# Patient Record
Sex: FEMALE | Race: WHITE | NOT HISPANIC OR LATINO | Employment: OTHER | URBAN - METROPOLITAN AREA
[De-identification: names, ages, dates, MRNs, and addresses within clinical notes are randomized per-mention and may not be internally consistent; named-entity substitution may affect disease eponyms.]

---

## 2017-01-06 DIAGNOSIS — E78.00 ELEVATED CHOLESTEROL: ICD-10-CM

## 2017-01-06 DIAGNOSIS — Z00.00 HEALTHCARE MAINTENANCE: ICD-10-CM

## 2017-01-06 DIAGNOSIS — M81.0 OSTEOPOROSIS: ICD-10-CM

## 2017-01-10 LAB
25(OH)D3+25(OH)D2 SERPL-MCNC: 25.5 NG/ML (ref 30–100)
ALBUMIN SERPL-MCNC: 4.2 G/DL (ref 3.5–5.2)
ALBUMIN/GLOB SERPL: 1.8 G/DL
ALP SERPL-CCNC: 74 U/L (ref 39–117)
ALT SERPL-CCNC: 13 U/L (ref 1–33)
AST SERPL-CCNC: 20 U/L (ref 1–32)
BILIRUB SERPL-MCNC: 0.3 MG/DL (ref 0.1–1.2)
BUN SERPL-MCNC: 35 MG/DL (ref 8–23)
BUN/CREAT SERPL: 25.4 (ref 7–25)
CALCIUM SERPL-MCNC: 9.7 MG/DL (ref 8.6–10.5)
CHLORIDE SERPL-SCNC: 105 MMOL/L (ref 98–107)
CHOLEST SERPL-MCNC: 180 MG/DL (ref 0–200)
CO2 SERPL-SCNC: 26.6 MMOL/L (ref 22–29)
CREAT SERPL-MCNC: 1.38 MG/DL (ref 0.57–1)
GLOBULIN SER CALC-MCNC: 2.4 GM/DL
GLUCOSE SERPL-MCNC: 80 MG/DL (ref 65–99)
HCV AB S/CO SERPL IA: <0.1 S/CO RATIO (ref 0–0.9)
HDLC SERPL-MCNC: 91 MG/DL (ref 40–60)
LDLC SERPL CALC-MCNC: 77 MG/DL (ref 0–100)
LDLC/HDLC SERPL: 0.85 {RATIO}
POTASSIUM SERPL-SCNC: 3.8 MMOL/L (ref 3.5–5.2)
PROT SERPL-MCNC: 6.6 G/DL (ref 6–8.5)
SODIUM SERPL-SCNC: 144 MMOL/L (ref 136–145)
TRIGL SERPL-MCNC: 60 MG/DL (ref 0–150)
VLDLC SERPL CALC-MCNC: 12 MG/DL (ref 5–40)

## 2017-01-12 ENCOUNTER — OFFICE VISIT (OUTPATIENT)
Dept: INTERNAL MEDICINE | Facility: CLINIC | Age: 67
End: 2017-01-12

## 2017-01-12 VITALS
WEIGHT: 135.2 LBS | HEIGHT: 61 IN | SYSTOLIC BLOOD PRESSURE: 128 MMHG | DIASTOLIC BLOOD PRESSURE: 72 MMHG | OXYGEN SATURATION: 97 % | HEART RATE: 77 BPM | BODY MASS INDEX: 25.53 KG/M2

## 2017-01-12 DIAGNOSIS — E55.9 VITAMIN D DEFICIENCY: ICD-10-CM

## 2017-01-12 DIAGNOSIS — F41.9 ANXIETY: Primary | ICD-10-CM

## 2017-01-12 DIAGNOSIS — D69.6 THROMBOCYTOPENIA (HCC): ICD-10-CM

## 2017-01-12 DIAGNOSIS — M81.0 OSTEOPOROSIS: ICD-10-CM

## 2017-01-12 DIAGNOSIS — G47.00 INSOMNIA, UNSPECIFIED TYPE: ICD-10-CM

## 2017-01-12 DIAGNOSIS — N28.9 RENAL INSUFFICIENCY: ICD-10-CM

## 2017-01-12 PROCEDURE — 99214 OFFICE O/P EST MOD 30 MIN: CPT | Performed by: INTERNAL MEDICINE

## 2017-01-12 RX ORDER — ALPRAZOLAM 0.5 MG/1
0.5 TABLET ORAL 3 TIMES DAILY PRN
COMMUNITY
End: 2017-01-12 | Stop reason: SDUPTHER

## 2017-01-12 RX ORDER — ALPRAZOLAM 0.5 MG/1
0.5 TABLET ORAL 3 TIMES DAILY PRN
Qty: 20 TABLET | Refills: 0 | Status: SHIPPED | OUTPATIENT
Start: 2017-01-12 | End: 2018-02-15 | Stop reason: SDUPTHER

## 2017-01-12 NOTE — MR AVS SNAPSHOT
Carol Osgood   1/12/2017 1:30 PM   Office Visit    Dept Phone:  [phone]   Encounter #:  [id number]    Provider:  Ciera Newman MD   Department:  [facility] INTERNAL MEDICINE                Your Full Care Plan              Today's Medication Changes          These changes are accurate as of: 1/12/17  2:49 PM.  If you have any questions, ask your nurse or doctor.               Stop taking medication(s)listed here:     calcium carbonate 1250 (500 CA) MG tablet   Commonly known as:  OS-MOLLY   Stopped by:  Ciera Newman MD                Where to Get Your Medications      You can get these medications from any pharmacy     Bring a paper prescription for each of these medications     ALPRAZolam 0.5 MG tablet                  Your Updated Medication List          This list is accurate as of: 1/12/17  2:49 PM.  Always use your most recent med list.                ALPRAZolam 0.5 MG tablet   Commonly known as:  XANAX   Take 1 tablet by mouth 3 (Three) Times a Day As Needed for anxiety.               You Were Diagnosed With        Codes Comments    Anxiety    -  Primary ICD-10-CM: F41.9  ICD-9-CM: 300.00     Insomnia, unspecified type     ICD-10-CM: G47.00  ICD-9-CM: 780.52     Vitamin D deficiency     ICD-10-CM: E55.9  ICD-9-CM: 268.9     Renal insufficiency     ICD-10-CM: N28.9  ICD-9-CM: 593.9     Thrombocytopenia     ICD-10-CM: D69.6  ICD-9-CM: 287.5 in the past 2010  had a BM BX    Osteoporosis     ICD-10-CM: M81.0  ICD-9-CM: 733.00       Instructions     None    Patient Instructions History      Upcoming Appointments     Visit Type Date Time Department    OFFICE VISIT 1/12/2017  1:30 PM [facility]    [facility] 3/13/2017  9:50 AM [facility]    OFFICE VISIT 3/15/2017 10:30 AM [facility]      MyChart Signup     Our records indicate that you have declined [facility] MyChart signup. If you would like to sign up for [employer], please email  [email or web address] or call [phone] to obtain an activation code.             Other Info from Your Visit           Your Appointments     Mar 13, 2017  9:50 AM EDT   [facility] with [facility]   [facility] INTERNAL MEDICINE (--)    [address] [place] [address]   [phone]            Mar 15, 2017 10:30 AM EDT   Office Visit with Ciera Newman MD   [facility] INTERNAL MEDICINE (--)    [address] [place] [address]   [phone]           Arrive 15 minutes prior to appointment.              Allergies     Celecoxib        Reason for Visit     Anxiety     Osteoporosis     Hyperlipidemia     Insomnia           Vital Signs     Blood Pressure Pulse Height Weight Oxygen Saturation Body Mass Index    128/72 (BP Location: Left arm, Patient Position: Sitting, Cuff Size: Adult) 77 60.5\" (153.7 cm) 135 lb 3.2 oz (61.3 kg) 97% 25.97 kg/m2    Smoking Status                   Never Smoker           Problems and Diagnoses Noted     Anxiety problem    Osteoporosis    Poor kidney function    Decreased platelet count    Vitamin D deficiency    Difficulty falling or staying asleep            "

## 2017-01-12 NOTE — PROGRESS NOTES
Subjective   Carol Osgood is a 66 y.o. female.   Pt. Here to follow up on Anxiety, Hyperlipidemia, and Insomnia.     History of Present Illness   She has been stable with occas use of alprazolam  She has a hard time sleeping  SHe says she has a hard time sleeping.  She says she just worries about getting things done.  She has poor sleep hygeine and knows she needs to work on this  She does complain of a nodule on her left shin  She says it is painful and has been there for a few months  She does have some numbness in her right foot.   She does have a hx or mortons neuromas    The following portions of the patient's history were reviewed and updated as appropriate: allergies, current medications, past medical history, past social history and problem list.  She is not exercising but she does eat a healthy diet    Review of Systems   Musculoskeletal:        Foot pain/numbness   All other systems reviewed and are negative.      Objective   Physical Exam   Constitutional: She is oriented to person, place, and time. She appears well-developed and well-nourished.   HENT:   Head: Normocephalic and atraumatic.   Right Ear: External ear normal.   Left Ear: External ear normal.   Mouth/Throat: Oropharynx is clear and moist.   Eyes: Conjunctivae and EOM are normal. Pupils are equal, round, and reactive to light.   Neck: Normal range of motion. No tracheal deviation present. No thyromegaly present.   Cardiovascular: Normal rate, regular rhythm, normal heart sounds and intact distal pulses.    Pulmonary/Chest: Effort normal and breath sounds normal.   Abdominal: Soft. Bowel sounds are normal. She exhibits no distension. There is no tenderness.   Musculoskeletal: Normal range of motion. She exhibits no edema or deformity.   Neurological: She is alert and oriented to person, place, and time.   Skin: Skin is warm and dry.   Small cordlike nodule between 2 large varicosities.   Psychiatric: She has a normal mood and affect. Her  behavior is normal. Judgment and thought content normal.   Vitals reviewed.      Assessment/Plan   Mattie was seen today for anxiety, osteoporosis, hyperlipidemia and insomnia.    Diagnoses and all orders for this visit:    Anxiety    Insomnia, unspecified type    Vitamin D deficiency    Renal insufficiency    Thrombocytopenia  Comments:  in the past 2010  had a BM BX    1. Anxiety-and is doing well with a rare alprazolam.  I'm refilling this today  2. Insomnia-patient is not interested in any medication.  She really needs to work on good sleep hygiene and she knows what to do.  She is going to work harder on this and we will see her back in a couple of months  3. Vit d def-she needs 2000IU daily and we will recheck next visit  4. CRI- increase H2o and recheck  5. Osteoporosis- due BMX  We will check this   6.  Superficial thrombophlebitis: This appears very small.  She completed some heat on it a couple of times a day that should resolve completely

## 2017-01-17 ENCOUNTER — APPOINTMENT (OUTPATIENT)
Dept: BONE DENSITY | Facility: HOSPITAL | Age: 67
End: 2017-01-17

## 2017-01-19 ENCOUNTER — HOSPITAL ENCOUNTER (OUTPATIENT)
Dept: BONE DENSITY | Facility: HOSPITAL | Age: 67
Discharge: HOME | End: 2017-01-19
Admitting: INTERNAL MEDICINE

## 2017-01-19 DIAGNOSIS — M81.0 OSTEOPOROSIS: ICD-10-CM

## 2017-01-19 PROCEDURE — 77080 DXA BONE DENSITY AXIAL: CPT

## 2017-03-12 ENCOUNTER — RESULTS ENCOUNTER (OUTPATIENT)
Dept: INTERNAL MEDICINE | Facility: CLINIC | Age: 67
End: 2017-03-12

## 2017-03-12 DIAGNOSIS — N28.9 RENAL INSUFFICIENCY: ICD-10-CM

## 2017-03-12 DIAGNOSIS — E55.9 VITAMIN D DEFICIENCY: ICD-10-CM

## 2017-03-12 DIAGNOSIS — D69.6 THROMBOCYTOPENIA (HCC): ICD-10-CM

## 2017-03-14 LAB
25(OH)D3+25(OH)D2 SERPL-MCNC: 30.4 NG/ML (ref 30–100)
BASOPHILS # BLD AUTO: 0.05 10*3/MM3 (ref 0–0.2)
BASOPHILS NFR BLD AUTO: 1.3 % (ref 0–1.5)
BUN SERPL-MCNC: 31 MG/DL (ref 8–23)
BUN/CREAT SERPL: 23.1 (ref 7–25)
CALCIUM SERPL-MCNC: 9.9 MG/DL (ref 8.6–10.5)
CHLORIDE SERPL-SCNC: 100 MMOL/L (ref 98–107)
CO2 SERPL-SCNC: 27 MMOL/L (ref 22–29)
CREAT SERPL-MCNC: 1.34 MG/DL (ref 0.57–1)
EOSINOPHIL # BLD AUTO: 0.11 10*3/MM3 (ref 0–0.7)
EOSINOPHIL NFR BLD AUTO: 2.8 % (ref 0.3–6.2)
ERYTHROCYTE [DISTWIDTH] IN BLOOD BY AUTOMATED COUNT: 12.4 % (ref 11.7–13)
GLUCOSE SERPL-MCNC: 80 MG/DL (ref 65–99)
HCT VFR BLD AUTO: 36.1 % (ref 35.6–45.5)
HGB BLD-MCNC: 11.9 G/DL (ref 11.9–15.5)
IMM GRANULOCYTES # BLD: 0 10*3/MM3 (ref 0–0.03)
IMM GRANULOCYTES NFR BLD: 0 % (ref 0–0.5)
LYMPHOCYTES # BLD AUTO: 0.9 10*3/MM3 (ref 0.9–4.8)
LYMPHOCYTES NFR BLD AUTO: 22.9 % (ref 19.6–45.3)
MCH RBC QN AUTO: 31.8 PG (ref 26.9–32)
MCHC RBC AUTO-ENTMCNC: 33 G/DL (ref 32.4–36.3)
MCV RBC AUTO: 96.5 FL (ref 80.5–98.2)
MONOCYTES # BLD AUTO: 0.44 10*3/MM3 (ref 0.2–1.2)
MONOCYTES NFR BLD AUTO: 11.2 % (ref 5–12)
NEUTROPHILS # BLD AUTO: 2.43 10*3/MM3 (ref 1.9–8.1)
NEUTROPHILS NFR BLD AUTO: 61.8 % (ref 42.7–76)
PLATELET # BLD AUTO: 126 10*3/MM3 (ref 140–500)
POTASSIUM SERPL-SCNC: 4.2 MMOL/L (ref 3.5–5.2)
RBC # BLD AUTO: 3.74 10*6/MM3 (ref 3.9–5.2)
SODIUM SERPL-SCNC: 140 MMOL/L (ref 136–145)
WBC # BLD AUTO: 3.93 10*3/MM3 (ref 4.5–10.7)

## 2017-03-15 ENCOUNTER — OFFICE VISIT (OUTPATIENT)
Dept: INTERNAL MEDICINE | Facility: CLINIC | Age: 67
End: 2017-03-15

## 2017-03-15 VITALS
DIASTOLIC BLOOD PRESSURE: 74 MMHG | HEART RATE: 75 BPM | SYSTOLIC BLOOD PRESSURE: 118 MMHG | BODY MASS INDEX: 25.86 KG/M2 | HEIGHT: 61 IN | WEIGHT: 137 LBS | OXYGEN SATURATION: 98 %

## 2017-03-15 DIAGNOSIS — E55.9 VITAMIN D DEFICIENCY: Primary | ICD-10-CM

## 2017-03-15 DIAGNOSIS — N28.9 RENAL INSUFFICIENCY: ICD-10-CM

## 2017-03-15 DIAGNOSIS — L30.9 ECZEMA, UNSPECIFIED TYPE: ICD-10-CM

## 2017-03-15 PROCEDURE — 99213 OFFICE O/P EST LOW 20 MIN: CPT | Performed by: INTERNAL MEDICINE

## 2017-03-15 RX ORDER — TRIAMCINOLONE ACETONIDE 5 MG/G
OINTMENT TOPICAL 2 TIMES DAILY
Qty: 15 G | Refills: 1 | Status: SHIPPED | OUTPATIENT
Start: 2017-03-15 | End: 2018-07-25

## 2017-03-15 RX ORDER — CHOLECALCIFEROL (VITAMIN D3) 125 MCG
1 TABLET ORAL DAILY
COMMUNITY
End: 2017-09-19

## 2017-03-15 NOTE — PROGRESS NOTES
Subjective   Carol Osgood is a 66 y.o. female here today to f/u on vitamin d def, low hemoglobin and renal insufficiency.      History of Present Illness   She has been drinking more water and she does avoid NSAIDs  She is taking vit d daily  She still has some xanax but rarely needs to take this    The following portions of the patient's history were reviewed and updated as appropriate: allergies, current medications, past medical history, past social history and problem list.  She has been staying active but exercise as much    Review of Systems   All other systems reviewed and are negative.      Objective   Physical Exam   Constitutional: She is oriented to person, place, and time. She appears well-developed and well-nourished.   HENT:   Head: Normocephalic and atraumatic.   Right Ear: External ear normal.   Left Ear: External ear normal.   Mouth/Throat: Oropharynx is clear and moist.   Eyes: Conjunctivae and EOM are normal. Pupils are equal, round, and reactive to light.   Neck: Normal range of motion. No tracheal deviation present. No thyromegaly present.   Cardiovascular: Normal rate, regular rhythm, normal heart sounds and intact distal pulses.    Pulmonary/Chest: Effort normal and breath sounds normal.   Abdominal: Soft. Bowel sounds are normal. She exhibits no distension. There is no tenderness.   Musculoskeletal: Normal range of motion. She exhibits no edema or deformity.   Neurological: She is alert and oriented to person, place, and time.   Skin: Skin is warm and dry.   Small red papules on the back without any crusting   Psychiatric: She has a normal mood and affect. Her behavior is normal. Judgment and thought content normal.   Vitals reviewed.      Vitals:    03/15/17 1043   BP: 118/74   Pulse: 75   SpO2: 98%        Results Encounter on 03/12/2017   Component Date Value Ref Range Status   • WBC 03/13/2017 3.93* 4.50 - 10.70 10*3/mm3 Final   • RBC 03/13/2017 3.74* 3.90 - 5.20 10*6/mm3 Final   •  Hemoglobin 03/13/2017 11.9  11.9 - 15.5 g/dL Final   • Hematocrit 03/13/2017 36.1  35.6 - 45.5 % Final   • MCV 03/13/2017 96.5  80.5 - 98.2 fL Final   • MCH 03/13/2017 31.8  26.9 - 32.0 pg Final   • MCHC 03/13/2017 33.0  32.4 - 36.3 g/dL Final   • RDW 03/13/2017 12.4  11.7 - 13.0 % Final   • Platelets 03/13/2017 126* 140 - 500 10*3/mm3 Final   • Neutrophil Rel % 03/13/2017 61.8  42.7 - 76.0 % Final   • Lymphocyte Rel % 03/13/2017 22.9  19.6 - 45.3 % Final   • Monocyte Rel % 03/13/2017 11.2  5.0 - 12.0 % Final   • Eosinophil Rel % 03/13/2017 2.8  0.3 - 6.2 % Final   • Basophil Rel % 03/13/2017 1.3  0.0 - 1.5 % Final   • Neutrophils Absolute 03/13/2017 2.43  1.90 - 8.10 10*3/mm3 Final   • Lymphocytes Absolute 03/13/2017 0.90  0.90 - 4.80 10*3/mm3 Final   • Monocytes Absolute 03/13/2017 0.44  0.20 - 1.20 10*3/mm3 Final   • Eosinophils Absolute 03/13/2017 0.11  0.00 - 0.70 10*3/mm3 Final   • Basophils Absolute 03/13/2017 0.05  0.00 - 0.20 10*3/mm3 Final   • Immature Granulocyte Rel % 03/13/2017 0.0  0.0 - 0.5 % Final   • Immature Grans Absolute 03/13/2017 0.00  0.00 - 0.03 10*3/mm3 Final   • Glucose 03/13/2017 80  65 - 99 mg/dL Final   • BUN 03/13/2017 31* 8 - 23 mg/dL Final   • Creatinine 03/13/2017 1.34* 0.57 - 1.00 mg/dL Final   • eGFR Non  Am 03/13/2017 39* >60 mL/min/1.73 Final   • eGFR African Am 03/13/2017 48* >60 mL/min/1.73 Final   • BUN/Creatinine Ratio 03/13/2017 23.1  7.0 - 25.0 Final   • Sodium 03/13/2017 140  136 - 145 mmol/L Final   • Potassium 03/13/2017 4.2  3.5 - 5.2 mmol/L Final   • Chloride 03/13/2017 100  98 - 107 mmol/L Final   • Total CO2 03/13/2017 27.0  22.0 - 29.0 mmol/L Final   • Calcium 03/13/2017 9.9  8.6 - 10.5 mg/dL Final   • 25 Hydroxy, Vitamin D 03/13/2017 30.4  30.0 - 100.0 ng/mL Final    Comment: Reference Range for Total Vitamin D 25(OH)  Deficiency    <20.0 ng/mL  Insufficiency 21-29 ng/mL  Sufficiency    ng/mL  Toxicity      >100 ng/ml            Current Outpatient  Prescriptions:   •  ALPRAZolam (XANAX) 0.5 MG tablet, Take 1 tablet by mouth 3 (Three) Times a Day As Needed for anxiety., Disp: 20 tablet, Rfl: 0  •  Ergocalciferol (VITAMIN D2) 2000 UNITS tablet, Take 1 tablet by mouth Daily., Disp: , Rfl:       Assessment/Plan   Diagnoses and all orders for this visit:    Vitamin D deficiency    Renal insufficiency    Eczema, unspecified type      1.  Vitamin D deficiency: Patient is going to team continued to take the over-the-counter and try to get some sunlight exposure  2.  Chronic renal insufficiency stable.  She will continue to avoid NSAIDs  3.  Eczema: She has a little bit of area on her back.  She is going to try some triamcinolone cream for a week and see if that helps

## 2017-07-14 ENCOUNTER — OFFICE VISIT (OUTPATIENT)
Dept: INTERNAL MEDICINE | Facility: CLINIC | Age: 67
End: 2017-07-14

## 2017-07-14 VITALS
HEIGHT: 67 IN | BODY MASS INDEX: 21.57 KG/M2 | SYSTOLIC BLOOD PRESSURE: 144 MMHG | DIASTOLIC BLOOD PRESSURE: 82 MMHG | OXYGEN SATURATION: 98 % | HEART RATE: 60 BPM | WEIGHT: 137.44 LBS

## 2017-07-14 DIAGNOSIS — L98.9 SKIN LESION: ICD-10-CM

## 2017-07-14 DIAGNOSIS — R10.84 GENERALIZED ABDOMINAL PAIN: ICD-10-CM

## 2017-07-14 DIAGNOSIS — K59.00 CONSTIPATION, UNSPECIFIED CONSTIPATION TYPE: ICD-10-CM

## 2017-07-14 DIAGNOSIS — N28.9 RENAL INSUFFICIENCY: Primary | ICD-10-CM

## 2017-07-14 LAB
ALBUMIN SERPL-MCNC: 4.3 G/DL (ref 3.5–5.2)
ALBUMIN/GLOB SERPL: 1.9 G/DL
ALP SERPL-CCNC: 82 U/L (ref 39–117)
ALT SERPL-CCNC: 18 U/L (ref 1–33)
AST SERPL-CCNC: 23 U/L (ref 1–32)
BILIRUB SERPL-MCNC: 0.4 MG/DL (ref 0.1–1.2)
BUN SERPL-MCNC: 31 MG/DL (ref 8–23)
BUN/CREAT SERPL: 22.1 (ref 7–25)
CALCIUM SERPL-MCNC: 9.8 MG/DL (ref 8.6–10.5)
CHLORIDE SERPL-SCNC: 104 MMOL/L (ref 98–107)
CO2 SERPL-SCNC: 27.5 MMOL/L (ref 22–29)
CREAT SERPL-MCNC: 1.4 MG/DL (ref 0.57–1)
GLOBULIN SER CALC-MCNC: 2.3 GM/DL
GLUCOSE SERPL-MCNC: 82 MG/DL (ref 65–99)
POTASSIUM SERPL-SCNC: 4.4 MMOL/L (ref 3.5–5.2)
PROT SERPL-MCNC: 6.6 G/DL (ref 6–8.5)
SODIUM SERPL-SCNC: 143 MMOL/L (ref 136–145)

## 2017-07-14 PROCEDURE — 99214 OFFICE O/P EST MOD 30 MIN: CPT | Performed by: NURSE PRACTITIONER

## 2017-07-14 NOTE — PROGRESS NOTES
Subjective   Carol Osgood is a 66 y.o. female. Patient is here today for   Chief Complaint   Patient presents with   • Insect Bite     Pt complains of having insect bites all over her body. Pt states that these bites have been present x1 month.    .    History of Present Illness   C/o bug bites that have occurred over the past month.  Patient states that she does not feel anything when she gets bit and she does not have any itching.  The spot starts out red and then turns into a blister. States that this happened to her last summer and when the weather has changed, she improved.  She has been outside working in her yard.  The platelets seem to be mostly on her torso area.  She did go to the urgent care and was told that she possibly had an autoimmune disease.  She has used Neosporin and a bandaid until they heal. Happens each year at this time. She tried zyrtec which helped some with the redness    She has a few areas of bruising and states that she read somewhere that it could be due to a Vit C deficiency.   She has also had some issues with abdominal distention.  She has seen GI in the past and has had a colonoscopy.  She did not have good prep with a colonoscopy and was told that she would have to have it redone, but she wasn't sure if insurance would pay for it.  She has had some problems with constipation in the past along with some diarrhea. Denies abdominal pain. She is wanting to do an all natural liver detox and is wanting to know if that is okay to take.    The following portions of the patient's history were reviewed and updated as appropriate: allergies, current medications, past family history, past medical history, past social history, past surgical history and problem list.    Review of Systems   Constitutional: Negative.    Respiratory: Negative.    Cardiovascular: Negative.    Gastrointestinal: Positive for abdominal distention and constipation. Negative for abdominal pain and nausea.    Genitourinary: Negative.    Skin: Positive for rash.   Hematological: Bruises/bleeds easily.       Objective   Vitals:    07/14/17 0937   BP: 144/82   Pulse: 60   SpO2: 98%     Physical Exam   Constitutional: Vital signs are normal. She appears well-developed and well-nourished.   Cardiovascular: Normal rate, regular rhythm and normal heart sounds.    Pulmonary/Chest: Effort normal and breath sounds normal.   Abdominal: Soft. Normal appearance and bowel sounds are normal. She exhibits distension. There is no hepatomegaly. There is no tenderness. No hernia.   Skin: Skin is warm and dry. Lesion noted.        Psychiatric: She has a normal mood and affect. Her speech is normal and behavior is normal. Thought content normal.   Nursing note and vitals reviewed.    Reviewed colonoscopy done on 7/8/2015. Patient had poor prep and was recommended to repeat the colonoscopy.     Assessment/Plan   Mattie was seen today for insect bite.    Diagnoses and all orders for this visit:    Renal insufficiency  -     Comprehensive Metabolic Panel    Generalized abdominal pain  -     Ambulatory Referral to Gastroenterology    Constipation, unspecified constipation type  -     Ambulatory Referral to Gastroenterology    Skin lesion    skin lesion - Patient will follow up with her dermatologist.     Renal insufficiency - will check a CMP today before giving patient the okay to do a liver cleanse. It is an herbal cleanse. Patient has had some renal insufficiency in the past.     She has a follow up appointment with her primary care physician, Ciera Newman, next month.

## 2017-07-17 ENCOUNTER — TELEPHONE (OUTPATIENT)
Dept: INTERNAL MEDICINE | Facility: CLINIC | Age: 67
End: 2017-07-17

## 2017-07-17 PROBLEM — K59.00 CONSTIPATION: Status: ACTIVE | Noted: 2017-07-17

## 2017-07-17 PROBLEM — R10.84 GENERALIZED ABDOMINAL PAIN: Status: ACTIVE | Noted: 2017-07-17

## 2017-07-17 NOTE — TELEPHONE ENCOUNTER
"----- Message from NAOMI Bishop sent at 7/17/2017  8:52 AM EDT -----  Please let patient know that she still has renal insufficiency and it hasn't improved. Her liver enzymes are normal. I would not recommend that she take the \"liver cleanse\" that she asked me about. She needs to follow up with Dr. Newman next month.  "

## 2017-08-25 ENCOUNTER — OFFICE (OUTPATIENT)
Dept: URBAN - METROPOLITAN AREA CLINIC 1 | Facility: CLINIC | Age: 67
End: 2017-08-25

## 2017-08-25 VITALS
WEIGHT: 136 LBS | HEIGHT: 67 IN | DIASTOLIC BLOOD PRESSURE: 70 MMHG | SYSTOLIC BLOOD PRESSURE: 122 MMHG | HEART RATE: 76 BPM

## 2017-08-25 DIAGNOSIS — K59.09 OTHER CONSTIPATION: ICD-10-CM

## 2017-08-25 DIAGNOSIS — Z12.11 ENCOUNTER FOR SCREENING FOR MALIGNANT NEOPLASM OF COLON: ICD-10-CM

## 2017-08-25 PROCEDURE — 99204 OFFICE O/P NEW MOD 45 MIN: CPT | Performed by: INTERNAL MEDICINE

## 2017-08-25 NOTE — SERVICEHPINOTES
Thank you very much for allowing me to evaluate Ms. Osgood. As you know she is a very pleasant healthy 66-year-old white female with lifelong constipation. She says she remembers her mother giving her milk of magnesia as a little role. She tries to manage with diet. She says she eats prunes and that helps but she can go up to 3 days without a bowel movement. She has not taken any fiber supplements. She eats nuts and carrots R eventually more roughage and not fiber. She also says consultations worse if she is traveling in her weight from home. There is no blood in the bowels, she will have lower abdominal pain. She'll also have upper abdominal fullness if she has not had a bowel movement in several days. She had a cousin had colon cancer in their 20s, she had an aunt with colon cancer in their 90s. She has no other family members with polyps or colon cancer spelled but probably is not a risk factor for her. She had an attempted colonoscopy little over 2 years ago and had a poor prep so she's not had an adequate exam.She has no upper GI symptoms, she denies any reflux. There is no dysphagia, odynophagia or nausea or vomiting. There is no melena or hematemesis. She is in no distress, she does not look acutely ill. She does not smoke or drink.

## 2017-09-13 LAB
25(OH)D3+25(OH)D2 SERPL-MCNC: 39 NG/ML (ref 30–100)
ALBUMIN SERPL-MCNC: 4.4 G/DL (ref 3.5–5.2)
ALBUMIN/GLOB SERPL: 2.1 G/DL
ALP SERPL-CCNC: 70 U/L (ref 39–117)
ALT SERPL-CCNC: 17 U/L (ref 1–33)
AST SERPL-CCNC: 23 U/L (ref 1–32)
BASOPHILS # BLD AUTO: 0.03 10*3/MM3 (ref 0–0.2)
BASOPHILS NFR BLD AUTO: 0.7 % (ref 0–1.5)
BILIRUB SERPL-MCNC: 0.6 MG/DL (ref 0.1–1.2)
BUN SERPL-MCNC: 30 MG/DL (ref 8–23)
BUN/CREAT SERPL: 22.7 (ref 7–25)
CALCIUM SERPL-MCNC: 9.8 MG/DL (ref 8.6–10.5)
CHLORIDE SERPL-SCNC: 102 MMOL/L (ref 98–107)
CO2 SERPL-SCNC: 29.4 MMOL/L (ref 22–29)
CREAT SERPL-MCNC: 1.32 MG/DL (ref 0.57–1)
EOSINOPHIL # BLD AUTO: 0.07 10*3/MM3 (ref 0–0.7)
EOSINOPHIL NFR BLD AUTO: 1.5 % (ref 0.3–6.2)
ERYTHROCYTE [DISTWIDTH] IN BLOOD BY AUTOMATED COUNT: 13.1 % (ref 11.7–13)
GLOBULIN SER CALC-MCNC: 2.1 GM/DL
GLUCOSE SERPL-MCNC: 77 MG/DL (ref 65–99)
HCT VFR BLD AUTO: 37.5 % (ref 35.6–45.5)
HGB BLD-MCNC: 12.1 G/DL (ref 11.9–15.5)
IMM GRANULOCYTES # BLD: 0 10*3/MM3 (ref 0–0.03)
IMM GRANULOCYTES NFR BLD: 0 % (ref 0–0.5)
LYMPHOCYTES # BLD AUTO: 1.25 10*3/MM3 (ref 0.9–4.8)
LYMPHOCYTES NFR BLD AUTO: 27.2 % (ref 19.6–45.3)
MCH RBC QN AUTO: 31.4 PG (ref 26.9–32)
MCHC RBC AUTO-ENTMCNC: 32.3 G/DL (ref 32.4–36.3)
MCV RBC AUTO: 97.4 FL (ref 80.5–98.2)
MONOCYTES # BLD AUTO: 0.37 10*3/MM3 (ref 0.2–1.2)
MONOCYTES NFR BLD AUTO: 8 % (ref 5–12)
NEUTROPHILS # BLD AUTO: 2.88 10*3/MM3 (ref 1.9–8.1)
NEUTROPHILS NFR BLD AUTO: 62.6 % (ref 42.7–76)
PLATELET # BLD AUTO: 141 10*3/MM3 (ref 140–500)
POTASSIUM SERPL-SCNC: 4.1 MMOL/L (ref 3.5–5.2)
PROT SERPL-MCNC: 6.5 G/DL (ref 6–8.5)
RBC # BLD AUTO: 3.85 10*6/MM3 (ref 3.9–5.2)
SODIUM SERPL-SCNC: 143 MMOL/L (ref 136–145)
WBC # BLD AUTO: 4.6 10*3/MM3 (ref 4.5–10.7)

## 2017-09-15 ENCOUNTER — RESULTS ENCOUNTER (OUTPATIENT)
Dept: INTERNAL MEDICINE | Facility: CLINIC | Age: 67
End: 2017-09-15

## 2017-09-15 DIAGNOSIS — E55.9 VITAMIN D DEFICIENCY: ICD-10-CM

## 2017-09-15 DIAGNOSIS — N28.9 RENAL INSUFFICIENCY: ICD-10-CM

## 2017-09-19 ENCOUNTER — OFFICE VISIT (OUTPATIENT)
Dept: INTERNAL MEDICINE | Facility: CLINIC | Age: 67
End: 2017-09-19

## 2017-09-19 VITALS
OXYGEN SATURATION: 97 % | HEIGHT: 67 IN | SYSTOLIC BLOOD PRESSURE: 106 MMHG | BODY MASS INDEX: 21.28 KG/M2 | HEART RATE: 76 BPM | DIASTOLIC BLOOD PRESSURE: 58 MMHG | WEIGHT: 135.6 LBS

## 2017-09-19 DIAGNOSIS — R41.3 MEMORY LOSS: ICD-10-CM

## 2017-09-19 DIAGNOSIS — G89.29 CHRONIC BILATERAL LOW BACK PAIN WITHOUT SCIATICA: ICD-10-CM

## 2017-09-19 DIAGNOSIS — N28.9 RENAL INSUFFICIENCY: Primary | ICD-10-CM

## 2017-09-19 DIAGNOSIS — Z00.00 MEDICARE ANNUAL WELLNESS VISIT, INITIAL: ICD-10-CM

## 2017-09-19 DIAGNOSIS — M54.50 CHRONIC BILATERAL LOW BACK PAIN WITHOUT SCIATICA: ICD-10-CM

## 2017-09-19 DIAGNOSIS — E55.9 VITAMIN D DEFICIENCY: ICD-10-CM

## 2017-09-19 PROBLEM — R10.84 GENERALIZED ABDOMINAL PAIN: Status: RESOLVED | Noted: 2017-07-17 | Resolved: 2017-09-19

## 2017-09-19 PROCEDURE — 99213 OFFICE O/P EST LOW 20 MIN: CPT | Performed by: INTERNAL MEDICINE

## 2017-09-19 PROCEDURE — G0438 PPPS, INITIAL VISIT: HCPCS | Performed by: INTERNAL MEDICINE

## 2017-09-19 RX ORDER — CHOLECALCIFEROL (VITAMIN D3) 2400/ML
2000 LIQUID (ML) MISCELLANEOUS DAILY
COMMUNITY
End: 2018-07-27

## 2017-09-19 NOTE — PATIENT INSTRUCTIONS
Fall Prevention in the Home   Falls can cause injuries. They can happen to people of all ages. There are many things you can do to make your home safe and to help prevent falls.   WHAT CAN I DO ON THE OUTSIDE OF MY HOME?  · Regularly fix the edges of walkways and driveways and fix any cracks.  · Remove anything that might make you trip as you walk through a door, such as a raised step or threshold.  · Trim any bushes or trees on the path to your home.  · Use bright outdoor lighting.  · Clear any walking paths of anything that might make someone trip, such as rocks or tools.  · Regularly check to see if handrails are loose or broken. Make sure that both sides of any steps have handrails.  · Any raised decks and porches should have guardrails on the edges.  · Have any leaves, snow, or ice cleared regularly.  · Use sand or salt on walking paths during winter.  · Clean up any spills in your garage right away. This includes oil or grease spills.  WHAT CAN I DO IN THE BATHROOM?   · Use night lights.  · Install grab bars by the toilet and in the tub and shower. Do not use towel bars as grab bars.  · Use non-skid mats or decals in the tub or shower.  · If you need to sit down in the shower, use a plastic, non-slip stool.  · Keep the floor dry. Clean up any water that spills on the floor as soon as it happens.  · Remove soap buildup in the tub or shower regularly.  · Attach bath mats securely with double-sided non-slip rug tape.  · Do not have throw rugs and other things on the floor that can make you trip.  WHAT CAN I DO IN THE BEDROOM?  · Use night lights.  · Make sure that you have a light by your bed that is easy to reach.  · Do not use any sheets or blankets that are too big for your bed. They should not hang down onto the floor.  · Have a firm chair that has side arms. You can use this for support while you get dressed.  · Do not have throw rugs and other things on the floor that can make you trip.  WHAT CAN I DO IN  THE KITCHEN?  · Clean up any spills right away.  · Avoid walking on wet floors.  · Keep items that you use a lot in easy-to-reach places.  · If you need to reach something above you, use a strong step stool that has a grab bar.  · Keep electrical cords out of the way.  · Do not use floor polish or wax that makes floors slippery. If you must use wax, use non-skid floor wax.  · Do not have throw rugs and other things on the floor that can make you trip.  WHAT CAN I DO WITH MY STAIRS?  · Do not leave any items on the stairs.  · Make sure that there are handrails on both sides of the stairs and use them. Fix handrails that are broken or loose. Make sure that handrails are as long as the stairways.  · Check any carpeting to make sure that it is firmly attached to the stairs. Fix any carpet that is loose or worn.  · Avoid having throw rugs at the top or bottom of the stairs. If you do have throw rugs, attach them to the floor with carpet tape.  · Make sure that you have a light switch at the top of the stairs and the bottom of the stairs. If you do not have them, ask someone to add them for you.  WHAT ELSE CAN I DO TO HELP PREVENT FALLS?  · Wear shoes that:    Do not have high heels.    Have rubber bottoms.    Are comfortable and fit you well.    Are closed at the toe. Do not wear sandals.  · If you use a stepladder:    Make sure that it is fully opened. Do not climb a closed stepladder.    Make sure that both sides of the stepladder are locked into place.    Ask someone to hold it for you, if possible.  · Clearly lamine and make sure that you can see:    Any grab bars or handrails.    First and last steps.    Where the edge of each step is.  · Use tools that help you move around (mobility aids) if they are needed. These include:    Canes.    Walkers.    Scooters.    Crutches.  · Turn on the lights when you go into a dark area. Replace any light bulbs as soon as they burn out.  · Set up your furniture so you have a clear  path. Avoid moving your furniture around.  · If any of your floors are uneven, fix them.  · If there are any pets around you, be aware of where they are.  · Review your medicines with your doctor. Some medicines can make you feel dizzy. This can increase your chance of falling.  Ask your doctor what other things that you can do to help prevent falls.     This information is not intended to replace advice given to you by your health care provider. Make sure you discuss any questions you have with your health care provider.     Document Released: 10/14/2010 Document Revised: 2016 Document Reviewed: 2016  [employer] Interactive Patient Education © [employer]    Medicare Wellness  Personal Prevention Plan of Service     Date of Office Visit:  2017  Encounter Provider:  Ciera Newman MD  Place of Service:  [facility] INTERNAL MEDICINE  Patient Name: Carol Osgood  :  1950    As part of the Medicare Wellness portion of your visit today, we are providing you with this personalized preventive plan of services (PPPS). This plan is based upon recommendations of the [place] Preventive Services Task Force (USPSTF) and the Advisory Committee on Immunization Practices (ACIP).    This lists the preventive care services that should be considered, and provides dates of when you are due. Items listed as completed are up-to-date and do not require any further intervention.    Health Maintenance   Topic Date Due   • TDAP/TD VACCINES (1 - Tdap) 2009   • LIPID PANEL  2018   • PNEUMOCOCCAL VACCINES (65+ LOW/MEDIUM RISK) (2 of 2 - PPSV23) 03/15/2018   • MEDICARE ANNUAL WELLNESS  2018   • MAMMOGRAM  2018   • DXA SCAN  2019   • COLONOSCOPY  2025   • HEPATITIS C SCREENING  Completed   • INFLUENZA VACCINE  Addressed   • ZOSTER VACCINE  Completed       No orders of the defined types were placed in this encounter.      No Follow-up on file.

## 2017-09-19 NOTE — PROGRESS NOTES
QUICK REFERENCE INFORMATION:  The ABCs of the Annual Wellness Visit    Initial Medicare Wellness Visit    HEALTH RISK ASSESSMENT    1950    Recent Hospitalizations:  No hospitalization(s) within the last year..        Current Medical Providers:  Patient Care Team:  Ciera Newman MD as PCP - General  Ciera Newman MD as PCP - Family Medicine  Damon Perez MD as PCP - Claims Attributed        Smoking Status:  History   Smoking Status   • Never Smoker   Smokeless Tobacco   • Never Used       Alcohol Consumption:  History   Alcohol Use No       Depression Screen:   PHQ-2/PHQ-9 Depression Screening 9/19/2017   Little interest or pleasure in doing things 0   Feeling down, depressed, or hopeless 0   Trouble falling or staying asleep, or sleeping too much 0   Feeling tired or having little energy 2   Poor appetite or overeating 1   Feeling bad about yourself - or that you are a failure or have let yourself or your family down 0   Trouble concentrating on things, such as reading the newspaper or watching television 0   Moving or speaking so slowly that other people could have noticed. Or the opposite - being so fidgety or restless that you have been moving around a lot more than usual 0   Thoughts that you would be better off dead, or of hurting yourself in some way 0   Total Score 3       Health Habits and Functional and Cognitive Screening:  Functional & Cognitive Status 9/19/2017   Do you have difficulty preparing food and eating? No   Do you have difficulty bathing yourself? No   Do you have difficulty getting dressed? No   Do you have difficulty using the toilet? No   Do you have difficulty moving around from place to place? Yes   In the past year have you fallen or experienced a near fall? Yes   Do you need help using the phone?  No   Are you deaf or do you have serious difficulty hearing?  No   Do you need help with transportation? No   Do you need help shopping? No   Do you need help preparing meals?  No    Do you need help with housework?  Yes   Do you need help with laundry? No   Do you need help taking your medications? No   Do you need help managing money? No   Do you have difficulty concentrating, remembering or making decisions? Yes                  Does the patient have evidence of cognitive impairment? Yes occasionally forget names.  She does not get lost    Asiprin use counseling: Does not need ASA (and currently is not on it)      Recent Lab Results:    Visual Acuity:  No exam data present    Age-appropriate Screening Schedule:  Refer to the list below for future screening recommendations based on patient's age, sex and/or medical conditions. Orders for these recommended tests are listed in the plan section. The patient has been provided with a written plan.    Health Maintenance   Topic Date Due   • TDAP/TD VACCINES (1 - Tdap) 09/02/2009   • LIPID PANEL  01/09/2018   • PNEUMOCOCCAL VACCINES (65+ LOW/MEDIUM RISK) (2 of 2 - PPSV23) 03/15/2018   • MAMMOGRAM  11/29/2018   • DXA SCAN  01/19/2019   • COLONOSCOPY  07/08/2025   • INFLUENZA VACCINE  Addressed   • ZOSTER VACCINE  Completed        Subjective   History of Present Illness    Carol Osgood is a 66 y.o. female who presents for an Annual Wellness Visit.    The following portions of the patient's history were reviewed and updated as appropriate: allergies, current medications, past family history, past medical history, past social history, past surgical history and problem list.    Outpatient Medications Prior to Visit   Medication Sig Dispense Refill   • ALPRAZolam (XANAX) 0.5 MG tablet Take 1 tablet by mouth 3 (Three) Times a Day As Needed for anxiety. 20 tablet 0   • triamcinolone (KENALOG) 0.5 % ointment Apply  topically 2 (Two) Times a Day. 15 g 1   • Ergocalciferol (VITAMIN D2) 2000 UNITS tablet Take 1 tablet by mouth Daily.       No facility-administered medications prior to visit.        Patient Active Problem List   Diagnosis   • Low back pain   •  "Low hemoglobin   • Menopause present   • Osteoporosis   • Thrombocytopenia   • Renal insufficiency   • Pain and swelling of left knee   • Anxiety   • Vitamin D deficiency   • Constipation       Advance Care Planning:  has NO advance directive - information provided to the patient today    Identification of Risk Factors:  Risk factors include: cardiovascular risk and increased fall risk.    Review of Systems    Compared to one year ago, the patient feels her physical health is the same.  Compared to one year ago, the patient feels her mental health is the same.    Objective     Physical Exam    Vitals:    09/19/17 0913   BP: 106/58   BP Location: Right arm   Patient Position: Sitting   Pulse: 76   SpO2: 97%   Weight: 135 lb 9.6 oz (61.5 kg)   Height: 67\" (170.2 cm)   PainSc:   3       Body mass index is 21.24 kg/(m^2).  Discussed the patient's BMI with her. The BMI is in the acceptable range.    Assessment/Plan   Patient Self-Management and Personalized Health Advice  The patient has been provided with information about: diet, exercise, prevention of cardiac or vascular disease and fall prevention and preventive services including:   · Exercise counseling provided, Fall Risk plan of care done, Nutrition counseling provided.    Visit Diagnoses:    ICD-10-CM ICD-9-CM   1. Renal insufficiency N28.9 593.9   2. Vitamin D deficiency E55.9 268.9   3. Chronic bilateral low back pain without sciatica M54.5 724.2    G89.29 338.29   4. Medicare annual wellness visit, initial Z00.00 V70.0       No orders of the defined types were placed in this encounter.      Outpatient Encounter Prescriptions as of 9/19/2017   Medication Sig Dispense Refill   • ALPRAZolam (XANAX) 0.5 MG tablet Take 1 tablet by mouth 3 (Three) Times a Day As Needed for anxiety. 20 tablet 0   • Cholecalciferol (VITAMIN D3) 2400 UNIT/ML liquid 2,000 Units Daily.     • triamcinolone (KENALOG) 0.5 % ointment Apply  topically 2 (Two) Times a Day. 15 g 1   • " [DISCONTINUED] Ergocalciferol (VITAMIN D2) 2000 UNITS tablet Take 1 tablet by mouth Daily.       No facility-administered encounter medications on file as of 9/19/2017.        Reviewed use of high risk medication in the elderly: yes  Reviewed for potential of harmful drug interactions in the elderly: yes    Follow Up:  No Follow-up on file.     An After Visit Summary and PPPS with all of these plans were given to the patient.   SHe has been careful on step and uneven grouncd     Patient is very cautious on stairs.  She is very interested in Silver sneakers that she has noticed some problems with her balance  Patient is going to make sure she gets enough protein in her diet.  She did well on the Mini-Mental status exam.  She scored 30 out of 30.  But since she is noticing some problems with her memory I did advise some exercises to do with her puzzles to help with memory.  In addition I recommended a diet rich in antioxidants.

## 2017-09-19 NOTE — PROGRESS NOTES
Subjective   Carol Osgood is a 66 y.o. female here today to f/u on renal insufficiency and vitamin d def.    History of Present Illness   Chronic renal insufficiency has been stable for many years  She does avoid NSAIDs  She has been taking the vit d and doing well    The following portions of the patient's history were reviewed and updated as appropriate: allergies, current medications, past medical history, past social history and problem list.  She avoids NSAIDs    Review of Systems   All other systems reviewed and are negative.      Objective   Physical Exam   Constitutional: She is oriented to person, place, and time. She appears well-developed and well-nourished.   HENT:   Head: Normocephalic and atraumatic.   Right Ear: External ear normal.   Left Ear: External ear normal.   Mouth/Throat: Oropharynx is clear and moist.   Eyes: Conjunctivae and EOM are normal. Pupils are equal, round, and reactive to light.   Neck: Normal range of motion. No tracheal deviation present. No thyromegaly present.   Cardiovascular: Normal rate, regular rhythm, normal heart sounds and intact distal pulses.    Pulmonary/Chest: Effort normal and breath sounds normal.   Abdominal: Soft. Bowel sounds are normal. She exhibits no distension. There is no tenderness.   Musculoskeletal: Normal range of motion. She exhibits no edema or deformity.   Neurological: She is alert and oriented to person, place, and time.   Skin: Skin is warm and dry.   Psychiatric: She has a normal mood and affect. Her behavior is normal. Judgment and thought content normal.   Vitals reviewed.      Vitals:    09/19/17 0913   BP: 106/58   Pulse: 76   SpO2: 97%     Results Encounter on 09/15/2017   Component Date Value Ref Range Status   • Glucose 09/13/2017 77  65 - 99 mg/dL Final   • BUN 09/13/2017 30* 8 - 23 mg/dL Final   • Creatinine 09/13/2017 1.32* 0.57 - 1.00 mg/dL Final   • eGFR Non  Am 09/13/2017 40* >60 mL/min/1.73 Final   • eGFR African Am  09/13/2017 49* >60 mL/min/1.73 Final   • BUN/Creatinine Ratio 09/13/2017 22.7  7.0 - 25.0 Final   • Sodium 09/13/2017 143  136 - 145 mmol/L Final   • Potassium 09/13/2017 4.1  3.5 - 5.2 mmol/L Final   • Chloride 09/13/2017 102  98 - 107 mmol/L Final   • Total CO2 09/13/2017 29.4* 22.0 - 29.0 mmol/L Final   • Calcium 09/13/2017 9.8  8.6 - 10.5 mg/dL Final   • Total Protein 09/13/2017 6.5  6.0 - 8.5 g/dL Final   • Albumin 09/13/2017 4.40  3.50 - 5.20 g/dL Final   • Globulin 09/13/2017 2.1  gm/dL Final   • A/G Ratio 09/13/2017 2.1  g/dL Final   • Total Bilirubin 09/13/2017 0.6  0.1 - 1.2 mg/dL Final   • Alkaline Phosphatase 09/13/2017 70  39 - 117 U/L Final   • AST (SGOT) 09/13/2017 23  1 - 32 U/L Final   • ALT (SGPT) 09/13/2017 17  1 - 33 U/L Final   • WBC 09/13/2017 4.60  4.50 - 10.70 10*3/mm3 Final   • RBC 09/13/2017 3.85* 3.90 - 5.20 10*6/mm3 Final   • Hemoglobin 09/13/2017 12.1  11.9 - 15.5 g/dL Final   • Hematocrit 09/13/2017 37.5  35.6 - 45.5 % Final   • MCV 09/13/2017 97.4  80.5 - 98.2 fL Final   • MCH 09/13/2017 31.4  26.9 - 32.0 pg Final   • MCHC 09/13/2017 32.3* 32.4 - 36.3 g/dL Final   • RDW 09/13/2017 13.1* 11.7 - 13.0 % Final   • Platelets 09/13/2017 141  140 - 500 10*3/mm3 Final   • Neutrophil Rel % 09/13/2017 62.6  42.7 - 76.0 % Final   • Lymphocyte Rel % 09/13/2017 27.2  19.6 - 45.3 % Final   • Monocyte Rel % 09/13/2017 8.0  5.0 - 12.0 % Final   • Eosinophil Rel % 09/13/2017 1.5  0.3 - 6.2 % Final   • Basophil Rel % 09/13/2017 0.7  0.0 - 1.5 % Final   • Neutrophils Absolute 09/13/2017 2.88  1.90 - 8.10 10*3/mm3 Final   • Lymphocytes Absolute 09/13/2017 1.25  0.90 - 4.80 10*3/mm3 Final   • Monocytes Absolute 09/13/2017 0.37  0.20 - 1.20 10*3/mm3 Final   • Eosinophils Absolute 09/13/2017 0.07  0.00 - 0.70 10*3/mm3 Final   • Basophils Absolute 09/13/2017 0.03  0.00 - 0.20 10*3/mm3 Final   • Immature Granulocyte Rel % 09/13/2017 0.0  0.0 - 0.5 % Final   • Immature Grans Absolute 09/13/2017 0.00  0.00 -  0.03 10*3/mm3 Final   • 25 Hydroxy, Vitamin D 09/13/2017 39.0  30.0 - 100.0 ng/mL Final    Comment: Reference Range for Total Vitamin D 25(OH)  Deficiency    <20.0 ng/mL  Insufficiency 21-29 ng/mL  Sufficiency    ng/mL  Toxicity      >100 ng/ml             Current Outpatient Prescriptions:   •  ALPRAZolam (XANAX) 0.5 MG tablet, Take 1 tablet by mouth 3 (Three) Times a Day As Needed for anxiety., Disp: 20 tablet, Rfl: 0  •  Cholecalciferol (VITAMIN D3) 2400 UNIT/ML liquid, 2,000 Units Daily., Disp: , Rfl:   •  triamcinolone (KENALOG) 0.5 % ointment, Apply  topically 2 (Two) Times a Day., Disp: 15 g, Rfl: 1      Assessment/Plan   Diagnoses and all orders for this visit:    Renal insufficiency    Vitamin D deficiency    1. CRI- stable but etiologuy unclear.  She will make sure to avoid NSAIDs and dehydration  2. LBP-stable  She will take tylenol as needed.  Sx are stable  3. Vit D def-  She will cont oral vit D

## 2017-10-17 ENCOUNTER — OFFICE VISIT (OUTPATIENT)
Dept: INTERNAL MEDICINE | Facility: CLINIC | Age: 67
End: 2017-10-17

## 2017-10-17 VITALS
DIASTOLIC BLOOD PRESSURE: 70 MMHG | SYSTOLIC BLOOD PRESSURE: 103 MMHG | HEART RATE: 78 BPM | BODY MASS INDEX: 20.88 KG/M2 | WEIGHT: 133 LBS | OXYGEN SATURATION: 98 % | HEIGHT: 67 IN | TEMPERATURE: 98.8 F

## 2017-10-17 DIAGNOSIS — R31.9 HEMATURIA, UNSPECIFIED TYPE: Primary | ICD-10-CM

## 2017-10-17 LAB
BILIRUB BLD-MCNC: NEGATIVE MG/DL
CLARITY, POC: CLEAR
COLOR UR: YELLOW
GLUCOSE UR STRIP-MCNC: NEGATIVE MG/DL
KETONES UR QL: NEGATIVE
LEUKOCYTE EST, POC: ABNORMAL
NITRITE UR-MCNC: NEGATIVE MG/ML
PH UR: 5.5 [PH] (ref 5–8)
PROT UR STRIP-MCNC: ABNORMAL MG/DL
RBC # UR STRIP: ABNORMAL /UL
SP GR UR: 1.02 (ref 1–1.03)
UROBILINOGEN UR QL: NORMAL

## 2017-10-17 PROCEDURE — 99213 OFFICE O/P EST LOW 20 MIN: CPT | Performed by: FAMILY MEDICINE

## 2017-10-17 PROCEDURE — 81003 URINALYSIS AUTO W/O SCOPE: CPT | Performed by: FAMILY MEDICINE

## 2017-10-17 NOTE — PROGRESS NOTES
Subjective   Carol Osgood is a 66 y.o. female.   Chief Complaint   Patient presents with   • Blood in Urine       History of Present Illness     #1 Blood in urine-seen 3 days ago on toilet paper.  There was a tiny amount of blood on the tissue.  It lasted for a few hours and then resolved.  This morning patient saw blood on the paper again.  She thinks that she saw blood in the urine.  But is not sure about that.  She has no dysuria, but she has pressure with urination.  No fever, no other symptoms associated.  Nothing makes it better or worse.  She never smoked.  She has no history of kidney stones.    The following portions of the patient's history were reviewed and updated as appropriate: allergies, current medications, past family history, past medical history, past social history, past surgical history and problem list.    Review of Systems   Constitutional: Negative.    Respiratory: Negative.    Cardiovascular: Negative.    Genitourinary: Positive for hematuria. Negative for dysuria and frequency.         Objective   Wt Readings from Last 3 Encounters:   10/17/17 133 lb (60.3 kg)   09/19/17 135 lb 9.6 oz (61.5 kg)   07/14/17 137 lb 7 oz (62.3 kg)      Vitals:    10/17/17 1221   BP: 103/70   Pulse: 78   Temp: 98.8 °F (37.1 °C)   SpO2: 98%     Temp Readings from Last 3 Encounters:   10/17/17 98.8 °F (37.1 °C)   06/09/17 96.7 °F (35.9 °C) (Oral)   07/30/15 98.1 °F (36.7 °C) (Oral)     BP Readings from Last 3 Encounters:   10/17/17 103/70   09/19/17 106/58   07/14/17 144/82     Pulse Readings from Last 3 Encounters:   10/17/17 78   09/19/17 76   07/14/17 60       Physical Exam   Constitutional: She is oriented to person, place, and time. She appears well-developed and well-nourished.   HENT:   Head: Normocephalic and atraumatic.   Neck: Neck supple. Carotid bruit is not present.   Cardiovascular: Normal rate, regular rhythm and normal heart sounds.    Pulmonary/Chest: Effort normal and breath sounds normal.    Abdominal: Soft. She exhibits no distension and no mass. There is no tenderness.   No CVA.   Neurological: She is alert and oriented to person, place, and time.   Skin: Skin is warm, dry and intact.   Psychiatric: She has a normal mood and affect. Her behavior is normal.       Assessment/Plan   Mattie was seen today for blood in urine.    Diagnoses and all orders for this visit:    Hematuria, unspecified type  -     POCT urinalysis dipstick, automated  -     Urine Culture - Urine, Urine, Clean Catch; Future  -     Urine Culture - Urine, Urine, Clean Catch        #1 hematuria-urine positive for blood and leukocytes, but no nitrates.  Discussed with patient waiting for culture results versus starting treatment today. Patient prefers to wait for culture results as her symptoms do not bother her too much.  If culture is negative would consider referral to urologist.

## 2017-10-20 ENCOUNTER — AMBULATORY SURGICAL CENTER (OUTPATIENT)
Dept: URBAN - METROPOLITAN AREA SURGERY 9 | Facility: SURGERY | Age: 67
End: 2017-10-20

## 2017-10-20 ENCOUNTER — LAB REQUISITION (OUTPATIENT)
Dept: LAB | Facility: HOSPITAL | Age: 67
End: 2017-10-20

## 2017-10-20 DIAGNOSIS — Z12.11 ENCOUNTER FOR SCREENING FOR MALIGNANT NEOPLASM OF COLON: ICD-10-CM

## 2017-10-20 DIAGNOSIS — D12.3 BENIGN NEOPLASM OF TRANSVERSE COLON: ICD-10-CM

## 2017-10-20 LAB
BACTERIA UR CULT: ABNORMAL
BACTERIA UR CULT: ABNORMAL
OTHER ANTIBIOTIC SUSC ISLT: ABNORMAL

## 2017-10-20 PROCEDURE — 45380 COLONOSCOPY AND BIOPSY: CPT | Mod: PT | Performed by: INTERNAL MEDICINE

## 2017-10-20 PROCEDURE — 88305 TISSUE EXAM BY PATHOLOGIST: CPT | Performed by: INTERNAL MEDICINE

## 2017-10-20 RX ORDER — NITROFURANTOIN 25; 75 MG/1; MG/1
100 CAPSULE ORAL 2 TIMES DAILY
Qty: 10 CAPSULE | Refills: 0 | Status: SHIPPED | OUTPATIENT
Start: 2017-10-20 | End: 2017-11-09

## 2017-10-23 LAB
CYTO UR: NORMAL
LAB AP CASE REPORT: NORMAL
LAB AP CLINICAL INFORMATION: NORMAL
Lab: NORMAL
PATH REPORT.FINAL DX SPEC: NORMAL
PATH REPORT.GROSS SPEC: NORMAL

## 2017-11-09 ENCOUNTER — OFFICE VISIT (OUTPATIENT)
Dept: INTERNAL MEDICINE | Facility: CLINIC | Age: 67
End: 2017-11-09

## 2017-11-09 VITALS
BODY MASS INDEX: 20.63 KG/M2 | SYSTOLIC BLOOD PRESSURE: 110 MMHG | HEART RATE: 84 BPM | WEIGHT: 131.44 LBS | OXYGEN SATURATION: 98 % | HEIGHT: 67 IN | DIASTOLIC BLOOD PRESSURE: 70 MMHG

## 2017-11-09 DIAGNOSIS — H00.012 HORDEOLUM EXTERNUM OF RIGHT LOWER EYELID: Primary | ICD-10-CM

## 2017-11-09 DIAGNOSIS — R31.9 HEMATURIA, UNSPECIFIED TYPE: ICD-10-CM

## 2017-11-09 LAB
BILIRUB BLD-MCNC: NEGATIVE MG/DL
CLARITY, POC: CLEAR
COLOR UR: YELLOW
GLUCOSE UR STRIP-MCNC: NEGATIVE MG/DL
KETONES UR QL: NEGATIVE
LEUKOCYTE EST, POC: NEGATIVE
NITRITE UR-MCNC: NEGATIVE MG/ML
PH UR: 5.5 [PH] (ref 5–8)
PROT UR STRIP-MCNC: NEGATIVE MG/DL
RBC # UR STRIP: NEGATIVE /UL
SP GR UR: 1.02 (ref 1–1.03)
UROBILINOGEN UR QL: NORMAL

## 2017-11-09 PROCEDURE — 99213 OFFICE O/P EST LOW 20 MIN: CPT | Performed by: NURSE PRACTITIONER

## 2017-11-09 PROCEDURE — 81003 URINALYSIS AUTO W/O SCOPE: CPT | Performed by: NURSE PRACTITIONER

## 2017-11-09 RX ORDER — ERYTHROMYCIN 5 MG/G
OINTMENT OPHTHALMIC 2 TIMES DAILY
Qty: 3.5 G | Refills: 0 | Status: SHIPPED | OUTPATIENT
Start: 2017-11-09 | End: 2018-03-20

## 2017-11-09 NOTE — PROGRESS NOTES
Subjective   Carol Osgood is a 66 y.o. female. Patient is here today for   Chief Complaint   Patient presents with   • Eye Problem     Pt complains of having right eye redness & irritation.    .    History of Present Illness   C/o right eye redness x 2 days associated with some irritation and discomfort. She uses eye drops daily for symptoms of dry eyes. These drops have not helped much. Eyes nasal congestion or any other upper respiratory symptoms at this time.    Patient also recently finished antibiotics for a urinary tract infection.  States that her symptoms have improved except for some mild discomfort.  She would like to have her urine checked today.    The following portions of the patient's history were reviewed and updated as appropriate: allergies, current medications, past family history, past medical history, past social history, past surgical history and problem list.    Review of Systems   Constitutional: Negative.    HENT: Negative.    Eyes: Positive for pain and redness. Negative for visual disturbance.   Respiratory: Negative.    Cardiovascular: Negative.    Genitourinary: Negative.        Objective   Vitals:    11/09/17 1309   BP: 110/70   Pulse: 84   SpO2: 98%     Physical Exam   Constitutional: Vital signs are normal. She appears well-developed and well-nourished.   HENT:   Right Ear: Tympanic membrane and ear canal normal.   Left Ear: Tympanic membrane and ear canal normal.   Eyes: Conjunctivae and EOM are normal. Pupils are equal, round, and reactive to light. Right eye exhibits hordeolum (internal with mild erythema along lower lid).   Cardiovascular: Normal rate, regular rhythm and normal heart sounds.    Pulmonary/Chest: Effort normal and breath sounds normal.   Abdominal: There is no CVA tenderness.   Lymphadenopathy:     She has no cervical adenopathy.   Skin: Skin is warm, dry and intact.   Psychiatric: She has a normal mood and affect. Her speech is normal and behavior is normal.  Thought content normal.   Nursing note and vitals reviewed.    Results for orders placed or performed in visit on 11/09/17   POCT urinalysis dipstick, automated   Result Value Ref Range    Color Yellow Yellow, Straw, Dark Yellow, Daxa    Clarity, UA Clear Clear    Glucose, UA Negative Negative, 1000 mg/dL (3+) mg/dL    Bilirubin Negative Negative    Ketones, UA Negative Negative    Specific Gravity  1.020 1.005 - 1.030    Blood, UA Negative Negative    pH, Urine 5.5 5.0 - 8.0    Protein, POC Negative Negative mg/dL    Urobilinogen, UA Normal Normal    Leukocytes Negative Negative    Nitrite, UA Negative Negative         Assessment/Plan   Mattie was seen today for eye problem.    Diagnoses and all orders for this visit:    Hordeolum externum of right lower eyelid  -     erythromycin (ROMYCIN) 5 MG/GM ophthalmic ointment; Administer  to the right eye 2 (Two) Times a Day.    Hematuria, unspecified type  -     POCT urinalysis dipstick, automated

## 2017-11-30 ENCOUNTER — OFFICE VISIT (OUTPATIENT)
Dept: INTERNAL MEDICINE | Facility: CLINIC | Age: 67
End: 2017-11-30

## 2017-11-30 VITALS
OXYGEN SATURATION: 96 % | WEIGHT: 133.6 LBS | BODY MASS INDEX: 20.97 KG/M2 | SYSTOLIC BLOOD PRESSURE: 110 MMHG | DIASTOLIC BLOOD PRESSURE: 62 MMHG | HEIGHT: 67 IN | HEART RATE: 76 BPM

## 2017-11-30 DIAGNOSIS — H00.012 HORDEOLUM EXTERNUM OF RIGHT LOWER EYELID: Primary | ICD-10-CM

## 2017-11-30 PROCEDURE — 99212 OFFICE O/P EST SF 10 MIN: CPT | Performed by: INTERNAL MEDICINE

## 2018-02-14 RX ORDER — ALPRAZOLAM 0.5 MG/1
TABLET ORAL
Qty: 20 TABLET | OUTPATIENT
Start: 2018-02-14

## 2018-02-15 RX ORDER — ALPRAZOLAM 0.5 MG/1
0.5 TABLET ORAL 3 TIMES DAILY PRN
Qty: 20 TABLET | Refills: 0 | OUTPATIENT
Start: 2018-02-15 | End: 2018-07-25 | Stop reason: SDUPTHER

## 2018-02-15 NOTE — TELEPHONE ENCOUNTER
----- Message from Ciera Newman MD sent at 2/15/2018  8:17 AM EST -----  Regarding: FW: meds refill  When did we last fill and for how many?  ----- Message -----     From: Reena Jung     Sent: 2/14/2018   3:55 PM       To: Ciera Newman MD  Subject: meds refill                                      Wants her Xanax 0.5 refilled she is going out of town on Friday. She only takes it PRN  Last Seen-11/30/2017  Next Office visit-03/20/2018  Last refilled-09/20/2017  Russ-09/20/2017

## 2018-03-20 ENCOUNTER — OFFICE VISIT (OUTPATIENT)
Dept: INTERNAL MEDICINE | Facility: CLINIC | Age: 68
End: 2018-03-20

## 2018-03-20 VITALS
TEMPERATURE: 97.6 F | WEIGHT: 134.6 LBS | BODY MASS INDEX: 21.12 KG/M2 | OXYGEN SATURATION: 99 % | SYSTOLIC BLOOD PRESSURE: 118 MMHG | HEART RATE: 71 BPM | DIASTOLIC BLOOD PRESSURE: 74 MMHG | HEIGHT: 67 IN

## 2018-03-20 DIAGNOSIS — N28.9 RENAL INSUFFICIENCY: Primary | ICD-10-CM

## 2018-03-20 DIAGNOSIS — D64.9 LOW HEMOGLOBIN: ICD-10-CM

## 2018-03-20 DIAGNOSIS — F41.9 ANXIETY: ICD-10-CM

## 2018-03-20 DIAGNOSIS — D12.3 ADENOMATOUS POLYP OF TRANSVERSE COLON: ICD-10-CM

## 2018-03-20 DIAGNOSIS — E55.9 VITAMIN D DEFICIENCY: ICD-10-CM

## 2018-03-20 LAB
25(OH)D3+25(OH)D2 SERPL-MCNC: 24.2 NG/ML (ref 30–100)
ALBUMIN SERPL-MCNC: 4.4 G/DL (ref 3.5–5.2)
ALBUMIN/GLOB SERPL: 1.9 G/DL
ALP SERPL-CCNC: 80 U/L (ref 39–117)
ALT SERPL-CCNC: 14 U/L (ref 1–33)
AST SERPL-CCNC: 19 U/L (ref 1–32)
BASOPHILS # BLD AUTO: 0.03 10*3/MM3 (ref 0–0.2)
BASOPHILS NFR BLD AUTO: 0.8 % (ref 0–1.5)
BILIRUB SERPL-MCNC: 0.6 MG/DL (ref 0.1–1.2)
BUN SERPL-MCNC: 32 MG/DL (ref 8–23)
BUN/CREAT SERPL: 27.1 (ref 7–25)
CALCIUM SERPL-MCNC: 9.7 MG/DL (ref 8.6–10.5)
CHLORIDE SERPL-SCNC: 103 MMOL/L (ref 98–107)
CO2 SERPL-SCNC: 29.2 MMOL/L (ref 22–29)
CREAT SERPL-MCNC: 1.18 MG/DL (ref 0.57–1)
EOSINOPHIL # BLD AUTO: 0.1 10*3/MM3 (ref 0–0.7)
EOSINOPHIL NFR BLD AUTO: 2.8 % (ref 0.3–6.2)
ERYTHROCYTE [DISTWIDTH] IN BLOOD BY AUTOMATED COUNT: 12.8 % (ref 11.7–13)
GFR SERPLBLD CREATININE-BSD FMLA CKD-EPI: 45 ML/MIN/1.73
GFR SERPLBLD CREATININE-BSD FMLA CKD-EPI: 55 ML/MIN/1.73
GLOBULIN SER CALC-MCNC: 2.3 GM/DL
GLUCOSE SERPL-MCNC: 82 MG/DL (ref 65–99)
HCT VFR BLD AUTO: 36.7 % (ref 35.6–45.5)
HGB BLD-MCNC: 11.9 G/DL (ref 11.9–15.5)
IMM GRANULOCYTES # BLD: 0 10*3/MM3 (ref 0–0.03)
IMM GRANULOCYTES NFR BLD: 0 % (ref 0–0.5)
LYMPHOCYTES # BLD AUTO: 0.87 10*3/MM3 (ref 0.9–4.8)
LYMPHOCYTES NFR BLD AUTO: 24.6 % (ref 19.6–45.3)
MCH RBC QN AUTO: 31.4 PG (ref 26.9–32)
MCHC RBC AUTO-ENTMCNC: 32.4 G/DL (ref 32.4–36.3)
MCV RBC AUTO: 96.8 FL (ref 80.5–98.2)
MONOCYTES # BLD AUTO: 0.32 10*3/MM3 (ref 0.2–1.2)
MONOCYTES NFR BLD AUTO: 9.1 % (ref 5–12)
NEUTROPHILS # BLD AUTO: 2.21 10*3/MM3 (ref 1.9–8.1)
NEUTROPHILS NFR BLD AUTO: 62.7 % (ref 42.7–76)
PLATELET # BLD AUTO: 144 10*3/MM3 (ref 140–500)
POTASSIUM SERPL-SCNC: 4.2 MMOL/L (ref 3.5–5.2)
PROT SERPL-MCNC: 6.7 G/DL (ref 6–8.5)
RBC # BLD AUTO: 3.79 10*6/MM3 (ref 3.9–5.2)
SODIUM SERPL-SCNC: 143 MMOL/L (ref 136–145)
TSH SERPL DL<=0.005 MIU/L-ACNC: 4.17 MIU/ML (ref 0.27–4.2)
WBC # BLD AUTO: 3.53 10*3/MM3 (ref 4.5–10.7)

## 2018-03-20 PROCEDURE — 99214 OFFICE O/P EST MOD 30 MIN: CPT | Performed by: INTERNAL MEDICINE

## 2018-03-20 NOTE — PROGRESS NOTES
Subjective   Carol Osgood is a 67 y.o. female here today to f/u on anxiety.  Pt c/o left side abdominal pain when sitting on the floor, when getting up.      History of Present Illness   She has been drinking more fluids. To help with renal function.  She has been taking xanax on occas  She had a polyp in October and she is ok    The following portions of the patient's history were reviewed and updated as appropriate: allergies, current medications, past medical history, past social history and problem list.  She is doing well with diet and exercise    Review of Systems   Gastrointestinal: Positive for abdominal pain.   All other systems reviewed and are negative.      Objective   Physical Exam   Constitutional: She is oriented to person, place, and time. She appears well-developed and well-nourished.   HENT:   Head: Normocephalic and atraumatic.   Right Ear: External ear normal.   Left Ear: External ear normal.   Mouth/Throat: Oropharynx is clear and moist.   Eyes: Conjunctivae and EOM are normal. Pupils are equal, round, and reactive to light.   Neck: Normal range of motion. No tracheal deviation present. No thyromegaly present.   Cardiovascular: Normal rate, regular rhythm, normal heart sounds and intact distal pulses.    Pulmonary/Chest: Effort normal and breath sounds normal.   Abdominal: Soft. Bowel sounds are normal. She exhibits no distension. There is no tenderness.   Musculoskeletal: Normal range of motion. She exhibits no edema or deformity.   Neurological: She is alert and oriented to person, place, and time.   Skin: Skin is warm and dry.   Psychiatric: She has a normal mood and affect. Her behavior is normal. Judgment and thought content normal.   Vitals reviewed.      Vitals:    03/20/18 0935   BP: 118/74   Pulse: 71   Temp: 97.6 °F (36.4 °C)   SpO2: 99%       Current Outpatient Prescriptions:   •  ALPRAZolam (XANAX) 0.5 MG tablet, Take 1 tablet by mouth 3 (Three) Times a Day As Needed for Anxiety.,  Disp: 20 tablet, Rfl: 0  •  Polyethyl Glycol-Propyl Glycol (SYSTANE ULTRA OP), Apply 1 drop to eye Daily., Disp: , Rfl:   •  triamcinolone (KENALOG) 0.5 % ointment, Apply  topically 2 (Two) Times a Day., Disp: 15 g, Rfl: 1  •  Cholecalciferol (VITAMIN D3) 2400 UNIT/ML liquid, 2,000 Units Daily., Disp: , Rfl:        Assessment/Plan   Diagnoses and all orders for this visit:    Renal insufficiency  -     CBC & Differential  -     Comprehensive Metabolic Panel  -     TSH Rfx On Abnormal To Free T4    Anxiety  -     TSH Rfx On Abnormal To Free T4    Adenomatous polyp of transverse colon    Low hemoglobin    Vitamin D deficiency  -     Vitamin D 25 Hydroxy      1.  CRI-  She is stable  Etiology is uncertain but she has been stable  2.  Vit D def-  We will recheck today  3.  Anxiety- refill xanax  She uses on occas and is stable  4. Polyp in colon -due recheck in 2019  5. Hx of anemia but ok last check  We will recheck level today

## 2018-05-18 ENCOUNTER — OFFICE VISIT (OUTPATIENT)
Dept: INTERNAL MEDICINE | Facility: CLINIC | Age: 68
End: 2018-05-18

## 2018-05-18 VITALS
HEIGHT: 67 IN | WEIGHT: 140.31 LBS | DIASTOLIC BLOOD PRESSURE: 80 MMHG | BODY MASS INDEX: 22.02 KG/M2 | SYSTOLIC BLOOD PRESSURE: 132 MMHG | HEART RATE: 59 BPM | OXYGEN SATURATION: 98 % | TEMPERATURE: 97.6 F

## 2018-05-18 DIAGNOSIS — R30.0 DYSURIA: Primary | ICD-10-CM

## 2018-05-18 LAB
BILIRUB BLD-MCNC: NEGATIVE MG/DL
CLARITY, POC: CLEAR
COLOR UR: YELLOW
GLUCOSE UR STRIP-MCNC: NEGATIVE MG/DL
KETONES UR QL: NEGATIVE
LEUKOCYTE EST, POC: NEGATIVE
NITRITE UR-MCNC: NEGATIVE MG/ML
PH UR: 7 [PH] (ref 5–8)
PROT UR STRIP-MCNC: NEGATIVE MG/DL
RBC # UR STRIP: ABNORMAL /UL
SP GR UR: 1.01 (ref 1–1.03)
UROBILINOGEN UR QL: NORMAL

## 2018-05-18 PROCEDURE — 81003 URINALYSIS AUTO W/O SCOPE: CPT | Performed by: NURSE PRACTITIONER

## 2018-05-18 PROCEDURE — 99213 OFFICE O/P EST LOW 20 MIN: CPT | Performed by: NURSE PRACTITIONER

## 2018-05-18 RX ORDER — NITROFURANTOIN 25; 75 MG/1; MG/1
100 CAPSULE ORAL 2 TIMES DAILY
Qty: 14 CAPSULE | Refills: 0 | Status: SHIPPED | OUTPATIENT
Start: 2018-05-18 | End: 2018-07-25

## 2018-05-18 NOTE — PROGRESS NOTES
Subjective   Carol Osgood is a 67 y.o. female. Patient is here today for   Chief Complaint   Patient presents with   • Abdominal Pain     Pt complains of having lower right abdominal pain & dysuria x1 day.    .    History of Present Illness   C/o lower abdominal pain since this morning. She has had some burning with urination. She has increased her fluid intake and is feeling better. She has had some constipation, but is feeling better after having a bowel movement.     The following portions of the patient's history were reviewed and updated as appropriate: allergies, current medications, past family history, past medical history, past social history, past surgical history and problem list.    Review of Systems   Constitutional: Negative.    Respiratory: Negative.    Cardiovascular: Negative.    Gastrointestinal: Positive for abdominal pain.   Genitourinary: Positive for dysuria.       Objective   Vitals:    05/18/18 1341   BP: 132/80   Pulse: 59   Temp: 97.6 °F (36.4 °C)   SpO2: 98%     POCT urine - negative except for a large amount of blood    Physical Exam   Constitutional: Vital signs are normal. She appears well-developed and well-nourished.   Cardiovascular: Normal rate, regular rhythm and normal heart sounds.    Pulmonary/Chest: Effort normal and breath sounds normal.   Abdominal: Soft. Bowel sounds are normal. There is no tenderness. There is no CVA tenderness.   Skin: Skin is warm, dry and intact.   Psychiatric: She has a normal mood and affect. Her speech is normal and behavior is normal. Thought content normal.   Nursing note and vitals reviewed.      Assessment/Plan   Mattie was seen today for abdominal pain.    Diagnoses and all orders for this visit:    Dysuria  -     POCT urinalysis dipstick, automated  -     Urine Culture - Urine, Urine, Clean Catch  -     nitrofurantoin, macrocrystal-monohydrate, (MACROBID) 100 MG capsule; Take 1 capsule by mouth 2 (Two) Times a Day.

## 2018-05-20 LAB
BACTERIA UR CULT: NORMAL
BACTERIA UR CULT: NORMAL

## 2018-05-21 NOTE — PROGRESS NOTES
Pt states that she no longer has lower abdominal pain, she is urinating fine & has been drinking plenty of water. Pt does want to know why there was blood in her urine when she had come in for 'peace of mind' . She would like to know if you'd like to recheck this urine & if there is still blood, what would we do at that time?

## 2018-07-23 ENCOUNTER — TELEPHONE (OUTPATIENT)
Dept: INTERNAL MEDICINE | Facility: CLINIC | Age: 68
End: 2018-07-23

## 2018-07-23 DIAGNOSIS — M79.671 BILATERAL FOOT PAIN: Primary | ICD-10-CM

## 2018-07-23 DIAGNOSIS — M79.672 BILATERAL FOOT PAIN: Primary | ICD-10-CM

## 2018-07-23 NOTE — TELEPHONE ENCOUNTER
REFERRAL ORDERED    ----- Message from Ciera Newman MD sent at 7/23/2018  7:20 AM EDT -----  ok  ----- Message -----  From: Priscilla Dunne MA  Sent: 7/20/2018   3:20 PM  To: Ciera Newman MD    Pt requesting a referral to Podiatry for bilateral foot pain. Please advise if this is okay & who you recommend for Podiatry.    ----- Message -----  From: Winnie Cintron  Sent: 7/20/2018   2:39 PM  To: Priscilla Dunne MA    Pt would lie an order to a podiatrist for foot pain

## 2018-07-25 ENCOUNTER — OFFICE VISIT (OUTPATIENT)
Dept: INTERNAL MEDICINE | Facility: CLINIC | Age: 68
End: 2018-07-25

## 2018-07-25 VITALS
TEMPERATURE: 98.5 F | SYSTOLIC BLOOD PRESSURE: 130 MMHG | BODY MASS INDEX: 21.17 KG/M2 | HEIGHT: 67 IN | HEART RATE: 87 BPM | WEIGHT: 134.9 LBS | OXYGEN SATURATION: 97 % | DIASTOLIC BLOOD PRESSURE: 76 MMHG

## 2018-07-25 DIAGNOSIS — R09.89 ABDOMINAL BRUIT: ICD-10-CM

## 2018-07-25 DIAGNOSIS — K59.00 CONSTIPATION, UNSPECIFIED CONSTIPATION TYPE: Primary | ICD-10-CM

## 2018-07-25 PROCEDURE — 99213 OFFICE O/P EST LOW 20 MIN: CPT | Performed by: NURSE PRACTITIONER

## 2018-07-25 RX ORDER — ALPRAZOLAM 0.5 MG/1
0.5 TABLET ORAL 3 TIMES DAILY PRN
Qty: 20 TABLET | Refills: 0 | Status: SHIPPED | OUTPATIENT
Start: 2018-07-25 | End: 2018-11-01

## 2018-07-25 NOTE — PROGRESS NOTES
Subjective   Carol Osgood is a 67 y.o. female here to discuss constipation and bloating.    History of Present Illness   Patient is here today with complaints of constipation and bloating.   About 4 months ago had a belly bloat. She describes extreme distention. She states after lunch and dinner the bloating is worth. Worse with usual. Last BM was about 4 days ago. Small BM prior. Hydrating well. She is taking prunes with out much relief. Has not tried anything OTC.   If she is sitting on the floor and she goes to get up she has a transient sharp pain in her epigastric area. Feels like something is caught.     C-scope UTD, 1 polyp, recheck in 2019.   The following portions of the patient's history were reviewed and updated as appropriate: allergies, current medications, past family history, past medical history, past social history, past surgical history and problem list.    Review of Systems   Constitutional: Negative.    Respiratory: Negative.    Cardiovascular: Negative.    Gastrointestinal: Positive for abdominal pain, anal bleeding (X1 scant amt ) and constipation. Negative for blood in stool.   Psychiatric/Behavioral: Negative.        Objective   Physical Exam   Constitutional: She appears well-developed and well-nourished.   Neck: Normal range of motion. Neck supple. No thyromegaly present.   Cardiovascular: Normal rate, regular rhythm, normal heart sounds and intact distal pulses.    Pulmonary/Chest: Effort normal and breath sounds normal.   Abdominal: Soft. Bowel sounds are normal. She exhibits abdominal bruit. There is no hepatosplenomegaly. There is tenderness (mild) in the epigastric area and left lower quadrant.   abd distention present on exam   Skin: Skin is warm and dry.   Psychiatric: She has a normal mood and affect. Her behavior is normal. Judgment and thought content normal.       Assessment/Plan   There are no diagnoses linked to this encounter.    1. Constipation- notify GI of new symptoms, try  miralax or mag citrate, ok to try colace once more regular. If continues repeat c-scope vs Linzess. Also discussed high fiber diet and probiotic.   2. Abd bruit- mild, suspect due to body habitus, check US

## 2018-07-25 NOTE — PATIENT INSTRUCTIONS
High-Fiber Diet  Fiber, also called dietary fiber, is a type of carbohydrate found in fruits, vegetables, whole grains, and beans. A high-fiber diet can have many health benefits. Your health care provider may recommend a high-fiber diet to help:  · Prevent constipation. Fiber can make your bowel movements more regular.  · Lower your cholesterol.  · Relieve hemorrhoids, uncomplicated diverticulosis, or irritable bowel syndrome.  · Prevent overeating as part of a weight-loss plan.  · Prevent heart disease, type 2 diabetes, and certain cancers.    What is my plan?  The recommended daily intake of fiber includes:  · 38 grams for men under age 50.  · 30 grams for men over age 50.  · 25 grams for women under age 50.  · 21 grams for women over age 50.    You can get the recommended daily intake of dietary fiber by eating a variety of fruits, vegetables, grains, and beans. Your health care provider may also recommend a fiber supplement if it is not possible to get enough fiber through your diet.  What do I need to know about a high-fiber diet?  · Fiber supplements have not been widely studied for their effectiveness, so it is better to get fiber through food sources.  · Always check the fiber content on the nutrition facts label of any prepackaged food. Look for foods that contain at least 5 grams of fiber per serving.  · Ask your dietitian if you have questions about specific foods that are related to your condition, especially if those foods are not listed in the following section.  · Increase your daily fiber consumption gradually. Increasing your intake of dietary fiber too quickly may cause bloating, cramping, or gas.  · Drink plenty of water. Water helps you to digest fiber.  What foods can I eat?  Grains  Whole-grain breads. Multigrain cereal. Oats and oatmeal. Brown rice. Barley. Bulgur wheat. Millet. Bran muffins. Popcorn. Rye wafer crackers.  Vegetables  Sweet potatoes. Spinach. Kale. Artichokes. Cabbage.  Broccoli. Green peas. Carrots. Squash.  Fruits  Berries. Pears. Apples. Oranges. Avocados. Prunes and raisins. Dried figs.  Meats and Other Protein Sources  Navy, kidney, fonseca, and soy beans. Split peas. Lentils. Nuts and seeds.  Dairy  Fiber-fortified yogurt.  Beverages  Fiber-fortified soy milk. Fiber-fortified orange juice.  Other  Fiber bars.  The items listed above may not be a complete list of recommended foods or beverages. Contact your dietitian for more options.  What foods are not recommended?  Grains  White bread. Pasta made with refined flour. White rice.  Vegetables  Fried potatoes. Canned vegetables. Well-cooked vegetables.  Fruits  Fruit juice. Cooked, strained fruit.  Meats and Other Protein Sources  Fatty cuts of meat. Fried poultry or fried fish.  Dairy  Milk. Yogurt. Cream cheese. Sour cream.  Beverages  Soft drinks.  Other  Cakes and pastries. Butter and oils.  The items listed above may not be a complete list of foods and beverages to avoid. Contact your dietitian for more information.  What are some tips for including high-fiber foods in my diet?  · Eat a wide variety of high-fiber foods.  · Make sure that half of all grains consumed each day are whole grains.  · Replace breads and cereals made from refined flour or white flour with whole-grain breads and cereals.  · Replace white rice with brown rice, bulgur wheat, or millet.  · Start the day with a breakfast that is high in fiber, such as a cereal that contains at least 5 grams of fiber per serving.  · Use beans in place of meat in soups, salads, or pasta.  · Eat high-fiber snacks, such as berries, raw vegetables, nuts, or popcorn.  This information is not intended to replace advice given to you by your health care provider. Make sure you discuss any questions you have with your health care provider.  Document Released: 12/18/2006 Document Revised: 05/25/2017 Document Reviewed: 06/02/2015  Elsevier Interactive Patient Education © 2018  [employer]vier Inc.

## 2018-07-25 NOTE — TELEPHONE ENCOUNTER
[facility] JONEL LAUREANO IN [facility]  LAST OV 3/20/18  LAST FILL DATE 2/15/18    ----- Message from Meagan Solis sent at 7/24/2018  3:55 PM EDT -----  Regarding: MED REFILL  Mattie called to ask for a refill on her alprazolam. She said the pharmacy needed an authorization on it. Thanks

## 2018-07-27 ENCOUNTER — OFFICE VISIT (OUTPATIENT)
Dept: INTERNAL MEDICINE | Facility: CLINIC | Age: 68
End: 2018-07-27

## 2018-07-27 VITALS
WEIGHT: 132.7 LBS | HEART RATE: 69 BPM | OXYGEN SATURATION: 96 % | DIASTOLIC BLOOD PRESSURE: 70 MMHG | SYSTOLIC BLOOD PRESSURE: 114 MMHG | BODY MASS INDEX: 20.83 KG/M2 | TEMPERATURE: 97.8 F | HEIGHT: 67 IN

## 2018-07-27 DIAGNOSIS — K58.1 IRRITABLE BOWEL SYNDROME WITH CONSTIPATION: ICD-10-CM

## 2018-07-27 DIAGNOSIS — R14.0 ABDOMINAL BLOATING: Primary | ICD-10-CM

## 2018-07-27 PROCEDURE — 99214 OFFICE O/P EST MOD 30 MIN: CPT | Performed by: INTERNAL MEDICINE

## 2018-07-31 ENCOUNTER — TELEPHONE (OUTPATIENT)
Dept: INTERNAL MEDICINE | Facility: CLINIC | Age: 68
End: 2018-07-31

## 2018-07-31 NOTE — TELEPHONE ENCOUNTER
PT HAS BEEN SEEN 2 TIMES FOR THIS ISSUE. SHE IS GOING TO TRY A DULCOLAX SUPPOSITORY AND IF THAT DOESN'T HELP SHE WILL TRY THE ENEMA. IF THAT DOESN'T HELP SHE IS GOING TO THE ER.      ----- Message from Ciera Newman MD sent at 7/31/2018  1:31 PM EDT -----  Has she been seen?  Has she tried an enema?    ----- Message -----  From: Eve Burns MA  Sent: 7/31/2018   9:47 AM  To: Ciera Newman MD    PT CALLED AND SHE STILL CAN NOT HAVE A BOWEL MOVEMENT. SHE HAS TRIED THE IB LIANA, COLACE AND 4 DAYS AGO SHE TOOK A BOTTLE OF MAG CITRATE WITH NO BOWEL MOVEMENTS. SHE IS BARELY PASSING GAS. PLEASE ADVISE.    SHE WOULD ALSO LIKE A PRESCRIPTION OF TOPICORT 0.25% FOR BUG BITES THAT SWELL AND BLISTER.

## 2018-08-01 RX ORDER — DESOXIMETASONE 2.5 MG/G
CREAM TOPICAL EVERY 12 HOURS SCHEDULED
Qty: 60 G | Refills: 0 | Status: SHIPPED | OUTPATIENT
Start: 2018-08-01 | End: 2020-05-18 | Stop reason: SDUPTHER

## 2018-08-15 ENCOUNTER — HOSPITAL ENCOUNTER (OUTPATIENT)
Dept: CT IMAGING | Facility: HOSPITAL | Age: 68
Discharge: HOME | End: 2018-08-15
Admitting: INTERNAL MEDICINE

## 2018-08-15 PROCEDURE — 0 DIATRIZOATE MEGLUMINE & SODIUM PER 1 ML: Performed by: INTERNAL MEDICINE

## 2018-08-15 PROCEDURE — 74177 CT ABD & PELVIS W/CONTRAST: CPT

## 2018-08-15 PROCEDURE — 25010000002 IOPAMIDOL 61 % SOLUTION: Performed by: INTERNAL MEDICINE

## 2018-08-15 PROCEDURE — 82565 ASSAY OF CREATININE: CPT

## 2018-08-15 RX ADMIN — IOPAMIDOL 100 ML: 612 INJECTION, SOLUTION INTRAVENOUS at 12:32

## 2018-08-15 RX ADMIN — DIATRIZOATE MEGLUMINE AND DIATRIZOATE SODIUM 30 ML: 660; 100 LIQUID ORAL; RECTAL at 11:32

## 2018-08-16 ENCOUNTER — EPISODE CHANGES (OUTPATIENT)
Dept: CASE MANAGEMENT | Facility: OTHER | Age: 68
End: 2018-08-16

## 2018-08-16 LAB — CREAT BLDA-MCNC: 1.3 MG/DL (ref 0.6–1.3)

## 2018-09-12 DIAGNOSIS — N28.9 RENAL INSUFFICIENCY: ICD-10-CM

## 2018-09-13 LAB
ALBUMIN SERPL-MCNC: 4.1 G/DL (ref 3.5–5.2)
ALBUMIN/GLOB SERPL: 1.5 G/DL
ALP SERPL-CCNC: 84 U/L (ref 39–117)
ALT SERPL-CCNC: 17 U/L (ref 1–33)
AST SERPL-CCNC: 24 U/L (ref 1–32)
BASOPHILS # BLD AUTO: 0.04 10*3/MM3 (ref 0–0.2)
BASOPHILS NFR BLD AUTO: 0.9 % (ref 0–1.5)
BILIRUB SERPL-MCNC: 0.4 MG/DL (ref 0.1–1.2)
BUN SERPL-MCNC: 29 MG/DL (ref 8–23)
BUN/CREAT SERPL: 23.4 (ref 7–25)
CALCIUM SERPL-MCNC: 9.6 MG/DL (ref 8.6–10.5)
CHLORIDE SERPL-SCNC: 105 MMOL/L (ref 98–107)
CO2 SERPL-SCNC: 28.3 MMOL/L (ref 22–29)
CREAT SERPL-MCNC: 1.24 MG/DL (ref 0.57–1)
EOSINOPHIL # BLD AUTO: 0.08 10*3/MM3 (ref 0–0.7)
EOSINOPHIL NFR BLD AUTO: 1.8 % (ref 0.3–6.2)
ERYTHROCYTE [DISTWIDTH] IN BLOOD BY AUTOMATED COUNT: 13.3 % (ref 11.7–13)
GLOBULIN SER CALC-MCNC: 2.8 GM/DL
GLUCOSE SERPL-MCNC: 89 MG/DL (ref 65–99)
HCT VFR BLD AUTO: 36.6 % (ref 35.6–45.5)
HGB BLD-MCNC: 11.9 G/DL (ref 11.9–15.5)
IMM GRANULOCYTES # BLD: 0.01 10*3/MM3 (ref 0–0.03)
IMM GRANULOCYTES NFR BLD: 0.2 % (ref 0–0.5)
LYMPHOCYTES # BLD AUTO: 1.17 10*3/MM3 (ref 0.9–4.8)
LYMPHOCYTES NFR BLD AUTO: 26.3 % (ref 19.6–45.3)
MCH RBC QN AUTO: 31.3 PG (ref 26.9–32)
MCHC RBC AUTO-ENTMCNC: 32.5 G/DL (ref 32.4–36.3)
MCV RBC AUTO: 96.3 FL (ref 80.5–98.2)
MONOCYTES # BLD AUTO: 0.42 10*3/MM3 (ref 0.2–1.2)
MONOCYTES NFR BLD AUTO: 9.4 % (ref 5–12)
NEUTROPHILS # BLD AUTO: 2.74 10*3/MM3 (ref 1.9–8.1)
NEUTROPHILS NFR BLD AUTO: 61.6 % (ref 42.7–76)
PLATELET # BLD AUTO: 145 10*3/MM3 (ref 140–500)
POTASSIUM SERPL-SCNC: 4 MMOL/L (ref 3.5–5.2)
PROT SERPL-MCNC: 6.9 G/DL (ref 6–8.5)
RBC # BLD AUTO: 3.8 10*6/MM3 (ref 3.9–5.2)
SODIUM SERPL-SCNC: 143 MMOL/L (ref 136–145)
WBC # BLD AUTO: 4.45 10*3/MM3 (ref 4.5–10.7)

## 2018-09-20 ENCOUNTER — OFFICE VISIT (OUTPATIENT)
Dept: INTERNAL MEDICINE | Facility: CLINIC | Age: 68
End: 2018-09-20

## 2018-09-20 VITALS
OXYGEN SATURATION: 99 % | DIASTOLIC BLOOD PRESSURE: 60 MMHG | SYSTOLIC BLOOD PRESSURE: 110 MMHG | WEIGHT: 133 LBS | BODY MASS INDEX: 20.88 KG/M2 | HEART RATE: 76 BPM | TEMPERATURE: 97.8 F | HEIGHT: 67 IN

## 2018-09-20 DIAGNOSIS — K59.00 CONSTIPATION, UNSPECIFIED CONSTIPATION TYPE: ICD-10-CM

## 2018-09-20 DIAGNOSIS — N28.9 RENAL INSUFFICIENCY: Primary | ICD-10-CM

## 2018-09-20 DIAGNOSIS — Z00.00 MEDICARE ANNUAL WELLNESS VISIT, SUBSEQUENT: ICD-10-CM

## 2018-09-20 DIAGNOSIS — F41.9 ANXIETY: ICD-10-CM

## 2018-09-20 DIAGNOSIS — D69.6 THROMBOCYTOPENIA (HCC): ICD-10-CM

## 2018-09-20 PROCEDURE — 99213 OFFICE O/P EST LOW 20 MIN: CPT | Performed by: INTERNAL MEDICINE

## 2018-09-20 PROCEDURE — G0439 PPPS, SUBSEQ VISIT: HCPCS | Performed by: INTERNAL MEDICINE

## 2018-09-20 RX ORDER — CITALOPRAM 10 MG/1
10 TABLET ORAL DAILY
Qty: 30 TABLET | Refills: 2 | Status: SHIPPED | OUTPATIENT
Start: 2018-09-20 | End: 2018-11-01 | Stop reason: SDUPTHER

## 2018-09-20 NOTE — PROGRESS NOTES
Subjective   Carol Osgood is a 67 y.o. female here for medicare wellness with labs.    History of Present Illness   She has been taking a little motrin for some neck pain.  She is seeing the chiro  She take very infrequently  She denies any trouble with anxiety- rarely uses the xanax though right now she is having more trouble with anxiety as her sister- in-law is living with her       The following portions of the patient's history were reviewed and updated as appropriate: allergies, current medications, past medical history, past social history and problem list.  She does exercise occas    She does eat  A healthy diet    Review of Systems   All other systems reviewed and are negative.      Objective   Physical Exam   Constitutional: She is oriented to person, place, and time. She appears well-developed and well-nourished.   HENT:   Head: Normocephalic and atraumatic.   Right Ear: External ear normal.   Left Ear: External ear normal.   Mouth/Throat: Oropharynx is clear and moist.   Eyes: Pupils are equal, round, and reactive to light. Conjunctivae and EOM are normal.   Neck: Normal range of motion. No tracheal deviation present. No thyromegaly present.   Cardiovascular: Normal rate, regular rhythm, normal heart sounds and intact distal pulses.    Pulmonary/Chest: Effort normal and breath sounds normal.   Abdominal: Soft. Bowel sounds are normal. She exhibits no distension. There is no tenderness.   Musculoskeletal: Normal range of motion. She exhibits no edema or deformity.   Neurological: She is alert and oriented to person, place, and time.   Skin: Skin is warm and dry.   Psychiatric: She has a normal mood and affect. Her behavior is normal. Judgment and thought content normal.   Vitals reviewed.      Vitals:    09/20/18 0921   BP: 110/60   Pulse: 76   Temp: 97.8 °F (36.6 °C)   SpO2: 99%     Orders Only on 09/12/2018   Component Date Value Ref Range Status   • WBC 09/13/2018 4.45* 4.50 - 10.70 10*3/mm3 Final   •  RBC 09/13/2018 3.80* 3.90 - 5.20 10*6/mm3 Final   • Hemoglobin 09/13/2018 11.9  11.9 - 15.5 g/dL Final   • Hematocrit 09/13/2018 36.6  35.6 - 45.5 % Final   • MCV 09/13/2018 96.3  80.5 - 98.2 fL Final   • MCH 09/13/2018 31.3  26.9 - 32.0 pg Final   • MCHC 09/13/2018 32.5  32.4 - 36.3 g/dL Final   • RDW 09/13/2018 13.3* 11.7 - 13.0 % Final   • Platelets 09/13/2018 145  140 - 500 10*3/mm3 Final   • Neutrophil Rel % 09/13/2018 61.6  42.7 - 76.0 % Final   • Lymphocyte Rel % 09/13/2018 26.3  19.6 - 45.3 % Final   • Monocyte Rel % 09/13/2018 9.4  5.0 - 12.0 % Final   • Eosinophil Rel % 09/13/2018 1.8  0.3 - 6.2 % Final   • Basophil Rel % 09/13/2018 0.9  0.0 - 1.5 % Final   • Neutrophils Absolute 09/13/2018 2.74  1.90 - 8.10 10*3/mm3 Final   • Lymphocytes Absolute 09/13/2018 1.17  0.90 - 4.80 10*3/mm3 Final   • Monocytes Absolute 09/13/2018 0.42  0.20 - 1.20 10*3/mm3 Final   • Eosinophils Absolute 09/13/2018 0.08  0.00 - 0.70 10*3/mm3 Final   • Basophils Absolute 09/13/2018 0.04  0.00 - 0.20 10*3/mm3 Final   • Immature Granulocyte Rel % 09/13/2018 0.2  0.0 - 0.5 % Final   • Immature Grans Absolute 09/13/2018 0.01  0.00 - 0.03 10*3/mm3 Final   • Glucose 09/13/2018 89  65 - 99 mg/dL Final   • BUN 09/13/2018 29* 8 - 23 mg/dL Final   • Creatinine 09/13/2018 1.24* 0.57 - 1.00 mg/dL Final   • eGFR Non  Am 09/13/2018 43* >60 mL/min/1.73 Final   • eGFR African Am 09/13/2018 52* >60 mL/min/1.73 Final   • BUN/Creatinine Ratio 09/13/2018 23.4  7.0 - 25.0 Final   • Sodium 09/13/2018 143  136 - 145 mmol/L Final   • Potassium 09/13/2018 4.0  3.5 - 5.2 mmol/L Final   • Chloride 09/13/2018 105  98 - 107 mmol/L Final   • Total CO2 09/13/2018 28.3  22.0 - 29.0 mmol/L Final   • Calcium 09/13/2018 9.6  8.6 - 10.5 mg/dL Final   • Total Protein 09/13/2018 6.9  6.0 - 8.5 g/dL Final   • Albumin 09/13/2018 4.10  3.50 - 5.20 g/dL Final   • Globulin 09/13/2018 2.8  gm/dL Final   • A/G Ratio 09/13/2018 1.5  g/dL Final   • Total Bilirubin  09/13/2018 0.4  0.1 - 1.2 mg/dL Final   • Alkaline Phosphatase 09/13/2018 84  39 - 117 U/L Final   • AST (SGOT) 09/13/2018 24  1 - 32 U/L Final   • ALT (SGPT) 09/13/2018 17  1 - 33 U/L Final     Current Outpatient Prescriptions:   •  ALPRAZolam (XANAX) 0.5 MG tablet, Take 1 tablet by mouth 3 (Three) Times a Day As Needed for Anxiety., Disp: 20 tablet, Rfl: 0  •  desoximetasone (TOPICORT) 0.25 % cream, Apply  topically to the appropriate area as directed Every 12 (Twelve) Hours., Disp: 60 g, Rfl: 0  •  Polyethyl Glycol-Propyl Glycol (SYSTANE ULTRA OP), Apply 1 drop to eye Daily., Disp: , Rfl:          Assessment/Plan   Diagnoses and all orders for this visit:    Renal insufficiency    Medicare annual wellness visit, subsequent    Anxiety      1. CRI- stable  She does avoid nsaids  2.  Insomnia-  She is not sleeping well as she has her family living with her  3. Anxiety- She is is doing well  She has figured stress relief techniques  THis is likely making the constipation worse   We will try the celexa  4. Constipation she is going to try dos  UTD on colon  Trying celexa

## 2018-09-20 NOTE — PATIENT INSTRUCTIONS
Fall Prevention in the Home  Falls can cause injuries. They can happen to people of all ages. There are many things you can do to make your home safe and to help prevent falls.  What can I do on the outside of my home?  · Regularly fix the edges of walkways and driveways and fix any cracks.  · Remove anything that might make you trip as you walk through a door, such as a raised step or threshold.  · Trim any bushes or trees on the path to your home.  · Use bright outdoor lighting.  · Clear any walking paths of anything that might make someone trip, such as rocks or tools.  · Regularly check to see if handrails are loose or broken. Make sure that both sides of any steps have handrails.  · Any raised decks and porches should have guardrails on the edges.  · Have any leaves, snow, or ice cleared regularly.  · Use sand or salt on walking paths during winter.  · Clean up any spills in your garage right away. This includes oil or grease spills.  What can I do in the bathroom?  · Use night lights.  · Install grab bars by the toilet and in the tub and shower. Do not use towel bars as grab bars.  · Use non-skid mats or decals in the tub or shower.  · If you need to sit down in the shower, use a plastic, non-slip stool.  · Keep the floor dry. Clean up any water that spills on the floor as soon as it happens.  · Remove soap buildup in the tub or shower regularly.  · Attach bath mats securely with double-sided non-slip rug tape.  · Do not have throw rugs and other things on the floor that can make you trip.  What can I do in the bedroom?  · Use night lights.  · Make sure that you have a light by your bed that is easy to reach.  · Do not use any sheets or blankets that are too big for your bed. They should not hang down onto the floor.  · Have a firm chair that has side arms. You can use this for support while you get dressed.  · Do not have throw rugs and other things on the floor that can make you trip.  What can I do in the  kitchen?  · Clean up any spills right away.  · Avoid walking on wet floors.  · Keep items that you use a lot in easy-to-reach places.  · If you need to reach something above you, use a strong step stool that has a grab bar.  · Keep electrical cords out of the way.  · Do not use floor polish or wax that makes floors slippery. If you must use wax, use non-skid floor wax.  · Do not have throw rugs and other things on the floor that can make you trip.  What can I do with my stairs?  · Do not leave any items on the stairs.  · Make sure that there are handrails on both sides of the stairs and use them. Fix handrails that are broken or loose. Make sure that handrails are as long as the stairways.  · Check any carpeting to make sure that it is firmly attached to the stairs. Fix any carpet that is loose or worn.  · Avoid having throw rugs at the top or bottom of the stairs. If you do have throw rugs, attach them to the floor with carpet tape.  · Make sure that you have a light switch at the top of the stairs and the bottom of the stairs. If you do not have them, ask someone to add them for you.  What else can I do to help prevent falls?  · Wear shoes that:  ? Do not have high heels.  ? Have rubber bottoms.  ? Are comfortable and fit you well.  ? Are closed at the toe. Do not wear sandals.  · If you use a stepladder:  ? Make sure that it is fully opened. Do not climb a closed stepladder.  ? Make sure that both sides of the stepladder are locked into place.  ? Ask someone to hold it for you, if possible.  · Clearly lamine and make sure that you can see:  ? Any grab bars or handrails.  ? First and last steps.  ? Where the edge of each step is.  · Use tools that help you move around (mobility aids) if they are needed. These include:  ? Canes.  ? Walkers.  ? Scooters.  ? Crutches.  · Turn on the lights when you go into a dark area. Replace any light bulbs as soon as they burn out.  · Set up your furniture so you have a clear path.  Avoid moving your furniture around.  · If any of your floors are uneven, fix them.  · If there are any pets around you, be aware of where they are.  · Review your medicines with your doctor. Some medicines can make you feel dizzy. This can increase your chance of falling.  Ask your doctor what other things that you can do to help prevent falls.  This information is not intended to replace advice given to you by your health care provider. Make sure you discuss any questions you have with your health care provider.  Document Released: 10/14/2010 Document Revised: 2017 Document Reviewed: 2016  [employer] Interactive Patient Education © 2018 Elsevier Inc.    Medicare Wellness  Personal Prevention Plan of Service     Date of Office Visit:  2018  Encounter Provider:  Ciera Newman MD  Place of Service:  [facility]  Patient Name: Carol Osgood  :  1950    As part of the Medicare Wellness portion of your visit today, we are providing you with this personalized preventive plan of services (PPPS). This plan is based upon recommendations of the [place] Preventive Services Task Force (USPSTF) and the Advisory Committee on Immunization Practices (ACIP).    This lists the preventive care services that should be considered, and provides dates of when you are due. Items listed as completed are up-to-date and do not require any further intervention.    Health Maintenance   Topic Date Due   • TDAP/TD VACCINES (1 - Tdap) 2009   • ZOSTER VACCINE (3 of 3) 05/10/2017   • INFLUENZA VACCINE  2018   • MEDICARE ANNUAL WELLNESS  2018   • LIPID PANEL  2020 (Originally 2018)   • MAMMOGRAM  2018   • DXA SCAN  2019   • COLONOSCOPY  10/20/2019   • COLOGUARD  10/20/2020   • HEPATITIS C SCREENING  Completed   • PNEUMOCOCCAL VACCINES (65+ LOW/MEDIUM RISK)  Excluded       No orders of the defined types were placed in this encounter.      Return in  about 6 weeks (around 11/1/2018) for fu ibs and anxiety.

## 2018-09-20 NOTE — PROGRESS NOTES
QUICK REFERENCE INFORMATION:  The ABCs of the Annual Wellness Visit    Subsequent Medicare Wellness Visit    HEALTH RISK ASSESSMENT    1950    Recent Hospitalizations:  No hospitalization(s) within the last year..        Current Medical Providers:  Patient Care Team:  Ciera Newman MD as PCP - General  Ciera Newman MD as PCP - Family Medicine  Ciera Newman MD as PCP - oHlli Jerry RN as Care Coordinator (Population Health)        Smoking Status:  History   Smoking Status   • Never Smoker   Smokeless Tobacco   • Never Used       Alcohol Consumption:  History   Alcohol Use No       Depression Screen:   PHQ-2/PHQ-9 Depression Screening 9/20/2018   Little interest or pleasure in doing things 0   Feeling down, depressed, or hopeless 0   Trouble falling or staying asleep, or sleeping too much 0   Feeling tired or having little energy 1   Poor appetite or overeating 0   Feeling bad about yourself - or that you are a failure or have let yourself or your family down 0   Trouble concentrating on things, such as reading the newspaper or watching television 0   Moving or speaking so slowly that other people could have noticed. Or the opposite - being so fidgety or restless that you have been moving around a lot more than usual 0   Thoughts that you would be better off dead, or of hurting yourself in some way 0   Total Score 1   If you checked off any problems, how difficult have these problems made it for you to do your work, take care of things at home, or get along with other people? Not difficult at all       Health Habits and Functional and Cognitive Screening:  Functional & Cognitive Status 9/20/2018   Do you have difficulty preparing food and eating? No   Do you have difficulty bathing yourself, getting dressed or grooming yourself? No   Do you have difficulty using the toilet? No   Do you have difficulty moving around from place to place? No   Do you have trouble with steps or getting out  of a bed or a chair? No   In the past year have you fallen or experienced a near fall? No   Current Diet Well Balanced Diet   Dental Exam Up to date   Eye Exam Up to date   Exercise (times per week) 3 times per week   Current Exercise Activities Include Walking   Do you need help using the phone?  No   Are you deaf or do you have serious difficulty hearing?  No   Do you need help with transportation? No   Do you need help shopping? No   Do you need help preparing meals?  No   Do you need help with housework?  Yes   Do you need help with laundry? No   Do you need help taking your medications? No   Do you need help managing money? No   Do you ever drive or ride in a car without wearing a seat belt? No   Have you felt unusual stress, anger or loneliness in the last month? No   Who do you live with? Spouse   If you need help, do you have trouble finding someone available to you? No   Have you been bothered in the last four weeks by sexual problems? No   Do you have difficulty concentrating, remembering or making decisions? No           Does the patient have evidence of cognitive impairment? No    Aspirin use counseling: Does not need ASA (and currently is not on it)      Recent Lab Results:  CMP:  Lab Results   Component Value Date    GLU 89 09/13/2018    BUN 29 (H) 09/13/2018    CREATININE 1.24 (H) 09/13/2018    EGFRIFNONA 43 (L) 09/13/2018    EGFRIFAFRI 52 (L) 09/13/2018    BCR 23.4 09/13/2018     09/13/2018    K 4.0 09/13/2018    CO2 28.3 09/13/2018    CALCIUM 9.6 09/13/2018    PROTENTOTREF 6.9 09/13/2018    ALBUMIN 4.10 09/13/2018    LABGLOBREF 2.8 09/13/2018    LABIL2 1.5 09/13/2018    BILITOT 0.4 09/13/2018    ALKPHOS 84 09/13/2018    AST 24 09/13/2018    ALT 17 09/13/2018     Lipid Panel:  Lab Results   Component Value Date    TRIG 60 01/09/2017    HDL 91 (H) 01/09/2017    VLDL 12 01/09/2017    LDLHDL 0.85 01/09/2017     HbA1c:       Visual Acuity:  No exam data present    Age-appropriate Screening  Schedule:  Refer to the list below for future screening recommendations based on patient's age, sex and/or medical conditions. Orders for these recommended tests are listed in the plan section. The patient has been provided with a written plan.    Health Maintenance   Topic Date Due   • TDAP/TD VACCINES (1 - Tdap) 09/02/2009   • ZOSTER VACCINE (3 of 3) 05/10/2017   • INFLUENZA VACCINE  08/01/2018   • LIPID PANEL  03/03/2020 (Originally 1/9/2018)   • MAMMOGRAM  11/29/2018   • DXA SCAN  01/19/2019   • COLONOSCOPY  10/20/2019   • PNEUMOCOCCAL VACCINES (65+ LOW/MEDIUM RISK)  Excluded        Subjective   History of Present Illness    Carol Osgood is a 67 y.o. female who presents for an Subsequent Wellness Visit.    The following portions of the patient's history were reviewed and updated as appropriate: allergies, current medications, past family history, past medical history, past social history, past surgical history and problem list.    Outpatient Medications Prior to Visit   Medication Sig Dispense Refill   • ALPRAZolam (XANAX) 0.5 MG tablet Take 1 tablet by mouth 3 (Three) Times a Day As Needed for Anxiety. 20 tablet 0   • desoximetasone (TOPICORT) 0.25 % cream Apply  topically to the appropriate area as directed Every 12 (Twelve) Hours. 60 g 0   • Polyethyl Glycol-Propyl Glycol (SYSTANE ULTRA OP) Apply 1 drop to eye Daily.       No facility-administered medications prior to visit.        Patient Active Problem List   Diagnosis   • Low back pain   • Low hemoglobin   • Menopause present   • Osteoporosis   • Thrombocytopenia (CMS/HCC)   • Renal insufficiency   • Pain and swelling of left knee   • Anxiety   • Vitamin D deficiency   • Constipation   • Memory loss       Advance Care Planning:  has an advance directive - a copy HAS NOT been provided. Have asked the patient to send this to us to add to record.    Identification of Risk Factors:  Risk factors include: weight , inactivity and increased fall risk.    Review  "of Systems    Compared to one year ago, the patient feels her physical health is better.  Compared to one year ago, the patient feels her mental health is the same.    Objective     Physical Exam    Vitals:    09/20/18 0921   BP: 110/60   BP Location: Left arm   Patient Position: Sitting   Cuff Size: Adult   Pulse: 76   Temp: 97.8 °F (36.6 °C)   TempSrc: Oral   SpO2: 99%   Weight: 60.3 kg (133 lb)   Height: 170.2 cm (67\")   PainSc:   6       Patient's Body mass index is 20.83 kg/m². BMI is within normal parameters. No follow-up required.      Assessment/Plan   Patient Self-Management and Personalized Health Advice  The patient has been provided with information about: diet, exercise, prevention of cardiac or vascular disease and fall prevention and preventive services including:   · Exercise counseling provided, Nutrition counseling provided.    Visit Diagnoses:    ICD-10-CM ICD-9-CM   1. Renal insufficiency N28.9 593.9   2. Medicare annual wellness visit, subsequent Z00.00 V70.0   3. Anxiety F41.9 300.00       No orders of the defined types were placed in this encounter.      Outpatient Encounter Prescriptions as of 9/20/2018   Medication Sig Dispense Refill   • ALPRAZolam (XANAX) 0.5 MG tablet Take 1 tablet by mouth 3 (Three) Times a Day As Needed for Anxiety. 20 tablet 0   • desoximetasone (TOPICORT) 0.25 % cream Apply  topically to the appropriate area as directed Every 12 (Twelve) Hours. 60 g 0   • Polyethyl Glycol-Propyl Glycol (SYSTANE ULTRA OP) Apply 1 drop to eye Daily.       No facility-administered encounter medications on file as of 9/20/2018.        Reviewed use of high risk medication in the elderly: yes  Reviewed for potential of harmful drug interactions in the elderly: yes    Follow Up:  No Follow-up on file.     An After Visit Summary and PPPS with all of these plans were given to the patient.    I have rec the shingles vaccine  She is working on balance exercise      "

## 2018-09-28 ENCOUNTER — OFFICE VISIT (OUTPATIENT)
Dept: INTERNAL MEDICINE | Facility: CLINIC | Age: 68
End: 2018-09-28

## 2018-09-28 VITALS
TEMPERATURE: 98 F | WEIGHT: 135.2 LBS | BODY MASS INDEX: 21.22 KG/M2 | SYSTOLIC BLOOD PRESSURE: 112 MMHG | DIASTOLIC BLOOD PRESSURE: 70 MMHG | HEIGHT: 67 IN | OXYGEN SATURATION: 98 % | HEART RATE: 66 BPM

## 2018-09-28 DIAGNOSIS — S16.1XXA STRAIN OF NECK MUSCLE, INITIAL ENCOUNTER: Primary | ICD-10-CM

## 2018-09-28 DIAGNOSIS — R10.13 EPIGASTRIC PAIN: ICD-10-CM

## 2018-09-28 PROCEDURE — 99214 OFFICE O/P EST MOD 30 MIN: CPT | Performed by: INTERNAL MEDICINE

## 2018-09-28 RX ORDER — OMEPRAZOLE 40 MG/1
40 CAPSULE, DELAYED RELEASE ORAL DAILY
Qty: 30 CAPSULE | Refills: 1 | Status: SHIPPED | OUTPATIENT
Start: 2018-09-28 | End: 2018-10-23 | Stop reason: SDUPTHER

## 2018-09-28 NOTE — PROGRESS NOTES
Subjective   Carol Osgood is a 67 y.o. female complains of rt neck pain X sep 8.    History of Present Illness   pT WAS WORKING IN HER YARD A FEW WEEKS AGO and has had neck pain on the right since then  She is seeing the chiro  It is better but still veru tight.  She has a hard time sleeping.  She says her constipation is better with celexa but the epigastric pain is not better  Patient continues to have some epigastric pain every time she eats.  Patient says it does not matter what she eats.  No nausea or vomiting  The following portions of the patient's history were reviewed and updated as appropriate: allergies, current medications, past medical history, past social history and problem list.  Denies any significant change in diet.  Review of Systems   Gastrointestinal: Positive for nausea. Abdominal pain: epigastric.   Musculoskeletal: Positive for neck pain.   All other systems reviewed and are negative.      Objective   Physical Exam   Constitutional: She is oriented to person, place, and time. She appears well-developed and well-nourished.   HENT:   Head: Normocephalic and atraumatic.   Right Ear: External ear normal.   Left Ear: External ear normal.   Mouth/Throat: Oropharynx is clear and moist.   Eyes: Pupils are equal, round, and reactive to light. Conjunctivae and EOM are normal.   Neck: Normal range of motion. No tracheal deviation present. No thyromegaly present.   Cardiovascular: Normal rate, regular rhythm, normal heart sounds and intact distal pulses.    Pulmonary/Chest: Effort normal and breath sounds normal.   Abdominal: Soft. Bowel sounds are normal. She exhibits no distension. There is no tenderness.   Musculoskeletal: Normal range of motion. She exhibits no edema or deformity.   Neurological: She is alert and oriented to person, place, and time.   Skin: Skin is warm and dry.   Psychiatric: She has a normal mood and affect. Her behavior is normal. Judgment and thought content normal.   Vitals  reviewed.      Vitals:    09/28/18 0838   BP: 112/70   Pulse: 66   Temp: 98 °F (36.7 °C)   SpO2: 98%     Orders Only on 09/12/2018   Component Date Value Ref Range Status   • WBC 09/13/2018 4.45* 4.50 - 10.70 10*3/mm3 Final   • RBC 09/13/2018 3.80* 3.90 - 5.20 10*6/mm3 Final   • Hemoglobin 09/13/2018 11.9  11.9 - 15.5 g/dL Final   • Hematocrit 09/13/2018 36.6  35.6 - 45.5 % Final   • MCV 09/13/2018 96.3  80.5 - 98.2 fL Final   • MCH 09/13/2018 31.3  26.9 - 32.0 pg Final   • MCHC 09/13/2018 32.5  32.4 - 36.3 g/dL Final   • RDW 09/13/2018 13.3* 11.7 - 13.0 % Final   • Platelets 09/13/2018 145  140 - 500 10*3/mm3 Final   • Neutrophil Rel % 09/13/2018 61.6  42.7 - 76.0 % Final   • Lymphocyte Rel % 09/13/2018 26.3  19.6 - 45.3 % Final   • Monocyte Rel % 09/13/2018 9.4  5.0 - 12.0 % Final   • Eosinophil Rel % 09/13/2018 1.8  0.3 - 6.2 % Final   • Basophil Rel % 09/13/2018 0.9  0.0 - 1.5 % Final   • Neutrophils Absolute 09/13/2018 2.74  1.90 - 8.10 10*3/mm3 Final   • Lymphocytes Absolute 09/13/2018 1.17  0.90 - 4.80 10*3/mm3 Final   • Monocytes Absolute 09/13/2018 0.42  0.20 - 1.20 10*3/mm3 Final   • Eosinophils Absolute 09/13/2018 0.08  0.00 - 0.70 10*3/mm3 Final   • Basophils Absolute 09/13/2018 0.04  0.00 - 0.20 10*3/mm3 Final   • Immature Granulocyte Rel % 09/13/2018 0.2  0.0 - 0.5 % Final   • Immature Grans Absolute 09/13/2018 0.01  0.00 - 0.03 10*3/mm3 Final   • Glucose 09/13/2018 89  65 - 99 mg/dL Final   • BUN 09/13/2018 29* 8 - 23 mg/dL Final   • Creatinine 09/13/2018 1.24* 0.57 - 1.00 mg/dL Final   • eGFR Non  Am 09/13/2018 43* >60 mL/min/1.73 Final   • eGFR African Am 09/13/2018 52* >60 mL/min/1.73 Final   • BUN/Creatinine Ratio 09/13/2018 23.4  7.0 - 25.0 Final   • Sodium 09/13/2018 143  136 - 145 mmol/L Final   • Potassium 09/13/2018 4.0  3.5 - 5.2 mmol/L Final   • Chloride 09/13/2018 105  98 - 107 mmol/L Final   • Total CO2 09/13/2018 28.3  22.0 - 29.0 mmol/L Final   • Calcium 09/13/2018 9.6  8.6 -  10.5 mg/dL Final   • Total Protein 09/13/2018 6.9  6.0 - 8.5 g/dL Final   • Albumin 09/13/2018 4.10  3.50 - 5.20 g/dL Final   • Globulin 09/13/2018 2.8  gm/dL Final   • A/G Ratio 09/13/2018 1.5  g/dL Final   • Total Bilirubin 09/13/2018 0.4  0.1 - 1.2 mg/dL Final   • Alkaline Phosphatase 09/13/2018 84  39 - 117 U/L Final   • AST (SGOT) 09/13/2018 24  1 - 32 U/L Final   • ALT (SGPT) 09/13/2018 17  1 - 33 U/L Final     Current Outpatient Prescriptions:   •  ALPRAZolam (XANAX) 0.5 MG tablet, Take 1 tablet by mouth 3 (Three) Times a Day As Needed for Anxiety., Disp: 20 tablet, Rfl: 0  •  citalopram (CELEXA) 10 MG tablet, Take 1 tablet by mouth Daily., Disp: 30 tablet, Rfl: 2  •  desoximetasone (TOPICORT) 0.25 % cream, Apply  topically to the appropriate area as directed Every 12 (Twelve) Hours., Disp: 60 g, Rfl: 0  •  Polyethyl Glycol-Propyl Glycol (SYSTANE ULTRA OP), Apply 1 drop to eye Daily., Disp: , Rfl:   •  omeprazole (priLOSEC) 40 MG capsule, Take 1 capsule by mouth Daily. First thing in the am, Disp: 30 capsule, Rfl: 1  •  Unable to find, Apply 1 each topically to the appropriate area as directed 1 (One) Time for 1 dose., Disp: 1 each, Rfl: 0         Assessment/Plan   Diagnoses and all orders for this visit:    Strain of neck muscle, initial encounter    Epigastric pain    Other orders  -     omeprazole (priLOSEC) 40 MG capsule; Take 1 capsule by mouth Daily. First thing in the am  -     Unable to find; Apply 1 each topically to the appropriate area as directed 1 (One) Time for 1 dose.    1. NEck strain- we will try a few days of motrin and heat with gentle stretching.  We will try an anti-inflammatory cream a few times a day and see if that helps.  Patient will continue seeing the chiropractor but she will let me know she decides to do physical therapy  2.  Epigastric pain  With eating  Try daily prilosec  3.  Lower abdominal cramping/constipation: Patient placed this is getting better with the Celexa.

## 2018-10-23 RX ORDER — OMEPRAZOLE 40 MG/1
40 CAPSULE, DELAYED RELEASE ORAL DAILY
Qty: 90 CAPSULE | Refills: 1 | Status: SHIPPED | OUTPATIENT
Start: 2018-10-23 | End: 2019-02-04 | Stop reason: SDUPTHER

## 2018-11-01 ENCOUNTER — OFFICE VISIT (OUTPATIENT)
Dept: INTERNAL MEDICINE | Facility: CLINIC | Age: 68
End: 2018-11-01

## 2018-11-01 VITALS
HEART RATE: 73 BPM | DIASTOLIC BLOOD PRESSURE: 62 MMHG | WEIGHT: 138 LBS | HEIGHT: 67 IN | TEMPERATURE: 98 F | SYSTOLIC BLOOD PRESSURE: 106 MMHG | BODY MASS INDEX: 21.66 KG/M2 | OXYGEN SATURATION: 97 %

## 2018-11-01 DIAGNOSIS — M25.462 PAIN AND SWELLING OF LEFT KNEE: Primary | ICD-10-CM

## 2018-11-01 DIAGNOSIS — E55.9 VITAMIN D DEFICIENCY: ICD-10-CM

## 2018-11-01 DIAGNOSIS — F41.9 ANXIETY: ICD-10-CM

## 2018-11-01 DIAGNOSIS — M25.562 PAIN AND SWELLING OF LEFT KNEE: Primary | ICD-10-CM

## 2018-11-01 PROCEDURE — 99213 OFFICE O/P EST LOW 20 MIN: CPT | Performed by: INTERNAL MEDICINE

## 2018-11-01 RX ORDER — CITALOPRAM 10 MG/1
10 TABLET ORAL DAILY
Qty: 90 TABLET | Refills: 1 | Status: SHIPPED | OUTPATIENT
Start: 2018-11-01 | End: 2019-06-03

## 2018-11-01 NOTE — PROGRESS NOTES
Subjective   Carol Osgood is a 67 y.o. female here to follow up on IBS & Anxiety.  Pt would like to discuss abt her left knee pain.    History of Present Illness   She is doing better with the omeprazole  She is doing better with the celexa for her anxietuy  The xanax made her sleep  She has been haiving some knee pain on the left since she went down on  Her knee yesterday  She is a little better today    The following portions of the patient's history were reviewed and updated as appropriate: allergies, current medications, past medical history, past social history and problem list.  She is staying active    Review of Systems   All other systems reviewed and are negative.      Objective   Physical Exam   Constitutional: She is oriented to person, place, and time. She appears well-developed and well-nourished.   HENT:   Head: Normocephalic and atraumatic.   Right Ear: External ear normal.   Left Ear: External ear normal.   Mouth/Throat: Oropharynx is clear and moist.   Eyes: Pupils are equal, round, and reactive to light. Conjunctivae and EOM are normal.   Neck: Normal range of motion. No tracheal deviation present. No thyromegaly present.   Cardiovascular: Normal rate, regular rhythm, normal heart sounds and intact distal pulses.    Pulmonary/Chest: Effort normal and breath sounds normal.   Abdominal: Soft. Bowel sounds are normal. She exhibits no distension. There is no tenderness.   Musculoskeletal: Normal range of motion. She exhibits no edema or deformity.   Neurological: She is alert and oriented to person, place, and time.   Skin: Skin is warm and dry.   Psychiatric: She has a normal mood and affect. Her behavior is normal. Judgment and thought content normal.   Vitals reviewed.      Vitals:    11/01/18 1016   BP: 106/62   Pulse: 73   Temp: 98 °F (36.7 °C)   SpO2: 97%     Current Outpatient Prescriptions:   •  citalopram (CELEXA) 10 MG tablet, Take 1 tablet by mouth Daily., Disp: 90 tablet, Rfl: 1  •   desoximetasone (TOPICORT) 0.25 % cream, Apply  topically to the appropriate area as directed Every 12 (Twelve) Hours., Disp: 60 g, Rfl: 0  •  omeprazole (priLOSEC) 40 MG capsule, Take 1 capsule by mouth Daily. First thing in the am, Disp: 90 capsule, Rfl: 1  •  Polyethyl Glycol-Propyl Glycol (SYSTANE ULTRA OP), Apply 1 drop to eye Daily., Disp: , Rfl:          Assessment/Plan   Diagnoses and all orders for this visit:    Pain and swelling of left knee    Anxiety  -     Comprehensive Metabolic Panel; Future  -     CBC & Differential; Future  -     Vitamin D 25 Hydroxy; Future    Vitamin D deficiency  -     Comprehensive Metabolic Panel; Future  -     CBC & Differential; Future  -     Vitamin D 25 Hydroxy; Future    Other orders  -     citalopram (CELEXA) 10 MG tablet; Take 1 tablet by mouth Daily.      1. Anxiety- doing well with celexa  2. IBS-/gerd- better with omeprazole  3.  Knee pain - already a little better we will  Follow if not improving

## 2019-02-04 RX ORDER — OMEPRAZOLE 40 MG/1
40 CAPSULE, DELAYED RELEASE ORAL DAILY
Qty: 90 CAPSULE | Refills: 0 | Status: SHIPPED | OUTPATIENT
Start: 2019-02-04 | End: 2019-05-25 | Stop reason: SDUPTHER

## 2019-04-24 DIAGNOSIS — E55.9 VITAMIN D DEFICIENCY: ICD-10-CM

## 2019-04-24 DIAGNOSIS — F41.9 ANXIETY: ICD-10-CM

## 2019-04-26 LAB
25(OH)D3+25(OH)D2 SERPL-MCNC: 28.4 NG/ML (ref 30–100)
ALBUMIN SERPL-MCNC: 4.4 G/DL (ref 3.5–5.2)
ALBUMIN/GLOB SERPL: 1.6 G/DL
ALP SERPL-CCNC: 80 U/L (ref 39–117)
ALT SERPL-CCNC: 17 U/L (ref 1–33)
AST SERPL-CCNC: 19 U/L (ref 1–32)
BASOPHILS # BLD AUTO: 0.06 10*3/MM3 (ref 0–0.2)
BASOPHILS NFR BLD AUTO: 1.5 % (ref 0–1.5)
BILIRUB SERPL-MCNC: 0.3 MG/DL (ref 0.2–1.2)
BUN SERPL-MCNC: 35 MG/DL (ref 8–23)
BUN/CREAT SERPL: 25.5 (ref 7–25)
CALCIUM SERPL-MCNC: 10.1 MG/DL (ref 8.6–10.5)
CHLORIDE SERPL-SCNC: 105 MMOL/L (ref 98–107)
CO2 SERPL-SCNC: 27.3 MMOL/L (ref 22–29)
CREAT SERPL-MCNC: 1.37 MG/DL (ref 0.57–1)
EOSINOPHIL # BLD AUTO: 0.11 10*3/MM3 (ref 0–0.4)
EOSINOPHIL NFR BLD AUTO: 2.8 % (ref 0.3–6.2)
ERYTHROCYTE [DISTWIDTH] IN BLOOD BY AUTOMATED COUNT: 13 % (ref 12.3–15.4)
GLOBULIN SER CALC-MCNC: 2.7 GM/DL
GLUCOSE SERPL-MCNC: 84 MG/DL (ref 65–99)
HCT VFR BLD AUTO: 39.6 % (ref 34–46.6)
HGB BLD-MCNC: 12.6 G/DL (ref 12–15.9)
IMM GRANULOCYTES # BLD AUTO: 0 10*3/MM3 (ref 0–0.05)
IMM GRANULOCYTES NFR BLD AUTO: 0 % (ref 0–0.5)
LYMPHOCYTES # BLD AUTO: 0.88 10*3/MM3 (ref 0.7–3.1)
LYMPHOCYTES NFR BLD AUTO: 22.6 % (ref 19.6–45.3)
MCH RBC QN AUTO: 31 PG (ref 26.6–33)
MCHC RBC AUTO-ENTMCNC: 31.8 G/DL (ref 31.5–35.7)
MCV RBC AUTO: 97.3 FL (ref 79–97)
MONOCYTES # BLD AUTO: 0.33 10*3/MM3 (ref 0.1–0.9)
MONOCYTES NFR BLD AUTO: 8.5 % (ref 5–12)
NEUTROPHILS # BLD AUTO: 2.52 10*3/MM3 (ref 1.7–7)
NEUTROPHILS NFR BLD AUTO: 64.6 % (ref 42.7–76)
NRBC BLD AUTO-RTO: 0 /100 WBC (ref 0–0.2)
PLATELET # BLD AUTO: 141 10*3/MM3 (ref 140–450)
POTASSIUM SERPL-SCNC: 4.3 MMOL/L (ref 3.5–5.2)
PROT SERPL-MCNC: 7.1 G/DL (ref 6–8.5)
RBC # BLD AUTO: 4.07 10*6/MM3 (ref 3.77–5.28)
SODIUM SERPL-SCNC: 143 MMOL/L (ref 136–145)
WBC # BLD AUTO: 3.9 10*3/MM3 (ref 3.4–10.8)

## 2019-05-02 ENCOUNTER — HOSPITAL ENCOUNTER (OUTPATIENT)
Dept: GENERAL RADIOLOGY | Facility: HOSPITAL | Age: 69
Discharge: HOME | End: 2019-05-02
Admitting: INTERNAL MEDICINE

## 2019-05-02 ENCOUNTER — OFFICE VISIT (OUTPATIENT)
Dept: INTERNAL MEDICINE | Facility: CLINIC | Age: 69
End: 2019-05-02

## 2019-05-02 VITALS
HEART RATE: 83 BPM | DIASTOLIC BLOOD PRESSURE: 70 MMHG | OXYGEN SATURATION: 98 % | TEMPERATURE: 98.3 F | SYSTOLIC BLOOD PRESSURE: 106 MMHG | HEIGHT: 67 IN | BODY MASS INDEX: 21.14 KG/M2 | WEIGHT: 134.7 LBS

## 2019-05-02 DIAGNOSIS — Z12.11 SCREENING FOR COLON CANCER: ICD-10-CM

## 2019-05-02 DIAGNOSIS — Z78.0 POST-MENOPAUSAL: ICD-10-CM

## 2019-05-02 DIAGNOSIS — Z12.31 ENCOUNTER FOR SCREENING MAMMOGRAM FOR MALIGNANT NEOPLASM OF BREAST: ICD-10-CM

## 2019-05-02 DIAGNOSIS — R10.84 GENERALIZED ABDOMINAL PAIN: ICD-10-CM

## 2019-05-02 DIAGNOSIS — R26.89 BALANCE PROBLEM: ICD-10-CM

## 2019-05-02 DIAGNOSIS — N18.9 CHRONIC RENAL IMPAIRMENT, UNSPECIFIED CKD STAGE: Primary | ICD-10-CM

## 2019-05-02 DIAGNOSIS — M54.2 NECK PAIN: ICD-10-CM

## 2019-05-02 DIAGNOSIS — R14.0 BLOATING: ICD-10-CM

## 2019-05-02 PROCEDURE — 72040 X-RAY EXAM NECK SPINE 2-3 VW: CPT

## 2019-05-02 PROCEDURE — 99214 OFFICE O/P EST MOD 30 MIN: CPT | Performed by: INTERNAL MEDICINE

## 2019-05-02 NOTE — PATIENT INSTRUCTIONS
She should get a phone call about scheduling a bone density and mammogram  Referral to GI for her abdominal pain and bloating  Referral to physical therapy for her neck pain.  She will get an x-ray today  Referral to the kidney doctor for her continuing elevation of her creatinine.  She is going to limit anti-inflammatories and use Tylenol only

## 2019-05-02 NOTE — PROGRESS NOTES
Subjective   Carol Osgood is a 68 y.o. female HERE TO FOLLOW UP ON LABS, IBS.   PT C/O SWELLING ON STOMACH AND PAIN WHILE MOVEMENT.    History of Present Illness   She struggles with int constipation and diarrhea.  She says the omeprazole caused worsening diarrhea.    She was on this for  epigastric pain and bloating and it did not help.    She has been takign the celexa prn and she feels like it really helps with her anxiety but she does not take it everyday    The following portions of the patient's history were reviewed and updated as appropriate: allergies, current medications, past medical history, past social history and problem list.  She has tried multiple dietary changes over the past 6 months    Review of Systems   All other systems reviewed and are negative.      Objective   Physical Exam   Constitutional: She is oriented to person, place, and time. She appears well-developed and well-nourished.   HENT:   Head: Normocephalic and atraumatic.   Right Ear: External ear normal.   Left Ear: External ear normal.   Mouth/Throat: Oropharynx is clear and moist.   Eyes: Conjunctivae and EOM are normal. Pupils are equal, round, and reactive to light.   Neck: Normal range of motion. No tracheal deviation present. No thyromegaly present.   Cardiovascular: Normal rate, regular rhythm, normal heart sounds and intact distal pulses.   Pulmonary/Chest: Effort normal and breath sounds normal.   Abdominal: Soft. Bowel sounds are normal. She exhibits no distension. There is no tenderness.   Musculoskeletal: Normal range of motion. She exhibits no edema or deformity.   Neurological: She is alert and oriented to person, place, and time.   Skin: Skin is warm and dry.   Psychiatric: She has a normal mood and affect. Her behavior is normal. Judgment and thought content normal.   Vitals reviewed.      Vitals:    05/02/19 0956   BP: 106/70   Pulse: 83   Temp: 98.3 °F (36.8 °C)   SpO2: 98%     Orders Only on 04/24/2019   Component  Date Value Ref Range Status   • 25 Hydroxy, Vitamin D 04/25/2019 28.4* 30.0 - 100.0 ng/ml Final    Comment: Reference Range for Total Vitamin D 25(OH)  Deficiency <20.0 ng/mL  Insufficiency 21-29 ng/mL  Sufficiency  ng/mL  Toxicity >100 ng/ml     • WBC 04/25/2019 3.90  3.40 - 10.80 10*3/mm3 Final   • RBC 04/25/2019 4.07  3.77 - 5.28 10*6/mm3 Final   • Hemoglobin 04/25/2019 12.6  12.0 - 15.9 g/dL Final   • Hematocrit 04/25/2019 39.6  34.0 - 46.6 % Final   • MCV 04/25/2019 97.3* 79.0 - 97.0 fL Final   • MCH 04/25/2019 31.0  26.6 - 33.0 pg Final   • MCHC 04/25/2019 31.8  31.5 - 35.7 g/dL Final   • RDW 04/25/2019 13.0  12.3 - 15.4 % Final   • Platelets 04/25/2019 141  140 - 450 10*3/mm3 Final   • Neutrophil Rel % 04/25/2019 64.6  42.7 - 76.0 % Final   • Lymphocyte Rel % 04/25/2019 22.6  19.6 - 45.3 % Final   • Monocyte Rel % 04/25/2019 8.5  5.0 - 12.0 % Final   • Eosinophil Rel % 04/25/2019 2.8  0.3 - 6.2 % Final   • Basophil Rel % 04/25/2019 1.5  0.0 - 1.5 % Final   • Neutrophils Absolute 04/25/2019 2.52  1.70 - 7.00 10*3/mm3 Final   • Lymphocytes Absolute 04/25/2019 0.88  0.70 - 3.10 10*3/mm3 Final   • Monocytes Absolute 04/25/2019 0.33  0.10 - 0.90 10*3/mm3 Final   • Eosinophils Absolute 04/25/2019 0.11  0.00 - 0.40 10*3/mm3 Final   • Basophils Absolute 04/25/2019 0.06  0.00 - 0.20 10*3/mm3 Final   • Immature Granulocyte Rel % 04/25/2019 0.0  0.0 - 0.5 % Final   • Immature Grans Absolute 04/25/2019 0.00  0.00 - 0.05 10*3/mm3 Final   • nRBC 04/25/2019 0.0  0.0 - 0.2 /100 WBC Final   • Glucose 04/25/2019 84  65 - 99 mg/dL Final   • BUN 04/25/2019 35* 8 - 23 mg/dL Final   • Creatinine 04/25/2019 1.37* 0.57 - 1.00 mg/dL Final   • eGFR Non African Am 04/25/2019 38* >60 mL/min/1.73 Final   • eGFR African Am 04/25/2019 46* >60 mL/min/1.73 Final   • BUN/Creatinine Ratio 04/25/2019 25.5* 7.0 - 25.0 Final   • Sodium 04/25/2019 143  136 - 145 mmol/L Final   • Potassium 04/25/2019 4.3  3.5 - 5.2 mmol/L Final   • Chloride  04/25/2019 105  98 - 107 mmol/L Final   • Total CO2 04/25/2019 27.3  22.0 - 29.0 mmol/L Final   • Calcium 04/25/2019 10.1  8.6 - 10.5 mg/dL Final   • Total Protein 04/25/2019 7.1  6.0 - 8.5 g/dL Final   • Albumin 04/25/2019 4.40  3.50 - 5.20 g/dL Final   • Globulin 04/25/2019 2.7  gm/dL Final   • A/G Ratio 04/25/2019 1.6  g/dL Final   • Total Bilirubin 04/25/2019 0.3  0.2 - 1.2 mg/dL Final   • Alkaline Phosphatase 04/25/2019 80  39 - 117 U/L Final   • AST (SGOT) 04/25/2019 19  1 - 32 U/L Final   • ALT (SGPT) 04/25/2019 17  1 - 33 U/L Final     Current Outpatient Medications:   •  desoximetasone (TOPICORT) 0.25 % cream, Apply  topically to the appropriate area as directed Every 12 (Twelve) Hours., Disp: 60 g, Rfl: 0  •  NON FORMULARY, ASHWAGANDHA, Disp: , Rfl:   •  Polyethyl Glycol-Propyl Glycol (SYSTANE ULTRA OP), Apply 1 drop to eye Daily., Disp: , Rfl:   •  Probiotic Product (PROBIOTIC PO), Take  [facility]. [facility], Disp: , Rfl:   •  citalopram (CELEXA) 10 MG tablet, Take 1 tablet by mouth Daily., Disp: 90 tablet, Rfl: 1  •  diclofenac (FLECTOR) 1.3 % patch patch, Apply 1 patch topically to the appropriate area as directed 2 (Two) Times a Day., Disp: 60 patch, Rfl: 1  •  omeprazole (priLOSEC) 40 MG capsule, TAKE 1 CAPSULE BY MOUTH DAILY. FIRST THING IN THE AM, Disp: 90 capsule, Rfl: 0         Assessment/Plan   Diagnoses and all orders for this visit:    Chronic renal impairment, unspecified CKD stage  -     Ambulatory Referral to Nephrology    Generalized abdominal pain  -     Ambulatory Referral to Gastroenterology    Bloating  -     Ambulatory Referral to Gastroenterology    Neck pain  -     XR Spine Cervical 2 or 3 View  -     Ambulatory Referral to Physical Therapy    Balance problem  -     Ambulatory Referral to Physical Therapy    Other orders  -     diclofenac (FLECTOR) 1.3 % patch patch; Apply 1 patch topically to the appropriate area as directed 2 (Two) Times a Day.      1. CRI- she avoids nsaids   Cont to increase  So I will refer to renal for this  2. IBS- not taking ssri reg  She is still having sx due a colon anyway refer back to GI  Cont to bogdan on diet  3.  Anxiety- She is doing well prn use of celexa  4.  Neck pain  For nine months on the right  No radicular sx  She has been seeing the chiro

## 2019-05-16 ENCOUNTER — OFFICE (OUTPATIENT)
Dept: URBAN - METROPOLITAN AREA CLINIC 75 | Facility: CLINIC | Age: 69
End: 2019-05-16

## 2019-05-16 VITALS
RESPIRATION RATE: 16 BRPM | DIASTOLIC BLOOD PRESSURE: 78 MMHG | HEIGHT: 67 IN | SYSTOLIC BLOOD PRESSURE: 118 MMHG | WEIGHT: 133 LBS | HEART RATE: 64 BPM

## 2019-05-16 DIAGNOSIS — K30 FUNCTIONAL DYSPEPSIA: ICD-10-CM

## 2019-05-16 DIAGNOSIS — R14.0 ABDOMINAL DISTENSION (GASEOUS): ICD-10-CM

## 2019-05-16 DIAGNOSIS — K59.09 OTHER CONSTIPATION: ICD-10-CM

## 2019-05-16 DIAGNOSIS — Z86.010 PERSONAL HISTORY OF COLONIC POLYPS: ICD-10-CM

## 2019-05-16 DIAGNOSIS — R10.13 EPIGASTRIC PAIN: ICD-10-CM

## 2019-05-16 DIAGNOSIS — R19.7 DIARRHEA, UNSPECIFIED: ICD-10-CM

## 2019-05-16 PROCEDURE — 99214 OFFICE O/P EST MOD 30 MIN: CPT | Performed by: INTERNAL MEDICINE

## 2019-05-22 ENCOUNTER — TRANSCRIBE ORDERS (OUTPATIENT)
Dept: ADMINISTRATIVE | Facility: HOSPITAL | Age: 69
End: 2019-05-22

## 2019-05-22 DIAGNOSIS — N17.9 AKI (ACUTE KIDNEY INJURY) (HCC): Primary | ICD-10-CM

## 2019-05-22 DIAGNOSIS — N18.30 CKD (CHRONIC KIDNEY DISEASE) STAGE 3, GFR 30-59 ML/MIN (HCC): ICD-10-CM

## 2019-05-28 ENCOUNTER — HOSPITAL ENCOUNTER (OUTPATIENT)
Dept: ULTRASOUND IMAGING | Facility: HOSPITAL | Age: 69
Discharge: HOME | End: 2019-05-28
Admitting: INTERNAL MEDICINE

## 2019-05-28 DIAGNOSIS — N18.30 CKD (CHRONIC KIDNEY DISEASE) STAGE 3, GFR 30-59 ML/MIN (HCC): ICD-10-CM

## 2019-05-28 DIAGNOSIS — N17.9 AKI (ACUTE KIDNEY INJURY) (HCC): ICD-10-CM

## 2019-05-28 PROCEDURE — 76775 US EXAM ABDO BACK WALL LIM: CPT

## 2019-05-28 RX ORDER — OMEPRAZOLE 40 MG/1
40 CAPSULE, DELAYED RELEASE ORAL DAILY
Qty: 90 CAPSULE | Refills: 0 | Status: SHIPPED | OUTPATIENT
Start: 2019-05-28 | End: 2019-06-03

## 2019-06-03 NOTE — TELEPHONE ENCOUNTER
PT NO LONGER WANTS TO TAKE THE OMEPRAZOLE AND CITALOPRAM TAKEN OFF OF HER MED LIST SHE IS NO LONGER GOING TO TAKE THEM. I HAVE ALSO CALLED THE PHARMACY TO HAVE THEM REMOVED.

## 2019-06-12 ENCOUNTER — TRANSCRIBE ORDERS (OUTPATIENT)
Dept: ADMINISTRATIVE | Facility: HOSPITAL | Age: 69
End: 2019-06-12

## 2019-06-12 ENCOUNTER — LAB (OUTPATIENT)
Dept: LAB | Facility: HOSPITAL | Age: 69
End: 2019-06-12

## 2019-06-12 DIAGNOSIS — N18.30 CHRONIC KIDNEY DISEASE, STAGE III (MODERATE) (HCC): ICD-10-CM

## 2019-06-12 DIAGNOSIS — N18.30 CHRONIC KIDNEY DISEASE, STAGE III (MODERATE) (HCC): Primary | ICD-10-CM

## 2019-06-12 LAB
ANION GAP SERPL CALCULATED.3IONS-SCNC: 9.1 MMOL/L
BUN BLD-MCNC: 38 MG/DL (ref 8–23)
BUN/CREAT SERPL: 24.1 (ref 7–25)
CALCIUM SPEC-SCNC: 10.3 MG/DL (ref 8.6–10.5)
CHLORIDE SERPL-SCNC: 103 MMOL/L (ref 98–107)
CO2 SERPL-SCNC: 29.9 MMOL/L (ref 22–29)
CREAT BLD-MCNC: 1.58 MG/DL (ref 0.57–1)
GFR SERPL CREATININE-BSD FRML MDRD: 32 ML/MIN/1.73
GLUCOSE BLD-MCNC: 92 MG/DL (ref 65–99)
POTASSIUM BLD-SCNC: 4.1 MMOL/L (ref 3.5–5.2)
SODIUM BLD-SCNC: 142 MMOL/L (ref 136–145)

## 2019-06-12 PROCEDURE — 80048 BASIC METABOLIC PNL TOTAL CA: CPT

## 2019-06-12 PROCEDURE — 36415 COLL VENOUS BLD VENIPUNCTURE: CPT

## 2019-06-18 ENCOUNTER — TRANSCRIBE ORDERS (OUTPATIENT)
Dept: ADMINISTRATIVE | Facility: HOSPITAL | Age: 69
End: 2019-06-18

## 2019-06-18 DIAGNOSIS — N17.9 ACUTE RENAL FAILURE, UNSPECIFIED ACUTE RENAL FAILURE TYPE (HCC): Primary | ICD-10-CM

## 2019-06-18 DIAGNOSIS — R31.9 HEMATURIA, UNSPECIFIED TYPE: ICD-10-CM

## 2019-06-21 ENCOUNTER — HOSPITAL ENCOUNTER (OUTPATIENT)
Dept: CT IMAGING | Facility: HOSPITAL | Age: 69
Discharge: HOME | End: 2019-06-21
Admitting: INTERNAL MEDICINE

## 2019-06-21 DIAGNOSIS — N17.9 ACUTE RENAL FAILURE, UNSPECIFIED ACUTE RENAL FAILURE TYPE (HCC): ICD-10-CM

## 2019-06-21 DIAGNOSIS — R31.9 HEMATURIA, UNSPECIFIED TYPE: ICD-10-CM

## 2019-06-21 PROCEDURE — 74176 CT ABD & PELVIS W/O CONTRAST: CPT

## 2019-07-19 ENCOUNTER — AMBULATORY SURGICAL CENTER (OUTPATIENT)
Dept: URBAN - METROPOLITAN AREA SURGERY 9 | Facility: SURGERY | Age: 69
End: 2019-07-19

## 2019-07-19 DIAGNOSIS — R10.13 EPIGASTRIC PAIN: ICD-10-CM

## 2019-07-19 DIAGNOSIS — K29.50 UNSPECIFIED CHRONIC GASTRITIS WITHOUT BLEEDING: ICD-10-CM

## 2019-07-19 DIAGNOSIS — K30 FUNCTIONAL DYSPEPSIA: ICD-10-CM

## 2019-07-19 PROCEDURE — 88305 TISSUE EXAM BY PATHOLOGIST: CPT | Performed by: INTERNAL MEDICINE

## 2019-07-19 PROCEDURE — 43239 EGD BIOPSY SINGLE/MULTIPLE: CPT | Performed by: INTERNAL MEDICINE

## 2019-07-20 ENCOUNTER — LAB REQUISITION (OUTPATIENT)
Dept: LAB | Facility: HOSPITAL | Age: 69
End: 2019-07-20

## 2019-07-20 DIAGNOSIS — K59.09 OTHER CONSTIPATION: ICD-10-CM

## 2019-07-22 LAB
CYTO UR: NORMAL
LAB AP CASE REPORT: NORMAL
LAB AP CLINICAL INFORMATION: NORMAL
PATH REPORT.FINAL DX SPEC: NORMAL
PATH REPORT.GROSS SPEC: NORMAL

## 2019-07-24 ENCOUNTER — TRANSCRIBE ORDERS (OUTPATIENT)
Dept: ADMINISTRATIVE | Facility: HOSPITAL | Age: 69
End: 2019-07-24

## 2019-07-24 ENCOUNTER — LAB (OUTPATIENT)
Dept: LAB | Facility: HOSPITAL | Age: 69
End: 2019-07-24

## 2019-07-24 DIAGNOSIS — N18.30 CHRONIC KIDNEY DISEASE, STAGE III (MODERATE) (HCC): ICD-10-CM

## 2019-07-24 DIAGNOSIS — N18.30 CHRONIC KIDNEY DISEASE, STAGE III (MODERATE) (HCC): Primary | ICD-10-CM

## 2019-07-24 LAB
25(OH)D3 SERPL-MCNC: 46.7 NG/ML (ref 30–100)
ALBUMIN SERPL-MCNC: 4.3 G/DL (ref 3.5–5.2)
ALBUMIN/GLOB SERPL: 1.6 G/DL
ALP SERPL-CCNC: 83 U/L (ref 39–117)
ALT SERPL W P-5'-P-CCNC: 19 U/L (ref 1–33)
ANION GAP SERPL CALCULATED.3IONS-SCNC: 8 MMOL/L (ref 5–15)
AST SERPL-CCNC: 24 U/L (ref 1–32)
BILIRUB SERPL-MCNC: 0.4 MG/DL (ref 0.1–1.2)
BUN BLD-MCNC: 36 MG/DL (ref 8–23)
BUN/CREAT SERPL: 23.8 (ref 7–25)
CALCIUM SPEC-SCNC: 10.1 MG/DL (ref 8.6–10.5)
CHLORIDE SERPL-SCNC: 106 MMOL/L (ref 98–107)
CK SERPL-CCNC: 159 U/L (ref 20–180)
CO2 SERPL-SCNC: 27 MMOL/L (ref 22–29)
CREAT BLD-MCNC: 1.51 MG/DL (ref 0.57–1)
DEPRECATED RDW RBC AUTO: 45.3 FL (ref 37–54)
ERYTHROCYTE [DISTWIDTH] IN BLOOD BY AUTOMATED COUNT: 12.8 % (ref 12.3–15.4)
GFR SERPL CREATININE-BSD FRML MDRD: 34 ML/MIN/1.73
GLOBULIN UR ELPH-MCNC: 2.7 GM/DL
GLUCOSE BLD-MCNC: 85 MG/DL (ref 65–99)
HCT VFR BLD AUTO: 34.8 % (ref 34–46.6)
HGB BLD-MCNC: 11.4 G/DL (ref 12–15.9)
MAGNESIUM SERPL-MCNC: 2.2 MG/DL (ref 1.6–2.4)
MCH RBC QN AUTO: 31.5 PG (ref 26.6–33)
MCHC RBC AUTO-ENTMCNC: 32.8 G/DL (ref 31.5–35.7)
MCV RBC AUTO: 96.1 FL (ref 79–97)
PHOSPHATE SERPL-MCNC: 3.5 MG/DL (ref 2.5–4.5)
PLATELET # BLD AUTO: 122 10*3/MM3 (ref 140–450)
PMV BLD AUTO: 11.4 FL (ref 6–12)
POTASSIUM BLD-SCNC: 4 MMOL/L (ref 3.5–5.2)
PROT SERPL-MCNC: 7 G/DL (ref 6–8.5)
PTH-INTACT SERPL-MCNC: 109 PG/ML (ref 15–65)
RBC # BLD AUTO: 3.62 10*6/MM3 (ref 3.77–5.28)
SODIUM BLD-SCNC: 141 MMOL/L (ref 136–145)
URATE SERPL-MCNC: 6 MG/DL (ref 2.4–5.7)
WBC NRBC COR # BLD: 4.41 10*3/MM3 (ref 3.4–10.8)

## 2019-07-24 PROCEDURE — 84100 ASSAY OF PHOSPHORUS: CPT

## 2019-07-24 PROCEDURE — 83970 ASSAY OF PARATHORMONE: CPT | Performed by: INTERNAL MEDICINE

## 2019-07-24 PROCEDURE — 82306 VITAMIN D 25 HYDROXY: CPT | Performed by: INTERNAL MEDICINE

## 2019-07-24 PROCEDURE — 85027 COMPLETE CBC AUTOMATED: CPT

## 2019-07-24 PROCEDURE — 84550 ASSAY OF BLOOD/URIC ACID: CPT

## 2019-07-24 PROCEDURE — 36415 COLL VENOUS BLD VENIPUNCTURE: CPT

## 2019-07-24 PROCEDURE — 82550 ASSAY OF CK (CPK): CPT | Performed by: INTERNAL MEDICINE

## 2019-07-24 PROCEDURE — 83735 ASSAY OF MAGNESIUM: CPT

## 2019-07-24 PROCEDURE — 80053 COMPREHEN METABOLIC PANEL: CPT

## 2019-09-02 ENCOUNTER — HOSPITAL ENCOUNTER (EMERGENCY)
Facility: HOSPITAL | Age: 69
Discharge: HOME | End: 2019-09-02
Attending: EMERGENCY MEDICINE | Admitting: EMERGENCY MEDICINE

## 2019-09-02 ENCOUNTER — APPOINTMENT (OUTPATIENT)
Dept: GENERAL RADIOLOGY | Facility: HOSPITAL | Age: 69
End: 2019-09-02

## 2019-09-02 VITALS
WEIGHT: 135 LBS | DIASTOLIC BLOOD PRESSURE: 62 MMHG | HEART RATE: 76 BPM | HEIGHT: 67 IN | SYSTOLIC BLOOD PRESSURE: 100 MMHG | TEMPERATURE: 100.4 F | OXYGEN SATURATION: 98 % | BODY MASS INDEX: 21.19 KG/M2 | RESPIRATION RATE: 18 BRPM

## 2019-09-02 DIAGNOSIS — R50.9 FEVER, UNSPECIFIED FEVER CAUSE: Primary | ICD-10-CM

## 2019-09-02 LAB
ALBUMIN SERPL-MCNC: 4 G/DL (ref 3.5–5.2)
ALBUMIN/GLOB SERPL: 1.6 G/DL
ALP SERPL-CCNC: 56 U/L (ref 39–117)
ALT SERPL W P-5'-P-CCNC: 24 U/L (ref 1–33)
ANION GAP SERPL CALCULATED.3IONS-SCNC: 14.6 MMOL/L (ref 5–15)
AST SERPL-CCNC: 36 U/L (ref 1–32)
BACTERIA UR QL AUTO: ABNORMAL /HPF
BASOPHILS # BLD AUTO: 0.02 10*3/MM3 (ref 0–0.2)
BASOPHILS NFR BLD AUTO: 0.4 % (ref 0–1.5)
BILIRUB SERPL-MCNC: 0.4 MG/DL (ref 0.2–1.2)
BILIRUB UR QL STRIP: NEGATIVE
BUN BLD-MCNC: 34 MG/DL (ref 8–23)
BUN/CREAT SERPL: 18.6 (ref 7–25)
CALCIUM SPEC-SCNC: 9.1 MG/DL (ref 8.6–10.5)
CHLORIDE SERPL-SCNC: 95 MMOL/L (ref 98–107)
CLARITY UR: CLEAR
CO2 SERPL-SCNC: 21.4 MMOL/L (ref 22–29)
COLOR UR: YELLOW
CREAT BLD-MCNC: 1.83 MG/DL (ref 0.57–1)
D-LACTATE SERPL-SCNC: 1 MMOL/L (ref 0.5–2)
DEPRECATED RDW RBC AUTO: 43.1 FL (ref 37–54)
EOSINOPHIL # BLD AUTO: 0.02 10*3/MM3 (ref 0–0.4)
EOSINOPHIL NFR BLD AUTO: 0.4 % (ref 0.3–6.2)
ERYTHROCYTE [DISTWIDTH] IN BLOOD BY AUTOMATED COUNT: 12.6 % (ref 12.3–15.4)
GFR SERPL CREATININE-BSD FRML MDRD: 27 ML/MIN/1.73
GLOBULIN UR ELPH-MCNC: 2.5 GM/DL
GLUCOSE BLD-MCNC: 97 MG/DL (ref 65–99)
GLUCOSE UR STRIP-MCNC: NEGATIVE MG/DL
HCT VFR BLD AUTO: 33.4 % (ref 34–46.6)
HGB BLD-MCNC: 11.3 G/DL (ref 12–15.9)
HGB UR QL STRIP.AUTO: NEGATIVE
HYALINE CASTS UR QL AUTO: ABNORMAL /LPF
IMM GRANULOCYTES # BLD AUTO: 0.02 10*3/MM3 (ref 0–0.05)
IMM GRANULOCYTES NFR BLD AUTO: 0.4 % (ref 0–0.5)
KETONES UR QL STRIP: ABNORMAL
LEUKOCYTE ESTERASE UR QL STRIP.AUTO: NEGATIVE
LIPASE SERPL-CCNC: 51 U/L (ref 13–60)
LYMPHOCYTES # BLD AUTO: 0.26 10*3/MM3 (ref 0.7–3.1)
LYMPHOCYTES NFR BLD AUTO: 4.8 % (ref 19.6–45.3)
MCH RBC QN AUTO: 31.4 PG (ref 26.6–33)
MCHC RBC AUTO-ENTMCNC: 33.8 G/DL (ref 31.5–35.7)
MCV RBC AUTO: 92.8 FL (ref 79–97)
MONOCYTES # BLD AUTO: 0.25 10*3/MM3 (ref 0.1–0.9)
MONOCYTES NFR BLD AUTO: 4.6 % (ref 5–12)
NEUTROPHILS # BLD AUTO: 4.86 10*3/MM3 (ref 1.7–7)
NEUTROPHILS NFR BLD AUTO: 89.4 % (ref 42.7–76)
NITRITE UR QL STRIP: NEGATIVE
NRBC BLD AUTO-RTO: 0 /100 WBC (ref 0–0.2)
PH UR STRIP.AUTO: <=5 [PH] (ref 5–8)
PLATELET # BLD AUTO: 74 10*3/MM3 (ref 140–450)
PMV BLD AUTO: 12.8 FL (ref 6–12)
POTASSIUM BLD-SCNC: 3.9 MMOL/L (ref 3.5–5.2)
PROCALCITONIN SERPL-MCNC: 4.01 NG/ML (ref 0.1–0.25)
PROT SERPL-MCNC: 6.5 G/DL (ref 6–8.5)
PROT UR QL STRIP: ABNORMAL
RBC # BLD AUTO: 3.6 10*6/MM3 (ref 3.77–5.28)
RBC # UR: ABNORMAL /HPF
REF LAB TEST METHOD: ABNORMAL
SODIUM BLD-SCNC: 131 MMOL/L (ref 136–145)
SP GR UR STRIP: 1.02 (ref 1–1.03)
SQUAMOUS #/AREA URNS HPF: ABNORMAL /HPF
UROBILINOGEN UR QL STRIP: ABNORMAL
WBC NRBC COR # BLD: 5.43 10*3/MM3 (ref 3.4–10.8)
WBC UR QL AUTO: ABNORMAL /HPF
YEAST URNS QL MICRO: ABNORMAL /HPF

## 2019-09-02 PROCEDURE — 36415 COLL VENOUS BLD VENIPUNCTURE: CPT

## 2019-09-02 PROCEDURE — 81001 URINALYSIS AUTO W/SCOPE: CPT | Performed by: PHYSICIAN ASSISTANT

## 2019-09-02 PROCEDURE — 85025 COMPLETE CBC W/AUTO DIFF WBC: CPT | Performed by: PHYSICIAN ASSISTANT

## 2019-09-02 PROCEDURE — 83605 ASSAY OF LACTIC ACID: CPT | Performed by: PHYSICIAN ASSISTANT

## 2019-09-02 PROCEDURE — 71045 X-RAY EXAM CHEST 1 VIEW: CPT

## 2019-09-02 PROCEDURE — 99284 EMERGENCY DEPT VISIT MOD MDM: CPT

## 2019-09-02 PROCEDURE — 86757 RICKETTSIA ANTIBODY: CPT | Performed by: EMERGENCY MEDICINE

## 2019-09-02 PROCEDURE — 80053 COMPREHEN METABOLIC PANEL: CPT | Performed by: PHYSICIAN ASSISTANT

## 2019-09-02 PROCEDURE — 87040 BLOOD CULTURE FOR BACTERIA: CPT | Performed by: PHYSICIAN ASSISTANT

## 2019-09-02 PROCEDURE — 86666 EHRLICHIA ANTIBODY: CPT | Performed by: EMERGENCY MEDICINE

## 2019-09-02 PROCEDURE — 83690 ASSAY OF LIPASE: CPT | Performed by: PHYSICIAN ASSISTANT

## 2019-09-02 PROCEDURE — 86618 LYME DISEASE ANTIBODY: CPT | Performed by: EMERGENCY MEDICINE

## 2019-09-02 PROCEDURE — 86753 PROTOZOA ANTIBODY NOS: CPT | Performed by: EMERGENCY MEDICINE

## 2019-09-02 PROCEDURE — 84145 PROCALCITONIN (PCT): CPT | Performed by: PHYSICIAN ASSISTANT

## 2019-09-02 RX ORDER — ACETAMINOPHEN 500 MG
1000 TABLET ORAL ONCE
Status: COMPLETED | OUTPATIENT
Start: 2019-09-02 | End: 2019-09-02

## 2019-09-02 RX ORDER — SODIUM CHLORIDE 0.9 % (FLUSH) 0.9 %
10 SYRINGE (ML) INJECTION AS NEEDED
Status: DISCONTINUED | OUTPATIENT
Start: 2019-09-02 | End: 2019-09-02 | Stop reason: HOSPADM

## 2019-09-02 RX ORDER — DOXYCYCLINE 100 MG/1
100 CAPSULE ORAL 2 TIMES DAILY
Qty: 14 CAPSULE | Refills: 0 | Status: SHIPPED | OUTPATIENT
Start: 2019-09-02 | End: 2019-09-18

## 2019-09-02 RX ADMIN — ACETAMINOPHEN 1000 MG: 500 TABLET, FILM COATED ORAL at 11:59

## 2019-09-02 RX ADMIN — SODIUM CHLORIDE 1000 ML: 9 INJECTION, SOLUTION INTRAVENOUS at 12:01

## 2019-09-02 NOTE — ED PROVIDER NOTES
Pt is a 68 y.o. female who presents to the ED complaining of fever, chills for the past few days. She reports nausea, vomiting, decreased appetite, arthralgia, and HA. She denies cough, SOA, abd pain, or  Sx. Per family, they have been staying out in the woods and see a lot of deer. She states she saw a tick on her a month ago but states it was not embedded. She denies noticing any new rashes. She states she has a FHx of low platelets.    On exam,  Constitutional: mildly distressed  HENT: oropharynx is clear and moist  Neck: supple, no lymphadenopathy, no neck stiffness  Cardiovascular: heart is RRR. No murmur  Pulmonary: normal effort, CTAB  Abdomen: soft, non-tender, non-distended, normoactive bowel sounds  Musculoskeletal: FROM  Neurological: Awake, alert, CHUNG, FC    Labs and imaging reviewed.   CBC: platelets = 74  CXR negative acute.     Plan: Discharge. Check tick panel      MD ATTESTATION NOTE    The NASIMA and I have discussed this patient's history, physical exam, and treatment plan.  I have reviewed the documentation and personally had a face to face interaction with the patient. I affirm the documentation and agree with the treatment and plan.  The attached note describes my personal findings.      Documentation assistance provided by estuardo Mendoza for Dr. Martin. Information recorded by the estuardo was done at my direction and has been verified and validated by me.           Damon Mendoza  09/02/19 1412       Amol Martin MD  09/02/19 2011

## 2019-09-02 NOTE — ED NOTES
"Pt states \"For 4 days I've had a headache and fevers. I have arthritis so my neck and my back have also been hurting. I've had some abdominal pain and I'm usually constipated. I also just started having pain behind my knees.\"     Eve Delgado, RN  09/02/19 1112       Eve Delgado, RN  09/02/19 1115    "

## 2019-09-04 LAB
B BURGDOR IGG+IGM SER-ACNC: <0.91 ISR (ref 0–0.9)
B BURGDOR IGM SER-ACNC: <0.8 INDEX (ref 0–0.79)

## 2019-09-05 ENCOUNTER — TELEPHONE (OUTPATIENT)
Dept: INTERNAL MEDICINE | Facility: CLINIC | Age: 69
End: 2019-09-05

## 2019-09-05 ENCOUNTER — OFFICE VISIT (OUTPATIENT)
Dept: INTERNAL MEDICINE | Facility: CLINIC | Age: 69
End: 2019-09-05

## 2019-09-05 ENCOUNTER — APPOINTMENT (OUTPATIENT)
Dept: LAB | Facility: HOSPITAL | Age: 69
End: 2019-09-05

## 2019-09-05 VITALS
HEART RATE: 94 BPM | BODY MASS INDEX: 20.31 KG/M2 | HEIGHT: 67 IN | WEIGHT: 129.4 LBS | TEMPERATURE: 98.2 F | DIASTOLIC BLOOD PRESSURE: 60 MMHG | OXYGEN SATURATION: 98 % | SYSTOLIC BLOOD PRESSURE: 96 MMHG

## 2019-09-05 DIAGNOSIS — N18.9 CHRONIC RENAL IMPAIRMENT, UNSPECIFIED CKD STAGE: Primary | ICD-10-CM

## 2019-09-05 DIAGNOSIS — B34.9 VIRAL ILLNESS: ICD-10-CM

## 2019-09-05 DIAGNOSIS — K59.00 CONSTIPATION, UNSPECIFIED CONSTIPATION TYPE: ICD-10-CM

## 2019-09-05 LAB
ANION GAP SERPL CALCULATED.3IONS-SCNC: 10.4 MMOL/L (ref 5–15)
B MICROTI IGG TITR SER: NORMAL {TITER}
B MICROTI IGM TITR SER: NORMAL {TITER}
BASOPHILS # BLD AUTO: 0.02 10*3/MM3 (ref 0–0.2)
BASOPHILS NFR BLD AUTO: 0.3 % (ref 0–1.5)
BUN BLD-MCNC: 31 MG/DL (ref 8–23)
BUN/CREAT SERPL: 17.4 (ref 7–25)
CALCIUM SPEC-SCNC: 10.3 MG/DL (ref 8.6–10.5)
CHLORIDE SERPL-SCNC: 102 MMOL/L (ref 98–107)
CO2 SERPL-SCNC: 26.6 MMOL/L (ref 22–29)
CREAT BLD-MCNC: 1.78 MG/DL (ref 0.57–1)
DEPRECATED RDW RBC AUTO: 44 FL (ref 37–54)
EOSINOPHIL # BLD AUTO: 0.12 10*3/MM3 (ref 0–0.4)
EOSINOPHIL NFR BLD AUTO: 2 % (ref 0.3–6.2)
ERYTHROCYTE [DISTWIDTH] IN BLOOD BY AUTOMATED COUNT: 12.7 % (ref 12.3–15.4)
GFR SERPL CREATININE-BSD FRML MDRD: 28 ML/MIN/1.73
GLUCOSE BLD-MCNC: 113 MG/DL (ref 65–99)
HCT VFR BLD AUTO: 31 % (ref 34–46.6)
HGB BLD-MCNC: 10.4 G/DL (ref 12–15.9)
IMM GRANULOCYTES # BLD AUTO: 0.02 10*3/MM3 (ref 0–0.05)
IMM GRANULOCYTES NFR BLD AUTO: 0.3 % (ref 0–0.5)
LYMPHOCYTES # BLD AUTO: 0.6 10*3/MM3 (ref 0.7–3.1)
LYMPHOCYTES NFR BLD AUTO: 10 % (ref 19.6–45.3)
MCH RBC QN AUTO: 31.5 PG (ref 26.6–33)
MCHC RBC AUTO-ENTMCNC: 33.5 G/DL (ref 31.5–35.7)
MCV RBC AUTO: 93.9 FL (ref 79–97)
MONOCYTES # BLD AUTO: 0.63 10*3/MM3 (ref 0.1–0.9)
MONOCYTES NFR BLD AUTO: 10.5 % (ref 5–12)
NEUTROPHILS # BLD AUTO: 4.6 10*3/MM3 (ref 1.7–7)
NEUTROPHILS NFR BLD AUTO: 76.9 % (ref 42.7–76)
NRBC BLD AUTO-RTO: 0 /100 WBC (ref 0–0.2)
PLATELET # BLD AUTO: 112 10*3/MM3 (ref 140–450)
PMV BLD AUTO: 11.2 FL (ref 6–12)
POTASSIUM BLD-SCNC: 3.6 MMOL/L (ref 3.5–5.2)
R RICKETTSI IGG SER QL IA: NEGATIVE
R RICKETTSI IGM TITR SER: 0.72 INDEX (ref 0–0.89)
RBC # BLD AUTO: 3.3 10*6/MM3 (ref 3.77–5.28)
SODIUM BLD-SCNC: 139 MMOL/L (ref 136–145)
WBC NRBC COR # BLD: 5.99 10*3/MM3 (ref 3.4–10.8)

## 2019-09-05 PROCEDURE — 85025 COMPLETE CBC W/AUTO DIFF WBC: CPT | Performed by: INTERNAL MEDICINE

## 2019-09-05 PROCEDURE — 36415 COLL VENOUS BLD VENIPUNCTURE: CPT | Performed by: INTERNAL MEDICINE

## 2019-09-05 PROCEDURE — 99214 OFFICE O/P EST MOD 30 MIN: CPT | Performed by: INTERNAL MEDICINE

## 2019-09-05 PROCEDURE — 80048 BASIC METABOLIC PNL TOTAL CA: CPT | Performed by: INTERNAL MEDICINE

## 2019-09-05 NOTE — TELEPHONE ENCOUNTER
PT AWARE    ----- Message from Ciera Newman MD sent at 9/5/2019  1:07 PM EDT -----  Contact: [phone]  I would not  It can cause constipation and that is her principle complaint  ----- Message -----  From: Eve uBrns MA  Sent: 9/5/2019   1:01 PM  To: Ciera Newman MD        ----- Message -----  From: Shahla Aquino RegSched Rep  Sent: 9/5/2019  12:45 PM  To: Eve Burns MA    Pt called because Dr. Jaimes gave her this DICYCLOMINE 10 MG.   Wants to know if it is okay to take

## 2019-09-05 NOTE — ED PROVIDER NOTES
"EMERGENCY DEPARTMENT ENCOUNTER    Room Number:  33/33  Date seen:  9/2/2019  Time seen: 11:30 AM  PCP: Ciera Newman MD  Historian: self, pt's       HPI:  Chief complaint: Fever  Context: Carol Osgood is a 68 y.o. female who presents to the ED c/o fever (Tmax 102.4) that started 4 days ago. Pt also c/o chills, HA, diarrhea, arthralgias, abd pain, chronic constipation and chronic neck and back pain. Pt denies ear pain, cough, sore throat, congestion, SOA, vaginal bleeding, dysuria and difficulty urinating. Pt has a hx of arthritis where she normally notices arthralgias in her neck and back. She developed chills and fever along with a HA within the last week, which seemed to exacerbate her arthritis. Pt states she has acute intermittent epigastric abd pain, described as \"something getting lodged in her stomach\" which seems to be induced by positional movements. Today, pain developed to the back of her knees bilaterally. Due to her sx's persisting, she was seen at the [facility] earlier today where she was referred to the ED for further evaluation. Pt reports during examination at [facility], there was blood noted in the R ear.     MEDICAL RECORD REVIEW     Pt was seen earlier today at [facility] where she was referred to the ED fur further workup.      ALLERGIES  Celecoxib    PAST MEDICAL HISTORY  Active Ambulatory Problems     Diagnosis Date Noted   • Low back pain 02/24/2016   • Low hemoglobin 02/24/2016   • Menopause present 02/24/2016   • Osteoporosis 02/24/2016   • Thrombocytopenia (CMS/HCC) 02/24/2016   • Renal insufficiency 02/24/2016   • Pain and swelling of left knee 02/24/2016   • Anxiety 01/12/2017   • Vitamin D deficiency 01/12/2017   • Constipation 07/17/2017   • Memory loss 09/19/2017     Resolved Ambulatory Problems     Diagnosis Date Noted   • Elevated pancreatic enzyme 02/24/2016   • Generalized abdominal pain 07/17/2017     Past Medical History:   Diagnosis Date   • Arthritis    • Osteoporosis        PAST SURGICAL " HISTORY  Past Surgical History:   Procedure Laterality Date   • APPENDECTOMY         FAMILY HISTORY  Family History   Problem Relation Age of Onset   • COPD Other    • Heart attack Other    • Stroke Other    • Thrombocytopenia Other    • Diabetes Other    • Brain cancer Other    • COPD Mother    • Heart attack Mother    • Stroke Mother    • Heart disease Father         PARALYSIS, WAIST DOWN   • Thrombocytopenia Sister    • Diabetes Maternal Grandmother    • Cancer Paternal Grandmother         BRAIN   • Breast cancer Paternal Aunt    • Hepatitis Brother    • Prostate cancer Brother    • Colon cancer Paternal Aunt    • Colon cancer Other         NEPHEW       SOCIAL HISTORY  Social History     Socioeconomic History   • Marital status:      Spouse name: Michael J Osgood   • Number of children: 1   • Years of education: Not on file   • Highest education level: Not on file   Occupational History   • Occupation: housewife   Tobacco Use   • Smoking status: Never Smoker   • Smokeless tobacco: Never Used   Substance and Sexual Activity   • Alcohol use: No   • Drug use: No       REVIEW OF SYSTEMS  Review of Systems   Constitutional: Positive for chills and fever (Tmax 102.4).   HENT: Negative for congestion, ear pain and sore throat.    Eyes: Negative.    Respiratory: Negative for cough and shortness of breath.    Cardiovascular: Negative for chest pain.   Gastrointestinal: Positive for abdominal pain (epigastric), constipation and diarrhea. Negative for vomiting.   Genitourinary: Negative for difficulty urinating, dysuria and vaginal bleeding.   Musculoskeletal: Positive for arthralgias, back pain (chronic) and neck pain (chronic).   Skin: Negative for rash.   Allergic/Immunologic: Negative for immunocompromised state.   Neurological: Negative for weakness, numbness and headaches.   Hematological: Negative.    Psychiatric/Behavioral: Negative.    All other systems reviewed and are negative.      PHYSICAL EXAM  ED  Triage Vitals [09/02/19 1112]   Temp Heart Rate Resp BP SpO2   100.4 °F (38 °C) 100 18 -- 97 %      Temp src Heart Rate Source Patient Position BP Location FiO2 (%)   Tympanic -- -- -- --     Physical Exam   Constitutional: She is oriented to person, place, and time. She appears healthy.  Non-toxic appearance. No distress.   HENT:   Head: Normocephalic and atraumatic.   Right Ear: Hearing, tympanic membrane and external ear normal. Tympanic membrane is not erythematous.   Left Ear: Hearing, tympanic membrane, external ear and ear canal normal. Tympanic membrane is not erythematous.   Erythematous macular lesion to the R auditory canal.   Eyes: EOM are normal. Pupils are equal, round, and reactive to light.   Neck: Normal range of motion. Neck supple.   Cardiovascular: Normal rate, regular rhythm and normal heart sounds.   Pulmonary/Chest: Effort normal and breath sounds normal. No respiratory distress.   Abdominal: Soft. Bowel sounds are normal. There is no tenderness. There is no rebound, no guarding and no CVA tenderness.   Musculoskeletal: Normal range of motion. She exhibits no edema.   Neurological: She is alert and oriented to person, place, and time. She has normal sensation and normal strength.   Skin: Skin is warm and dry. No rash noted.   Psychiatric: Mood and affect normal.   Nursing note and vitals reviewed.      LAB RESULTS  Recent Results (from the past 24 hour(s))   Comprehensive Metabolic Panel    Collection Time: 09/02/19 11:59 AM   Result Value Ref Range    Glucose 97 65 - 99 mg/dL    BUN 34 (H) 8 - 23 mg/dL    Creatinine 1.83 (H) 0.57 - 1.00 mg/dL    Sodium 131 (L) 136 - 145 mmol/L    Potassium 3.9 3.5 - 5.2 mmol/L    Chloride 95 (L) 98 - 107 mmol/L    CO2 21.4 (L) 22.0 - 29.0 mmol/L    Calcium 9.1 8.6 - 10.5 mg/dL    Total Protein 6.5 6.0 - 8.5 g/dL    Albumin 4.00 3.50 - 5.20 g/dL    ALT (SGPT) 24 1 - 33 U/L    AST (SGOT) 36 (H) 1 - 32 U/L    Alkaline Phosphatase 56 39 - 117 U/L    Total  Bilirubin 0.4 0.2 - 1.2 mg/dL    eGFR Non African Amer 27 (L) >60 mL/min/1.73    Globulin 2.5 gm/dL    A/G Ratio 1.6 g/dL    BUN/Creatinine Ratio 18.6 7.0 - 25.0    Anion Gap 14.6 5.0 - 15.0 mmol/L   Lipase    Collection Time: 09/02/19 11:59 AM   Result Value Ref Range    Lipase 51 13 - 60 U/L   Lactic Acid, Plasma    Collection Time: 09/02/19 11:59 AM   Result Value Ref Range    Lactate 1.0 0.5 - 2.0 mmol/L   Procalcitonin    Collection Time: 09/02/19 11:59 AM   Result Value Ref Range    Procalcitonin 4.01 (H) 0.10 - 0.25 ng/mL   CBC Auto Differential    Collection Time: 09/02/19 11:59 AM   Result Value Ref Range    WBC 5.43 3.40 - 10.80 10*3/mm3    RBC 3.60 (L) 3.77 - 5.28 10*6/mm3    Hemoglobin 11.3 (L) 12.0 - 15.9 g/dL    Hematocrit 33.4 (L) 34.0 - 46.6 %    MCV 92.8 79.0 - 97.0 fL    MCH 31.4 26.6 - 33.0 pg    MCHC 33.8 31.5 - 35.7 g/dL    RDW 12.6 12.3 - 15.4 %    RDW-SD 43.1 37.0 - 54.0 fl    MPV 12.8 (H) 6.0 - 12.0 fL    Platelets 74 (L) 140 - 450 10*3/mm3    Neutrophil % 89.4 (H) 42.7 - 76.0 %    Lymphocyte % 4.8 (L) 19.6 - 45.3 %    Monocyte % 4.6 (L) 5.0 - 12.0 %    Eosinophil % 0.4 0.3 - 6.2 %    Basophil % 0.4 0.0 - 1.5 %    Immature Grans % 0.4 0.0 - 0.5 %    Neutrophils, Absolute 4.86 1.70 - 7.00 10*3/mm3    Lymphocytes, Absolute 0.26 (L) 0.70 - 3.10 10*3/mm3    Monocytes, Absolute 0.25 0.10 - 0.90 10*3/mm3    Eosinophils, Absolute 0.02 0.00 - 0.40 10*3/mm3    Basophils, Absolute 0.02 0.00 - 0.20 10*3/mm3    Immature Grans, Absolute 0.02 0.00 - 0.05 10*3/mm3    nRBC 0.0 0.0 - 0.2 /100 WBC   Urinalysis With Microscopic If Indicated (No Culture) - Urine, Clean Catch    Collection Time: 09/02/19 12:00 PM   Result Value Ref Range    Color, UA Yellow Yellow, Straw    Appearance, UA Clear Clear    pH, UA <=5.0 5.0 - 8.0    Specific Gravity, UA 1.016 1.005 - 1.030    Glucose, UA Negative Negative    Ketones, UA Trace (A) Negative    Bilirubin, UA Negative Negative    Blood, UA Negative Negative    Protein,  UA 30 mg/dL (1+) (A) Negative    Leuk Esterase, UA Negative Negative    Nitrite, UA Negative Negative    Urobilinogen, UA 0.2 E.U./dL 0.2 - 1.0 E.U./dL   Urinalysis, Microscopic Only - Urine, Clean Catch    Collection Time: 09/02/19 12:00 PM   Result Value Ref Range    RBC, UA 3-5 (A) None Seen, 0-2 /HPF    WBC, UA 6-12 (A) None Seen, 0-2 /HPF    Bacteria, UA Trace (A) None Seen /HPF    Squamous Epithelial Cells, UA 3-6 (A) None Seen, 0-2 /HPF    Yeast, UA Small/1+ Yeast None Seen /HPF    Hyaline Casts, UA 0-2 None Seen /LPF    Methodology Manual Light Microscopy        I ordered the above labs and reviewed the results      RADIOLOGY  XR Chest 1 View   The lungs are well-expanded and clear. The heart is top normal in size.  No acute abnormality is seen.          I ordered the above noted radiological studies and reviewed the images on the PACS system.         MEDICATIONS GIVEN IN ER  Medications   sodium chloride 0.9 % flush 10 mL (not administered)   sodium chloride 0.9 % bolus 1,000 mL (0 mL Intravenous Stopped 9/2/19 1327)   acetaminophen (TYLENOL) tablet 1,000 mg (1,000 mg Oral Given 9/2/19 1159)       COURSE & MEDICAL DECISION MAKING  Pertinent Labs and Imaging studies that were ordered and reviewed are noted above.  Results were reviewed/discussed with the patient and they were also made aware of online access.  Pt also made aware that some labs, such as cultures, will not be resulted during ER visit and follow up with PMD is necessary.       PROGRESS AND CONSULTS    Progress Notes:       1153 Lipase ordered. Blood culture ordered. UA ordered. Labs ordered. CXR ordered. Tylenol ordered.     1242 Reviewed pt's history and workup with Dr. Martin (ER physician).  After a bedside evaluation, Dr. Martin agrees with the plan of care.    1356 Rechecked pt. Pt is resting comfortably in NAD with BP of 100/62. Discussed w/pt results of CXR, labs and blood work were unremarkable. There is no signs of meningitis or PNA. Pt  "confirms possible tick exposure. Discussed w/pt her sx's could be stemming from a tick born illness. Pt will be notified of further lab results in the next couple of days. Plan is to d/c pt with f/u to PCP. Pt understands and agrees with the plan. All questions were answered.     Disposition vitals:  /62   Pulse 71   Temp 100.4 °F (38 °C) (Tympanic)   Resp 18   Ht 170.2 cm (67\")   Wt 61.2 kg (135 lb)   SpO2 98%   BMI 21.14 kg/m²         DIAGNOSIS  Final diagnoses:   Fever, unspecified fever cause         DISPOSITION  DISCHARGE    Patient discharged in stable condition.    Reviewed implications of results, diagnosis, meds, responsibility to follow up, warning signs and symptoms of possible worsening, potential complications and reasons to return to ER.    Patient/Family voiced understanding of above instructions.    Discussed plan for discharge, as there is no emergent indication for admission. Patient referred to primary care provider for BP management due to today's BP. Pt/family is agreeable and understands need for follow up and repeat testing.  Pt is aware that discharge does not mean that nothing is wrong but it indicates no emergency is present that requires admission and they must continue care with follow-up as given below or physician of their choice.     FOLLOW-UP  Ciera Newman MD  [address]  [address] [place], [place] [id number] [phone]    In 3 days  for recheck of symptoms         Medication List      New Prescriptions    doxycycline 100 MG capsule  Commonly known as:  MONODOX  Take 1 capsule by mouth 2 (Two) Times a Day.              Documentation assistance provided by estuardo Ortiz for Gonzales Ely PA-C.  Information recorded by the estuardo was done at my direction and has been verified and validated by me.     Angel Castano  09/02/19 1990       Gonzales Ely PA  09/02/19 9644    " Adult

## 2019-09-05 NOTE — PROGRESS NOTES
Subjective   Carol Osgood is a 68 y.o. female here as a hospital for fever.  Pt still have on and off ache over her neck, bilateral calf and fever on and off.    History of Present Illness   She has had low grade fever on and off for 1 week.  She was seen in the ER.  CLINE was neg  No cough no sob  No abd pain no NVD.  She did have a tick but no bite and they gave her doxycyckine.  She has been having some leg pain and cramping  She has been seeing renal and her labs are looking worse  She has been staying well hydrated.  She does have chronic constipation a little worse with the abx  She was told her platelets are low.  She has not had any bruising or bleeding  The following portions of the patient's history were reviewed and updated as appropriate: allergies, current medications, past medical history, past social history and problem list.  She denies any ill contacts    Review of Systems   Constitutional: Positive for fatigue and fever.   Gastrointestinal: Positive for constipation. Negative for diarrhea and nausea.   All other systems reviewed and are negative.      Objective   Physical Exam   Constitutional: She is oriented to person, place, and time. She appears well-developed and well-nourished.   HENT:   Head: Normocephalic and atraumatic.   Right Ear: External ear normal.   Left Ear: External ear normal.   Mouth/Throat: Oropharynx is clear and moist.   Eyes: Conjunctivae and EOM are normal. Pupils are equal, round, and reactive to light.   Neck: Normal range of motion. No tracheal deviation present. No thyromegaly present.   Cardiovascular: Normal rate, regular rhythm, normal heart sounds and intact distal pulses.   Pulmonary/Chest: Effort normal and breath sounds normal.   Abdominal: Soft. Bowel sounds are normal. She exhibits no distension. There is no tenderness.   Musculoskeletal: Normal range of motion. She exhibits no edema or deformity.   Neurological: She is alert and oriented to person, place, and  time.   Skin: Skin is warm and dry.   Psychiatric: She has a normal mood and affect. Her behavior is normal. Judgment and thought content normal.   Vitals reviewed.      Vitals:    09/05/19 0935   BP: 96/60   Pulse: 94   Temp: 98.2 °F (36.8 °C)   SpO2: 98%     Current Outpatient Medications:   •  desoximetasone (TOPICORT) 0.25 % cream, Apply  topically to the appropriate area as directed Every 12 (Twelve) Hours., Disp: 60 g, Rfl: 0  •  doxycycline (MONODOX) 100 MG capsule, Take 1 capsule by mouth 2 (Two) Times a Day., Disp: 14 capsule, Rfl: 0  •  NON FORMULARY, ASHWAGANDHA, Disp: , Rfl:   •  Polyethyl Glycol-Propyl Glycol (SYSTANE ULTRA OP), Apply 1 drop to eye Daily., Disp: , Rfl:   •  Probiotic Product (PROBIOTIC PO), Take  by mouth. kriss life start, Disp: , Rfl:      Labs from the ER were reviewed.  Creatinine is 1.8 baseline around 1.5  Assessment/Plan   Diagnoses and all orders for this visit:    Chronic renal impairment, unspecified CKD stage  -     Basic Metabolic Panel    Viral illness  -     CBC & Differential    Constipation, unspecified constipation type    1.  Viral illness- meyer neg  She will try increase fluid  2.  CRI-  Recheck a BMP.  These labs probably need to be faxed to her nephrologist after get them back.  3.  Constipation- try magnesium and increase fluids and pruin  4.  Low platelets: Patient has never been this low.  She does run low normal but this is significantly low which could all be related to viral illness

## 2019-09-06 LAB
A PHAGOCYTOPH IGM TITR SER IF: NEGATIVE {TITER}
CONV HGE IGG TITER: NEGATIVE
E CHAFFEENSIS IGG TITR SER IF: NEGATIVE {TITER}
E. CHAFFEENSIS (HME) IGM TITER: NEGATIVE

## 2019-09-07 LAB
BACTERIA SPEC AEROBE CULT: NORMAL
BACTERIA SPEC AEROBE CULT: NORMAL

## 2019-09-09 ENCOUNTER — TELEPHONE (OUTPATIENT)
Dept: INTERNAL MEDICINE | Facility: CLINIC | Age: 69
End: 2019-09-09

## 2019-09-09 NOTE — TELEPHONE ENCOUNTER
She has been having pain in both legs and neck and back  Really all over  She is still constipated but she is getting better  But she is still having firm stool  rec cont the miralax for now  It may all be reltaed to viral illness  She needs to FU with me is not getting better'    ----- Message from Suzan Mackey sent at 9/9/2019 12:38 PM EDT -----  I GIVE HER OPTION TO COME SEE US TOMORROW OR COME SE [facility] TODAY BUT SHE CANNOT WAIT TILL TOMORROW AND DON'T WANT TO SEE [facility].   SHE IS ASKING CAN THE PAIN CAUSED BY IMPACTED BOWL OR FIBROMYALGIA?  SHE GOT CONSTIPATED FROM ANTIBIOTIC AND MIRALAX AND TYLENOL DOESN'T HELP.    ----- Message -----  From: Winnie Cintron  Sent: 9/9/2019  10:07 AM  To: Suzan Mackey        ----- Message -----  From: Winnie Cintron  Sent: 9/9/2019   8:10 AM  To: Eve Burns MA    Patient saw Dr Newman last week. She said she is in a lot pain and needs to know what to do.  Phone: [phone]

## 2019-09-18 ENCOUNTER — OFFICE VISIT (OUTPATIENT)
Dept: INTERNAL MEDICINE | Facility: CLINIC | Age: 69
End: 2019-09-18

## 2019-09-18 VITALS
WEIGHT: 124.9 LBS | HEART RATE: 71 BPM | SYSTOLIC BLOOD PRESSURE: 104 MMHG | TEMPERATURE: 98.2 F | DIASTOLIC BLOOD PRESSURE: 60 MMHG | HEIGHT: 67 IN | OXYGEN SATURATION: 99 % | BODY MASS INDEX: 19.6 KG/M2

## 2019-09-18 DIAGNOSIS — B34.9 VIRAL ILLNESS: Primary | ICD-10-CM

## 2019-09-18 DIAGNOSIS — N28.9 RENAL INSUFFICIENCY: ICD-10-CM

## 2019-09-18 DIAGNOSIS — D64.9 LOW HEMOGLOBIN: ICD-10-CM

## 2019-09-18 DIAGNOSIS — Z12.31 ENCOUNTER FOR SCREENING MAMMOGRAM FOR MALIGNANT NEOPLASM OF BREAST: ICD-10-CM

## 2019-09-18 DIAGNOSIS — K59.00 CONSTIPATION, UNSPECIFIED CONSTIPATION TYPE: ICD-10-CM

## 2019-09-18 DIAGNOSIS — E78.5 HYPERLIPIDEMIA, UNSPECIFIED HYPERLIPIDEMIA TYPE: ICD-10-CM

## 2019-09-18 PROCEDURE — 99214 OFFICE O/P EST MOD 30 MIN: CPT | Performed by: INTERNAL MEDICINE

## 2019-09-18 NOTE — PROGRESS NOTES
Subjective   Carol Osgood is a 68 y.o. female c/o legs cramps and back pain and all over her body lately.    History of Present Illness   SHe thinks the cramps in the legs are better  She is taking the magnesium and she is better  She has been doing better with her IBS sx  And the probiotic and prunes are helping    The following portions of the patient's history were reviewed and updated as appropriate: allergies, current medications, past medical history, past social history and problem list.  She is eating a healthy diet    Review of Systems   All other systems reviewed and are negative.      Objective   Physical Exam   Constitutional: She is oriented to person, place, and time. She appears well-developed and well-nourished.   HENT:   Head: Normocephalic and atraumatic.   Right Ear: External ear normal.   Left Ear: External ear normal.   Mouth/Throat: Oropharynx is clear and moist.   Eyes: Conjunctivae and EOM are normal. Pupils are equal, round, and reactive to light.   Neck: Normal range of motion. No tracheal deviation present. No thyromegaly present.   Cardiovascular: Normal rate, regular rhythm, normal heart sounds and intact distal pulses.   Pulmonary/Chest: Effort normal and breath sounds normal.   Abdominal: Soft. Bowel sounds are normal. She exhibits no distension. There is no tenderness.   Musculoskeletal: Normal range of motion. She exhibits no edema or deformity.   Neurological: She is alert and oriented to person, place, and time.   Skin: Skin is warm and dry.   Psychiatric: She has a normal mood and affect. Her behavior is normal. Judgment and thought content normal.   Vitals reviewed.      Vitals:    09/18/19 0920   BP: 104/60   Pulse: 71   Temp: 98.2 °F (36.8 °C)   SpO2: 99%     Current Outpatient Medications:   •  desoximetasone (TOPICORT) 0.25 % cream, Apply  topically to the appropriate area as directed Every 12 (Twelve) Hours., Disp: 60 g, Rfl: 0  •  MAGNESIUM PO, Take  by mouth., Disp: , Rfl:    •  Polyethyl Glycol-Propyl Glycol (SYSTANE ULTRA OP), Apply 1 drop to eye Daily., Disp: , Rfl:   •  Probiotic Product (PROBIOTIC PO), Take  by mouth. natren life start, Disp: , Rfl:   •  NON FORMULARY, ASHWAGANDHA, Disp: , Rfl:          Assessment/Plan   Diagnoses and all orders for this visit:    Encounter for screening mammogram for malignant neoplasm of breast  -     Mammo Screening Bilateral With CAD; Future    Low hemoglobin  -     Comprehensive Metabolic Panel; Future  -     LP+LDL / HDL Ratio ([facility]); Future  -     TSH Rfx On Abnormal To Free T4; Future  -     CBC & Differential; Future  -     Iron Profile; Future    Renal insufficiency  -     Comprehensive Metabolic Panel; Future  -     LP+LDL / HDL Ratio ([facility]); Future  -     TSH Rfx On Abnormal To Free T4; Future  -     CBC & Differential; Future  -     Iron Profile; Future    Hyperlipidemia, unspecified hyperlipidemia type  -     Comprehensive Metabolic Panel; Future  -     LP+LDL / HDL Ratio ([facility]); Future  -     TSH Rfx On Abnormal To Free T4; Future  -     CBC & Differential; Future  -     Iron Profile; Future      1. Viral illness-  Sx seem to have resolved  Muscle cramps now gone  She is still taking mag  2.  CRI-  Stable  seeing nephrology  3. Anemia-  I suspect this is from recent viral illness.  She will recheck next minth and will follow  4.  IBS-with constipation- now back to her reg probiotic and prunes and she is back to baseline

## 2019-09-27 ENCOUNTER — HOSPITAL ENCOUNTER (OUTPATIENT)
Dept: MAMMOGRAPHY | Facility: HOSPITAL | Age: 69
Discharge: HOME | End: 2019-09-27
Admitting: INTERNAL MEDICINE

## 2019-09-27 DIAGNOSIS — Z12.31 ENCOUNTER FOR SCREENING MAMMOGRAM FOR MALIGNANT NEOPLASM OF BREAST: ICD-10-CM

## 2019-09-27 PROCEDURE — 77067 SCR MAMMO BI INCL CAD: CPT

## 2019-10-09 ENCOUNTER — LAB (OUTPATIENT)
Dept: LAB | Facility: HOSPITAL | Age: 69
End: 2019-10-09

## 2019-10-09 ENCOUNTER — TRANSCRIBE ORDERS (OUTPATIENT)
Dept: ADMINISTRATIVE | Facility: HOSPITAL | Age: 69
End: 2019-10-09

## 2019-10-09 DIAGNOSIS — N18.30 CHRONIC KIDNEY DISEASE, STAGE III (MODERATE) (HCC): Primary | ICD-10-CM

## 2019-10-09 DIAGNOSIS — N18.30 CHRONIC KIDNEY DISEASE, STAGE III (MODERATE) (HCC): ICD-10-CM

## 2019-10-09 LAB
25(OH)D3 SERPL-MCNC: 47.3 NG/ML (ref 30–100)
ALBUMIN SERPL-MCNC: 4.3 G/DL (ref 3.5–5.2)
ALBUMIN/GLOB SERPL: 1.5 G/DL
ALP SERPL-CCNC: 72 U/L (ref 39–117)
ALT SERPL W P-5'-P-CCNC: 14 U/L (ref 1–33)
ANION GAP SERPL CALCULATED.3IONS-SCNC: 10.4 MMOL/L (ref 5–15)
AST SERPL-CCNC: 24 U/L (ref 1–32)
BILIRUB SERPL-MCNC: 0.5 MG/DL (ref 0.1–1.2)
BUN BLD-MCNC: 37 MG/DL (ref 8–23)
BUN/CREAT SERPL: 27.2 (ref 7–25)
CALCIUM SPEC-SCNC: 9.8 MG/DL (ref 8.6–10.5)
CHLORIDE SERPL-SCNC: 103 MMOL/L (ref 98–107)
CK SERPL-CCNC: 164 U/L (ref 20–180)
CO2 SERPL-SCNC: 26.6 MMOL/L (ref 22–29)
CREAT BLD-MCNC: 1.36 MG/DL (ref 0.57–1)
DEPRECATED RDW RBC AUTO: 46.8 FL (ref 37–54)
ERYTHROCYTE [DISTWIDTH] IN BLOOD BY AUTOMATED COUNT: 13.1 % (ref 12.3–15.4)
GFR SERPL CREATININE-BSD FRML MDRD: 38 ML/MIN/1.73
GLOBULIN UR ELPH-MCNC: 2.9 GM/DL
GLUCOSE BLD-MCNC: 84 MG/DL (ref 65–99)
HCT VFR BLD AUTO: 35.3 % (ref 34–46.6)
HGB BLD-MCNC: 11.5 G/DL (ref 12–15.9)
MAGNESIUM SERPL-MCNC: 2.1 MG/DL (ref 1.6–2.4)
MCH RBC QN AUTO: 31.6 PG (ref 26.6–33)
MCHC RBC AUTO-ENTMCNC: 32.6 G/DL (ref 31.5–35.7)
MCV RBC AUTO: 97 FL (ref 79–97)
PHOSPHATE SERPL-MCNC: 3.2 MG/DL (ref 2.5–4.5)
PLATELET # BLD AUTO: 136 10*3/MM3 (ref 140–450)
PMV BLD AUTO: 11.4 FL (ref 6–12)
POTASSIUM BLD-SCNC: 4 MMOL/L (ref 3.5–5.2)
PROT SERPL-MCNC: 7.2 G/DL (ref 6–8.5)
PTH-INTACT SERPL-MCNC: 106 PG/ML (ref 15–65)
RBC # BLD AUTO: 3.64 10*6/MM3 (ref 3.77–5.28)
SODIUM BLD-SCNC: 140 MMOL/L (ref 136–145)
URATE SERPL-MCNC: 5.4 MG/DL (ref 2.4–5.7)
WBC NRBC COR # BLD: 4.35 10*3/MM3 (ref 3.4–10.8)

## 2019-10-09 PROCEDURE — 36415 COLL VENOUS BLD VENIPUNCTURE: CPT

## 2019-10-09 PROCEDURE — 83970 ASSAY OF PARATHORMONE: CPT | Performed by: INTERNAL MEDICINE

## 2019-10-09 PROCEDURE — 82550 ASSAY OF CK (CPK): CPT | Performed by: INTERNAL MEDICINE

## 2019-10-09 PROCEDURE — 80053 COMPREHEN METABOLIC PANEL: CPT

## 2019-10-09 PROCEDURE — 82306 VITAMIN D 25 HYDROXY: CPT | Performed by: INTERNAL MEDICINE

## 2019-10-09 PROCEDURE — 83735 ASSAY OF MAGNESIUM: CPT

## 2019-10-09 PROCEDURE — 84550 ASSAY OF BLOOD/URIC ACID: CPT

## 2019-10-09 PROCEDURE — 84100 ASSAY OF PHOSPHORUS: CPT

## 2019-10-09 PROCEDURE — 85027 COMPLETE CBC AUTOMATED: CPT

## 2019-10-18 ENCOUNTER — OFFICE (OUTPATIENT)
Dept: URBAN - METROPOLITAN AREA CLINIC 1 | Facility: CLINIC | Age: 69
End: 2019-10-18

## 2019-10-18 VITALS
HEIGHT: 67 IN | SYSTOLIC BLOOD PRESSURE: 129 MMHG | OXYGEN SATURATION: 98 % | DIASTOLIC BLOOD PRESSURE: 81 MMHG | HEART RATE: 74 BPM | WEIGHT: 127 LBS

## 2019-10-18 DIAGNOSIS — K59.09 OTHER CONSTIPATION: ICD-10-CM

## 2019-10-18 DIAGNOSIS — K30 FUNCTIONAL DYSPEPSIA: ICD-10-CM

## 2019-10-18 DIAGNOSIS — Z12.11 ENCOUNTER FOR SCREENING FOR MALIGNANT NEOPLASM OF COLON: ICD-10-CM

## 2019-10-18 DIAGNOSIS — R14.0 ABDOMINAL DISTENSION (GASEOUS): ICD-10-CM

## 2019-10-18 DIAGNOSIS — Z86.010 PERSONAL HISTORY OF COLONIC POLYPS: ICD-10-CM

## 2019-10-18 DIAGNOSIS — R10.13 EPIGASTRIC PAIN: ICD-10-CM

## 2019-10-18 DIAGNOSIS — R19.7 DIARRHEA, UNSPECIFIED: ICD-10-CM

## 2019-10-18 PROCEDURE — 99213 OFFICE O/P EST LOW 20 MIN: CPT | Performed by: INTERNAL MEDICINE

## 2019-10-25 DIAGNOSIS — N28.9 RENAL INSUFFICIENCY: ICD-10-CM

## 2019-10-25 DIAGNOSIS — E78.5 HYPERLIPIDEMIA, UNSPECIFIED HYPERLIPIDEMIA TYPE: ICD-10-CM

## 2019-10-25 DIAGNOSIS — D64.9 LOW HEMOGLOBIN: ICD-10-CM

## 2019-10-28 ENCOUNTER — APPOINTMENT (OUTPATIENT)
Dept: LAB | Facility: HOSPITAL | Age: 69
End: 2019-10-28

## 2019-10-28 LAB
ALBUMIN SERPL-MCNC: 4.1 G/DL (ref 3.5–5.2)
ALBUMIN/GLOB SERPL: 1.5 G/DL
ALP SERPL-CCNC: 69 U/L (ref 39–117)
ALT SERPL W P-5'-P-CCNC: 16 U/L (ref 1–33)
ANION GAP SERPL CALCULATED.3IONS-SCNC: 10.2 MMOL/L (ref 5–15)
AST SERPL-CCNC: 23 U/L (ref 1–32)
BASOPHILS # BLD AUTO: 0.03 10*3/MM3 (ref 0–0.2)
BASOPHILS NFR BLD AUTO: 0.7 % (ref 0–1.5)
BILIRUB SERPL-MCNC: 0.5 MG/DL (ref 0.1–1.2)
BUN BLD-MCNC: 36 MG/DL (ref 8–23)
BUN/CREAT SERPL: 26.1 (ref 7–25)
CALCIUM SPEC-SCNC: 9.8 MG/DL (ref 8.6–10.5)
CHLORIDE SERPL-SCNC: 102 MMOL/L (ref 98–107)
CO2 SERPL-SCNC: 27.8 MMOL/L (ref 22–29)
CREAT BLD-MCNC: 1.38 MG/DL (ref 0.57–1)
DEPRECATED RDW RBC AUTO: 46 FL (ref 37–54)
EOSINOPHIL # BLD AUTO: 0.07 10*3/MM3 (ref 0–0.4)
EOSINOPHIL NFR BLD AUTO: 1.7 % (ref 0.3–6.2)
ERYTHROCYTE [DISTWIDTH] IN BLOOD BY AUTOMATED COUNT: 12.8 % (ref 12.3–15.4)
GFR SERPL CREATININE-BSD FRML MDRD: 38 ML/MIN/1.73
GLOBULIN UR ELPH-MCNC: 2.8 GM/DL
GLUCOSE BLD-MCNC: 92 MG/DL (ref 65–99)
HCT VFR BLD AUTO: 34.6 % (ref 34–46.6)
HGB BLD-MCNC: 11.3 G/DL (ref 12–15.9)
IMM GRANULOCYTES # BLD AUTO: 0.01 10*3/MM3 (ref 0–0.05)
IMM GRANULOCYTES NFR BLD AUTO: 0.2 % (ref 0–0.5)
IRON 24H UR-MRATE: 107 MCG/DL (ref 37–145)
IRON SATN MFR SERPL: 30 % (ref 20–50)
LYMPHOCYTES # BLD AUTO: 0.87 10*3/MM3 (ref 0.7–3.1)
LYMPHOCYTES NFR BLD AUTO: 21.7 % (ref 19.6–45.3)
MCH RBC QN AUTO: 31.6 PG (ref 26.6–33)
MCHC RBC AUTO-ENTMCNC: 32.7 G/DL (ref 31.5–35.7)
MCV RBC AUTO: 96.6 FL (ref 79–97)
MONOCYTES # BLD AUTO: 0.3 10*3/MM3 (ref 0.1–0.9)
MONOCYTES NFR BLD AUTO: 7.5 % (ref 5–12)
NEUTROPHILS # BLD AUTO: 2.73 10*3/MM3 (ref 1.7–7)
NEUTROPHILS NFR BLD AUTO: 68.2 % (ref 42.7–76)
NRBC BLD AUTO-RTO: 0 /100 WBC (ref 0–0.2)
PLATELET # BLD AUTO: 123 10*3/MM3 (ref 140–450)
PMV BLD AUTO: 11.4 FL (ref 6–12)
POTASSIUM BLD-SCNC: 3.8 MMOL/L (ref 3.5–5.2)
PROT SERPL-MCNC: 6.9 G/DL (ref 6–8.5)
RBC # BLD AUTO: 3.58 10*6/MM3 (ref 3.77–5.28)
SODIUM BLD-SCNC: 140 MMOL/L (ref 136–145)
TIBC SERPL-MCNC: 358 MCG/DL (ref 298–536)
TRANSFERRIN SERPL-MCNC: 240 MG/DL (ref 200–360)
TSH SERPL DL<=0.05 MIU/L-ACNC: 2.95 UIU/ML (ref 0.27–4.2)
WBC NRBC COR # BLD: 4.01 10*3/MM3 (ref 3.4–10.8)

## 2019-10-28 PROCEDURE — 85025 COMPLETE CBC W/AUTO DIFF WBC: CPT | Performed by: INTERNAL MEDICINE

## 2019-10-28 PROCEDURE — 80053 COMPREHEN METABOLIC PANEL: CPT | Performed by: INTERNAL MEDICINE

## 2019-10-28 PROCEDURE — 84443 ASSAY THYROID STIM HORMONE: CPT | Performed by: INTERNAL MEDICINE

## 2019-10-28 PROCEDURE — 83540 ASSAY OF IRON: CPT | Performed by: INTERNAL MEDICINE

## 2019-10-28 PROCEDURE — 80061 LIPID PANEL: CPT | Performed by: INTERNAL MEDICINE

## 2019-10-28 PROCEDURE — 36415 COLL VENOUS BLD VENIPUNCTURE: CPT | Performed by: INTERNAL MEDICINE

## 2019-10-28 PROCEDURE — 84466 ASSAY OF TRANSFERRIN: CPT | Performed by: INTERNAL MEDICINE

## 2019-10-29 LAB
CHOLEST SERPL-MCNC: 177 MG/DL (ref 100–199)
HDLC SERPL-MCNC: 88 MG/DL
LDLC SERPL CALC-MCNC: 78 MG/DL (ref 0–99)
LDLC/HDLC SERPL: 0.9 RATIO (ref 0–3.2)
TRIGL SERPL-MCNC: 54 MG/DL (ref 0–149)
VLDLC SERPL-MCNC: 11 MG/DL (ref 5–40)

## 2019-11-04 ENCOUNTER — OFFICE VISIT (OUTPATIENT)
Dept: INTERNAL MEDICINE | Facility: CLINIC | Age: 69
End: 2019-11-04

## 2019-11-04 VITALS
HEIGHT: 67 IN | DIASTOLIC BLOOD PRESSURE: 68 MMHG | TEMPERATURE: 97.6 F | WEIGHT: 129.7 LBS | BODY MASS INDEX: 20.36 KG/M2 | OXYGEN SATURATION: 99 % | SYSTOLIC BLOOD PRESSURE: 114 MMHG | HEART RATE: 71 BPM

## 2019-11-04 DIAGNOSIS — D64.9 LOW HEMOGLOBIN: ICD-10-CM

## 2019-11-04 DIAGNOSIS — Z00.00 MEDICARE ANNUAL WELLNESS VISIT, SUBSEQUENT: Primary | ICD-10-CM

## 2019-11-04 DIAGNOSIS — M81.6 LOCALIZED OSTEOPOROSIS, UNSPECIFIED PATHOLOGICAL FRACTURE PRESENCE: ICD-10-CM

## 2019-11-04 DIAGNOSIS — N28.9 RENAL INSUFFICIENCY: ICD-10-CM

## 2019-11-04 DIAGNOSIS — M81.0 AGE RELATED OSTEOPOROSIS, UNSPECIFIED PATHOLOGICAL FRACTURE PRESENCE: ICD-10-CM

## 2019-11-04 DIAGNOSIS — D69.6 THROMBOCYTOPENIA (HCC): ICD-10-CM

## 2019-11-04 PROCEDURE — G0439 PPPS, SUBSEQ VISIT: HCPCS | Performed by: INTERNAL MEDICINE

## 2019-11-04 PROCEDURE — 99213 OFFICE O/P EST LOW 20 MIN: CPT | Performed by: INTERNAL MEDICINE

## 2019-11-04 RX ORDER — CHOLECALCIFEROL (VITAMIN D3) 125 MCG
1 CAPSULE ORAL DAILY
COMMUNITY

## 2019-11-04 NOTE — PROGRESS NOTES
The ABCs of the Annual Wellness Visit  Subsequent Medicare Wellness Visit    No chief complaint on file.      Subjective   History of Present Illness:  Carol Osgood is a 68 y.o. female who presents for a Subsequent Medicare Wellness Visit.    HEALTH RISK ASSESSMENT    Recent Hospitalizations:  No hospitalization(s) within the last year.    Current Medical Providers:  Patient Care Team:  Ciera Newman MD as PCP - General  Ciera Newman MD as PCP - Claims Attributed  Fredrick Norris MD as Consulting Physician (Nephrology)    Smoking Status:  Social History     Tobacco Use   Smoking Status Never Smoker   Smokeless Tobacco Never Used       Alcohol Consumption:  Social History     Substance and Sexual Activity   Alcohol Use No       Depression Screen:   PHQ-2/PHQ-9 Depression Screening 11/4/2019   Little interest or pleasure in doing things 0   Feeling down, depressed, or hopeless 0   Trouble falling or staying asleep, or sleeping too much 0   Feeling tired or having little energy 1   Poor appetite or overeating 1   Feeling bad about yourself - or that you are a failure or have let yourself or your family down 0   Trouble concentrating on things, such as reading the newspaper or watching television 1   Moving or speaking so slowly that other people could have noticed. Or the opposite - being so fidgety or restless that you have been moving around a lot more than usual 1   Thoughts that you would be better off dead, or of hurting yourself in some way 0   Total Score 4   If you checked off any problems, how difficult have these problems made it for you to do your work, take care of things at home, or get along with other people? Somewhat difficult       Fall Risk Screen:  STEADI Fall Risk Assessment was completed, and patient is at LOW risk for falls.Assessment completed on:11/4/2019    Health Habits and Functional and Cognitive Screening:  Functional & Cognitive Status 11/4/2019   Do you have difficulty preparing food and  eating? No   Do you have difficulty bathing yourself, getting dressed or grooming yourself? No   Do you have difficulty using the toilet? No   Do you have difficulty moving around from place to place? No   Do you have trouble with steps or getting out of a bed or a chair? No   Current Diet Well Balanced Diet   Dental Exam Up to date   Eye Exam Up to date   Exercise (times per week) 3 times per week   Current Exercise Activities Include Walking   Do you need help using the phone?  No   Are you deaf or do you have serious difficulty hearing?  No   Do you need help with transportation? No   Do you need help shopping? No   Do you need help preparing meals?  No   Do you need help with housework?  Yes   Do you need help with laundry? No   Do you need help taking your medications? No   Do you need help managing money? No   Do you ever drive or ride in a car without wearing a seat belt? No   Have you felt unusual stress, anger or loneliness in the last month? Yes   Who do you live with? Spouse   If you need help, do you have trouble finding someone available to you? No   Have you been bothered in the last four weeks by sexual problems? No   Do you have difficulty concentrating, remembering or making decisions? Yes         Does the patient have evidence of cognitive impairment? Yes 3/3 forget names  Usually remembers later  She thinsk that things being crazy at home    Asprin use counseling:Does not need ASA (and currently is not on it)    Age-appropriate Screening Schedule:  Refer to the list below for future screening recommendations based on patient's age, sex and/or medical conditions. Orders for these recommended tests are listed in the plan section. The patient has been provided with a written plan.    Health Maintenance   Topic Date Due   • TDAP/TD VACCINES (1 - Tdap) 09/02/2009   • ZOSTER VACCINE (3 of 3) 05/10/2017   • DXA SCAN  01/19/2019   • COLONOSCOPY  10/20/2020   • LIPID PANEL  10/28/2020   • MAMMOGRAM   09/27/2021   • INFLUENZA VACCINE  Addressed   • PNEUMOCOCCAL VACCINES (65+ LOW/MEDIUM RISK)  Discontinued          The following portions of the patient's history were reviewed and updated as appropriate: allergies, current medications, past family history, past medical history, past social history, past surgical history and problem list.    Outpatient Medications Prior to Visit   Medication Sig Dispense Refill   • Cholecalciferol (VITAMIN D3) 50 MCG (2000 UT) tablet Take 1 tablet by mouth Daily.     • desoximetasone (TOPICORT) 0.25 % cream Apply  topically to the appropriate area as directed Every 12 (Twelve) Hours. 60 g 0   • MAGNESIUM PO Take  by mouth.     • NON FORMULARY ASHWAGANDHA     • Polyethyl Glycol-Propyl Glycol (SYSTANE ULTRA OP) Apply 1 drop to eye Daily.     • Probiotic Product (PROBIOTIC PO) Take  by mouth. natren life start       No facility-administered medications prior to visit.        Patient Active Problem List   Diagnosis   • Low back pain   • Low hemoglobin   • Menopause present   • Osteoporosis   • Thrombocytopenia (CMS/HCC)   • Renal insufficiency   • Pain and swelling of left knee   • Anxiety   • Vitamin D deficiency   • Constipation   • Memory loss       Advanced Care Planning:  Patient has an advance directive - a copy has not been provided. Have asked the patient to send this to us to add to record    Review of Systems    Compared to one year ago, the patient feels her physical health is worse.over more tired  More from stress  Compared to one year ago, the patient feels her mental health is worse.stress    Reviewed chart for potential of high risk medication in the elderly: yes  Reviewed chart for potential of harmful drug interactions in the elderly:yes    Objective         Vitals:    11/04/19 1032   BP: 114/68   BP Location: Left arm   Patient Position: Sitting   Pulse: 71   Temp: 97.6 °F (36.4 °C)   TempSrc: Oral   SpO2: 99%   Weight: 58.8 kg (129 lb 11.2 oz)   Height: 170.2 cm  "(67\")   PainSc:   4       Body mass index is 20.31 kg/m².  Discussed the patient's BMI with her. The BMI is in the acceptable range.    Physical Exam    Lab Results   Component Value Date    CHLPL 177 10/28/2019    TRIG 54 10/28/2019    HDL 88 10/28/2019    LDL 78 10/28/2019    VLDL 11 10/28/2019        Assessment/Plan   Medicare Risks and Personalized Health Plan  CMS Preventative Services Quick Reference  Immunizations Discussed/Encouraged (specific immunizations; Shingrix )    The above risks/problems have been discussed with the patient.  Pertinent information has been shared with the patient in the After Visit Summary.  Follow up plans and orders are seen below in the Assessment/Plan Section.    Diagnoses and all orders for this visit:    1. Medicare annual wellness visit, subsequent (Primary)    2. Thrombocytopenia (CMS/HCC)    3. Low hemoglobin    4. Renal insufficiency      Follow Up:  No Follow-up on file.     An After Visit Summary and PPPS were given to the patient.         She is under a lot of stress living with her sister and brother in laws      "

## 2019-11-04 NOTE — PATIENT INSTRUCTIONS
Medicare Wellness  Personal Prevention Plan of Service     Date of Office Visit:  2019  Encounter Provider:  Ciera Newman MD  Place of Service:  [facility] INTER[facility] MED[facility]  Patient Name: Carol Osgood  :  1950    As part of the Medicare Wellness portion of your visit today, we are providing you with this personalized preventive plan of services (PPPS). This plan is based upon recommendations of the [place] Preventive Services Task Force (USPSTF) and the Advisory Committee on Immunization Practices (ACIP).    This lists the preventive care services that should be considered, and provides dates of when you are due. Items listed as completed are up-to-date and do not require any further intervention.    Health Maintenance   Topic Date Due   • TDAP/TD VACCINES (1 - Tdap) 2009   • ZOSTER VACCINE (3 of 3) 05/10/2017   • DXA SCAN  2019   • MEDICARE ANNUAL WELLNESS  2019   • COLONOSCOPY  10/20/2020   • LIPID PANEL  10/28/2020   • MAMMOGRAM  2021   • HEPATITIS C SCREENING  Completed   • INFLUENZA VACCINE  Addressed   • PNEUMOCOCCAL VACCINES (65+ LOW/MEDIUM RISK)  Discontinued       No orders of the defined types were placed in this encounter.      No Follow-up on file.          Fall Prevention in the Home, Adult  Falls can cause injuries. They can happen to people of all ages. There are many things you can do to make your home safe and to help prevent falls. Ask for help when making these changes, if needed.  What actions can I take to prevent falls?  General Instructions  · Use good lighting in all rooms. Replace any light bulbs that burn out.  · Turn on the lights when you go into a dark area. Use night-lights.  · Keep items that you use often in easy-to-reach places. Lower the shelves around your home if necessary.  · Set up your furniture so you have a clear path. Avoid moving your furniture around.  · Do not have throw rugs and other things on the floor  that can make you trip.  · Avoid walking on wet floors.  · If any of your floors are uneven, fix them.  · Add color or contrast paint or tape to clearly lamine and help you see:  ? Any grab bars or handrails.  ? First and last steps of stairways.  ? Where the edge of each step is.  · If you use a stepladder:  ? Make sure that it is fully opened. Do not climb a closed stepladder.  ? Make sure that both sides of the stepladder are locked into place.  ? Ask someone to hold the stepladder for you while you use it.  · If there are any pets around you, be aware of where they are.  What can I do in the bathroom?         · Keep the floor dry. Clean up any water that spills onto the floor as soon as it happens.  · Remove soap buildup in the tub or shower regularly.  · Use non-skid mats or decals on the floor of the tub or shower.  · Attach bath mats securely with double-sided, non-slip rug tape.  · If you need to sit down in the shower, use a plastic, non-slip stool.  · Install grab bars by the toilet and in the tub and shower. Do not use towel bars as grab bars.  What can I do in the bedroom?  · Make sure that you have a light by your bed that is easy to reach.  · Do not use any sheets or blankets that are too big for your bed. They should not hang down onto the floor.  · Have a firm chair that has side arms. You can use this for support while you get dressed.  What can I do in the kitchen?  · Clean up any spills right away.  · If you need to reach something above you, use a strong step stool that has a grab bar.  · Keep electrical cords out of the way.  · Do not use floor polish or wax that makes floors slippery. If you must use wax, use non-skid floor wax.  What can I do with my stairs?  · Do not leave any items on the stairs.  · Make sure that you have a light switch at the top of the stairs and the bottom of the stairs. If you do not have them, ask someone to add them for you.  · Make sure that there are handrails on both  sides of the stairs, and use them. Fix handrails that are broken or loose. Make sure that handrails are as long as the stairways.  · Install non-slip stair treads on all stairs in your home.  · Avoid having throw rugs at the top or bottom of the stairs. If you do have throw rugs, attach them to the floor with carpet tape.  · Choose a carpet that does not hide the edge of the steps on the stairway.  · Check any carpeting to make sure that it is firmly attached to the stairs. Fix any carpet that is loose or worn.  What can I do on the outside of my home?  · Use bright outdoor lighting.  · Regularly fix the edges of walkways and driveways and fix any cracks.  · Remove anything that might make you trip as you walk through a door, such as a raised step or threshold.  · Trim any bushes or trees on the path to your home.  · Regularly check to see if handrails are loose or broken. Make sure that both sides of any steps have handrails.  · Install guardrails along the edges of any raised decks and porches.  · Clear walking paths of anything that might make someone trip, such as tools or rocks.  · Have any leaves, snow, or ice cleared regularly.  · Use sand or salt on walking paths during winter.  · Clean up any spills in your garage right away. This includes grease or oil spills.  What other actions can I take?  · Wear shoes that:  ? Have a low heel. Do not wear high heels.  ? Have rubber bottoms.  ? Are comfortable and fit you well.  ? Are closed at the toe. Do not wear open-toe sandals.  · Use tools that help you move around (mobility aids) if they are needed. These include:  ? Canes.  ? Walkers.  ? Scooters.  ? Crutches.  · Review your medicines with your doctor. Some medicines can make you feel dizzy. This can increase your chance of falling.  Ask your doctor what other things you can do to help prevent falls.  Where to find more information  · Centers for Disease Control and PreventionJOSE:  https://cdc.gov  · National [place] on Aging: https://bw3cbsz.ashleigh.nih.gov  Contact a doctor if:  · You are afraid of falling at home.  · You feel weak, drowsy, or dizzy at home.  · You fall at home.  Summary  · There are many simple things that you can do to make your home safe and to help prevent falls.  · Ways to make your home safe include removing tripping hazards and installing grab bars in the bathroom.  · Ask for help when making these changes in your home.  This information is not intended to replace advice given to you by your health care provider. Make sure you discuss any questions you have with your health care provider.  Document Released: 10/14/2010 Document Revised: 08/02/2018 Document Reviewed: 08/02/2018  Elsevier Interactive Patient Education © 2019 [employer]

## 2019-11-04 NOTE — PROGRESS NOTES
Subjective   Carol Osgood is a 68 y.o. female here today to f/u on vitamin d def.  Pt c/o having blood in right ear.      History of Present Illness   She has been stable with CRI  Seeing nephrology  She does have a hx of low WBc and HB  And platelets and thinks she has a FH of this   She does feel a little off balance on occas and the chiro is giving her exercises that help  She has recovered from the iral illness    The following portions of the patient's history were reviewed and updated as appropriate: allergies, current medications, past medical history, past social history and problem list.  She does eat a healthy diet    Review of Systems   All other systems reviewed and are negative.      Objective   Physical Exam   Constitutional: She is oriented to person, place, and time. She appears well-developed and well-nourished.   HENT:   Head: Normocephalic and atraumatic.   Right Ear: External ear normal.   Left Ear: External ear normal.   Mouth/Throat: Oropharynx is clear and moist.   Eyes: Conjunctivae and EOM are normal. Pupils are equal, round, and reactive to light.   Neck: Normal range of motion. No tracheal deviation present. No thyromegaly present.   Cardiovascular: Normal rate, regular rhythm, normal heart sounds and intact distal pulses.   Pulmonary/Chest: Effort normal and breath sounds normal.   Abdominal: Soft. Bowel sounds are normal. She exhibits no distension. There is no tenderness.   Musculoskeletal: Normal range of motion. She exhibits no edema or deformity.   Neurological: She is alert and oriented to person, place, and time.   Skin: Skin is warm and dry.   Psychiatric: She has a normal mood and affect. Her behavior is normal. Judgment and thought content normal.   Vitals reviewed.      Vitals:    11/04/19 1032   BP: 114/68   Pulse: 71   Temp: 97.6 °F (36.4 °C)   SpO2: 99%        Orders Only on 10/25/2019   Component Date Value Ref Range Status   • Iron 10/28/2019 107  37 - 145 mcg/dL Final   •  Iron Saturation 10/28/2019 30  20 - 50 % Final   • Transferrin 10/28/2019 240  200 - 360 mg/dL Final   • TIBC 10/28/2019 358  298 - 536 mcg/dL Final   • TSH 10/28/2019 2.950  0.270 - 4.200 uIU/mL Final   • Total Cholesterol 10/28/2019 177  100 - 199 mg/dL Final   • Triglycerides 10/28/2019 54  0 - 149 mg/dL Final   • HDL Cholesterol 10/28/2019 88  >39 mg/dL Final   • VLDL Cholesterol 10/28/2019 11  5 - 40 mg/dL Final   • LDL Cholesterol  10/28/2019 78  0 - 99 mg/dL Final   • LDL/HDL Ratio 10/28/2019 0.9  0.0 - 3.2 ratio Final                                        LDL/HDL Ratio                                              Men  Women                                1/2 Avg.Risk  1.0    1.5                                    Avg.Risk  3.6    3.2                                 2X Avg.Risk  6.2    5.0                                 3X Avg.Risk  8.0    6.1   • Glucose 10/28/2019 92  65 - 99 mg/dL Final   • BUN 10/28/2019 36* 8 - 23 mg/dL Final   • Creatinine 10/28/2019 1.38* 0.57 - 1.00 mg/dL Final   • Sodium 10/28/2019 140  136 - 145 mmol/L Final   • Potassium 10/28/2019 3.8  3.5 - 5.2 mmol/L Final   • Chloride 10/28/2019 102  98 - 107 mmol/L Final   • CO2 10/28/2019 27.8  22.0 - 29.0 mmol/L Final   • Calcium 10/28/2019 9.8  8.6 - 10.5 mg/dL Final   • Total Protein 10/28/2019 6.9  6.0 - 8.5 g/dL Final   • Albumin 10/28/2019 4.10  3.50 - 5.20 g/dL Final   • ALT (SGPT) 10/28/2019 16  1 - 33 U/L Final   • AST (SGOT) 10/28/2019 23  1 - 32 U/L Final   • Alkaline Phosphatase 10/28/2019 69  39 - 117 U/L Final   • Total Bilirubin 10/28/2019 0.5  0.1 - 1.2 mg/dL Final   • eGFR Non African Amer 10/28/2019 38* >60 mL/min/1.73 Final   • Globulin 10/28/2019 2.8  gm/dL Final   • A/G Ratio 10/28/2019 1.5  g/dL Final   • BUN/Creatinine Ratio 10/28/2019 26.1* 7.0 - 25.0 Final   • Anion Gap 10/28/2019 10.2  5.0 - 15.0 mmol/L Final   • WBC 10/28/2019 4.01  3.40 - 10.80 10*3/mm3 Final   • RBC 10/28/2019 3.58* 3.77 - 5.28 10*6/mm3 Final    • Hemoglobin 10/28/2019 11.3* 12.0 - 15.9 g/dL Final   • Hematocrit 10/28/2019 34.6  34.0 - 46.6 % Final   • MCV 10/28/2019 96.6  79.0 - 97.0 fL Final   • MCH 10/28/2019 31.6  26.6 - 33.0 pg Final   • MCHC 10/28/2019 32.7  31.5 - 35.7 g/dL Final   • RDW 10/28/2019 12.8  12.3 - 15.4 % Final   • RDW-SD 10/28/2019 46.0  37.0 - 54.0 fl Final   • MPV 10/28/2019 11.4  6.0 - 12.0 fL Final   • Platelets 10/28/2019 123* 140 - 450 10*3/mm3 Final   • Neutrophil % 10/28/2019 68.2  42.7 - 76.0 % Final   • Lymphocyte % 10/28/2019 21.7  19.6 - 45.3 % Final   • Monocyte % 10/28/2019 7.5  5.0 - 12.0 % Final   • Eosinophil % 10/28/2019 1.7  0.3 - 6.2 % Final   • Basophil % 10/28/2019 0.7  0.0 - 1.5 % Final   • Immature Grans % 10/28/2019 0.2  0.0 - 0.5 % Final   • Neutrophils, Absolute 10/28/2019 2.73  1.70 - 7.00 10*3/mm3 Final   • Lymphocytes, Absolute 10/28/2019 0.87  0.70 - 3.10 10*3/mm3 Final   • Monocytes, Absolute 10/28/2019 0.30  0.10 - 0.90 10*3/mm3 Final   • Eosinophils, Absolute 10/28/2019 0.07  0.00 - 0.40 10*3/mm3 Final   • Basophils, Absolute 10/28/2019 0.03  0.00 - 0.20 10*3/mm3 Final   • Immature Grans, Absolute 10/28/2019 0.01  0.00 - 0.05 10*3/mm3 Final   • nRBC 10/28/2019 0.0  0.0 - 0.2 /100 WBC Final       Current Outpatient Medications:   •  Cholecalciferol (VITAMIN D3) 50 MCG (2000 UT) tablet, Take 1 tablet by mouth Daily., Disp: , Rfl:   •  desoximetasone (TOPICORT) 0.25 % cream, Apply  topically to the appropriate area as directed Every 12 (Twelve) Hours., Disp: 60 g, Rfl: 0  •  MAGNESIUM PO, Take  by mouth., Disp: , Rfl:   •  NON FORMULARY, ASHWAGANDHA, Disp: , Rfl:   •  Polyethyl Glycol-Propyl Glycol (SYSTANE ULTRA OP), Apply 1 drop to eye Daily., Disp: , Rfl:   •  Probiotic Product (PROBIOTIC PO), Take  by mouth. natren life start, Disp: , Rfl:         Assessment/Plan   Diagnoses and all orders for this visit:    Medicare annual wellness visit, subsequent    Thrombocytopenia (CMS/HCC)    Low  hemoglobin    Renal insufficiency    Localized osteoporosis, unspecified pathological fracture presence  -     DEXA Bone Density Axial; Future    Age related osteoporosis, unspecified pathological fracture presence        1. CRI- stable  Seeing renal  2. THrombocytopenia-  At same level she has always been  I have discussed seeing CBC at some point but right now all is stable  3.  Anemia- very mild and may be related to CRI  4.  Viral illness- now resolved  5.  Balance issues- I have given her exercises to try  6. Insomnia-  She is working on relaxation to help with sleep

## 2019-12-04 ENCOUNTER — HOSPITAL ENCOUNTER (OUTPATIENT)
Dept: BONE DENSITY | Facility: HOSPITAL | Age: 69
Discharge: HOME | End: 2019-12-04
Admitting: INTERNAL MEDICINE

## 2019-12-04 DIAGNOSIS — M81.6 LOCALIZED OSTEOPOROSIS, UNSPECIFIED PATHOLOGICAL FRACTURE PRESENCE: ICD-10-CM

## 2019-12-04 PROCEDURE — 77080 DXA BONE DENSITY AXIAL: CPT

## 2020-01-08 ENCOUNTER — LAB (OUTPATIENT)
Dept: LAB | Facility: HOSPITAL | Age: 70
End: 2020-01-08

## 2020-01-08 ENCOUNTER — TRANSCRIBE ORDERS (OUTPATIENT)
Dept: ADMINISTRATIVE | Facility: HOSPITAL | Age: 70
End: 2020-01-08

## 2020-01-08 DIAGNOSIS — N18.30 CHRONIC KIDNEY DISEASE, STAGE III (MODERATE) (HCC): Primary | ICD-10-CM

## 2020-01-08 DIAGNOSIS — N18.30 CHRONIC KIDNEY DISEASE, STAGE III (MODERATE) (HCC): ICD-10-CM

## 2020-01-08 LAB
25(OH)D3 SERPL-MCNC: 52.3 NG/ML (ref 30–100)
ALBUMIN SERPL-MCNC: 4.4 G/DL (ref 3.5–5.2)
ALBUMIN/GLOB SERPL: 1.4 G/DL
ALP SERPL-CCNC: 72 U/L (ref 39–117)
ALT SERPL W P-5'-P-CCNC: 14 U/L (ref 1–33)
ANION GAP SERPL CALCULATED.3IONS-SCNC: 11.1 MMOL/L (ref 5–15)
AST SERPL-CCNC: 21 U/L (ref 1–32)
BILIRUB SERPL-MCNC: 0.6 MG/DL (ref 0.1–1.2)
BUN BLD-MCNC: 39 MG/DL (ref 8–23)
BUN/CREAT SERPL: 28.3 (ref 7–25)
CALCIUM SPEC-SCNC: 10 MG/DL (ref 8.6–10.5)
CHLORIDE SERPL-SCNC: 101 MMOL/L (ref 98–107)
CK SERPL-CCNC: 100 U/L (ref 20–180)
CO2 SERPL-SCNC: 25.9 MMOL/L (ref 22–29)
CREAT BLD-MCNC: 1.38 MG/DL (ref 0.57–1)
DEPRECATED RDW RBC AUTO: 43.8 FL (ref 37–54)
ERYTHROCYTE [DISTWIDTH] IN BLOOD BY AUTOMATED COUNT: 12.4 % (ref 12.3–15.4)
GFR SERPL CREATININE-BSD FRML MDRD: 38 ML/MIN/1.73
GLOBULIN UR ELPH-MCNC: 3.2 GM/DL
GLUCOSE BLD-MCNC: 90 MG/DL (ref 65–99)
HCT VFR BLD AUTO: 35.4 % (ref 34–46.6)
HGB BLD-MCNC: 11.6 G/DL (ref 12–15.9)
MAGNESIUM SERPL-MCNC: 2.3 MG/DL (ref 1.6–2.4)
MCH RBC QN AUTO: 31.5 PG (ref 26.6–33)
MCHC RBC AUTO-ENTMCNC: 32.8 G/DL (ref 31.5–35.7)
MCV RBC AUTO: 96.2 FL (ref 79–97)
PHOSPHATE SERPL-MCNC: 3.4 MG/DL (ref 2.5–4.5)
PLATELET # BLD AUTO: 137 10*3/MM3 (ref 140–450)
PMV BLD AUTO: 11.5 FL (ref 6–12)
POTASSIUM BLD-SCNC: 3.7 MMOL/L (ref 3.5–5.2)
PROT SERPL-MCNC: 7.6 G/DL (ref 6–8.5)
PTH-INTACT SERPL-MCNC: 112 PG/ML (ref 15–65)
RBC # BLD AUTO: 3.68 10*6/MM3 (ref 3.77–5.28)
SODIUM BLD-SCNC: 138 MMOL/L (ref 136–145)
URATE SERPL-MCNC: 5.4 MG/DL (ref 2.4–5.7)
WBC NRBC COR # BLD: 4.17 10*3/MM3 (ref 3.4–10.8)

## 2020-01-08 PROCEDURE — 80053 COMPREHEN METABOLIC PANEL: CPT

## 2020-01-08 PROCEDURE — 84156 ASSAY OF PROTEIN URINE: CPT | Performed by: INTERNAL MEDICINE

## 2020-01-08 PROCEDURE — 82306 VITAMIN D 25 HYDROXY: CPT | Performed by: INTERNAL MEDICINE

## 2020-01-08 PROCEDURE — 82550 ASSAY OF CK (CPK): CPT | Performed by: INTERNAL MEDICINE

## 2020-01-08 PROCEDURE — 84100 ASSAY OF PHOSPHORUS: CPT

## 2020-01-08 PROCEDURE — 85027 COMPLETE CBC AUTOMATED: CPT

## 2020-01-08 PROCEDURE — 82570 ASSAY OF URINE CREATININE: CPT | Performed by: INTERNAL MEDICINE

## 2020-01-08 PROCEDURE — 84550 ASSAY OF BLOOD/URIC ACID: CPT

## 2020-01-08 PROCEDURE — 36415 COLL VENOUS BLD VENIPUNCTURE: CPT

## 2020-01-08 PROCEDURE — 81003 URINALYSIS AUTO W/O SCOPE: CPT

## 2020-01-08 PROCEDURE — 83970 ASSAY OF PARATHORMONE: CPT | Performed by: INTERNAL MEDICINE

## 2020-01-08 PROCEDURE — 83735 ASSAY OF MAGNESIUM: CPT

## 2020-01-09 LAB
BILIRUB UR QL STRIP: NEGATIVE
CLARITY UR: CLEAR
COLOR UR: YELLOW
CREAT UR-MCNC: 85.9 MG/DL
GLUCOSE UR STRIP-MCNC: NEGATIVE MG/DL
HGB UR QL STRIP.AUTO: NEGATIVE
KETONES UR QL STRIP: NEGATIVE
LEUKOCYTE ESTERASE UR QL STRIP.AUTO: NEGATIVE
NITRITE UR QL STRIP: NEGATIVE
PH UR STRIP.AUTO: 5.5 [PH] (ref 5–8)
PROT UR QL STRIP: ABNORMAL
PROT UR-MCNC: 19 MG/DL
SP GR UR STRIP: 1.01 (ref 1–1.03)
UROBILINOGEN UR QL STRIP: ABNORMAL

## 2020-03-06 ENCOUNTER — OFFICE (OUTPATIENT)
Dept: URBAN - METROPOLITAN AREA CLINIC 1 | Facility: CLINIC | Age: 70
End: 2020-03-06

## 2020-03-06 VITALS
HEIGHT: 67 IN | DIASTOLIC BLOOD PRESSURE: 68 MMHG | SYSTOLIC BLOOD PRESSURE: 108 MMHG | HEART RATE: 75 BPM | WEIGHT: 132 LBS

## 2020-03-06 DIAGNOSIS — R19.7 DIARRHEA, UNSPECIFIED: ICD-10-CM

## 2020-03-06 DIAGNOSIS — R14.0 ABDOMINAL DISTENSION (GASEOUS): ICD-10-CM

## 2020-03-06 DIAGNOSIS — R10.13 EPIGASTRIC PAIN: ICD-10-CM

## 2020-03-06 DIAGNOSIS — R94.5 ABNORMAL RESULTS OF LIVER FUNCTION STUDIES: ICD-10-CM

## 2020-03-06 DIAGNOSIS — Z86.010 PERSONAL HISTORY OF COLONIC POLYPS: ICD-10-CM

## 2020-03-06 DIAGNOSIS — K59.09 OTHER CONSTIPATION: ICD-10-CM

## 2020-03-06 DIAGNOSIS — K30 FUNCTIONAL DYSPEPSIA: ICD-10-CM

## 2020-03-06 DIAGNOSIS — Z12.11 ENCOUNTER FOR SCREENING FOR MALIGNANT NEOPLASM OF COLON: ICD-10-CM

## 2020-03-06 PROCEDURE — 99213 OFFICE O/P EST LOW 20 MIN: CPT | Performed by: INTERNAL MEDICINE

## 2020-05-04 ENCOUNTER — APPOINTMENT (OUTPATIENT)
Dept: LAB | Facility: HOSPITAL | Age: 70
End: 2020-05-04

## 2020-05-04 DIAGNOSIS — E78.5 HYPERLIPIDEMIA, UNSPECIFIED HYPERLIPIDEMIA TYPE: ICD-10-CM

## 2020-05-04 DIAGNOSIS — N28.9 RENAL INSUFFICIENCY: Primary | ICD-10-CM

## 2020-05-04 LAB
ALBUMIN SERPL-MCNC: 3.9 G/DL (ref 3.5–5.2)
ALBUMIN/GLOB SERPL: 1.3 G/DL
ALP SERPL-CCNC: 58 U/L (ref 39–117)
ALT SERPL W P-5'-P-CCNC: 12 U/L (ref 1–33)
ANION GAP SERPL CALCULATED.3IONS-SCNC: 8.3 MMOL/L (ref 5–15)
AST SERPL-CCNC: 22 U/L (ref 1–32)
BILIRUB SERPL-MCNC: 0.4 MG/DL (ref 0.2–1.2)
BUN BLD-MCNC: 34 MG/DL (ref 8–23)
BUN/CREAT SERPL: 23.3 (ref 7–25)
CALCIUM SPEC-SCNC: 10 MG/DL (ref 8.6–10.5)
CHLORIDE SERPL-SCNC: 102 MMOL/L (ref 98–107)
CO2 SERPL-SCNC: 27.7 MMOL/L (ref 22–29)
CREAT BLD-MCNC: 1.46 MG/DL (ref 0.57–1)
GFR SERPL CREATININE-BSD FRML MDRD: 35 ML/MIN/1.73
GLOBULIN UR ELPH-MCNC: 3 GM/DL
GLUCOSE BLD-MCNC: 91 MG/DL (ref 65–99)
POTASSIUM BLD-SCNC: 4.2 MMOL/L (ref 3.5–5.2)
PROT SERPL-MCNC: 6.9 G/DL (ref 6–8.5)
SODIUM BLD-SCNC: 138 MMOL/L (ref 136–145)

## 2020-05-04 PROCEDURE — 36415 COLL VENOUS BLD VENIPUNCTURE: CPT | Performed by: INTERNAL MEDICINE

## 2020-05-04 PROCEDURE — 80053 COMPREHEN METABOLIC PANEL: CPT | Performed by: INTERNAL MEDICINE

## 2020-05-04 PROCEDURE — 80061 LIPID PANEL: CPT | Performed by: INTERNAL MEDICINE

## 2020-05-05 LAB
CHOLEST SERPL-MCNC: 180 MG/DL (ref 100–199)
HDLC SERPL-MCNC: 86 MG/DL
LDLC SERPL CALC-MCNC: 80 MG/DL (ref 0–99)
LDLC/HDLC SERPL: 0.9 RATIO (ref 0–3.2)
TRIGL SERPL-MCNC: 68 MG/DL (ref 0–149)
VLDLC SERPL-MCNC: 14 MG/DL (ref 5–40)

## 2020-05-07 ENCOUNTER — OFFICE VISIT (OUTPATIENT)
Dept: INTERNAL MEDICINE | Facility: CLINIC | Age: 70
End: 2020-05-07

## 2020-05-07 VITALS
HEART RATE: 82 BPM | DIASTOLIC BLOOD PRESSURE: 72 MMHG | WEIGHT: 128.1 LBS | SYSTOLIC BLOOD PRESSURE: 116 MMHG | OXYGEN SATURATION: 97 % | BODY MASS INDEX: 20.11 KG/M2 | HEIGHT: 67 IN

## 2020-05-07 DIAGNOSIS — E78.00 ELEVATED CHOLESTEROL: ICD-10-CM

## 2020-05-07 DIAGNOSIS — R55 VASOVAGAL SYNCOPE: Primary | ICD-10-CM

## 2020-05-07 DIAGNOSIS — N28.9 RENAL INSUFFICIENCY: ICD-10-CM

## 2020-05-07 PROCEDURE — 99214 OFFICE O/P EST MOD 30 MIN: CPT | Performed by: INTERNAL MEDICINE

## 2020-05-07 PROCEDURE — 93000 ELECTROCARDIOGRAM COMPLETE: CPT | Performed by: INTERNAL MEDICINE

## 2020-05-07 RX ORDER — PHENOL 1.4 %
600 AEROSOL, SPRAY (ML) MUCOUS MEMBRANE DAILY
COMMUNITY
End: 2021-11-11

## 2020-05-07 RX ORDER — ERYTHROMYCIN 5 MG/G
1 OINTMENT OPHTHALMIC DAILY PRN
COMMUNITY
Start: 2020-04-09

## 2020-05-07 NOTE — PROGRESS NOTES
Subjective   Carol A Osgood is a 69 y.o. female here tody to f/u on renal insufficiency.  Pt states that she has fallen off the toilet a couple times in the last 2 months    History of Present Illness   One episode while on the toilet  Not sure is she actually passed out  Other times she has felt weak like she would pass out and didn't.    She also had an episode last year when [place] at the airport  I suspect vasovagal syncope  She denies any CP or SOB with exertion.  No orthopnea no PND no LE edema    The following portions of the patient's history were reviewed and updated as appropriate: allergies, current medications, past medical history, past social history and problem list.  She denies any change in diet or change of medicines    Review of Systems   All other systems reviewed and are negative.      Objective   Physical Exam   Constitutional: She is oriented to person, place, and time. She appears well-developed and well-nourished.   HENT:   Head: Normocephalic and atraumatic.   Right Ear: External ear normal.   Left Ear: External ear normal.   Mouth/Throat: Oropharynx is clear and moist.   Cardiovascular: Normal rate, regular rhythm and normal heart sounds.   Musculoskeletal: Normal range of motion.   Neurological: She is alert and oriented to person, place, and time.   Vitals reviewed.      Vitals:    05/07/20 1059   BP: 116/72   Pulse: 82   SpO2: 97%     Body mass index is 20.06 kg/m².       Orders Only on 05/04/2020   Component Date Value Ref Range Status   • Glucose 05/04/2020 91  65 - 99 mg/dL Final   • BUN 05/04/2020 34* 8 - 23 mg/dL Final   • Creatinine 05/04/2020 1.46* 0.57 - 1.00 mg/dL Final   • Sodium 05/04/2020 138  136 - 145 mmol/L Final   • Potassium 05/04/2020 4.2  3.5 - 5.2 mmol/L Final   • Chloride 05/04/2020 102  98 - 107 mmol/L Final   • CO2 05/04/2020 27.7  22.0 - 29.0 mmol/L Final   • Calcium 05/04/2020 10.0  8.6 - 10.5 mg/dL Final   • Total Protein 05/04/2020 6.9  6.0 - 8.5 g/dL  Final   • Albumin 05/04/2020 3.90  3.50 - 5.20 g/dL Final   • ALT (SGPT) 05/04/2020 12  1 - 33 U/L Final   • AST (SGOT) 05/04/2020 22  1 - 32 U/L Final   • Alkaline Phosphatase 05/04/2020 58  39 - 117 U/L Final   • Total Bilirubin 05/04/2020 0.4  0.2 - 1.2 mg/dL Final   • eGFR Non African Amer 05/04/2020 35* >60 mL/min/1.73 Final   • Globulin 05/04/2020 3.0  gm/dL Final   • A/G Ratio 05/04/2020 1.3  g/dL Final   • BUN/Creatinine Ratio 05/04/2020 23.3  7.0 - 25.0 Final   • Anion Gap 05/04/2020 8.3  5.0 - 15.0 mmol/L Final   • Total Cholesterol 05/04/2020 180  100 - 199 mg/dL Final   • Triglycerides 05/04/2020 68  0 - 149 mg/dL Final   • HDL Cholesterol 05/04/2020 86  >39 mg/dL Final   • VLDL Cholesterol 05/04/2020 14  5 - 40 mg/dL Final   • LDL Cholesterol  05/04/2020 80  0 - 99 mg/dL Final   • LDL/HDL Ratio 05/04/2020 0.9  0.0 - 3.2 ratio Final                                        LDL/HDL Ratio                                              Men  Women                                1/2 Avg.Risk  1.0    1.5                                    Avg.Risk  3.6    3.2                                 2X Avg.Risk  6.2    5.0                                 3X Avg.Risk  8.0    6.1       Current Outpatient Medications:   •  calcium carbonate (OS-MOLLY) 600 MG tablet, Take 600 mg by mouth Daily., Disp: , Rfl:   •  Cholecalciferol (VITAMIN D3) 50 MCG (2000 UT) tablet, Take 1 tablet by mouth Daily., Disp: , Rfl:   •  desoximetasone (TOPICORT) 0.25 % cream, Apply  topically to the appropriate area as directed Every 12 (Twelve) Hours., Disp: 60 g, Rfl: 0  •  erythromycin (ROMYCIN) 5 MG/GM ophthalmic ointment, 1 application Daily As Needed., Disp: , Rfl:   •  NON FORMULARY, ASHWAGANDHA, Disp: , Rfl:   •  Polyethyl Glycol-Propyl Glycol (SYSTANE ULTRA OP), Apply 1 drop to eye Daily., Disp: , Rfl:   •  Probiotic Product (PROBIOTIC PO), Take  by mouth. [facility] life [facility], Disp: , Rfl:     EKG- NSR no acute st changes no change compared to  EKG done 4/16      Assessment/Plan   Diagnoses and all orders for this visit:    Vasovagal syncope  -     Ambulatory Referral to Cardiology  -     CBC & Differential; Future  -     Comprehensive Metabolic Panel; Future  -     LP+LDL / HDL Ratio ([facility]); Future  -     TSH Rfx On Abnormal To Free T4; Future    Renal insufficiency  -     CBC & Differential; Future  -     Comprehensive Metabolic Panel; Future  -     LP+LDL / HDL Ratio ([facility]); Future  -     TSH Rfx On Abnormal To Free T4; Future    Elevated cholesterol  -     LP+LDL / HDL Ratio ([facility]); Future  -     TSH Rfx On Abnormal To Free T4; Future    1.  Syncope-  Suspect vasovagal but she keeps havign similar feeling from time to time  She would like a cardiac eval  May need a tilt table or echo  2.  CRI- stable  Sees nephrology  3.  HPL-patient is going to continue to work on diet and exercise

## 2020-05-12 ENCOUNTER — OFFICE VISIT (OUTPATIENT)
Dept: CARDIOLOGY | Facility: CLINIC | Age: 70
End: 2020-05-12

## 2020-05-12 VITALS
SYSTOLIC BLOOD PRESSURE: 144 MMHG | HEIGHT: 67 IN | BODY MASS INDEX: 21.03 KG/M2 | DIASTOLIC BLOOD PRESSURE: 108 MMHG | OXYGEN SATURATION: 99 % | WEIGHT: 134 LBS | HEART RATE: 89 BPM

## 2020-05-12 DIAGNOSIS — R55 SYNCOPE AND COLLAPSE: Primary | ICD-10-CM

## 2020-05-12 PROCEDURE — 99204 OFFICE O/P NEW MOD 45 MIN: CPT | Performed by: INTERNAL MEDICINE

## 2020-05-14 NOTE — PROGRESS NOTES
Subjective:     Encounter Date:05/12/2020      Patient ID: Carol A Osgood is a 69 y.o. female.    Chief Complaint:  Syncope   This is a recurrent problem. The current episode started more than 1 month ago. The symptoms are aggravated by defecation. Associated symptoms include abdominal pain and malaise/fatigue. She has tried nothing for the symptoms.       69-year-old female who presents today for evaluation of syncopal episodes.  She said her first syncopal episode was about 2 years ago.  Patient says she got an airplane got overheated started feeling bad try to get up to go to the restroom when she subsequently passed out.  That time she was out a few minutes but had done relatively well.  She said unfortunately she has had a lot of bowel issues for many many years.  She says that she had an episode of syncope in March and then in April but a month apart.  Both times she was sitting on the toilet.  Second time she actually hurt her self pretty bad with extensive injury to her face which she is recovered from.  She said both time she was very constipated she had some abdominal pain and cramping.  She was referred for further evaluation.    Review of Systems   Constitution: Positive for malaise/fatigue.   Cardiovascular: Positive for syncope.   Gastrointestinal: Positive for abdominal pain.   Neurological: Positive for paresthesias.   All other systems reviewed and are negative.      Procedures       Objective:     Physical Exam   Constitutional: She is oriented to person, place, and time. She appears well-developed.   HENT:   Head: Normocephalic.   Eyes: Conjunctivae are normal.   Neck: Normal range of motion.   Cardiovascular: Normal rate, regular rhythm and normal heart sounds.   Pulmonary/Chest: Breath sounds normal.   Abdominal: Soft. Bowel sounds are normal.   Musculoskeletal: Normal range of motion. She exhibits no edema.   Neurological: She is alert and oriented to person, place, and time.   Skin: Skin  is warm and dry.   Psychiatric: She has a normal mood and affect. Her behavior is normal.   Vitals reviewed.      Lab Review:       Assessment:          Diagnosis Plan   1. Syncope and collapse  Holter Monitor - 24 Hour    Adult Transthoracic Echo Complete W/ Cont if Necessary Per Protocol          Plan:       1.  Syncope I think this is pretty classic vasovagal syncope the only concerning part is how extensively she injured herself on the second episode.  Again I explained importance of water which she says she is not that great about and obviously getting a good bowel regimen is going to be absolutely key for her.  Cardiovascular standpoint since she did hurt her self I am going to set her up for a echo and a Holter to rule out structural heart disease as well as arrhythmias.  Did not do a stress test remains very active and has never had any types of chest discomfort.  Obviously if I find something on the echo or Holter I will change that recommendation I am assuming are both unremarkable.  Reevaluate in 6 months again the studies are unremarkable.

## 2020-05-15 ENCOUNTER — HOSPITAL ENCOUNTER (OUTPATIENT)
Dept: CARDIOLOGY | Facility: HOSPITAL | Age: 70
Discharge: HOME | End: 2020-05-15
Admitting: INTERNAL MEDICINE

## 2020-05-15 VITALS
HEART RATE: 75 BPM | HEIGHT: 67 IN | WEIGHT: 136 LBS | BODY MASS INDEX: 21.35 KG/M2 | SYSTOLIC BLOOD PRESSURE: 122 MMHG | DIASTOLIC BLOOD PRESSURE: 60 MMHG

## 2020-05-15 DIAGNOSIS — R55 SYNCOPE AND COLLAPSE: ICD-10-CM

## 2020-05-15 LAB
AORTIC ROOT ANNULUS: 2.1 CM
BH CV ECHO MEAS - ACS: 2.1 CM
BH CV ECHO MEAS - AI DEC SLOPE: 124.3 CM/SEC^2
BH CV ECHO MEAS - AI MAX PG: 33.1 MMHG
BH CV ECHO MEAS - AI MAX VEL: 287.8 CM/SEC
BH CV ECHO MEAS - AI P1/2T: 678.3 MSEC
BH CV ECHO MEAS - AO MAX PG (FULL): 2 MMHG
BH CV ECHO MEAS - AO MAX PG: 6.1 MMHG
BH CV ECHO MEAS - AO MEAN PG (FULL): 1.1 MMHG
BH CV ECHO MEAS - AO MEAN PG: 3.3 MMHG
BH CV ECHO MEAS - AO ROOT AREA (BSA CORRECTED): 2
BH CV ECHO MEAS - AO ROOT AREA: 9.3 CM^2
BH CV ECHO MEAS - AO ROOT DIAM: 3.4 CM
BH CV ECHO MEAS - AO V2 MAX: 123.1 CM/SEC
BH CV ECHO MEAS - AO V2 MEAN: 84.5 CM/SEC
BH CV ECHO MEAS - AO V2 VTI: 29 CM
BH CV ECHO MEAS - AVA(I,A): 2.1 CM^2
BH CV ECHO MEAS - AVA(I,D): 2.1 CM^2
BH CV ECHO MEAS - AVA(V,A): 2.3 CM^2
BH CV ECHO MEAS - AVA(V,D): 2.3 CM^2
BH CV ECHO MEAS - BSA(HAYCOCK): 1.7 M^2
BH CV ECHO MEAS - BSA: 1.7 M^2
BH CV ECHO MEAS - BZI_BMI: 21.3 KILOGRAMS/M^2
BH CV ECHO MEAS - BZI_METRIC_HEIGHT: 170.2 CM
BH CV ECHO MEAS - BZI_METRIC_WEIGHT: 61.7 KG
BH CV ECHO MEAS - EDV(CUBED): 91.9 ML
BH CV ECHO MEAS - EDV(MOD-SP2): 66 ML
BH CV ECHO MEAS - EDV(MOD-SP4): 72 ML
BH CV ECHO MEAS - EDV(TEICH): 93.1 ML
BH CV ECHO MEAS - EF(CUBED): 74.3 %
BH CV ECHO MEAS - EF(MOD-BP): 61 %
BH CV ECHO MEAS - EF(MOD-SP2): 62.1 %
BH CV ECHO MEAS - EF(MOD-SP4): 56.9 %
BH CV ECHO MEAS - EF(TEICH): 66.3 %
BH CV ECHO MEAS - ESV(CUBED): 23.6 ML
BH CV ECHO MEAS - ESV(MOD-SP2): 25 ML
BH CV ECHO MEAS - ESV(MOD-SP4): 31 ML
BH CV ECHO MEAS - ESV(TEICH): 31.3 ML
BH CV ECHO MEAS - FS: 36.5 %
BH CV ECHO MEAS - IVS/LVPW: 1.1
BH CV ECHO MEAS - IVSD: 0.92 CM
BH CV ECHO MEAS - LAT PEAK E' VEL: 8 CM/SEC
BH CV ECHO MEAS - LV DIASTOLIC VOL/BSA (35-75): 41.9 ML/M^2
BH CV ECHO MEAS - LV MASS(C)D: 126.5 GRAMS
BH CV ECHO MEAS - LV MASS(C)DI: 73.7 GRAMS/M^2
BH CV ECHO MEAS - LV MAX PG: 4.1 MMHG
BH CV ECHO MEAS - LV MEAN PG: 2.1 MMHG
BH CV ECHO MEAS - LV SYSTOLIC VOL/BSA (12-30): 18.1 ML/M^2
BH CV ECHO MEAS - LV V1 MAX: 101.1 CM/SEC
BH CV ECHO MEAS - LV V1 MEAN: 68.7 CM/SEC
BH CV ECHO MEAS - LV V1 VTI: 21.6 CM
BH CV ECHO MEAS - LVIDD: 4.5 CM
BH CV ECHO MEAS - LVIDS: 2.9 CM
BH CV ECHO MEAS - LVLD AP2: 6.7 CM
BH CV ECHO MEAS - LVLD AP4: 6.6 CM
BH CV ECHO MEAS - LVLS AP2: 6 CM
BH CV ECHO MEAS - LVLS AP4: 5.9 CM
BH CV ECHO MEAS - LVOT AREA (M): 2.8 CM^2
BH CV ECHO MEAS - LVOT AREA: 2.8 CM^2
BH CV ECHO MEAS - LVOT DIAM: 1.9 CM
BH CV ECHO MEAS - LVPWD: 0.81 CM
BH CV ECHO MEAS - MED PEAK E' VEL: 6 CM/SEC
BH CV ECHO MEAS - MV A DUR: 0.13 SEC
BH CV ECHO MEAS - MV A MAX VEL: 78 CM/SEC
BH CV ECHO MEAS - MV DEC SLOPE: 239.2 CM/SEC^2
BH CV ECHO MEAS - MV DEC TIME: 0.21 SEC
BH CV ECHO MEAS - MV E MAX VEL: 55.7 CM/SEC
BH CV ECHO MEAS - MV E/A: 0.71
BH CV ECHO MEAS - MV MAX PG: 2.8 MMHG
BH CV ECHO MEAS - MV MEAN PG: 1.4 MMHG
BH CV ECHO MEAS - MV P1/2T MAX VEL: 68.8 CM/SEC
BH CV ECHO MEAS - MV P1/2T: 84.3 MSEC
BH CV ECHO MEAS - MV V2 MAX: 84.4 CM/SEC
BH CV ECHO MEAS - MV V2 MEAN: 56.3 CM/SEC
BH CV ECHO MEAS - MV V2 VTI: 21.9 CM
BH CV ECHO MEAS - MVA P1/2T LCG: 3.2 CM^2
BH CV ECHO MEAS - MVA(P1/2T): 2.6 CM^2
BH CV ECHO MEAS - MVA(VTI): 2.8 CM^2
BH CV ECHO MEAS - PA MAX PG (FULL): 2 MMHG
BH CV ECHO MEAS - PA MAX PG: 3.1 MMHG
BH CV ECHO MEAS - PA V2 MAX: 88.2 CM/SEC
BH CV ECHO MEAS - PI END-D VEL: 97 CM/SEC
BH CV ECHO MEAS - PULM A REVS DUR: 0.1 SEC
BH CV ECHO MEAS - PULM A REVS VEL: 28.3 CM/SEC
BH CV ECHO MEAS - PULM DIAS VEL: 41.1 CM/SEC
BH CV ECHO MEAS - PULM S/D: 1.1
BH CV ECHO MEAS - PULM SYS VEL: 44.6 CM/SEC
BH CV ECHO MEAS - PVA(V,A): 2.9 CM^2
BH CV ECHO MEAS - PVA(V,D): 2.9 CM^2
BH CV ECHO MEAS - QP/QS: 0.96
BH CV ECHO MEAS - RAP SYSTOLE: 3 MMHG
BH CV ECHO MEAS - RV MAX PG: 1.1 MMHG
BH CV ECHO MEAS - RV MEAN PG: 0.67 MMHG
BH CV ECHO MEAS - RV V1 MAX: 53.6 CM/SEC
BH CV ECHO MEAS - RV V1 MEAN: 38.1 CM/SEC
BH CV ECHO MEAS - RV V1 VTI: 12.4 CM
BH CV ECHO MEAS - RVOT AREA: 4.7 CM^2
BH CV ECHO MEAS - RVOT DIAM: 2.5 CM
BH CV ECHO MEAS - RVSP: 24 MMHG
BH CV ECHO MEAS - SI(AO): 156.5 ML/M^2
BH CV ECHO MEAS - SI(CUBED): 39.8 ML/M^2
BH CV ECHO MEAS - SI(LVOT): 35.3 ML/M^2
BH CV ECHO MEAS - SI(MOD-SP2): 23.9 ML/M^2
BH CV ECHO MEAS - SI(MOD-SP4): 23.9 ML/M^2
BH CV ECHO MEAS - SI(TEICH): 36 ML/M^2
BH CV ECHO MEAS - SUP REN AO DIAM: 1.8 CM
BH CV ECHO MEAS - SV(AO): 268.5 ML
BH CV ECHO MEAS - SV(CUBED): 68.3 ML
BH CV ECHO MEAS - SV(LVOT): 60.6 ML
BH CV ECHO MEAS - SV(MOD-SP2): 41 ML
BH CV ECHO MEAS - SV(MOD-SP4): 41 ML
BH CV ECHO MEAS - SV(RVOT): 58.4 ML
BH CV ECHO MEAS - SV(TEICH): 61.7 ML
BH CV ECHO MEAS - TAPSE (>1.6): 1.7 CM2
BH CV ECHO MEAS - TR MAX VEL: 230.8 CM/SEC
BH CV ECHO MEASUREMENTS AVERAGE E/E' RATIO: 7.96
BH CV XLRA - TDI S': 15 CM/SEC
LEFT ATRIUM VOLUME INDEX: 19 ML/M2
LEFT ATRIUM VOLUME: 33 CM3

## 2020-05-15 PROCEDURE — 93306 TTE W/DOPPLER COMPLETE: CPT

## 2020-05-15 PROCEDURE — 93306 TTE W/DOPPLER COMPLETE: CPT | Performed by: INTERNAL MEDICINE

## 2020-05-18 ENCOUNTER — TELEPHONE (OUTPATIENT)
Dept: INTERNAL MEDICINE | Facility: CLINIC | Age: 70
End: 2020-05-18

## 2020-05-18 RX ORDER — DESOXIMETASONE 2.5 MG/G
CREAM TOPICAL EVERY 12 HOURS SCHEDULED
Qty: 60 G | Refills: 0 | Status: SHIPPED | OUTPATIENT
Start: 2020-05-18 | End: 2021-04-22

## 2020-05-18 NOTE — TELEPHONE ENCOUNTER
RX SENT TO PHARMACY    ----- Message from Tra Jenkins Rep sent at 5/18/2020  3:21 PM EDT -----  Regarding: PATIENT CALL  Patient said that a cream was supposed to be called in for to her local [facility], she said same cream she had a few years ago that starts with a D.

## 2020-05-28 ENCOUNTER — TRANSCRIBE ORDERS (OUTPATIENT)
Dept: ADMINISTRATIVE | Facility: HOSPITAL | Age: 70
End: 2020-05-28

## 2020-05-28 ENCOUNTER — LAB (OUTPATIENT)
Dept: LAB | Facility: HOSPITAL | Age: 70
End: 2020-05-28

## 2020-05-28 DIAGNOSIS — N25.81 SECONDARY HYPERPARATHYROIDISM OF RENAL ORIGIN (HCC): ICD-10-CM

## 2020-05-28 DIAGNOSIS — N18.30 CHRONIC KIDNEY DISEASE, STAGE III (MODERATE) (HCC): ICD-10-CM

## 2020-05-28 DIAGNOSIS — N18.30 CHRONIC KIDNEY DISEASE, STAGE III (MODERATE) (HCC): Primary | ICD-10-CM

## 2020-05-28 LAB
25(OH)D3 SERPL-MCNC: 46 NG/ML (ref 30–100)
ALBUMIN SERPL-MCNC: 4.2 G/DL (ref 3.5–5.2)
ALBUMIN/GLOB SERPL: 1.7 G/DL
ALP SERPL-CCNC: 55 U/L (ref 39–117)
ALT SERPL W P-5'-P-CCNC: 16 U/L (ref 1–33)
ANION GAP SERPL CALCULATED.3IONS-SCNC: 9.2 MMOL/L (ref 5–15)
AST SERPL-CCNC: 27 U/L (ref 1–32)
BILIRUB SERPL-MCNC: 0.5 MG/DL (ref 0.2–1.2)
BUN BLD-MCNC: 35 MG/DL (ref 8–23)
BUN/CREAT SERPL: 22.3 (ref 7–25)
CALCIUM SPEC-SCNC: 9.8 MG/DL (ref 8.6–10.5)
CHLORIDE SERPL-SCNC: 101 MMOL/L (ref 98–107)
CK SERPL-CCNC: 122 U/L (ref 20–180)
CO2 SERPL-SCNC: 28.8 MMOL/L (ref 22–29)
CREAT BLD-MCNC: 1.57 MG/DL (ref 0.57–1)
DEPRECATED RDW RBC AUTO: 42 FL (ref 37–54)
ERYTHROCYTE [DISTWIDTH] IN BLOOD BY AUTOMATED COUNT: 12.2 % (ref 12.3–15.4)
GFR SERPL CREATININE-BSD FRML MDRD: 32 ML/MIN/1.73
GLOBULIN UR ELPH-MCNC: 2.5 GM/DL
GLUCOSE BLD-MCNC: 85 MG/DL (ref 65–99)
HCT VFR BLD AUTO: 31.3 % (ref 34–46.6)
HGB BLD-MCNC: 10.6 G/DL (ref 12–15.9)
MAGNESIUM SERPL-MCNC: 2.2 MG/DL (ref 1.6–2.4)
MCH RBC QN AUTO: 31.6 PG (ref 26.6–33)
MCHC RBC AUTO-ENTMCNC: 33.9 G/DL (ref 31.5–35.7)
MCV RBC AUTO: 93.4 FL (ref 79–97)
PHOSPHATE SERPL-MCNC: 3.3 MG/DL (ref 2.5–4.5)
PLATELET # BLD AUTO: 114 10*3/MM3 (ref 140–450)
PMV BLD AUTO: 12.7 FL (ref 6–12)
POTASSIUM BLD-SCNC: 4.1 MMOL/L (ref 3.5–5.2)
PROT SERPL-MCNC: 6.7 G/DL (ref 6–8.5)
PTH-INTACT SERPL-MCNC: 122 PG/ML (ref 15–65)
RBC # BLD AUTO: 3.35 10*6/MM3 (ref 3.77–5.28)
SODIUM BLD-SCNC: 139 MMOL/L (ref 136–145)
URATE SERPL-MCNC: 5.7 MG/DL (ref 2.4–5.7)
WBC NRBC COR # BLD: 3.73 10*3/MM3 (ref 3.4–10.8)

## 2020-05-28 PROCEDURE — 84550 ASSAY OF BLOOD/URIC ACID: CPT | Performed by: INTERNAL MEDICINE

## 2020-05-28 PROCEDURE — 82306 VITAMIN D 25 HYDROXY: CPT | Performed by: INTERNAL MEDICINE

## 2020-05-28 PROCEDURE — 80053 COMPREHEN METABOLIC PANEL: CPT | Performed by: INTERNAL MEDICINE

## 2020-05-28 PROCEDURE — 83970 ASSAY OF PARATHORMONE: CPT | Performed by: INTERNAL MEDICINE

## 2020-05-28 PROCEDURE — 83735 ASSAY OF MAGNESIUM: CPT | Performed by: INTERNAL MEDICINE

## 2020-05-28 PROCEDURE — 36415 COLL VENOUS BLD VENIPUNCTURE: CPT

## 2020-05-28 PROCEDURE — 84100 ASSAY OF PHOSPHORUS: CPT | Performed by: INTERNAL MEDICINE

## 2020-05-28 PROCEDURE — 82550 ASSAY OF CK (CPK): CPT | Performed by: INTERNAL MEDICINE

## 2020-05-28 PROCEDURE — 85027 COMPLETE CBC AUTOMATED: CPT | Performed by: INTERNAL MEDICINE

## 2020-10-01 ENCOUNTER — LAB (OUTPATIENT)
Dept: LAB | Facility: HOSPITAL | Age: 70
End: 2020-10-01

## 2020-10-01 ENCOUNTER — TRANSCRIBE ORDERS (OUTPATIENT)
Dept: ADMINISTRATIVE | Facility: HOSPITAL | Age: 70
End: 2020-10-01

## 2020-10-01 DIAGNOSIS — N18.30 MALIGNANT HYPERTENSIVE HEART AND CHRONIC KIDNEY DISEASE STAGE III (HCC): Primary | ICD-10-CM

## 2020-10-01 DIAGNOSIS — I13.10 MALIGNANT HYPERTENSIVE HEART AND CHRONIC KIDNEY DISEASE STAGE III (HCC): Primary | ICD-10-CM

## 2020-10-01 DIAGNOSIS — I13.10 MALIGNANT HYPERTENSIVE HEART AND CHRONIC KIDNEY DISEASE STAGE III (HCC): ICD-10-CM

## 2020-10-01 DIAGNOSIS — N18.30 MALIGNANT HYPERTENSIVE HEART AND CHRONIC KIDNEY DISEASE STAGE III (HCC): ICD-10-CM

## 2020-10-01 LAB
25(OH)D3 SERPL-MCNC: 50.6 NG/ML (ref 30–100)
ALBUMIN SERPL-MCNC: 4.2 G/DL (ref 3.5–5.2)
ALBUMIN/GLOB SERPL: 1.8 G/DL
ALP SERPL-CCNC: 66 U/L (ref 39–117)
ALT SERPL W P-5'-P-CCNC: 20 U/L (ref 1–33)
ANION GAP SERPL CALCULATED.3IONS-SCNC: 10.3 MMOL/L (ref 5–15)
AST SERPL-CCNC: 27 U/L (ref 1–32)
BILIRUB SERPL-MCNC: 0.7 MG/DL (ref 0–1.2)
BILIRUB UR QL STRIP: NEGATIVE
BUN SERPL-MCNC: 27 MG/DL (ref 8–23)
BUN/CREAT SERPL: 19.6 (ref 7–25)
CALCIUM SPEC-SCNC: 10.2 MG/DL (ref 8.6–10.5)
CHLORIDE SERPL-SCNC: 103 MMOL/L (ref 98–107)
CHOLEST SERPL-MCNC: 183 MG/DL (ref 0–200)
CK SERPL-CCNC: 176 U/L (ref 20–180)
CLARITY UR: CLEAR
CO2 SERPL-SCNC: 26.7 MMOL/L (ref 22–29)
COLOR UR: YELLOW
CREAT SERPL-MCNC: 1.38 MG/DL (ref 0.57–1)
DEPRECATED RDW RBC AUTO: 42.7 FL (ref 37–54)
ERYTHROCYTE [DISTWIDTH] IN BLOOD BY AUTOMATED COUNT: 12.3 % (ref 12.3–15.4)
GFR SERPL CREATININE-BSD FRML MDRD: 38 ML/MIN/1.73
GLOBULIN UR ELPH-MCNC: 2.4 GM/DL
GLUCOSE SERPL-MCNC: 79 MG/DL (ref 65–99)
GLUCOSE UR STRIP-MCNC: NEGATIVE MG/DL
HCT VFR BLD AUTO: 36.3 % (ref 34–46.6)
HDLC SERPL-MCNC: 90 MG/DL (ref 40–60)
HGB BLD-MCNC: 12.1 G/DL (ref 12–15.9)
HGB UR QL STRIP.AUTO: NEGATIVE
KETONES UR QL STRIP: NEGATIVE
LDLC SERPL CALC-MCNC: 83 MG/DL (ref 0–100)
LDLC/HDLC SERPL: 0.92 {RATIO}
LEUKOCYTE ESTERASE UR QL STRIP.AUTO: NEGATIVE
MAGNESIUM SERPL-MCNC: 2.3 MG/DL (ref 1.6–2.4)
MCH RBC QN AUTO: 31.6 PG (ref 26.6–33)
MCHC RBC AUTO-ENTMCNC: 33.3 G/DL (ref 31.5–35.7)
MCV RBC AUTO: 94.8 FL (ref 79–97)
NITRITE UR QL STRIP: NEGATIVE
PH UR STRIP.AUTO: 6.5 [PH] (ref 5–8)
PHOSPHATE SERPL-MCNC: 3.3 MG/DL (ref 2.5–4.5)
PLATELET # BLD AUTO: 128 10*3/MM3 (ref 140–450)
PMV BLD AUTO: 12.6 FL (ref 6–12)
POTASSIUM SERPL-SCNC: 4.2 MMOL/L (ref 3.5–5.2)
PROT SERPL-MCNC: 6.6 G/DL (ref 6–8.5)
PROT UR QL STRIP: NEGATIVE
PTH-INTACT SERPL-MCNC: 112 PG/ML (ref 15–65)
RBC # BLD AUTO: 3.83 10*6/MM3 (ref 3.77–5.28)
SODIUM SERPL-SCNC: 140 MMOL/L (ref 136–145)
SP GR UR STRIP: 1.01 (ref 1–1.03)
TRIGL SERPL-MCNC: 52 MG/DL (ref 0–150)
UROBILINOGEN UR QL STRIP: NORMAL
VLDLC SERPL-MCNC: 10.4 MG/DL (ref 5–40)
WBC # BLD AUTO: 4.11 10*3/MM3 (ref 3.4–10.8)

## 2020-10-01 PROCEDURE — 83735 ASSAY OF MAGNESIUM: CPT | Performed by: INTERNAL MEDICINE

## 2020-10-01 PROCEDURE — 82550 ASSAY OF CK (CPK): CPT | Performed by: INTERNAL MEDICINE

## 2020-10-01 PROCEDURE — 80053 COMPREHEN METABOLIC PANEL: CPT | Performed by: INTERNAL MEDICINE

## 2020-10-01 PROCEDURE — 81003 URINALYSIS AUTO W/O SCOPE: CPT | Performed by: INTERNAL MEDICINE

## 2020-10-01 PROCEDURE — 85027 COMPLETE CBC AUTOMATED: CPT | Performed by: INTERNAL MEDICINE

## 2020-10-01 PROCEDURE — 83970 ASSAY OF PARATHORMONE: CPT | Performed by: INTERNAL MEDICINE

## 2020-10-01 PROCEDURE — 80061 LIPID PANEL: CPT | Performed by: INTERNAL MEDICINE

## 2020-10-01 PROCEDURE — 84100 ASSAY OF PHOSPHORUS: CPT | Performed by: INTERNAL MEDICINE

## 2020-10-01 PROCEDURE — 82306 VITAMIN D 25 HYDROXY: CPT | Performed by: INTERNAL MEDICINE

## 2020-10-01 PROCEDURE — 36415 COLL VENOUS BLD VENIPUNCTURE: CPT

## 2020-10-30 DIAGNOSIS — R55 VASOVAGAL SYNCOPE: ICD-10-CM

## 2020-10-30 DIAGNOSIS — N28.9 RENAL INSUFFICIENCY: ICD-10-CM

## 2020-10-30 DIAGNOSIS — E78.00 ELEVATED CHOLESTEROL: ICD-10-CM

## 2020-11-02 ENCOUNTER — LAB (OUTPATIENT)
Dept: LAB | Facility: HOSPITAL | Age: 70
End: 2020-11-02

## 2020-11-02 LAB
ALBUMIN SERPL-MCNC: 4.3 G/DL (ref 3.5–5.2)
ALBUMIN/GLOB SERPL: 1.7 G/DL
ALP SERPL-CCNC: 71 U/L (ref 39–117)
ALT SERPL W P-5'-P-CCNC: 21 U/L (ref 1–33)
ANION GAP SERPL CALCULATED.3IONS-SCNC: 10.6 MMOL/L (ref 5–15)
AST SERPL-CCNC: 31 U/L (ref 1–32)
BASOPHILS # BLD AUTO: 0.05 10*3/MM3 (ref 0–0.2)
BASOPHILS NFR BLD AUTO: 1.1 % (ref 0–1.5)
BILIRUB SERPL-MCNC: 0.5 MG/DL (ref 0–1.2)
BUN SERPL-MCNC: 39 MG/DL (ref 8–23)
BUN/CREAT SERPL: 22.4 (ref 7–25)
CALCIUM SPEC-SCNC: 10.4 MG/DL (ref 8.6–10.5)
CHLORIDE SERPL-SCNC: 104 MMOL/L (ref 98–107)
CO2 SERPL-SCNC: 28.4 MMOL/L (ref 22–29)
CREAT SERPL-MCNC: 1.74 MG/DL (ref 0.57–1)
DEPRECATED RDW RBC AUTO: 44 FL (ref 37–54)
EOSINOPHIL # BLD AUTO: 0.07 10*3/MM3 (ref 0–0.4)
EOSINOPHIL NFR BLD AUTO: 1.5 % (ref 0.3–6.2)
ERYTHROCYTE [DISTWIDTH] IN BLOOD BY AUTOMATED COUNT: 12.3 % (ref 12.3–15.4)
GFR SERPL CREATININE-BSD FRML MDRD: 29 ML/MIN/1.73
GLOBULIN UR ELPH-MCNC: 2.6 GM/DL
GLUCOSE SERPL-MCNC: 90 MG/DL (ref 65–99)
HCT VFR BLD AUTO: 36.6 % (ref 34–46.6)
HGB BLD-MCNC: 12 G/DL (ref 12–15.9)
IMM GRANULOCYTES # BLD AUTO: 0.01 10*3/MM3 (ref 0–0.05)
IMM GRANULOCYTES NFR BLD AUTO: 0.2 % (ref 0–0.5)
LYMPHOCYTES # BLD AUTO: 0.8 10*3/MM3 (ref 0.7–3.1)
LYMPHOCYTES NFR BLD AUTO: 17.6 % (ref 19.6–45.3)
MCH RBC QN AUTO: 31.7 PG (ref 26.6–33)
MCHC RBC AUTO-ENTMCNC: 32.8 G/DL (ref 31.5–35.7)
MCV RBC AUTO: 96.6 FL (ref 79–97)
MONOCYTES # BLD AUTO: 0.39 10*3/MM3 (ref 0.1–0.9)
MONOCYTES NFR BLD AUTO: 8.6 % (ref 5–12)
NEUTROPHILS NFR BLD AUTO: 3.22 10*3/MM3 (ref 1.7–7)
NEUTROPHILS NFR BLD AUTO: 71 % (ref 42.7–76)
NRBC BLD AUTO-RTO: 0 /100 WBC (ref 0–0.2)
PLATELET # BLD AUTO: 116 10*3/MM3 (ref 140–450)
PMV BLD AUTO: 12.6 FL (ref 6–12)
POTASSIUM SERPL-SCNC: 4.6 MMOL/L (ref 3.5–5.2)
PROT SERPL-MCNC: 6.9 G/DL (ref 6–8.5)
RBC # BLD AUTO: 3.79 10*6/MM3 (ref 3.77–5.28)
SODIUM SERPL-SCNC: 143 MMOL/L (ref 136–145)
TSH SERPL DL<=0.05 MIU/L-ACNC: 2.72 UIU/ML (ref 0.27–4.2)
WBC # BLD AUTO: 4.54 10*3/MM3 (ref 3.4–10.8)

## 2020-11-02 PROCEDURE — 84443 ASSAY THYROID STIM HORMONE: CPT | Performed by: INTERNAL MEDICINE

## 2020-11-02 PROCEDURE — 80061 LIPID PANEL: CPT | Performed by: INTERNAL MEDICINE

## 2020-11-02 PROCEDURE — 36415 COLL VENOUS BLD VENIPUNCTURE: CPT | Performed by: INTERNAL MEDICINE

## 2020-11-02 PROCEDURE — 85025 COMPLETE CBC W/AUTO DIFF WBC: CPT | Performed by: INTERNAL MEDICINE

## 2020-11-02 PROCEDURE — 80053 COMPREHEN METABOLIC PANEL: CPT | Performed by: INTERNAL MEDICINE

## 2020-11-04 LAB
CHOLEST SERPL-MCNC: 178 MG/DL (ref 100–199)
HDLC SERPL-MCNC: 89 MG/DL
LDLC SERPL CALC-MCNC: 77 MG/DL (ref 0–99)
LDLC/HDLC SERPL: 0.9 RATIO (ref 0–3.2)
TRIGL SERPL-MCNC: 65 MG/DL (ref 0–149)
VLDLC SERPL CALC-MCNC: 12 MG/DL (ref 5–40)

## 2020-11-10 ENCOUNTER — OFFICE VISIT (OUTPATIENT)
Dept: INTERNAL MEDICINE | Facility: CLINIC | Age: 70
End: 2020-11-10

## 2020-11-10 VITALS
HEIGHT: 67 IN | OXYGEN SATURATION: 98 % | BODY MASS INDEX: 20.28 KG/M2 | HEART RATE: 76 BPM | DIASTOLIC BLOOD PRESSURE: 74 MMHG | WEIGHT: 129.2 LBS | SYSTOLIC BLOOD PRESSURE: 116 MMHG

## 2020-11-10 DIAGNOSIS — E55.9 VITAMIN D DEFICIENCY: ICD-10-CM

## 2020-11-10 DIAGNOSIS — M54.50 CHRONIC BILATERAL LOW BACK PAIN WITHOUT SCIATICA: ICD-10-CM

## 2020-11-10 DIAGNOSIS — F41.9 ANXIETY: ICD-10-CM

## 2020-11-10 DIAGNOSIS — E78.5 HYPERLIPIDEMIA, UNSPECIFIED HYPERLIPIDEMIA TYPE: ICD-10-CM

## 2020-11-10 DIAGNOSIS — G89.29 CHRONIC BILATERAL LOW BACK PAIN WITHOUT SCIATICA: ICD-10-CM

## 2020-11-10 DIAGNOSIS — N28.9 RENAL INSUFFICIENCY: ICD-10-CM

## 2020-11-10 DIAGNOSIS — Z00.00 MEDICARE ANNUAL WELLNESS VISIT, SUBSEQUENT: Primary | ICD-10-CM
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
## 2020-11-10 PROCEDURE — G0439 PPPS, SUBSEQ VISIT: HCPCS | Performed by: INTERNAL MEDICINE

## 2020-11-10 PROCEDURE — 99213 OFFICE O/P EST LOW 20 MIN: CPT | Performed by: INTERNAL MEDICINE

## 2020-11-10 NOTE — PROGRESS NOTES
Subjective   Carol A Osgood is a 69 y.o. female here today to f/u on osteoporosis.  Pt c/o back pain. Pt wants to have Pt ordered for the back pain.      History of Present Illness   She still has issues with chronic constipation.  She sits on the toilet for a long time  At night she has fallen asleep before and fallen off the toilet.  SHe has tried meds on and off over the years  She does see the chiro for her back and is getting recurrent PT    The following portions of the patient's history were reviewed and updated as appropriate: allergies, current medications, past medical history, past social history and problem list.  She does try to watch her diet    Review of Systems   All other systems reviewed and are negative.      Objective   Physical Exam  Vitals signs reviewed.   Constitutional:       Appearance: She is well-developed.   HENT:      Head: Normocephalic and atraumatic.      Right Ear: External ear normal.      Left Ear: External ear normal.   Eyes:      Conjunctiva/sclera: Conjunctivae normal.      Pupils: Pupils are equal, round, and reactive to light.   Neck:      Musculoskeletal: Normal range of motion.      Thyroid: No thyromegaly.      Trachea: No tracheal deviation.   Cardiovascular:      Rate and Rhythm: Normal rate and regular rhythm.      Heart sounds: Normal heart sounds.   Pulmonary:      Effort: Pulmonary effort is normal.      Breath sounds: Normal breath sounds.   Abdominal:      General: Bowel sounds are normal. There is no distension.      Palpations: Abdomen is soft.      Tenderness: There is no abdominal tenderness.   Musculoskeletal: Normal range of motion.         General: No deformity.   Skin:     General: Skin is warm and dry.   Neurological:      Mental Status: She is alert and oriented to person, place, and time.   Psychiatric:         Behavior: Behavior normal.         Thought Content: Thought content normal.         Judgment: Judgment normal.         Vitals:    11/10/20 1109    BP: 116/74   Pulse: 76   SpO2: 98%     Body mass index is 20.24 kg/m².    Orders Only on 10/30/2020   Component Date Value Ref Range Status   • TSH 11/02/2020 2.720  0.270 - 4.200 uIU/mL Final   • Total Cholesterol 11/02/2020 178  100 - 199 mg/dL Final   • Triglycerides 11/02/2020 65  0 - 149 mg/dL Final   • HDL Cholesterol 11/02/2020 89  >39 mg/dL Final   • VLDL Cholesterol Law 11/02/2020 12  5 - 40 mg/dL Final   • LDL Chol Calc ([facility]) 11/02/2020 77  0 - 99 mg/dL Final   • LDL/HDL RATIO 11/02/2020 0.9  0.0 - 3.2 ratio Final                                        LDL/HDL Ratio                                              Men  Women                                1/2 Avg.Risk  1.0    1.5                                    Avg.Risk  3.6    3.2                                 2X Avg.Risk  6.2    5.0                                 3X Avg.Risk  8.0    6.1   • Glucose 11/02/2020 90  65 - 99 mg/dL Final   • BUN 11/02/2020 39* 8 - 23 mg/dL Final   • Creatinine 11/02/2020 1.74* 0.57 - 1.00 mg/dL Final   • Sodium 11/02/2020 143  136 - 145 mmol/L Final   • Potassium 11/02/2020 4.6  3.5 - 5.2 mmol/L Final   • Chloride 11/02/2020 104  98 - 107 mmol/L Final   • CO2 11/02/2020 28.4  22.0 - 29.0 mmol/L Final   • Calcium 11/02/2020 10.4  8.6 - 10.5 mg/dL Final   • Total Protein 11/02/2020 6.9  6.0 - 8.5 g/dL Final   • Albumin 11/02/2020 4.30  3.50 - 5.20 g/dL Final   • ALT (SGPT) 11/02/2020 21  1 - 33 U/L Final   • AST (SGOT) 11/02/2020 31  1 - 32 U/L Final   • Alkaline Phosphatase 11/02/2020 71  39 - 117 U/L Final   • Total Bilirubin 11/02/2020 0.5  0.0 - 1.2 mg/dL Final   • eGFR Non African Amer 11/02/2020 29* >60 mL/min/1.73 Final   • Globulin 11/02/2020 2.6  gm/dL Final   • A/G Ratio 11/02/2020 1.7  g/dL Final   • BUN/Creatinine Ratio 11/02/2020 22.4  7.0 - 25.0 Final   • Anion Gap 11/02/2020 10.6  5.0 - 15.0 mmol/L Final   • WBC 11/02/2020 4.54  3.40 - 10.80 10*3/mm3 Final   • RBC 11/02/2020 3.79  3.77 - 5.28 10*6/mm3 Final    • Hemoglobin 11/02/2020 12.0  12.0 - 15.9 g/dL Final   • Hematocrit 11/02/2020 36.6  34.0 - 46.6 % Final   • MCV 11/02/2020 96.6  79.0 - 97.0 fL Final   • MCH 11/02/2020 31.7  26.6 - 33.0 pg Final   • MCHC 11/02/2020 32.8  31.5 - 35.7 g/dL Final   • RDW 11/02/2020 12.3  12.3 - 15.4 % Final   • RDW-SD 11/02/2020 44.0  37.0 - 54.0 fl Final   • MPV 11/02/2020 12.6* 6.0 - 12.0 fL Final   • Platelets 11/02/2020 116* 140 - 450 10*3/mm3 Final   • Neutrophil % 11/02/2020 71.0  42.7 - 76.0 % Final   • Lymphocyte % 11/02/2020 17.6* 19.6 - 45.3 % Final   • Monocyte % 11/02/2020 8.6  5.0 - 12.0 % Final   • Eosinophil % 11/02/2020 1.5  0.3 - 6.2 % Final   • Basophil % 11/02/2020 1.1  0.0 - 1.5 % Final   • Immature Grans % 11/02/2020 0.2  0.0 - 0.5 % Final   • Neutrophils, Absolute 11/02/2020 3.22  1.70 - 7.00 10*3/mm3 Final   • Lymphocytes, Absolute 11/02/2020 0.80  0.70 - 3.10 10*3/mm3 Final   • Monocytes, Absolute 11/02/2020 0.39  0.10 - 0.90 10*3/mm3 Final   • Eosinophils, Absolute 11/02/2020 0.07  0.00 - 0.40 10*3/mm3 Final   • Basophils, Absolute 11/02/2020 0.05  0.00 - 0.20 10*3/mm3 Final   • Immature Grans, Absolute 11/02/2020 0.01  0.00 - 0.05 10*3/mm3 Final   • nRBC 11/02/2020 0.0  0.0 - 0.2 /100 WBC Final          Current Outpatient Medications   Medication Instructions   • Ascorbic Acid (VITAMIN C PO) Oral   • calcium carbonate (OS-MOLLY) 600 mg, Oral, Daily   • Cholecalciferol (VITAMIN D3) 50 MCG (2000 UT) tablet 1 tablet, Oral, Daily   • desoximetasone (Topicort) 0.25 % cream Topical, Every 12 Hours Scheduled   • erythromycin (ROMYCIN) 5 MG/GM ophthalmic ointment 1 application, Daily PRN   • NON FORMULARY As Needed, ASHWAGANDHA   • Polyethyl Glycol-Propyl Glycol (SYSTANE ULTRA OP) 1 drop, Ophthalmic, Daily   • Probiotic Product (PROBIOTIC PO) Oral, natren life start          Assessment/Plan   Diagnoses and all orders for this visit:    1. Chronic bilateral low back pain without sciatica (Primary)    2. Medicare  annual wellness visit, subsequent    3. Anxiety      1.  Chronic LBP- stable  Seeing chiro  2. Constipation-  Cont stool softener  3.  Anxiety-  Ok with current meds  4.  Falls-  Pt needs some work with PT and core stregthening

## 2020-11-10 NOTE — PROGRESS NOTES
The ABCs of the Annual Wellness Visit  Subsequent Medicare Wellness Visit    No chief complaint on file.      Subjective   History of Present Illness:  Carol A Osgood is a 69 y.o. female who presents for a Subsequent Medicare Wellness Visit.    HEALTH RISK ASSESSMENT    Recent Hospitalizations:  No hospitalization(s) within the last year.    Current Medical Providers:  Patient Care Team:  Ciera Newman MD as PCP - General  Fredrick Norris MD as Consulting Physician (Nephrology)    Smoking Status:  Social History     Tobacco Use   Smoking Status Never Smoker   Smokeless Tobacco Never Used   Tobacco Comment    no caffeine       Alcohol Consumption:  Social History     Substance and Sexual Activity   Alcohol Use No       Depression Screen:   PHQ-2/PHQ-9 Depression Screening 11/10/2020   Little interest or pleasure in doing things 0   Feeling down, depressed, or hopeless 0   Trouble falling or staying asleep, or sleeping too much 0   Feeling tired or having little energy 0   Poor appetite or overeating 0   Feeling bad about yourself - or that you are a failure or have let yourself or your family down 0   Trouble concentrating on things, such as reading the newspaper or watching television 3   Moving or speaking so slowly that other people could have noticed. Or the opposite - being so fidgety or restless that you have been moving around a lot more than usual 0   Thoughts that you would be better off dead, or of hurting yourself in some way 0   Total Score 3   If you checked off any problems, how difficult have these problems made it for you to do your work, take care of things at home, or get along with other people? Not difficult at all       Fall Risk Screen:  STEADI Fall Risk Assessment was completed, and patient is at HIGH risk for falls. Assessment completed on:11/10/2020    Health Habits and Functional and Cognitive Screening:  Functional & Cognitive Status 11/10/2020   Do you have difficulty preparing food and  eating? No   Do you have difficulty bathing yourself, getting dressed or grooming yourself? No   Do you have difficulty using the toilet? No   Do you have difficulty moving around from place to place? No   Do you have trouble with steps or getting out of a bed or a chair? Yes   Current Diet Well Balanced Diet   Dental Exam Up to date   Eye Exam Up to date   Exercise (times per week) 0 times per week   Current Exercises Include No Regular Exercise   Current Exercise Activities Include -   Do you need help using the phone?  No   Are you deaf or do you have serious difficulty hearing?  No   Do you need help with transportation? No   Do you need help shopping? No   Do you need help preparing meals?  No   Do you need help with housework?  No   Do you need help with laundry? No   Do you need help taking your medications? No   Do you need help managing money? No   Do you ever drive or ride in a car without wearing a seat belt? No   Have you felt unusual stress, anger or loneliness in the last month? No   Who do you live with? Spouse   If you need help, do you have trouble finding someone available to you? No   Have you been bothered in the last four weeks by sexual problems? No   Do you have difficulty concentrating, remembering or making decisions? Yes         Does the patient have evidence of cognitive impairment? No    Asprin use counseling:Does not need ASA (and currently is not on it)    Age-appropriate Screening Schedule:  Refer to the list below for future screening recommendations based on patient's age, sex and/or medical conditions. Orders for these recommended tests are listed in the plan section. The patient has been provided with a written plan.    Health Maintenance   Topic Date Due   • ZOSTER VACCINE (3 of 3) 05/10/2017   • TDAP/TD VACCINES (1 - Tdap) 11/10/2020 (Originally 11/22/1969)   • COLONOSCOPY  01/16/2021 (Originally 10/20/2020)   • INFLUENZA VACCINE  11/10/2021 (Originally 8/1/2020)   • MAMMOGRAM   09/27/2021   • LIPID PANEL  11/02/2021   • DXA SCAN  12/04/2021          The following portions of the patient's history were reviewed and updated as appropriate: allergies, current medications, past medical history, past social history and problem list.    Outpatient Medications Prior to Visit   Medication Sig Dispense Refill   • Ascorbic Acid (VITAMIN C PO) Take  by mouth.     • calcium carbonate (OS-MOLLY) 600 MG tablet Take 600 mg by mouth Daily.     • Cholecalciferol (VITAMIN D3) 50 MCG (2000 UT) tablet Take 1 tablet by mouth Daily.     • desoximetasone (Topicort) 0.25 % cream Apply  topically to the appropriate area as directed Every 12 (Twelve) Hours. 60 g 0   • erythromycin (ROMYCIN) 5 MG/GM ophthalmic ointment 1 application Daily As Needed.     • NON FORMULARY As Needed. ASHWAGANDHA      • Polyethyl Glycol-Propyl Glycol (SYSTANE ULTRA OP) Apply 1 drop to eye Daily.     • Probiotic Product (PROBIOTIC PO) Take  by mouth. natren life start       No facility-administered medications prior to visit.        Patient Active Problem List   Diagnosis   • Low back pain   • Low hemoglobin   • Menopause present   • Osteoporosis   • Thrombocytopenia (CMS/HCC)   • Renal insufficiency   • Pain and swelling of left knee   • Anxiety   • Vitamin D deficiency   • Constipation   • Memory loss       Advanced Care Planning:  ACP discussion was held with the patient during this visit. Patient has an advance directive (not in EMR), copy requested.    Review of Systems    Compared to one year ago, the patient feels her physical health is the same.  Compared to one year ago, the patient feels her mental health is the same.    Reviewed chart for potential of high risk medication in the elderly: yes  Reviewed chart for potential of harmful drug interactions in the elderly:yes    Objective         Vitals:    11/10/20 1109   BP: 116/74   BP Location: Left arm   Patient Position: Sitting   Pulse: 76   SpO2: 98%   Weight: 58.6 kg (129 lb 3.2  "oz)   Height: 170.2 cm (67\")   PainSc:   4       Body mass index is 20.24 kg/m².  Discussed the patient's BMI with her. The BMI is in the acceptable range.    Physical Exam    Lab Results   Component Value Date    CHLPL 178 11/02/2020    TRIG 65 11/02/2020    TRIG 52 10/01/2020    HDL 89 11/02/2020    HDL 90 (H) 10/01/2020    LDL 77 11/02/2020    VLDL 12 11/02/2020        Assessment/Plan   Medicare Risks and Personalized Health Plan  CMS Preventative Services Quick Reference  Fall Risk  Immunizations Discussed/Encouraged (specific immunizations; Shingrix )    The above risks/problems have been discussed with the patient.  Pertinent information has been shared with the patient in the After Visit Summary.  Follow up plans and orders are seen below in the Assessment/Plan Section.    Diagnoses and all orders for this visit:    1. Chronic bilateral low back pain without sciatica (Primary)    2. Medicare annual wellness visit, subsequent    3. Anxiety      Follow Up:  No follow-ups on file.     An After Visit Summary and PPPS were given to the patient.             "

## 2020-11-10 NOTE — PATIENT INSTRUCTIONS
Medicare Wellness  Personal Prevention Plan of Service     Date of Office Visit:  11/10/2020  Encounter Provider:  Ciera Newman MD  Place of Service:  [facility] INTERNAL MEDICINE  Patient Name: Carol A Osgood  :  1950    As part of the Medicare Wellness portion of your visit today, we are providing you with this personalized preventive plan of services (PPPS). This plan is based upon recommendations of the [place] Preventive Services Task Force (USPSTF) and the Advisory Committee on Immunization Practices (ACIP).    This lists the preventive care services that should be considered, and provides dates of when you are due. Items listed as completed are up-to-date and do not require any further intervention.    Health Maintenance   Topic Date Due   • ZOSTER VACCINE (3 of 3) 05/10/2017   • ANNUAL WELLNESS VISIT  2020   • TDAP/TD VACCINES (1 - Tdap) 11/10/2020 (Originally 1969)   • COLONOSCOPY  2021 (Originally 10/20/2020)   • INFLUENZA VACCINE  11/10/2021 (Originally 2020)   • Pneumococcal Vaccine 65+ (1 of 1 - PPSV23) 11/10/2021 (Originally 2013)   • MAMMOGRAM  2021   • LIPID PANEL  2021   • DXA SCAN  2021   • HEPATITIS C SCREENING  Completed       No orders of the defined types were placed in this encounter.      No follow-ups on file.          Fall Prevention in the Home, Adult  Falls can cause injuries. They can happen to people of all ages. There are many things you can do to make your home safe and to help prevent falls. Ask for help when making these changes, if needed.  What actions can I take to prevent falls?  General Instructions  · Use good lighting in all rooms. Replace any light bulbs that burn out.  · Turn on the lights when you go into a dark area. Use night-lights.  · Keep items that you use often in easy-to-reach places. Lower the shelves around your home if necessary.  · Set up your furniture so you have a clear path.  Avoid moving your furniture around.  · Do not have throw rugs and other things on the floor that can make you trip.  · Avoid walking on wet floors.  · If any of your floors are uneven, fix them.  · Add color or contrast paint or tape to clearly lamine and help you see:  ? Any grab bars or handrails.  ? First and last steps of stairways.  ? Where the edge of each step is.  · If you use a stepladder:  ? Make sure that it is fully opened. Do not climb a closed stepladder.  ? Make sure that both sides of the stepladder are locked into place.  ? Ask someone to hold the stepladder for you while you use it.  · If there are any pets around you, be aware of where they are.  What can I do in the bathroom?         · Keep the floor dry. Clean up any water that spills onto the floor as soon as it happens.  · Remove soap buildup in the tub or shower regularly.  · Use non-skid mats or decals on the floor of the tub or shower.  · Attach bath mats securely with double-sided, non-slip rug tape.  · If you need to sit down in the shower, use a plastic, non-slip stool.  · Install grab bars by the toilet and in the tub and shower. Do not use towel bars as grab bars.  What can I do in the bedroom?  · Make sure that you have a light by your bed that is easy to reach.  · Do not use any sheets or blankets that are too big for your bed. They should not hang down onto the floor.  · Have a firm chair that has side arms. You can use this for support while you get dressed.  What can I do in the kitchen?  · Clean up any spills right away.  · If you need to reach something above you, use a strong step stool that has a grab bar.  · Keep electrical cords out of the way.  · Do not use floor polish or wax that makes floors slippery. If you must use wax, use non-skid floor wax.  What can I do with my stairs?  · Do not leave any items on the stairs.  · Make sure that you have a light switch at the top of the stairs and the bottom of the stairs. If you do  not have them, ask someone to add them for you.  · Make sure that there are handrails on both sides of the stairs, and use them. Fix handrails that are broken or loose. Make sure that handrails are as long as the stairways.  · Install non-slip stair treads on all stairs in your home.  · Avoid having throw rugs at the top or bottom of the stairs. If you do have throw rugs, attach them to the floor with carpet tape.  · Choose a carpet that does not hide the edge of the steps on the stairway.  · Check any carpeting to make sure that it is firmly attached to the stairs. Fix any carpet that is loose or worn.  What can I do on the outside of my home?  · Use bright outdoor lighting.  · Regularly fix the edges of walkways and driveways and fix any cracks.  · Remove anything that might make you trip as you walk through a door, such as a raised step or threshold.  · Trim any bushes or trees on the path to your home.  · Regularly check to see if handrails are loose or broken. Make sure that both sides of any steps have handrails.  · Install guardrails along the edges of any raised decks and porches.  · Clear walking paths of anything that might make someone trip, such as tools or rocks.  · Have any leaves, snow, or ice cleared regularly.  · Use sand or salt on walking paths during winter.  · Clean up any spills in your garage right away. This includes grease or oil spills.  What other actions can I take?  · Wear shoes that:  ? Have a low heel. Do not wear high heels.  ? Have rubber bottoms.  ? Are comfortable and fit you well.  ? Are closed at the toe. Do not wear open-toe sandals.  · Use tools that help you move around (mobility aids) if they are needed. These include:  ? Canes.  ? Walkers.  ? Scooters.  ? Crutches.  · Review your medicines with your doctor. Some medicines can make you feel dizzy. This can increase your chance of falling.  Ask your doctor what other things you can do to help prevent falls.  Where to find more  information  · Centers for Disease Control and Prevention, STEADI: https://cdc.gov  · National [place] on Aging: https://yp0kxdq.ashleigh.nih.gov  Contact a doctor if:  · You are afraid of falling at home.  · You feel weak, drowsy, or dizzy at home.  · You fall at home.  Summary  · There are many simple things that you can do to make your home safe and to help prevent falls.  · Ways to make your home safe include removing tripping hazards and installing grab bars in the bathroom.  · Ask for help when making these changes in your home.  This information is not intended to replace advice given to you by your health care provider. Make sure you discuss any questions you have with your health care provider.  Document Released: 10/14/2010 Document Revised: 04/09/2020 Document Reviewed: 08/02/2018  Elsevier Patient Education © 2020 [employer]      Sit-to-Stand Exercise    The sit-to-stand exercise (also known as the chair stand or chair rise exercise) strengthens your lower body and helps you maintain or improve your mobility and independence. The goal is to do the sit-to-stand exercise without using your hands. This will be easier as you become stronger. You should always talk with your health care provider before starting any exercise program, especially if you have had recent surgery.  Do the exercise exactly as told by your health care provider and adjust it as directed. It is normal to feel mild stretching, pulling, tightness, or discomfort as you do this exercise, but you should stop right away if you feel sudden pain or your pain gets worse. Do not begin doing this exercise until told by your health care provider.  What the sit-to-stand exercise does  The sit-to-stand exercise helps to strengthen the muscles in your thighs and the muscles in the center of your body that give you stability (core muscles). This exercise is especially helpful if:  · You have had knee or hip surgery.  · You have trouble getting up from  a chair, out of a car, or off the toilet.  How to do the sit-to-stand exercise  1. Sit toward the front edge of a sturdy chair without armrests. Your knees should be bent and your feet should be flat on the floor and shoulder-width apart.  2. Place your hands lightly on each side of the seat. Keep your back and neck as straight as possible, with your chest slightly forward.  3. Breathe in slowly. Lean forward and slightly shift your weight to the front of your feet.  4. Breathe out as you slowly stand up. Use your hands as little as possible.  5. Stand and pause for a full breath in and out.  6. Breathe in as you sit down slowly. Tighten your core and abdominal muscles to control your lowering as much as possible.  7. Breathe out slowly.  8. Do this exercise 10-15 times. If needed, do it fewer times until you build up strength.  9. Rest for 1 minute, then do another set of 10-15 repetitions.  To change the difficulty of the sit-to-stand exercise  · If the exercise is too difficult, use a chair with sturdy armrests, and push off the armrests to help you come to the standing position. You can also use the armrests to help slowly lower yourself back to sitting. As this gets easier, try to use your arms less. You can also place a firm cushion or pillow on the chair to make the surface higher.  · If this exercise is too easy, do not use your arms to help raise or lower yourself. You can also wear a weighted vest, use hand weights, increase your repetitions, or try a lower chair.  General tips  · You may feel tired when starting an exercise routine. This is normal.  · You may have muscle soreness that lasts a few days. This is normal. As you get stronger, you may not feel muscle soreness.  · Use smooth, steady movements.  · Do not  hold your breath during strength exercises. This can cause unsafe changes in your blood pressure.  · Breathe in slowly through your nose, and breathe out slowly through your  mouth.  Summary  · Strengthening your lower body is an important step to help you move safely and independently.  · The sit-to-stand exercise helps strengthen the muscles in your thighs and core.  · You should always talk with your health care provider before starting any exercise program, especially if you have had recent surgery.  This information is not intended to replace advice given to you by your health care provider. Make sure you discuss any questions you have with your health care provider.  Document Released: 02/08/2018 Document Revised: 10/16/2019 Document Reviewed: 02/08/2018  Elsevier Patient Education © 2020 [employer] Inc.

## 2020-11-19 ENCOUNTER — TELEPHONE (OUTPATIENT)
Dept: INTERNAL MEDICINE | Facility: CLINIC | Age: 70
End: 2020-11-19

## 2020-11-19 DIAGNOSIS — G89.29 CHRONIC BILATERAL LOW BACK PAIN WITHOUT SCIATICA: Primary | ICD-10-CM

## 2020-11-19 DIAGNOSIS — M54.50 CHRONIC BILATERAL LOW BACK PAIN WITHOUT SCIATICA: Primary | ICD-10-CM

## 2020-11-19 NOTE — TELEPHONE ENCOUNTER
Patient called and stated she has horrible pain in her back. Her chiropractor has suggested that she needs physical therapy. Patient tried to go downstairs to schedule PT, but did not have any openings until the first of December. Patient is in a lot of pain and would like a referral to an orthopedist as soon as possible, preferably at the [facility] [place] if possible. Patient states she can barley sit and when she stands up her back locks up.     Please advise   [phone]

## 2020-11-25 ENCOUNTER — OFFICE VISIT (OUTPATIENT)
Dept: ORTHOPEDIC SURGERY | Facility: CLINIC | Age: 70
End: 2020-11-25

## 2020-11-25 VITALS — TEMPERATURE: 97.4 F | WEIGHT: 129 LBS | HEIGHT: 67 IN | BODY MASS INDEX: 20.25 KG/M2

## 2020-11-25 DIAGNOSIS — M54.50 LUMBAR PAIN: Primary | ICD-10-CM

## 2020-11-25 DIAGNOSIS — M81.0 AGE-RELATED OSTEOPOROSIS WITHOUT CURRENT PATHOLOGICAL FRACTURE: ICD-10-CM

## 2020-11-25 PROCEDURE — 72100 X-RAY EXAM L-S SPINE 2/3 VWS: CPT | Performed by: ORTHOPAEDIC SURGERY

## 2020-11-25 PROCEDURE — 99203 OFFICE O/P NEW LOW 30 MIN: CPT | Performed by: ORTHOPAEDIC SURGERY

## 2020-11-25 NOTE — PROGRESS NOTES
New patient or new problem visit    Chief Complaint   Patient presents with   • Lumbar Spine - Establish Care       HPI: She complains of a 2-week history of increasing lumbosacral and right lower lumbar back pain.  Had some low-grade pain the past chiropractor is always helped that not as much this time she took some Tylenol which helps somewhat.  Did PT a few years back.  Pain radiates to the buttock increased with sitting.  2012 she underwent MRI report of which she showed me.    PFSH: See chart- reviewed    Review of Systems   Constitutional: Negative for chills, fever and unexpected weight change.   HENT: Negative for trouble swallowing and voice change.    Eyes: Negative for visual disturbance.   Respiratory: Negative for cough and shortness of breath.    Cardiovascular: Negative for chest pain and leg swelling.   Gastrointestinal: Negative for abdominal pain, nausea and vomiting.   Endocrine: Negative for cold intolerance and heat intolerance.   Genitourinary: Negative for difficulty urinating, frequency and urgency.   Skin: Negative for rash and wound.   Allergic/Immunologic: Negative for immunocompromised state.   Neurological: Negative for weakness and numbness.   Hematological: Does not bruise/bleed easily.   Psychiatric/Behavioral: Negative for dysphoric mood. The patient is not nervous/anxious.        PE: Constitutional: Vital signs above-noted.  Awake, alert and oriented    Psychiatric: Affect and insight do not appear grossly disturbed.    Pulmonary: Breathing is unlabored, color is good.    Skin: Warm, dry and normal turgor    Cardiac: Pedal pulses intact.  No edema.    Eyesight and hearing appear adequate for examination purposes      Musculoskeletal:  There is some tenderness to percussion and palpation of the spine. Motion appears undisturbed.  Posture is unremarkable to coronal and sagittal inspection.    The skin about the area is notable for some ink marks she is made just off the lumbosacral  midline and to the right there where her pain seems concentrated.  There is no palpable or visible deformity.  There is no local spasm.  There is a slight indentation at the intercrestal midline.       Neurologic:   Reflexes are 2+ and symmetrical in the patellae and right Achilles but absent in the left Achilles.   Motor function is undisturbed in quadriceps, EHL, and gastrocnemius   sensation appears symmetrically intact to light touch.  In the bilateral lower extremities there is no evidence of atrophy.   Clonus is absent..  Gait appears undisturbed.  SLR test negative      MEDICAL DECISION MAKING    XRAY: Plain film x-rays of the lumbar spine which show diminished bone density but look otherwise fairly unremarkable no fractures.  No comparison views are available.  MRI report from 2009 did suggest some lumbosacral disc pathology and was otherwise okay.  Recent bone density and showed severe osteoporosis in the hip and back    Other: n/a    Impression: Lumbar back pain.  Osteoporosis    Plan: We talked about fracture risk and this is being addressed she will call for increasing sudden back pain especially if it occurs while lifting her accompanied by a pop.  I want to get her in in 7 to 10 days and get an x-ray if that occurs.  For now let us try physical therapy which for which she is already scheduled and follow-up as needed

## 2020-12-01 ENCOUNTER — TREATMENT (OUTPATIENT)
Dept: PHYSICAL THERAPY | Facility: CLINIC | Age: 70
End: 2020-12-01

## 2020-12-01 DIAGNOSIS — G89.29 CHRONIC BILATERAL LOW BACK PAIN WITHOUT SCIATICA: Primary | ICD-10-CM

## 2020-12-01 DIAGNOSIS — M54.50 CHRONIC BILATERAL LOW BACK PAIN WITHOUT SCIATICA: Primary | ICD-10-CM

## 2020-12-01 DIAGNOSIS — Z91.81 AT MODERATE RISK FOR FALL: ICD-10-CM

## 2020-12-01 DIAGNOSIS — R26.9 GAIT DISTURBANCE: ICD-10-CM

## 2020-12-01 PROCEDURE — 97162 PT EVAL MOD COMPLEX 30 MIN: CPT | Performed by: PHYSICAL THERAPIST

## 2020-12-01 PROCEDURE — 97530 THERAPEUTIC ACTIVITIES: CPT | Performed by: PHYSICAL THERAPIST

## 2020-12-01 PROCEDURE — 97110 THERAPEUTIC EXERCISES: CPT | Performed by: PHYSICAL THERAPIST

## 2020-12-01 NOTE — PATIENT INSTRUCTIONS
Access Code: [email or web address]   URL: [email or web address]   Date: 12/01/2020   Prepared by: Andreia Briones     Exercises  Hooklying Lumbar Rotation - 20 reps - 1 sets - 2x daily  Supine Piriformis Stretch - 3 reps - 1 sets - 20 hold - 2x daily  Supine Double Knee to Chest - 3 reps - 1 sets - 20 hold - 2x daily  Supine Transversus Abdominis Bracing - Hands on Stomach - 10 reps - 1 sets - 5 hold - 2x daily  Seated Transversus Abdominis Bracing - 10 reps - 1 sets - 5 hold - 2x daily  Seated Scapular Retraction - 10 reps - 1 sets - 5 hold - 2x daily  Standing Transverse Abdominis Contraction - 10 reps - 1 sets - 5 hold - 2x daily    Patient was educated on findings of evaluation, purpose of treatment, and goals for therapy.  Treatment options discussed and questions answered.  Patient was educated on exercises/self treatment/pain relief techniques.

## 2020-12-01 NOTE — PROGRESS NOTES
Physical Therapy Initial Evaluation and Plan of Care    Patient: Carol A Osgood   : 1950  Diagnosis/ICD-10 Code:  Chronic bilateral low back pain without sciatica [M54.5, G89.29]  Referring practitioner: Ciera Newman MD/Pedro Medina MD    Subjective Evaluation    History of Present Illness  Mechanism of injury: Pt is referred for PT for both LBP from Dr. Medina and for high fall risk from Dr. Newman     Not sleeping well for years - when I'm tired I'm more likely to fall. My last fall was several months ago.  I was getting ready to take a bath and was on the commode and I think I feel asleep and fell and struck my head.  I do have a sense of imbalance when I walk.  Sometimes It's worse when I'm tired.    My back pain comes and goes and will radiate to my hips.  It's hard to walk or stand up straight after I've been sitting too long.  My R calf has been painful on and off this year and it currently symptomatic.     I am careful on stairs    PMH for osteoporosis - my tailbone was fractured as a child.      XRAY: Plain film x-rays of the lumbar spine which show diminished bone density but look otherwise fairly unremarkable no fractures        Patient Occupation: retired Pain  Current pain ratin  At worst pain ratin  Quality: sharp, dull ache and discomfort  Aggravating factors: ambulation (prolonged sitting )    Social Support  Lives in: multiple-level home    Patient Goals  Patient goals for therapy: increased strength and improved balance       Medical Hx reviewed      Objective          Postural Observations  Seated posture: fair  Standing posture: fair    Additional Postural Observation Details  Mild postural hyperkyphosis      Palpation   Left   No palpable tenderness to the lumbar paraspinals.   Tenderness of the piriformis.     Right   No palpable tenderness to the lumbar paraspinals. Tenderness of the piriformis.     Additional Palpation Details  Mild gastroc palp tenderness R    Tenderness      Additional Tenderness Details  Level ASIS    Active Range of Motion     Lumbar   Flexion: Active lumbar flexion: RLE pain. WFL and with pain  Extension: 20 (R LE pain and back pain) degrees WFL and with pain  Left lateral flexion: 20 degrees   Right lateral flexion: 20 degrees with pain  Left rotation: WFL  Right rotation: Active right lumbar rotation: RLE pain 75%N. with pain    Additional Active Range of Motion Details  Painful transitional movements  Has to roll to s/l from supine before sitting up with associated pain      Strength/Myotome Testing     Left Hip   Planes of Motion   Flexion: 4    Right Hip   Planes of Motion   Flexion: 4+    Left Knee   Flexion: 5  Extension: 5    Right Knee   Flexion: 5  Extension: 5    Left Ankle/Foot   Dorsiflexion: 5  Plantar flexion: 5    Right Ankle/Foot   Dorsiflexion: 5  Plantar flexion: 5 (non painful)    Tests       Thoracic   Negative slump.     Lumbar     Left   Negative crossed SLR and passive SLR.     Right   Positive passive SLR.   Negative crossed SLR.     Functional Assessment     Comments  5TSTS - 18.91 - without UE assist  TUG - without AD - 10.6 sec with RLE pain  DGI 17/24  Romberg - foam/EC/FA - 30 sec with SBA  Tandem stance/firm - 30 sec            Assessment & Plan     Assessment  Impairments: abnormal gait, abnormal or restricted ROM, activity intolerance, impaired balance, lacks appropriate home exercise program, pain with function and safety issue  Assessment details: Carol A Osgood is a 70 y.o. female referred to physical therapy for LBP and risk for falls. She presents with painful trunk AROM, difficulty and pain with transitional movements, palp tenderness B piriformis, RLE pain with sit to stand, decreased core stability, fair standing/sitting posture, gait disturbance and risk for falls.  Back Index is 50% and ABC balance is 71%. Pt would benefit from skilled PT services in order to address listed impairments and increase tolerance to normal  daily activities including ADLs, work and recreational activities.       Barriers to therapy: osteoporosis  Prognosis: good  Functional Limitations: sleeping, walking, sitting and standing  Goals  Plan Goals: STG In 8 visits. Pt will:  1. Be independent with HEP  2. Perform advanced TrA retraining exercises with no increased pain  3. Report pain of </= 6/10 with all ADLs  4. Pt to report greater ease with transitional movements  5. Pt will demonstrate ability to perform sit to stand while maintaining transversus abdominis contraction with decreased c/o RLE/back pain  6. Patient will report decreased disequilibrium by at least 30%     LTG In 16 visits. Pt will:  1. Report pain of </= 4/10 with all ADLs  2. Demonstrate full lumbar AROM to allow for reaching and bending with minimal pain  3. Back Index </= 25%  4. DGI > 19/24 to demonstrated decreased fall risk  5. 5 times sit to stand < 16 sec to demonstrate improved functional LE strength.  6. Pt is able to stand on one leg for > 7 sec to demonstrate improved balance and reduce fall risk when ambulating on uneven terrain.  7. Patient will report no loss of balance with ADLs to demonstrate improved functional balance.      Plan  Therapy options: will be seen for skilled physical therapy services  Planned modality interventions: thermotherapy (hydrocollator packs), electrical stimulation/Russian stimulation, TENS and ultrasound  Planned therapy interventions: strengthening, neuromuscular re-education, manual therapy, therapeutic activities, stretching, balance/weight-bearing training, gait training and home exercise program  Duration in visits: 16  Treatment plan discussed with: patient  Plan details: Pt will be visiting daughter via air travel 12/12/20 so will focus on establishing HEP and reducing back pain/fall risk in upcoming appts.         Timed:  Manual Therapy:    -     mins  83555;  Therapeutic Exercise:    15     mins  [id number];     Neuromuscular Frank:    -     mins  18848;    Therapeutic Activity:     10     mins  62382;     Gait Training:      -     mins  08859;     Ultrasound:     -     mins  07077;    Iontophoresis                 -     mins [id number]    Timed Treatment:   25   mins   Total Treatment:     65   mins    PT SIGNATURE: [place]LAH Alegre License # [id number]  DATE TREATMENT INITIATED: 12/1/2020    Medicare Initial Certification  Certification Period: 3/1/2021  I certify that the therapy services are furnished while this patient is under my care.  The services outlined above are required by this patient, and will be reviewed every 90 days.     PHYSICIAN: Ciera Newman MD/Pedro Medina MD      DATE:     Please sign and return via fax to [phone]. Thank you, [facility].

## 2020-12-04 ENCOUNTER — TREATMENT (OUTPATIENT)
Dept: PHYSICAL THERAPY | Facility: CLINIC | Age: 70
End: 2020-12-04

## 2020-12-04 DIAGNOSIS — R26.9 GAIT DISTURBANCE: ICD-10-CM

## 2020-12-04 DIAGNOSIS — M54.50 CHRONIC BILATERAL LOW BACK PAIN WITHOUT SCIATICA: Primary | ICD-10-CM

## 2020-12-04 DIAGNOSIS — G89.29 CHRONIC BILATERAL LOW BACK PAIN WITHOUT SCIATICA: Primary | ICD-10-CM

## 2020-12-04 DIAGNOSIS — Z91.81 AT MODERATE RISK FOR FALL: ICD-10-CM

## 2020-12-04 PROCEDURE — 97110 THERAPEUTIC EXERCISES: CPT | Performed by: PHYSICAL THERAPIST

## 2020-12-04 PROCEDURE — 97140 MANUAL THERAPY 1/> REGIONS: CPT | Performed by: PHYSICAL THERAPIST

## 2020-12-04 NOTE — PROGRESS NOTES
Physical Therapy Daily Progress Note      Patient: Carol A Osgood   : 1950  Referring practitioner: Ciera Newman MD  Date of Initial Visit: Type: THERAPY  Noted: 2020  Today's Date: 2020  Patient seen for 2 sessions       Carol Osgood reports: that she's been working on her exercises. Has been feeling pretty good.       Objective/ Treatment: Patient exhibits significant posterior pelvic tilt in sitting and standing with forward head and shoulders. Patient benefits from mod-maximal verbal cues for correct form and minimal tactile cues at pelvis and scapula for appropriate posture. Bilateral gastrocs tender to palpation, as well as bilateral quadratus lumborum. Patient reports discomfort in the left greater trochanter with side sidelying and pain at the coccyx with hooklying. See therapeutic exercise log and manual therapy logs for more details.     Education: Home exercise program with handouts, pelvic floor relation to coccyx dysfunction      Assessment: The patient tolerated today's exercise progression well. The patient visibly exhibits difficulty with transitional movements during the session, likely related to abdominal weakness based on postural assessment. Patient frequently becoming off balance in standing without loss of balance in today's session. She would benefit from additional skilled physical therapy to address the stated deficits, improve muscle strength and endurance and reduce risk of falls.         Plan:  Progress strengthening /stabilization /functional activity             Timed:         Manual Therapy:   10      mins  47101;     Therapeutic Exercise:   35      mins  77804;     Neuromuscular Frank:        mins  64449;    Therapeutic Activity:          mins  35239;     Gait Training:           mins  51336;     Ultrasound:          mins  74208;    Ionto                                   mins   42557  Self Care                            mins   57121      Un-Timed:  Electrical  Stimulation:         mins  86135 ( );  Dry Needling          mins self-pay  Traction          mins 15379  Low Eval          Mins  42899  Mod Eval          Mins  82676  High Eval                            Mins  39023  Canalith Repos                   mins  17824    Timed Treatment:  45    mins   Total Treatment:    50    mins    Lyla Hannah, PT  Physical Therapist

## 2020-12-08 ENCOUNTER — TREATMENT (OUTPATIENT)
Dept: PHYSICAL THERAPY | Facility: CLINIC | Age: 70
End: 2020-12-08

## 2020-12-08 DIAGNOSIS — G89.29 CHRONIC BILATERAL LOW BACK PAIN WITHOUT SCIATICA: Primary | ICD-10-CM

## 2020-12-08 DIAGNOSIS — M54.50 CHRONIC BILATERAL LOW BACK PAIN WITHOUT SCIATICA: Primary | ICD-10-CM

## 2020-12-08 DIAGNOSIS — R26.9 GAIT DISTURBANCE: ICD-10-CM

## 2020-12-08 DIAGNOSIS — Z91.81 AT MODERATE RISK FOR FALL: ICD-10-CM

## 2020-12-08 PROCEDURE — 97140 MANUAL THERAPY 1/> REGIONS: CPT | Performed by: PHYSICAL THERAPIST

## 2020-12-08 PROCEDURE — 97530 THERAPEUTIC ACTIVITIES: CPT | Performed by: PHYSICAL THERAPIST

## 2020-12-08 PROCEDURE — 97110 THERAPEUTIC EXERCISES: CPT | Performed by: PHYSICAL THERAPIST

## 2020-12-08 NOTE — PROGRESS NOTES
Physical Therapy Daily Progress Note    Patient: Carol A Osgood   : 1950  Diagnosis/ICD-10 Code:  Chronic bilateral low back pain without sciatica [M54.5, G89.29]  Referring practitioner: Ciera Newman MD  Date of Initial Visit: Type: THERAPY  Noted: 2020  Today's Date: 2020  Patient seen for 3 sessions         Carol Osgood reports: She feels tightness in her neck when she performs her exercises at home. Continues to have some pain in her calves and was sore after the manual done last session.    Subjective     Objective   -Reviewed HEP as pt is going to be out of town for next couple weeks.     See Exercise, Manual, and Modality Logs for complete treatment.       Assessment/Plan   Compliant and cooperative with rehab efforts. Subjectively, pt reports no increase of pain or discomfort with interventions performed today. Performed well with all interventions with cues for proper posture. Continues to demonstrate some difficulty with bed mobility and transitional activities. Continues to benefit from verbal/tactile cues to ensure proper form and technique for exercise performance especially in regards to postural awareness.       Progress per Plan of Care           Manual Therapy:    10     mins  93414;  Therapeutic Exercise:    18     mins  88840;     Neuromuscular Frank:        mins  85743;    Therapeutic Activity:     17     mins  41559;     Gait Training:           mins  45297;     Ultrasound:          mins  48723;    Electrical Stimulation:         mins  61042 ( );  Dry Needling          mins self-pay    Timed Treatment:   45   mins   Total Treatment:     45   mins    Helena Barreto PTA  Physical Therapist Assistant [id number]

## 2020-12-29 ENCOUNTER — TREATMENT (OUTPATIENT)
Dept: PHYSICAL THERAPY | Facility: CLINIC | Age: 70
End: 2020-12-29

## 2020-12-29 DIAGNOSIS — Z91.81 AT MODERATE RISK FOR FALL: ICD-10-CM

## 2020-12-29 DIAGNOSIS — R26.9 GAIT DISTURBANCE: ICD-10-CM

## 2020-12-29 DIAGNOSIS — G89.29 CHRONIC BILATERAL LOW BACK PAIN WITHOUT SCIATICA: Primary | ICD-10-CM

## 2020-12-29 DIAGNOSIS — M54.50 CHRONIC BILATERAL LOW BACK PAIN WITHOUT SCIATICA: Primary | ICD-10-CM

## 2020-12-29 PROCEDURE — 97110 THERAPEUTIC EXERCISES: CPT | Performed by: PHYSICAL THERAPIST

## 2020-12-29 PROCEDURE — 97530 THERAPEUTIC ACTIVITIES: CPT | Performed by: PHYSICAL THERAPIST

## 2020-12-29 PROCEDURE — 97112 NEUROMUSCULAR REEDUCATION: CPT | Performed by: PHYSICAL THERAPIST

## 2020-12-29 NOTE — PROGRESS NOTES
Physical Therapy Daily Progress Note  Visit: 4    Subjective Carol Osgood reports: compliance with HEP.     Objective   See Exercise, Manual, and Modality Logs for complete treatment. Added S/L leg press, standing 3-way hip on therapad, added bridge with yellow mini band, sink squats. Provided instruction in exercises and proper technique and purpose of exercises. Cues to maintain abd bracing with exercises.       Assessment/Plan: L LE weakness > R. Plan details: Progress ROM / strengthening / stabilization / functional activity as tolerated     Manual Therapy:          mins  44067;  Therapeutic Exercise:    20      mins  50638;     Neuromuscular Frank:    10     mins  99920;    Therapeutic Activity:     10      mins  91473;     Gait Training:            mins  97404;     Ultrasound:           mins  32790;    Electrical Stimulation:          mins  83312 ( );  Dry Needling           mins self-pay  Traction           mins 14967  Canalith Repositioning         mins 48312      Timed Treatment: 40     mins   Total Treatment:    40    mins    KYLAH Sparrow License #: [id number]    Physical Therapist

## 2020-12-29 NOTE — PATIENT INSTRUCTIONS
Access Code: [email or web address]   URL: [email or web address]   Date: 12/29/2020   Prepared by: Petty Ho     Exercises  Squat with Chair and Counter Support - 10 reps - 3 sets - 1x daily - 7x weekly

## 2020-12-31 ENCOUNTER — TREATMENT (OUTPATIENT)
Dept: PHYSICAL THERAPY | Facility: CLINIC | Age: 70
End: 2020-12-31

## 2020-12-31 DIAGNOSIS — G89.29 CHRONIC BILATERAL LOW BACK PAIN WITHOUT SCIATICA: Primary | ICD-10-CM

## 2020-12-31 DIAGNOSIS — Z91.81 AT MODERATE RISK FOR FALL: ICD-10-CM

## 2020-12-31 DIAGNOSIS — M54.50 CHRONIC BILATERAL LOW BACK PAIN WITHOUT SCIATICA: Primary | ICD-10-CM

## 2020-12-31 DIAGNOSIS — R26.9 GAIT DISTURBANCE: ICD-10-CM

## 2020-12-31 PROCEDURE — 97112 NEUROMUSCULAR REEDUCATION: CPT | Performed by: PHYSICAL THERAPIST

## 2020-12-31 PROCEDURE — 97530 THERAPEUTIC ACTIVITIES: CPT | Performed by: PHYSICAL THERAPIST

## 2020-12-31 PROCEDURE — 97110 THERAPEUTIC EXERCISES: CPT | Performed by: PHYSICAL THERAPIST

## 2020-12-31 NOTE — PROGRESS NOTES
Physical Therapy Daily Progress Note  Visit: 5    Subjective Carol Osgood reports: she had a little muscle soreness after last visit     Objective   See Exercise, Manual, and Modality Logs for complete treatment. Progressed repetitions used with exercises and added resistance to standing hip 3-way. Educated pt to equal weight bear with sit to stand transfers      Assessment/Plan: good tolerance to progression of exercises. Compliant/cooperative with current rehab efforts.  Benefits from verbal/tactile cues to ensure correct exercise performance/technique, hold time and position. Plan details: Progress ROM / strengthening / stabilization / functional activity as tolerated     Manual Therapy:          mins  17794;  Therapeutic Exercise:     20     mins  57280;     Neuromuscular Frank:   10     mins  90839;    Therapeutic Activity:     10      mins  53664;     Gait Training:            mins  87505;     Ultrasound:           mins  08455;    Electrical Stimulation:          mins  03238 ( );  Dry Needling           mins self-pay  Traction           mins 19644  Canalith Repositioning         mins 19047      Timed Treatment: 40     mins   Total Treatment:     40   mins    Petty Ho PT  [place] License #: [id number]    Physical Therapist

## 2021-01-05 ENCOUNTER — TREATMENT (OUTPATIENT)
Dept: PHYSICAL THERAPY | Facility: CLINIC | Age: 71
End: 2021-01-05

## 2021-01-05 DIAGNOSIS — Z91.81 AT MODERATE RISK FOR FALL: ICD-10-CM

## 2021-01-05 DIAGNOSIS — R26.9 GAIT DISTURBANCE: ICD-10-CM

## 2021-01-05 DIAGNOSIS — G89.29 CHRONIC BILATERAL LOW BACK PAIN WITHOUT SCIATICA: Primary | ICD-10-CM

## 2021-01-05 DIAGNOSIS — M54.50 CHRONIC BILATERAL LOW BACK PAIN WITHOUT SCIATICA: Primary | ICD-10-CM

## 2021-01-05 PROCEDURE — 97530 THERAPEUTIC ACTIVITIES: CPT | Performed by: PHYSICAL THERAPIST

## 2021-01-05 PROCEDURE — 97110 THERAPEUTIC EXERCISES: CPT | Performed by: PHYSICAL THERAPIST

## 2021-01-05 NOTE — PROGRESS NOTES
Re-Assessment / Re-Certification        Patient: Carol A Osgood   : 1950  Diagnosis/ICD-10 Code:  Chronic bilateral low back pain without sciatica [M54.5, G89.29]  Referring practitioner: Ciera Newman MD  Date of Initial Visit: Type: THERAPY  Noted: 2020  Today's Date: 2021  Patient seen for 6 sessions      Subjective:   Carol Osgood reports: fluctuating back pain. Reports she received chiropractic care 2x/wk   Subjective Questionnaire: Oswestry: 15 (50 on initial evaluation)   ABC: 79  (71 on initial evaluation)   Clinical Progress: improved  Home Program Compliance: Yes  Treatment has included: therapeutic exercise, neuromuscular re-education and therapeutic activity    Subjective   Current pain ratin  At worst pain ratin  Quality: sharp, dull ache and discomfort  Aggravating factors: ambulation (prolonged sitting )  Objective        Painful transitional movements  Has to roll to s/l from supine before sitting up with associated pain    Strength/Myotome Testing      Left Hip   Planes of Motion   Flexion: 4+     Right Hip   Planes of Motion   Flexion: 4+     Left Knee   Flexion: 5  Extension: 5     Right Knee   Flexion: 5  Extension: 5     Left Ankle/Foot   Dorsiflexion: 5  Plantar flexion: 5     Right Ankle/Foot   Dorsiflexion: 5  Plantar flexion: 5 (non painful)    5xSTS: 22 secs  R SLS: 22.8  L SLS:L 17.94    Goals  Plan Goals: STG In 8 visits. Pt will:  1. Be independent with HEP Progressing as evolving HEP  2. Perform advanced TrA retraining exercises with no increased pain Progressing   3. Report pain of </= 6/10 with all ADLs Met  4. Pt to report greater ease with transitional movements Progressing   5. Pt will demonstrate ability to perform sit to stand while maintaining transversus abdominis contraction with decreased c/o RLE/back pain Progressin g      LTG In 16 visits. Pt will:  1. Report pain of </= 4/10 with all ADLs Progressing  2. Demonstrate full lumbar AROM to allow for  reaching and bending with minimal pain Progressing   3. Back Index </= 25% Progressing   4. 5 times sit to stand < 16 sec to demonstrate improved functional LE strength. Progressing   5. Pt is able to stand on one leg for > 7 sec to demonstrate improved balance and reduce fall risk when ambulating on uneven terrain. Met  6. Patient will report no loss of balance with ADLs to demonstrate improved functional balance. Progressing   Assessment/Plan  Progress toward previous goals: Progresing        Recommendations: Continue as planned  Timeframe: 1 month  Prognosis to achieve goals: good    PT Signature: Petty Ho PT      Based upon review of the patient's progress and continued therapy plan, it is my medical opinion that Carol Osgood should continue physical therapy treatment at [facility]  [address] [place] [id number] [phone].    Signature: __________________________________  Ciera Newman MD    Manual Therapy:         mins  46433;  Therapeutic Exercise:     20    mins  79017;     Neuromuscular Frank:        mins  33924;    Therapeutic Activity:    23      mins  37298;     Gait Training:           mins  94886;     Ultrasound:          mins  58519;    Electrical Stimulation:         mins  16016 ( );  Dry Needling          mins self-pay    Timed Treatment:   43   mins   Total Treatment:     43   mins

## 2021-01-07 ENCOUNTER — TREATMENT (OUTPATIENT)
Dept: PHYSICAL THERAPY | Facility: CLINIC | Age: 71
End: 2021-01-07

## 2021-01-07 DIAGNOSIS — M54.50 CHRONIC BILATERAL LOW BACK PAIN WITHOUT SCIATICA: Primary | ICD-10-CM

## 2021-01-07 DIAGNOSIS — R26.9 GAIT DISTURBANCE: ICD-10-CM

## 2021-01-07 DIAGNOSIS — G89.29 CHRONIC BILATERAL LOW BACK PAIN WITHOUT SCIATICA: Primary | ICD-10-CM

## 2021-01-07 DIAGNOSIS — Z91.81 AT MODERATE RISK FOR FALL: ICD-10-CM

## 2021-01-07 PROCEDURE — 97110 THERAPEUTIC EXERCISES: CPT | Performed by: PHYSICAL THERAPIST

## 2021-01-07 PROCEDURE — 97112 NEUROMUSCULAR REEDUCATION: CPT | Performed by: PHYSICAL THERAPIST

## 2021-01-07 NOTE — PROGRESS NOTES
Physical Therapy Daily Progress Note  Visit: 7    Subjective Carol Osgood reports: compliance with HEP     Objective   See Exercise, Manual, and Modality Logs for complete treatment.     Assessment/Plan: Compliant/cooperative with current rehab efforts.  Benefits from verbal/tactile cues to ensure correct exercise performance/technique, hold time and position. Plan details: Progress ROM / strengthening / stabilization / functional activity as tolerated     Manual Therapy:          mins  02779;  Therapeutic Exercise:    30      mins  84220;     Neuromuscular Frank:    10     mins  97259;    Therapeutic Activity:           mins  15644;     Gait Training:            mins  60932;     Ultrasound:           mins  13933;    Electrical Stimulation:          mins  71509 ( );  Dry Needling           mins self-pay  Traction           mins 25612  Canalith Repositioning         mins 59338      Timed Treatment: 40     mins   Total Treatment:    40    mins    KYLAH Sparrow License #: [id number]    Physical Therapist

## 2021-01-12 ENCOUNTER — TREATMENT (OUTPATIENT)
Dept: PHYSICAL THERAPY | Facility: CLINIC | Age: 71
End: 2021-01-12

## 2021-01-12 DIAGNOSIS — R26.9 GAIT DISTURBANCE: ICD-10-CM

## 2021-01-12 DIAGNOSIS — M54.50 CHRONIC BILATERAL LOW BACK PAIN WITHOUT SCIATICA: Primary | ICD-10-CM

## 2021-01-12 DIAGNOSIS — G89.29 CHRONIC BILATERAL LOW BACK PAIN WITHOUT SCIATICA: Primary | ICD-10-CM

## 2021-01-12 DIAGNOSIS — Z91.81 AT MODERATE RISK FOR FALL: ICD-10-CM

## 2021-01-12 PROCEDURE — 97530 THERAPEUTIC ACTIVITIES: CPT | Performed by: PHYSICAL THERAPIST

## 2021-01-12 PROCEDURE — 97110 THERAPEUTIC EXERCISES: CPT | Performed by: PHYSICAL THERAPIST

## 2021-01-12 PROCEDURE — 97112 NEUROMUSCULAR REEDUCATION: CPT | Performed by: PHYSICAL THERAPIST

## 2021-01-12 NOTE — PROGRESS NOTES
Physical Therapy Daily Progress Note  Visit: 8    Subjective Carol Osgood reports: compliance with HEP     Objective   See Exercise, Manual, and Modality Logs for complete treatment. Progressed resistance used with LE there ex and progressed balance activities     Assessment/Plan: Compliant/cooperative with current rehab efforts.  Benefits from verbal/tactile cues to ensure correct exercise performance/technique, hold time and position. Plan details: Progress ROM / strengthening / stabilization / functional activity as tolerated     Manual Therapy:          mins  30925;  Therapeutic Exercise:    17      mins  65341;     Neuromuscular Frank:    14     mins  31726;    Therapeutic Activity:      14     mins  48145;     Gait Training:            mins  34605;     Ultrasound:           mins  43981;    Electrical Stimulation:          mins  63966 ( );  Dry Needling           mins self-pay  Traction           mins 11493  Canalith Repositioning         mins 70503      Timed Treatment: 45   mins   Total Treatment:   45     mins    Petty Ho PT  [place] License #: [id number]    Physical Therapist

## 2021-01-14 ENCOUNTER — TREATMENT (OUTPATIENT)
Dept: PHYSICAL THERAPY | Facility: CLINIC | Age: 71
End: 2021-01-14

## 2021-01-14 DIAGNOSIS — R26.9 GAIT DISTURBANCE: ICD-10-CM

## 2021-01-14 DIAGNOSIS — M54.50 CHRONIC BILATERAL LOW BACK PAIN WITHOUT SCIATICA: Primary | ICD-10-CM

## 2021-01-14 DIAGNOSIS — G89.29 CHRONIC BILATERAL LOW BACK PAIN WITHOUT SCIATICA: Primary | ICD-10-CM

## 2021-01-14 DIAGNOSIS — Z91.81 AT MODERATE RISK FOR FALL: ICD-10-CM

## 2021-01-14 PROCEDURE — 97112 NEUROMUSCULAR REEDUCATION: CPT | Performed by: PHYSICAL THERAPIST

## 2021-01-14 PROCEDURE — 97110 THERAPEUTIC EXERCISES: CPT | Performed by: PHYSICAL THERAPIST

## 2021-01-14 NOTE — PROGRESS NOTES
Physical Therapy Daily Progress Note  Visit: 9    Subjective Carol Osgood reports: compliance with HEP     Objective   See Exercise, Manual, and Modality Logs for complete treatment.     Assessment/Plan: Compliant/cooperative with current rehab efforts.  Benefits from verbal/tactile cues to ensure correct exercise performance/technique, hold time and position. Plan details: Progress ROM / strengthening / stabilization / functional activity as tolerated     Manual Therapy:          mins  98670;  Therapeutic Exercise:     15     mins  20470;     Neuromuscular Frank:   25      mins  01734;    Therapeutic Activity:           mins  59662;     Gait Training:            mins  40750;     Ultrasound:           mins  01323;    Electrical Stimulation:          mins  20163 ( );  Dry Needling           mins self-pay  Traction           mins 00392  Canalith Repositioning         mins 67175      Timed Treatment: 40     mins   Total Treatment:    40    mins    KYLAH Sparrow License #: [id number]    Physical Therapist

## 2021-01-15 DIAGNOSIS — Z23 NEED FOR VACCINATION: ICD-10-CM

## 2021-01-19 ENCOUNTER — TREATMENT (OUTPATIENT)
Dept: PHYSICAL THERAPY | Facility: CLINIC | Age: 71
End: 2021-01-19

## 2021-01-19 DIAGNOSIS — Z91.81 AT MODERATE RISK FOR FALL: ICD-10-CM

## 2021-01-19 DIAGNOSIS — R26.9 GAIT DISTURBANCE: ICD-10-CM

## 2021-01-19 DIAGNOSIS — M54.50 CHRONIC BILATERAL LOW BACK PAIN WITHOUT SCIATICA: Primary | ICD-10-CM

## 2021-01-19 DIAGNOSIS — G89.29 CHRONIC BILATERAL LOW BACK PAIN WITHOUT SCIATICA: Primary | ICD-10-CM

## 2021-01-19 PROCEDURE — 97112 NEUROMUSCULAR REEDUCATION: CPT | Performed by: PHYSICAL THERAPIST

## 2021-01-19 PROCEDURE — 97110 THERAPEUTIC EXERCISES: CPT | Performed by: PHYSICAL THERAPIST

## 2021-01-19 NOTE — PROGRESS NOTES
Physical Therapy Daily Progress Note  Visit: 10    Subjective Carol Osgood reports: compliance with HEP     Objective   See Exercise, Manual, and Modality Logs for complete treatment. Added resisted walking side stepping and back to front.     Assessment/Plan: Good tolerance and balance control with added exercises. Plan details: Progress ROM / strengthening / stabilization / functional activity as tolerated     Manual Therapy:          mins  28251;  Therapeutic Exercise:     15     mins  78698;     Neuromuscular Frank:     30   mins  40996;    Therapeutic Activity:           mins  69686;     Gait Training:            mins  70300;     Ultrasound:           mins  13595;    Electrical Stimulation:          mins  78886 ( );  Dry Needling           mins self-pay  Traction           mins 03675    Timed Treatment: 45     mins   Total Treatment:    45    mins    KYLAH Sparrow License #: [id number]    Physical Therapist

## 2021-02-01 ENCOUNTER — TREATMENT (OUTPATIENT)
Dept: PHYSICAL THERAPY | Facility: CLINIC | Age: 71
End: 2021-02-01

## 2021-02-01 DIAGNOSIS — M54.50 CHRONIC BILATERAL LOW BACK PAIN WITHOUT SCIATICA: Primary | ICD-10-CM

## 2021-02-01 DIAGNOSIS — Z91.81 AT MODERATE RISK FOR FALL: ICD-10-CM

## 2021-02-01 DIAGNOSIS — R26.9 GAIT DISTURBANCE: ICD-10-CM

## 2021-02-01 DIAGNOSIS — G89.29 CHRONIC BILATERAL LOW BACK PAIN WITHOUT SCIATICA: Primary | ICD-10-CM

## 2021-02-01 PROCEDURE — 97530 THERAPEUTIC ACTIVITIES: CPT | Performed by: PHYSICAL THERAPIST

## 2021-02-01 PROCEDURE — 97110 THERAPEUTIC EXERCISES: CPT | Performed by: PHYSICAL THERAPIST

## 2021-02-01 NOTE — PROGRESS NOTES
Physical Therapy Daily Progress Note    Patient: Carol A Osgood   : 1950  Diagnosis/ICD-10 Code:  Chronic bilateral low back pain without sciatica [M54.5, G89.29]  Referring practitioner: Ciera Newman MD  Date of Initial Visit: Type: THERAPY  Noted: 2020  Today's Date: 2021  Patient seen for 11 sessions         Carol Osgood reports: Has been out for a couple weeks because of self quarantine covid exposure. Has been keeping up with exercises although says that she has not been able to complete them every day.     Subjective     Objective   See Exercise, Manual, and Modality Logs for complete treatment.       Assessment/Plan   Compliant and cooperative with rehab efforts. Subjectively, pt reports no increase of pain or discomfort with interventions performed today. Performed well with all interventions with good postural awareness.  Continues to benefit from verbal/tactile cues to ensure proper form and technique for exercise performance.       Progress per Plan of Care           Manual Therapy:         mins  00942;  Therapeutic Exercise:    30     mins  34729;     Neuromuscular Frank:        mins  61991;    Therapeutic Activity:     15     mins  17390;     Gait Training:           mins  04988;     Ultrasound:          mins  27236;    Electrical Stimulation:         mins  30584 ( );  Dry Needling          mins self-pay    Timed Treatment:   45   mins   Total Treatment:     45   mins    Helena Barreto PTA  Physical Therapist Assistant [id number]

## 2021-02-04 ENCOUNTER — LAB (OUTPATIENT)
Dept: LAB | Facility: HOSPITAL | Age: 71
End: 2021-02-04

## 2021-02-04 ENCOUNTER — TRANSCRIBE ORDERS (OUTPATIENT)
Dept: ADMINISTRATIVE | Facility: HOSPITAL | Age: 71
End: 2021-02-04

## 2021-02-04 DIAGNOSIS — N18.30 MALIGNANT HYPERTENSIVE HEART AND CHRONIC KIDNEY DISEASE STAGE III (HCC): Primary | ICD-10-CM

## 2021-02-04 DIAGNOSIS — I13.10 MALIGNANT HYPERTENSIVE HEART AND CHRONIC KIDNEY DISEASE STAGE III (HCC): Primary | ICD-10-CM

## 2021-02-04 DIAGNOSIS — I13.10 MALIGNANT HYPERTENSIVE HEART AND CHRONIC KIDNEY DISEASE STAGE III (HCC): ICD-10-CM

## 2021-02-04 DIAGNOSIS — N18.30 MALIGNANT HYPERTENSIVE HEART AND CHRONIC KIDNEY DISEASE STAGE III (HCC): ICD-10-CM

## 2021-02-04 DIAGNOSIS — N25.81 SECONDARY HYPERPARATHYROIDISM OF RENAL ORIGIN (HCC): ICD-10-CM

## 2021-02-04 LAB
25(OH)D3 SERPL-MCNC: 74.8 NG/ML (ref 30–100)
ALBUMIN SERPL-MCNC: 4 G/DL (ref 3.5–5.2)
ALBUMIN/GLOB SERPL: 1.4 G/DL
ALP SERPL-CCNC: 69 U/L (ref 39–117)
ALT SERPL W P-5'-P-CCNC: 14 U/L (ref 1–33)
ANION GAP SERPL CALCULATED.3IONS-SCNC: 6.9 MMOL/L (ref 5–15)
AST SERPL-CCNC: 19 U/L (ref 1–32)
BASOPHILS # BLD AUTO: 0.05 10*3/MM3 (ref 0–0.2)
BASOPHILS NFR BLD AUTO: 1.1 % (ref 0–1.5)
BILIRUB SERPL-MCNC: 0.4 MG/DL (ref 0–1.2)
BILIRUB UR QL STRIP: NEGATIVE
BUN SERPL-MCNC: 34 MG/DL (ref 8–23)
BUN/CREAT SERPL: 21.7 (ref 7–25)
CALCIUM SPEC-SCNC: 10.2 MG/DL (ref 8.6–10.5)
CHLORIDE SERPL-SCNC: 106 MMOL/L (ref 98–107)
CK SERPL-CCNC: 82 U/L (ref 20–180)
CLARITY UR: CLEAR
CO2 SERPL-SCNC: 28.1 MMOL/L (ref 22–29)
COLOR UR: YELLOW
CREAT SERPL-MCNC: 1.57 MG/DL (ref 0.57–1)
DEPRECATED RDW RBC AUTO: 41.5 FL (ref 37–54)
EOSINOPHIL # BLD AUTO: 0.14 10*3/MM3 (ref 0–0.4)
EOSINOPHIL NFR BLD AUTO: 3.2 % (ref 0.3–6.2)
ERYTHROCYTE [DISTWIDTH] IN BLOOD BY AUTOMATED COUNT: 12.3 % (ref 12.3–15.4)
GFR SERPL CREATININE-BSD FRML MDRD: 32 ML/MIN/1.73
GLOBULIN UR ELPH-MCNC: 2.8 GM/DL
GLUCOSE SERPL-MCNC: 76 MG/DL (ref 65–99)
GLUCOSE UR STRIP-MCNC: NEGATIVE MG/DL
HCT VFR BLD AUTO: 32.8 % (ref 34–46.6)
HGB BLD-MCNC: 11.2 G/DL (ref 12–15.9)
HGB UR QL STRIP.AUTO: NEGATIVE
IMM GRANULOCYTES # BLD AUTO: 0 10*3/MM3 (ref 0–0.05)
IMM GRANULOCYTES NFR BLD AUTO: 0 % (ref 0–0.5)
KETONES UR QL STRIP: NEGATIVE
LEUKOCYTE ESTERASE UR QL STRIP.AUTO: NEGATIVE
LYMPHOCYTES # BLD AUTO: 0.85 10*3/MM3 (ref 0.7–3.1)
LYMPHOCYTES NFR BLD AUTO: 19.5 % (ref 19.6–45.3)
MAGNESIUM SERPL-MCNC: 2.2 MG/DL (ref 1.6–2.4)
MCH RBC QN AUTO: 31.9 PG (ref 26.6–33)
MCHC RBC AUTO-ENTMCNC: 34.1 G/DL (ref 31.5–35.7)
MCV RBC AUTO: 93.4 FL (ref 79–97)
MONOCYTES # BLD AUTO: 0.45 10*3/MM3 (ref 0.1–0.9)
MONOCYTES NFR BLD AUTO: 10.3 % (ref 5–12)
NEUTROPHILS NFR BLD AUTO: 2.86 10*3/MM3 (ref 1.7–7)
NEUTROPHILS NFR BLD AUTO: 65.9 % (ref 42.7–76)
NITRITE UR QL STRIP: NEGATIVE
NRBC BLD AUTO-RTO: 0 /100 WBC (ref 0–0.2)
PH UR STRIP.AUTO: 5.5 [PH] (ref 5–8)
PHOSPHATE SERPL-MCNC: 3.4 MG/DL (ref 2.5–4.5)
PLATELET # BLD AUTO: 141 10*3/MM3 (ref 140–450)
PMV BLD AUTO: 12.2 FL (ref 6–12)
POTASSIUM SERPL-SCNC: 4.4 MMOL/L (ref 3.5–5.2)
PROT SERPL-MCNC: 6.8 G/DL (ref 6–8.5)
PROT UR QL STRIP: NEGATIVE
PTH-INTACT SERPL-MCNC: 93.6 PG/ML (ref 15–65)
RBC # BLD AUTO: 3.51 10*6/MM3 (ref 3.77–5.28)
SODIUM SERPL-SCNC: 141 MMOL/L (ref 136–145)
SP GR UR STRIP: 1.02 (ref 1–1.03)
URATE SERPL-MCNC: 7.2 MG/DL (ref 2.4–5.7)
UROBILINOGEN UR QL STRIP: NORMAL
WBC # BLD AUTO: 4.35 10*3/MM3 (ref 3.4–10.8)

## 2021-02-04 PROCEDURE — 82306 VITAMIN D 25 HYDROXY: CPT

## 2021-02-04 PROCEDURE — 81003 URINALYSIS AUTO W/O SCOPE: CPT

## 2021-02-04 PROCEDURE — 83735 ASSAY OF MAGNESIUM: CPT

## 2021-02-04 PROCEDURE — 84550 ASSAY OF BLOOD/URIC ACID: CPT

## 2021-02-04 PROCEDURE — 82550 ASSAY OF CK (CPK): CPT

## 2021-02-04 PROCEDURE — 84100 ASSAY OF PHOSPHORUS: CPT

## 2021-02-04 PROCEDURE — 36415 COLL VENOUS BLD VENIPUNCTURE: CPT

## 2021-02-04 PROCEDURE — 83970 ASSAY OF PARATHORMONE: CPT

## 2021-02-04 PROCEDURE — 85025 COMPLETE CBC W/AUTO DIFF WBC: CPT

## 2021-02-04 PROCEDURE — 80053 COMPREHEN METABOLIC PANEL: CPT

## 2021-02-12 ENCOUNTER — TELEPHONE (OUTPATIENT)
Dept: PHYSICAL THERAPY | Facility: CLINIC | Age: 71
End: 2021-02-12

## 2021-02-23 ENCOUNTER — TREATMENT (OUTPATIENT)
Dept: PHYSICAL THERAPY | Facility: CLINIC | Age: 71
End: 2021-02-23

## 2021-02-23 DIAGNOSIS — R26.9 GAIT DISTURBANCE: ICD-10-CM

## 2021-02-23 DIAGNOSIS — Z91.81 AT MODERATE RISK FOR FALL: ICD-10-CM

## 2021-02-23 DIAGNOSIS — G89.29 CHRONIC BILATERAL LOW BACK PAIN WITHOUT SCIATICA: Primary | ICD-10-CM

## 2021-02-23 DIAGNOSIS — M54.50 CHRONIC BILATERAL LOW BACK PAIN WITHOUT SCIATICA: Primary | ICD-10-CM

## 2021-02-23 PROCEDURE — 97530 THERAPEUTIC ACTIVITIES: CPT | Performed by: PHYSICAL THERAPIST

## 2021-02-23 PROCEDURE — 97110 THERAPEUTIC EXERCISES: CPT | Performed by: PHYSICAL THERAPIST

## 2021-02-23 NOTE — PROGRESS NOTES
Physical Therapy Daily Progress Note/Discharge note  Visit: 12    Subjective Carol Osgood reports: overall doing better.     Objective   See Exercise, Manual, and Modality Logs for complete treatment. Reviewed HEP and added:   Access Code: [email or web address]   URL: [email or web address]   Date: 02/23/2021   Prepared by: Petty Ho     Exercises  Standing Single Arm Elbow Flexion with Resistance - 10 reps - 3 sets - 1x daily - 7x weekly  Standing Elbow Extension with Self-Anchored Resistance - 10 reps - 3 sets - 1x daily - 7x weekly  Squat with Chair and Counter Support - 10 reps - 3 sets - 1x daily - 7x weekly  Heel Toe Raises with Counter Support - 10 reps - 3 sets - 1x daily - 7x weekly  Heel Toe Raises with Unilateral Counter Support - 10 reps - 3 sets                            - 1x daily - 7x weekly  Standing Single Leg Stance with Unilateral Counter Support - 10 reps - 3 sets - 1x daily - 7x weekly  Seated Long Arc Quad - 10 reps - 3 sets - 1x daily - 7x weekly      Assessment/Plan: Pt has progressed well and ready to DC and continue HEP.     Manual Therapy:          mins  87115;  Therapeutic Exercise:    45      mins  62748;     Neuromuscular Frank:         mins  27791;    Therapeutic Activity:     15      mins  65575;     Gait Training:            mins  78044;     Ultrasound:           mins  89578;    Electrical Stimulation:          mins  64156 ( );  Dry Needling           mins self-pay  Traction           mins 10788  Canalith Repositioning         mins 41229      Timed Treatment:      mins   Total Treatment:        mins    Petty Ho PT  [place] License #: [id number]    Physical Therapist

## 2021-02-23 NOTE — PATIENT INSTRUCTIONS
Access Code: [email or web address]   URL: [email or web address]   Date: 02/23/2021   Prepared by: Petty Ho     Exercises  Standing Single Arm Elbow Flexion with Resistance - 10 reps - 3 sets - 1x daily - 7x weekly  Standing Elbow Extension with Self-Anchored Resistance - 10 reps - 3 sets - 1x daily - 7x weekly  Squat with Chair and Counter Support - 10 reps - 3 sets - 1x daily - 7x weekly  Heel Toe Raises with Counter Support - 10 reps - 3 sets - 1x daily - 7x weekly  Heel Toe Raises with Unilateral Counter Support - 10 reps - 3 sets                            - 1x daily - 7x weekly  Standing Single Leg Stance with Unilateral Counter Support - 10 reps - 3 sets - 1x daily - 7x weekly  Seated Long Arc Quad - 10 reps - 3 sets - 1x daily - 7x weekly

## 2021-03-09 DIAGNOSIS — Z23 IMMUNIZATION DUE: ICD-10-CM

## 2021-04-22 ENCOUNTER — OFFICE VISIT (OUTPATIENT)
Dept: INTERNAL MEDICINE | Facility: CLINIC | Age: 71
End: 2021-04-22

## 2021-04-22 VITALS
HEIGHT: 67 IN | BODY MASS INDEX: 20.4 KG/M2 | OXYGEN SATURATION: 98 % | DIASTOLIC BLOOD PRESSURE: 70 MMHG | TEMPERATURE: 96.8 F | HEART RATE: 88 BPM | WEIGHT: 130 LBS | SYSTOLIC BLOOD PRESSURE: 110 MMHG

## 2021-04-22 DIAGNOSIS — R15.9 INCONTINENCE OF FECES, UNSPECIFIED FECAL INCONTINENCE TYPE: Primary | ICD-10-CM

## 2021-04-22 PROCEDURE — 99213 OFFICE O/P EST LOW 20 MIN: CPT | Performed by: INTERNAL MEDICINE

## 2021-04-22 NOTE — PROGRESS NOTES
Subjective   Carol A Osgood is a 70 y.o. female here today for bowel incontinence.      History of Present Illness   She is past due a colon.  She has been complaining of increasing bowel incontinence  She has had a couple of colonscopies and saw dr mcdnoald nd dr rodarte and etiology unknown  She has loose stool.  Foul smelling gas  She has been taking a probiotic with little relief      The following portions of the patient's history were reviewed and updated as appropriate: allergies, current medications, past medical history, past social history and problem list.    Review of Systems    Objective   Physical Exam  Vitals reviewed.   Constitutional:       Appearance: She is well-developed.   HENT:      Head: Normocephalic and atraumatic.      Right Ear: External ear normal.      Left Ear: External ear normal.   Eyes:      Conjunctiva/sclera: Conjunctivae normal.      Pupils: Pupils are equal, round, and reactive to light.   Neck:      Thyroid: No thyromegaly.      Trachea: No tracheal deviation.   Cardiovascular:      Rate and Rhythm: Normal rate and regular rhythm.      Heart sounds: Normal heart sounds.   Pulmonary:      Effort: Pulmonary effort is normal.      Breath sounds: Normal breath sounds.   Abdominal:      General: Bowel sounds are normal. There is no distension.      Palpations: Abdomen is soft.      Tenderness: There is no abdominal tenderness.   Musculoskeletal:         General: No deformity. Normal range of motion.      Cervical back: Normal range of motion.   Skin:     General: Skin is warm and dry.   Neurological:      Mental Status: She is alert and oriented to person, place, and time.   Psychiatric:         Behavior: Behavior normal.         Thought Content: Thought content normal.         Judgment: Judgment normal.         Vitals:    04/22/21 1410   BP: 110/70   Pulse: 88   Temp: 96.8 °F (36 °C)   SpO2: 98%     Body mass index is 20.36 kg/m².       Current Outpatient Medications:   •  Ascorbic Acid  (VITAMIN C PO), Take  by mouth., Disp: , Rfl:   •  calcium carbonate (OS-MOLLY) 600 MG tablet, Take 600 mg by mouth Daily., Disp: , Rfl:   •  Cholecalciferol (VITAMIN D3) 50 MCG (2000 UT) tablet, Take 1 tablet by mouth Daily., Disp: , Rfl:   •  Polyethyl Glycol-Propyl Glycol (SYSTANE ULTRA OP), Apply 1 drop to eye Daily., Disp: , Rfl:   •  Probiotic Product (PROBIOTIC PO), Take  by mouth. kriss life start, Disp: , Rfl:   •  erythromycin (ROMYCIN) 5 MG/GM ophthalmic ointment, 1 application Daily As Needed., Disp: , Rfl:   •  NON FORMULARY, As Needed. ASHWAGANDHA , Disp: , Rfl:       Assessment/Plan   Diagnoses and all orders for this visit:    1. Incontinence of feces, unspecified fecal incontinence type (Primary)  -     Ambulatory Referral to Gastroenterology  -     Cancel: Stool Culture (Reference Lab) - Stool, Per Rectum  -     Clostridium Difficile EIA - Stool, Per Rectum  -     Cancel: Fecal Leukocytes - Stool, Per Rectum  -     Stool Culture (Reference Lab) - Stool, Per Rectum  -     Fecal Leukocytes - Stool, Per Rectum      1.  Fecal incontinence-  Check some labs and refer to gi.  This is been a chronic issue but it is definitely getting worse.  There is really not really anything I can think of to do medically at this point.  She does not think the probiotic is helping so she is going to stop this.

## 2021-04-30 LAB
BACTERIA SPEC CULT: NORMAL
BACTERIA SPEC CULT: NORMAL
C DIFF TOX A+B STL QL IA: NEGATIVE
CAMPYLOBACTER STL CULT: NORMAL
E COLI SXT STL QL IA: NEGATIVE
SALM + SHIG STL CULT: NORMAL
WBC STL QL MICRO: NORMAL
WBC STL QL MICRO: NORMAL

## 2021-06-03 ENCOUNTER — OFFICE VISIT (OUTPATIENT)
Dept: GASTROENTEROLOGY | Facility: CLINIC | Age: 71
End: 2021-06-03

## 2021-06-03 ENCOUNTER — PREP FOR SURGERY (OUTPATIENT)
Dept: SURGERY | Facility: SURGERY CENTER | Age: 71
End: 2021-06-03

## 2021-06-03 VITALS
WEIGHT: 129.2 LBS | SYSTOLIC BLOOD PRESSURE: 110 MMHG | DIASTOLIC BLOOD PRESSURE: 60 MMHG | TEMPERATURE: 97.1 F | OXYGEN SATURATION: 99 % | BODY MASS INDEX: 20.28 KG/M2 | HEIGHT: 67 IN | HEART RATE: 78 BPM

## 2021-06-03 DIAGNOSIS — Z86.010 HX OF ADENOMATOUS COLONIC POLYPS: ICD-10-CM

## 2021-06-03 DIAGNOSIS — R15.9 INCONTINENCE OF FECES, UNSPECIFIED FECAL INCONTINENCE TYPE: Primary | ICD-10-CM

## 2021-06-03 DIAGNOSIS — Z12.11 COLON CANCER SCREENING: ICD-10-CM

## 2021-06-03 PROBLEM — Z86.0101 HX OF ADENOMATOUS COLONIC POLYPS: Status: ACTIVE | Noted: 2021-06-03

## 2021-06-03 PROCEDURE — 99204 OFFICE O/P NEW MOD 45 MIN: CPT | Performed by: INTERNAL MEDICINE

## 2021-06-03 RX ORDER — SODIUM CHLORIDE, SODIUM LACTATE, POTASSIUM CHLORIDE, CALCIUM CHLORIDE 600; 310; 30; 20 MG/100ML; MG/100ML; MG/100ML; MG/100ML
30 INJECTION, SOLUTION INTRAVENOUS CONTINUOUS PRN
Status: CANCELLED | OUTPATIENT
Start: 2021-06-03

## 2021-06-03 RX ORDER — SODIUM CHLORIDE 0.9 % (FLUSH) 0.9 %
10 SYRINGE (ML) INJECTION AS NEEDED
Status: CANCELLED | OUTPATIENT
Start: 2021-06-03

## 2021-06-03 RX ORDER — SODIUM CHLORIDE 0.9 % (FLUSH) 0.9 %
3 SYRINGE (ML) INJECTION EVERY 12 HOURS SCHEDULED
Status: CANCELLED | OUTPATIENT
Start: 2021-06-03

## 2021-06-03 NOTE — PROGRESS NOTES
"Chief Complaint   Patient presents with   • fecal incontinence         History of Present Illness  70-year old female presents to the office today for evaluation of fecal incontinence. Symptoms occur off and on for the past year. She had tried to make changes to her diet and will be better for a period of time and then will revert back to fecal incontinence. If she sits on the toilet for a while every 2 hours, pushes on her abdomen she will have a soft BM. If she misses a session, when she goes to urinate she will have the presence of stool in her pad and will not have known she has had a BM on her pad.  She takes a daily probiotic. She has had anorectal manometry in the past, approximately 7 years ago. She reports having a colonoscopy in the past x 2. First bowel prep was very poor per her report. She had a repeat colonoscopy that demonstrated 1 tubular adenoma with low grade dysplasia. She reports that she is due for her next colonoscopy at this time.     Review of Systems   Constitutional: Negative for fever and unexpected weight change.   Gastrointestinal: Negative for abdominal distention, abdominal pain, anal bleeding, blood in stool, constipation, diarrhea, nausea, rectal pain and vomiting.        Result Review :       SCANNED - COLONOSCOPY (10/20/2017)  Tissue Pathology Exam (10/20/2017 07:48)  DEXA Bone Density Axial (12/04/2019 11:32)      Vital Signs:   /60   Pulse 78   Temp 97.1 °F (36.2 °C)   Ht 170.2 cm (67\")   Wt 58.6 kg (129 lb 3.2 oz)   SpO2 99%   BMI 20.24 kg/m²     Body mass index is 20.24 kg/m².     Physical Exam  Vitals reviewed.   Constitutional:       Appearance: Normal appearance.   HENT:      Nose: No nasal deformity.   Eyes:      General: No scleral icterus.  Neck:      Comments: Trachea midline.  Cardiovascular:      Rate and Rhythm: Normal rate and regular rhythm.   Pulmonary:      Effort: No respiratory distress.      Breath sounds: Normal breath sounds.   Abdominal:      " General: Bowel sounds are normal. There is no distension.      Palpations: Abdomen is soft. There is no mass.      Tenderness: There is no abdominal tenderness.   Lymphadenopathy:      Comments: No periumbilical lymphadenopathy.   Skin:     General: Skin is warm.   Neurological:      Mental Status: She is alert.           Assessment and Plan    Diagnoses and all orders for this visit:    1. Incontinence of feces, unspecified fecal incontinence type (Primary)  -     Anorectal Manometry  -     XR Abdomen KUB; Future    2. Colon cancer screening    3. Hx of adenomatous colonic polyps         Documentation by Dania SALGADO acting as a scribe in the following sections (HPI, Assessment, Plan) for the undersigned provider.       I have reviewed and confirmed the accuracy of the HPI and Assessment and Plan as documented by the NAOMI Mendez        Follow Up   No follow-ups on file.    Patient Instructions   1. For your history of colon polyps and colon cancer screening we will schedule a colonoscopy.    2. For further work up of fecal incontinence, we will schedule anorectal manometry and defecating proctogram. Additional recommendations pending outcome of testing and colonoscopy.     3. To help bulk your stool we recommend use of Konsyl daily fiber supplement. This is available for purchase OTC at your local grocery or pharmacy. We recommend one serving per day.

## 2021-06-03 NOTE — PATIENT INSTRUCTIONS
1. For your history of colon polyps and colon cancer screening we will schedule a colonoscopy.    2. For further work up of fecal incontinence, we will schedule anorectal manometry and defecating proctogram. Additional recommendations pending outcome of testing and colonoscopy.     3. To help bulk your stool we recommend use of Konsyl daily fiber supplement. This is available for purchase OTC at your local grocery or pharmacy. We recommend one serving per day.

## 2021-07-02 ENCOUNTER — OFFICE VISIT (OUTPATIENT)
Dept: INTERNAL MEDICINE | Facility: CLINIC | Age: 71
End: 2021-07-02

## 2021-07-02 VITALS
TEMPERATURE: 97.8 F | BODY MASS INDEX: 19.97 KG/M2 | SYSTOLIC BLOOD PRESSURE: 122 MMHG | DIASTOLIC BLOOD PRESSURE: 70 MMHG | OXYGEN SATURATION: 97 % | HEART RATE: 77 BPM | HEIGHT: 67 IN | WEIGHT: 127.2 LBS

## 2021-07-02 DIAGNOSIS — J02.9 SORE THROAT: ICD-10-CM

## 2021-07-02 DIAGNOSIS — J06.9 ACUTE URI: Primary | ICD-10-CM

## 2021-07-02 LAB
EXPIRATION DATE: NORMAL
INTERNAL CONTROL: NORMAL
Lab: NORMAL
S PYO AG THROAT QL: NEGATIVE

## 2021-07-02 PROCEDURE — 87880 STREP A ASSAY W/OPTIC: CPT | Performed by: NURSE PRACTITIONER

## 2021-07-02 PROCEDURE — 99213 OFFICE O/P EST LOW 20 MIN: CPT | Performed by: NURSE PRACTITIONER

## 2021-07-02 NOTE — PROGRESS NOTES
"Chief Complaint  Sore Throat (Pt c/o sore throat, dry cough X wednesday.) and Cough    Subjective          Carol A Osgood presents to [facility]  History of Present Illness  She is here today as a new patient to me with c/o sore throat, dry cough that began earlier this week. Symptoms began with dry cough and scratchy throat. She noticed a small yellow bump on her left tonsil. This has improved today.  She has been drinking warm fluids and using honey with some improvement in symptoms.   Denies any fever, chills, chest pain, SOA, HA, sinus pressure, postnasal drainage, rhinorrhea, abdominal, nausea, vomiting, anosmia or dysgeusia.    and daughter with the same symptoms. She recently travelled to [place] last weekend. There were several boxwood trees in bloom that were right outside where they were staying.   Positive hx of tonsillitis and strep throat as a child.  Symptoms have improved.    Objective   Vital Signs:   /70 (BP Location: Left arm, Patient Position: Sitting, Cuff Size: Adult)   Pulse 77   Temp 97.8 °F (36.6 °C)   Ht 170.2 cm (67\")   Wt 57.7 kg (127 lb 3.2 oz)   SpO2 97%   BMI 19.92 kg/m²     Physical Exam  Constitutional:       Appearance: She is well-developed.   HENT:      Head: Normocephalic and atraumatic.      Right Ear: Hearing, tympanic membrane, ear canal and external ear normal.      Left Ear: Hearing, tympanic membrane, ear canal and external ear normal.      Nose: Nose normal.      Right Sinus: No maxillary sinus tenderness or frontal sinus tenderness.      Left Sinus: No maxillary sinus tenderness or frontal sinus tenderness.      Mouth/Throat:      Lips: Pink.      Mouth: Mucous membranes are moist.      Tongue: No lesions. Tongue does not deviate from midline.      Palate: No mass and lesions.      Pharynx: Oropharynx is clear. Uvula midline.      Comments: Left tonsil stone present  Neck:      Thyroid: No thyroid mass, thyromegaly or " thyroid tenderness.      Vascular: No carotid bruit.      Trachea: Trachea normal.   Cardiovascular:      Rate and Rhythm: Normal rate and regular rhythm.      Chest Wall: PMI is not displaced.      Pulses:           Radial pulses are 2+ on the right side and 2+ on the left side.        Dorsalis pedis pulses are 2+ on the right side and 2+ on the left side.        Posterior tibial pulses are 2+ on the right side and 2+ on the left side.      Heart sounds: S1 normal and S2 normal.   Pulmonary:      Effort: Pulmonary effort is normal.      Breath sounds: Normal breath sounds.   Musculoskeletal:      Right lower leg: No edema.      Left lower leg: No edema.   Lymphadenopathy:      Head:      Right side of head: No submental, submandibular, tonsillar or occipital adenopathy.      Left side of head: No submental, submandibular, tonsillar or occipital adenopathy.      Cervical: No cervical adenopathy.   Skin:     General: Skin is warm and dry.      Capillary Refill: Capillary refill takes less than 2 seconds.      Nails: There is no clubbing.   Neurological:      Mental Status: She is alert and oriented to person, place, and time.   Psychiatric:         Attention and Perception: Attention normal.         Mood and Affect: Mood and affect normal.         Speech: Speech normal.         Behavior: Behavior normal.         Thought Content: Thought content normal.         Cognition and Memory: Cognition normal.        Result Review :                 Assessment and Plan    Diagnoses and all orders for this visit:    1. Acute URI (Primary)    2. Sore throat  -     POCT rapid strep A  -     COVID-19,[facility] ROUTINE, NP/OP SWAB IN TRANSPORT MEDIA OR ESWAB 72 HR TAT - Swab, Oropharynx       1. Acute URI- ?allergies as she was exposed to excessive tree pollen over the weekend. Strep test negative today. Presence of left tonsil stone on exam today. Encouraged her to continue to monitor this. Notify if it becomes larger or changes.  Encouraged her to continue to drink warm fluids, warm slat water gargles, tylenol or ibuprofen PRN. If having postnasal drainage recommend antihistamine at bedtime. COVID test pending. Discussed quarantine at home pending test results.       Follow Up {Instructions Charge Capture  Follow-up Communications :23}  Return if symptoms worsen or fail to improve, for Next scheduled follow up.  Patient was given instructions and counseling regarding her condition or for health maintenance advice. Please see specific information pulled into the AVS if appropriate.

## 2021-07-03 LAB
LABCORP SARS-COV-2, NAA 2 DAY TAT: NORMAL
SARS-COV-2 RNA RESP QL NAA+PROBE: NOT DETECTED

## 2021-07-30 ENCOUNTER — OFFICE VISIT (OUTPATIENT)
Dept: INTERNAL MEDICINE | Facility: CLINIC | Age: 71
End: 2021-07-30

## 2021-07-30 VITALS
TEMPERATURE: 98.4 F | DIASTOLIC BLOOD PRESSURE: 74 MMHG | WEIGHT: 127.7 LBS | OXYGEN SATURATION: 99 % | HEART RATE: 78 BPM | BODY MASS INDEX: 20.04 KG/M2 | HEIGHT: 67 IN | SYSTOLIC BLOOD PRESSURE: 112 MMHG

## 2021-07-30 DIAGNOSIS — J35.8 TONSILLITH: Primary | ICD-10-CM

## 2021-07-30 PROCEDURE — 99212 OFFICE O/P EST SF 10 MIN: CPT | Performed by: INTERNAL MEDICINE

## 2021-07-30 NOTE — PROGRESS NOTES
Subjective   Carol A Osgood is a 70 y.o. female here today for a tonsil rock, left side.    History of Present Illness   She has a stone on her left tonsil  She has been having worsening allergy sx      The following portions of the patient's history were reviewed and updated as appropriate: allergies, current medications, past medical history, past social history and problem list.    Review of Systems    Objective   Physical Exam  Vitals reviewed.   Constitutional:       Appearance: She is well-developed.   HENT:      Head: Normocephalic and atraumatic.      Right Ear: External ear normal.      Left Ear: External ear normal.   Eyes:      Conjunctiva/sclera: Conjunctivae normal.      Pupils: Pupils are equal, round, and reactive to light.   Neck:      Thyroid: No thyromegaly.      Trachea: No tracheal deviation.   Cardiovascular:      Rate and Rhythm: Normal rate and regular rhythm.      Heart sounds: Normal heart sounds.   Pulmonary:      Effort: Pulmonary effort is normal.      Breath sounds: Normal breath sounds.   Abdominal:      General: Bowel sounds are normal. There is no distension.      Palpations: Abdomen is soft.      Tenderness: There is no abdominal tenderness.   Musculoskeletal:         General: No deformity. Normal range of motion.      Cervical back: Normal range of motion.   Skin:     General: Skin is warm and dry.   Neurological:      Mental Status: She is alert and oriented to person, place, and time.   Psychiatric:         Behavior: Behavior normal.         Thought Content: Thought content normal.         Judgment: Judgment normal.         Vitals:    07/30/21 1001   BP: 112/74   Pulse: 78   Temp: 98.4 °F (36.9 °C)   SpO2: 99%     Body mass index is 20 kg/m².       Current Outpatient Medications:   •  Ascorbic Acid (VITAMIN C PO), Take  by mouth., Disp: , Rfl:   •  calcium carbonate (OS-MOLLY) 600 MG tablet, Take 600 mg by mouth Daily., Disp: , Rfl:   •  Cholecalciferol (VITAMIN D3) 50 MCG (2000 UT)  tablet, Take 1 tablet by mouth Daily., Disp: , Rfl:   •  erythromycin (ROMYCIN) 5 MG/GM ophthalmic ointment, 1 application Daily As Needed., Disp: , Rfl:   •  NON FORMULARY, As Needed. ASHWAGANDHA , Disp: , Rfl:   •  Polyethyl Glycol-Propyl Glycol (SYSTANE ULTRA OP), Apply 1 drop to eye Daily., Disp: , Rfl:       Assessment/Plan   Diagnoses and all orders for this visit:    1. Tonsillith (Primary)      1.  Tonsillitis: It is on the left.  It is very mild she has no swelling of her tonsil.  I have advised some salt water gargles if it causes her any trouble or seems to have other issues associated with it we can refer her to ENT but at this time I do not think anything else needs to be done

## 2021-09-10 NOTE — SIGNIFICANT NOTE
Education provided the Patient on the following:    - Nothing to Eat or Drink after MN the night before the procedure    - Avoid red/purple fluids while completing their bowel prep as ordered by physician  -Contact Gastrointerologist office for any questions about specific details regarding colon prep    -You will need to have someone drive you home after your colonoscopy and remain with you for 24 hours after the procedure  - The date of your Surgery, you may have one visitor at bedside or within 10-15 minutes of [facility]  -Please wear warm socks when you arrive for your colonoscopy  -Remove all jewelry and leave any valuables before arriving the day of your procedure (all will have to be removed before leaving preop)  -You will need to arrive at  0900   on   9/14/21        for your colonoscopy    -Feel free to contact us at: [phone] with any additional questions/concerns

## 2021-09-14 ENCOUNTER — ANESTHESIA EVENT (OUTPATIENT)
Dept: SURGERY | Facility: SURGERY CENTER | Age: 71
End: 2021-09-14

## 2021-09-14 ENCOUNTER — ANESTHESIA (OUTPATIENT)
Dept: SURGERY | Facility: SURGERY CENTER | Age: 71
End: 2021-09-14

## 2021-09-14 ENCOUNTER — HOSPITAL ENCOUNTER (OUTPATIENT)
Facility: SURGERY CENTER | Age: 71
Setting detail: HOSPITAL OUTPATIENT SURGERY
Discharge: HOME | End: 2021-09-14
Attending: INTERNAL MEDICINE | Admitting: NURSE PRACTITIONER

## 2021-09-14 VITALS
BODY MASS INDEX: 19.46 KG/M2 | SYSTOLIC BLOOD PRESSURE: 127 MMHG | OXYGEN SATURATION: 98 % | TEMPERATURE: 97.8 F | HEART RATE: 81 BPM | HEIGHT: 67 IN | RESPIRATION RATE: 16 BRPM | WEIGHT: 124 LBS | DIASTOLIC BLOOD PRESSURE: 77 MMHG

## 2021-09-14 DIAGNOSIS — R15.9 INCONTINENCE OF FECES, UNSPECIFIED FECAL INCONTINENCE TYPE: ICD-10-CM

## 2021-09-14 DIAGNOSIS — Z12.11 COLON CANCER SCREENING: ICD-10-CM

## 2021-09-14 DIAGNOSIS — Z86.010 HX OF ADENOMATOUS COLONIC POLYPS: ICD-10-CM

## 2021-09-14 PROCEDURE — 0DBK8ZX EXCISION OF ASCENDING COLON, VIA NATURAL OR ARTIFICIAL OPENING ENDOSCOPIC, DIAGNOSTIC: ICD-10-PCS | Performed by: NURSE PRACTITIONER

## 2021-09-14 PROCEDURE — 45380 COLONOSCOPY AND BIOPSY: CPT | Performed by: INTERNAL MEDICINE

## 2021-09-14 PROCEDURE — 45380 COLONOSCOPY AND BIOPSY: CPT | Performed by: NURSE PRACTITIONER

## 2021-09-14 PROCEDURE — 25010000002 PROPOFOL 10 MG/ML EMULSION: Performed by: ANESTHESIOLOGY

## 2021-09-14 PROCEDURE — 88305 TISSUE EXAM BY PATHOLOGIST: CPT | Performed by: INTERNAL MEDICINE

## 2021-09-14 RX ORDER — MAGNESIUM HYDROXIDE 1200 MG/15ML
LIQUID ORAL AS NEEDED
Status: DISCONTINUED | OUTPATIENT
Start: 2021-09-14 | End: 2021-09-14 | Stop reason: HOSPADM

## 2021-09-14 RX ORDER — SODIUM CHLORIDE 0.9 % (FLUSH) 0.9 %
10 SYRINGE (ML) INJECTION AS NEEDED
Status: DISCONTINUED | OUTPATIENT
Start: 2021-09-14 | End: 2021-09-14 | Stop reason: HOSPADM

## 2021-09-14 RX ORDER — SODIUM CHLORIDE, SODIUM LACTATE, POTASSIUM CHLORIDE, CALCIUM CHLORIDE 600; 310; 30; 20 MG/100ML; MG/100ML; MG/100ML; MG/100ML
1000 INJECTION, SOLUTION INTRAVENOUS CONTINUOUS
Status: DISCONTINUED | OUTPATIENT
Start: 2021-09-14 | End: 2021-09-14 | Stop reason: HOSPADM

## 2021-09-14 RX ORDER — PROPOFOL 10 MG/ML
VIAL (ML) INTRAVENOUS AS NEEDED
Status: DISCONTINUED | OUTPATIENT
Start: 2021-09-14 | End: 2021-09-14 | Stop reason: SURG

## 2021-09-14 RX ORDER — SODIUM CHLORIDE 0.9 % (FLUSH) 0.9 %
3 SYRINGE (ML) INJECTION EVERY 12 HOURS SCHEDULED
Status: DISCONTINUED | OUTPATIENT
Start: 2021-09-14 | End: 2021-09-14 | Stop reason: HOSPADM

## 2021-09-14 RX ORDER — SODIUM CHLORIDE, SODIUM LACTATE, POTASSIUM CHLORIDE, CALCIUM CHLORIDE 600; 310; 30; 20 MG/100ML; MG/100ML; MG/100ML; MG/100ML
30 INJECTION, SOLUTION INTRAVENOUS CONTINUOUS PRN
Status: DISCONTINUED | OUTPATIENT
Start: 2021-09-14 | End: 2021-09-14 | Stop reason: HOSPADM

## 2021-09-14 RX ORDER — LIDOCAINE HYDROCHLORIDE 20 MG/ML
INJECTION, SOLUTION INFILTRATION; PERINEURAL AS NEEDED
Status: DISCONTINUED | OUTPATIENT
Start: 2021-09-14 | End: 2021-09-14 | Stop reason: SURG

## 2021-09-14 RX ORDER — LIDOCAINE HYDROCHLORIDE 10 MG/ML
0.5 INJECTION, SOLUTION INFILTRATION; PERINEURAL ONCE AS NEEDED
Status: DISCONTINUED | OUTPATIENT
Start: 2021-09-14 | End: 2021-09-14 | Stop reason: HOSPADM

## 2021-09-14 RX ADMIN — LIDOCAINE HYDROCHLORIDE 80 MG: 20 INJECTION, SOLUTION INFILTRATION; PERINEURAL at 09:36

## 2021-09-14 RX ADMIN — PROPOFOL 100 MG: 10 INJECTION, EMULSION INTRAVENOUS at 09:36

## 2021-09-14 RX ADMIN — SODIUM CHLORIDE, POTASSIUM CHLORIDE, SODIUM LACTATE AND CALCIUM CHLORIDE 1000 ML: 600; 310; 30; 20 INJECTION, SOLUTION INTRAVENOUS at 09:03

## 2021-09-14 RX ADMIN — PROPOFOL INJECTABLE EMULSION 100 MCG/KG/MIN: 10 INJECTION, EMULSION INTRAVENOUS at 09:36

## 2021-09-14 NOTE — ANESTHESIA PREPROCEDURE EVALUATION
Anesthesia Evaluation     Nursing notes reviewed   no history of anesthetic complications:               Airway   Mallampati: III  TM distance: >3 FB  Neck ROM: full  Dental      Pulmonary - normal exam   (-) not a smoker  Cardiovascular - normal exam    (-) angina      Neuro/Psych  (+) psychiatric history Anxiety,     GI/Hepatic/Renal/Endo    (+)   renal disease CRI,     ROS Comment: Hx of adenomatous colonic polyps  Incontinence of feces    Musculoskeletal     Abdominal    Substance History      OB/GYN          Other                        Anesthesia Plan    ASA 2     MAC       Anesthetic plan, all risks, benefits, and alternatives have been provided, discussed and informed consent has been obtained with: patient.

## 2021-09-14 NOTE — H&P
No chief complaint on file.      HPI  H/o polyps         Problem List:    Patient Active Problem List   Diagnosis   • Low back pain   • Low hemoglobin   • Menopause present   • Osteoporosis   • Thrombocytopenia (CMS/HCC)   • Renal insufficiency   • Pain and swelling of left knee   • Anxiety   • Vitamin D deficiency   • Constipation   • Memory loss   • Incontinence of feces   • Colon cancer screening   • Hx of adenomatous colonic polyps       Medical History:    Past Medical History:   Diagnosis Date   • Arthritis    • Osteoporosis         Social History:    Social History     Socioeconomic History   • Marital status:      Spouse name: Michael J Osgood   • Number of children: 1   • Years of education: Not on file   • Highest education level: Not on file   Tobacco Use   • Smoking status: Never Smoker   • Smokeless tobacco: Never Used   • Tobacco comment: no caffeine   Vaping Use   • Vaping Use: Never used   Substance and Sexual Activity   • Alcohol use: No   • Drug use: No   • Sexual activity: Defer       Family History:   Family History   Problem Relation Age of Onset   • COPD Other    • Heart attack Other    • Stroke Other    • Thrombocytopenia Other    • Diabetes Other    • Brain cancer Other    • COPD Mother    • Heart attack Mother    • Stroke Mother    • Heart disease Father         PARALYSIS, WAIST DOWN   • Thrombocytopenia Sister    • Diabetes Maternal Grandmother    • Cancer Paternal Grandmother         BRAIN   • Breast cancer Paternal Aunt    • Hepatitis Brother    • Prostate cancer Brother    • Colon cancer Paternal Aunt    • Colon polyps Paternal Aunt    • Colon cancer Other         NEPHEW   • Colon polyps Other    • Irritable bowel syndrome Neg Hx    • Ulcerative colitis Neg Hx        Surgical History:   Past Surgical History:   Procedure Laterality Date   • APPENDECTOMY           Current Facility-Administered Medications:   •  lactated ringers infusion 1,000 mL, 1,000 mL, Intravenous, Continuous,  Vineet Tran MD, Last Rate: 25 mL/hr at 09/14/21 0903, 1,000 mL at 09/14/21 0903  •  lidocaine (XYLOCAINE) 1 % injection 0.5 mL, 0.5 mL, Intradermal, Once PRN, Vineet Tran MD  •  sodium chloride 0.9 % flush 10 mL, 10 mL, Intravenous, PRN, Dania Sanchez APRN  •  sodium chloride 0.9 % flush 10 mL, 10 mL, Intravenous, PRN, Vineet Tran MD  •  sodium chloride 0.9 % flush 3 mL, 3 mL, Intravenous, Q12H, Dania Sanchez APRN    Allergies:   Allergies   Allergen Reactions   • Adhesive Tape Swelling   • Latex, Natural Rubber Itching   • Celecoxib Other (See Comments)     Made her feel bad        The following portions of the patient's history were reviewed by me and updated as appropriate: review of systems, allergies, current medications, past family history, past medical history, past social history, past surgical history and problem list.    Vitals:    09/14/21 0903   BP: 146/86   Pulse: 85   Resp: 16   Temp: 97.5 °F (36.4 °C)   SpO2: 100%       PHYSICAL EXAM:    CONSTITUTIONAL:  today's vital signs reviewed by me  GASTROINTESTINAL: abdomen is soft nontender nondistended with normal active bowel sounds, no masses are appreciated    Assessment/ Plan  Surveillance for h/o polyps    colonoscopy    Risks and benefits as well as alternatives to endoscopic evaluation were explained to the patient and they voiced understanding and wish to proceed.  These risks include but are not limited to the risk of bleeding, perforation, adverse reaction to sedation, and missed lesions.  The patient was given the opportunity to ask questions prior to the endoscopic procedure.

## 2021-09-14 NOTE — ANESTHESIA POSTPROCEDURE EVALUATION
Patient: Carol A Osgood    Procedure Summary     Date: 09/14/21 Room / Location: [facility]    Anesthesia Start: 0935 Anesthesia Stop: 1001    Procedure: COLONOSCOPY (N/A ) Diagnosis:       Incontinence of feces, unspecified fecal incontinence type      Colon cancer screening      Hx of adenomatous colonic polyps      (Incontinence of feces, unspecified fecal incontinence type [R15.9])      (Colon cancer screening [Z12.11])      (Hx of adenomatous colonic polyps [Z86.010])    Surgeons: Vineet Tran MD Provider: Daniele Morgan MD    Anesthesia Type: MAC ASA Status: 2          Anesthesia Type: MAC    Vitals  Vitals Value Taken Time   /62 09/14/21 1005   Temp 36.6 °C (97.8 °F) 09/14/21 1005   Pulse 81 09/14/21 1005   Resp 17 09/14/21 1005   SpO2 100 % 09/14/21 1005           Post Anesthesia Care and Evaluation    Patient location during evaluation: PHASE II  Anesthetic complications: No anesthetic complications

## 2021-09-15 LAB
LAB AP CASE REPORT: NORMAL
PATH REPORT.FINAL DX SPEC: NORMAL
PATH REPORT.GROSS SPEC: NORMAL

## 2021-09-22 ENCOUNTER — TELEPHONE (OUTPATIENT)
Dept: INTERNAL MEDICINE | Facility: CLINIC | Age: 71
End: 2021-09-22

## 2021-09-22 ENCOUNTER — OFFICE VISIT (OUTPATIENT)
Dept: GASTROENTEROLOGY | Facility: CLINIC | Age: 71
End: 2021-09-22

## 2021-09-22 VITALS
WEIGHT: 125.9 LBS | TEMPERATURE: 97.3 F | BODY MASS INDEX: 19.76 KG/M2 | SYSTOLIC BLOOD PRESSURE: 124 MMHG | DIASTOLIC BLOOD PRESSURE: 70 MMHG | HEART RATE: 78 BPM | HEIGHT: 67 IN | OXYGEN SATURATION: 97 %

## 2021-09-22 DIAGNOSIS — R15.9 INCONTINENCE OF FECES, UNSPECIFIED FECAL INCONTINENCE TYPE: Primary | ICD-10-CM

## 2021-09-22 DIAGNOSIS — K59.00 CONSTIPATION, UNSPECIFIED CONSTIPATION TYPE: ICD-10-CM

## 2021-09-22 DIAGNOSIS — J35.8 TONSILLITH: Primary | ICD-10-CM

## 2021-09-22 PROCEDURE — 99213 OFFICE O/P EST LOW 20 MIN: CPT | Performed by: NURSE PRACTITIONER

## 2021-09-22 NOTE — PATIENT INSTRUCTIONS
1.  Continue Konsyl daily fiber supplement, 1-2 times daily based upon bowel habits.    2.  Continue daily probiotic.    3.  Office follow-up as needed based upon symptoms.

## 2021-09-22 NOTE — PROGRESS NOTES
"Chief Complaint   Patient presents with   • Follow-up     Fecal incontinence         History of Present Illness  70-year-old female presents the office today for follow-up.  She was last seen in office on 6/3/2021.  She has a history of fecal incontinence and constipation which began around June 2020.  She takes a daily probiotic and underwent anorectal manometry approximately 7 years ago.  She most recently underwent colonoscopy on 9/14/2021 revealing melanosis of the colon, nonbleeding internal hemorrhoids, and a tortuous colon.  Colon biopsies consistent with melanosis coli.    She reports that she is currently taking Konsyl 1-2 servings per day which is producing 1-2 formed stools per day that are soft.  Stool is somewhat fragmented, but she is not currently experiencing continence.  She does still wear a feminine pad daily.  She denies any melena or hematochezia.  She does report some mild borborygmi.  She takes a daily probiotic.  She does report a paternal aunt with colon cancer and a paternal cousin who was diagnosed with colon cancer at the age of 25.    She denies having any upper GI symptoms such as heartburn, reflux, nausea, vomiting, or dysphagia.    Review of Systems   Constitutional: Negative for unexpected weight change.   HENT: Negative for trouble swallowing.    Cardiovascular: Negative for chest pain.   Gastrointestinal: Negative for abdominal distention, abdominal pain, anal bleeding, blood in stool, constipation, diarrhea, nausea, rectal pain and vomiting.      Result Review :       Office Visit with Vineet Tran MD (06/03/2021)  COLONOSCOPY (09/14/2021 09:33)  Tissue Pathology Exam (09/14/2021 09:57)  Clostridium Difficile EIA - , (04/26/2021 10:10)    Vital Signs:   /70   Pulse 78   Temp 97.3 °F (36.3 °C) (Temporal)   Ht 170.2 cm (67\")   Wt 57.1 kg (125 lb 14.4 oz)   SpO2 97%   BMI 19.72 kg/m²     Body mass index is 19.72 kg/m².     Physical Exam  Vitals reviewed. "   Constitutional:       Appearance: Normal appearance.   Pulmonary:      Effort: Pulmonary effort is normal. No respiratory distress.   Abdominal:      General: Abdomen is flat. Bowel sounds are normal. There is no distension.      Palpations: Abdomen is soft. There is no mass.      Tenderness: There is no abdominal tenderness. There is no guarding.   Musculoskeletal:         General: Normal range of motion.   Skin:     General: Skin is warm and dry.   Neurological:      General: No focal deficit present.      Mental Status: She is alert and oriented to person, place, and time.   Psychiatric:         Mood and Affect: Mood normal.         Behavior: Behavior normal.         Thought Content: Thought content normal.         Judgment: Judgment normal.       Assessment and Plan    Diagnoses and all orders for this visit:    1. Incontinence of feces, unspecified fecal incontinence type (Primary)    2. Constipation, unspecified constipation type           Patient Instructions   1.  Continue Konsyl daily fiber supplement, 1-2 times daily based upon bowel habits.    2.  Continue daily probiotic.    3.  Office follow-up as needed based upon symptoms.     Discussion:    Colonoscopy findings and pathology reviewed with patient today during her office visit.  Patient's fecal incontinence is well managed with Konsyl fiber supplement 1-2 servings per day, which we will have the patient continue.  Should her fecal incontinence return despite use of Konsyl to consider repeat anorectal manometry as it has been greater than 7 years since her last testing.  Would recommend office follow-up as needed.  Patient verbalized understand of above plan of care and is in agreement.  All questions answered and support provided.     EMR Dragon/Transcription Disclaimer:  This document has been Dictated utilizing Dragon dictation.

## 2021-09-22 NOTE — TELEPHONE ENCOUNTER
Referral ordered       ----- Message from Tra Luna sent at 9/22/2021  1:02 PM EDT -----  Patient said she was told to let Dr Newman know that she would like to be referred to Bertram Newman for follow up on throat nodule.

## 2021-11-03 DIAGNOSIS — M54.50 CHRONIC BILATERAL LOW BACK PAIN WITHOUT SCIATICA: ICD-10-CM

## 2021-11-03 DIAGNOSIS — E78.5 HYPERLIPIDEMIA, UNSPECIFIED HYPERLIPIDEMIA TYPE: ICD-10-CM

## 2021-11-03 DIAGNOSIS — F41.9 ANXIETY: ICD-10-CM

## 2021-11-03 DIAGNOSIS — E55.9 VITAMIN D DEFICIENCY: ICD-10-CM

## 2021-11-03 DIAGNOSIS — N28.9 RENAL INSUFFICIENCY: ICD-10-CM

## 2021-11-03 DIAGNOSIS — G89.29 CHRONIC BILATERAL LOW BACK PAIN WITHOUT SCIATICA: ICD-10-CM

## 2021-11-05 ENCOUNTER — LAB (OUTPATIENT)
Dept: LAB | Facility: HOSPITAL | Age: 71
End: 2021-11-05

## 2021-11-05 LAB
25(OH)D3 SERPL-MCNC: 45.6 NG/ML (ref 30–100)
ALBUMIN SERPL-MCNC: 4.3 G/DL (ref 3.5–5.2)
ALBUMIN/GLOB SERPL: 1.6 G/DL
ALP SERPL-CCNC: 75 U/L (ref 39–117)
ALT SERPL W P-5'-P-CCNC: 18 U/L (ref 1–33)
ANION GAP SERPL CALCULATED.3IONS-SCNC: 9.8 MMOL/L (ref 5–15)
AST SERPL-CCNC: 22 U/L (ref 1–32)
BASOPHILS # BLD AUTO: 0.04 10*3/MM3 (ref 0–0.2)
BASOPHILS NFR BLD AUTO: 1 % (ref 0–1.5)
BILIRUB SERPL-MCNC: 0.4 MG/DL (ref 0–1.2)
BUN SERPL-MCNC: 42 MG/DL (ref 8–23)
BUN/CREAT SERPL: 28.4 (ref 7–25)
CALCIUM SPEC-SCNC: 10.1 MG/DL (ref 8.6–10.5)
CHLORIDE SERPL-SCNC: 105 MMOL/L (ref 98–107)
CO2 SERPL-SCNC: 27.2 MMOL/L (ref 22–29)
CREAT SERPL-MCNC: 1.48 MG/DL (ref 0.57–1)
DEPRECATED RDW RBC AUTO: 40.8 FL (ref 37–54)
EOSINOPHIL # BLD AUTO: 0.12 10*3/MM3 (ref 0–0.4)
EOSINOPHIL NFR BLD AUTO: 2.9 % (ref 0.3–6.2)
ERYTHROCYTE [DISTWIDTH] IN BLOOD BY AUTOMATED COUNT: 12.1 % (ref 12.3–15.4)
GFR SERPL CREATININE-BSD FRML MDRD: 35 ML/MIN/1.73
GLOBULIN UR ELPH-MCNC: 2.7 GM/DL
GLUCOSE SERPL-MCNC: 79 MG/DL (ref 65–99)
HCT VFR BLD AUTO: 33.8 % (ref 34–46.6)
HGB BLD-MCNC: 11.7 G/DL (ref 12–15.9)
IMM GRANULOCYTES # BLD AUTO: 0.01 10*3/MM3 (ref 0–0.05)
IMM GRANULOCYTES NFR BLD AUTO: 0.2 % (ref 0–0.5)
LYMPHOCYTES # BLD AUTO: 0.82 10*3/MM3 (ref 0.7–3.1)
LYMPHOCYTES NFR BLD AUTO: 19.5 % (ref 19.6–45.3)
MCH RBC QN AUTO: 32.2 PG (ref 26.6–33)
MCHC RBC AUTO-ENTMCNC: 34.6 G/DL (ref 31.5–35.7)
MCV RBC AUTO: 93.1 FL (ref 79–97)
MONOCYTES # BLD AUTO: 0.37 10*3/MM3 (ref 0.1–0.9)
MONOCYTES NFR BLD AUTO: 8.8 % (ref 5–12)
NEUTROPHILS NFR BLD AUTO: 2.85 10*3/MM3 (ref 1.7–7)
NEUTROPHILS NFR BLD AUTO: 67.6 % (ref 42.7–76)
NRBC BLD AUTO-RTO: 0 /100 WBC (ref 0–0.2)
PLATELET # BLD AUTO: 128 10*3/MM3 (ref 140–450)
PMV BLD AUTO: 12.2 FL (ref 6–12)
POTASSIUM SERPL-SCNC: 4.1 MMOL/L (ref 3.5–5.2)
PROT SERPL-MCNC: 7 G/DL (ref 6–8.5)
RBC # BLD AUTO: 3.63 10*6/MM3 (ref 3.77–5.28)
SODIUM SERPL-SCNC: 142 MMOL/L (ref 136–145)
TSH SERPL DL<=0.05 MIU/L-ACNC: 4.15 UIU/ML (ref 0.27–4.2)
WBC # BLD AUTO: 4.21 10*3/MM3 (ref 3.4–10.8)

## 2021-11-05 PROCEDURE — 85025 COMPLETE CBC W/AUTO DIFF WBC: CPT | Performed by: INTERNAL MEDICINE

## 2021-11-05 PROCEDURE — 80061 LIPID PANEL: CPT | Performed by: INTERNAL MEDICINE

## 2021-11-05 PROCEDURE — 36415 COLL VENOUS BLD VENIPUNCTURE: CPT | Performed by: INTERNAL MEDICINE

## 2021-11-05 PROCEDURE — 84443 ASSAY THYROID STIM HORMONE: CPT | Performed by: INTERNAL MEDICINE

## 2021-11-05 PROCEDURE — 82306 VITAMIN D 25 HYDROXY: CPT | Performed by: INTERNAL MEDICINE

## 2021-11-05 PROCEDURE — 80053 COMPREHEN METABOLIC PANEL: CPT | Performed by: INTERNAL MEDICINE

## 2021-11-08 LAB
CHOLEST SERPL-MCNC: 175 MG/DL (ref 100–199)
HDLC SERPL-MCNC: 85 MG/DL
LDLC SERPL CALC-MCNC: 78 MG/DL (ref 0–99)
LDLC/HDLC SERPL: 0.9 RATIO (ref 0–3.2)
TRIGL SERPL-MCNC: 64 MG/DL (ref 0–149)
VLDLC SERPL CALC-MCNC: 12 MG/DL (ref 5–40)

## 2021-11-11 ENCOUNTER — OFFICE VISIT (OUTPATIENT)
Dept: INTERNAL MEDICINE | Facility: CLINIC | Age: 71
End: 2021-11-11

## 2021-11-11 VITALS
OXYGEN SATURATION: 95 % | DIASTOLIC BLOOD PRESSURE: 62 MMHG | BODY MASS INDEX: 19.98 KG/M2 | TEMPERATURE: 98.2 F | SYSTOLIC BLOOD PRESSURE: 114 MMHG | WEIGHT: 127.3 LBS | HEIGHT: 67 IN | HEART RATE: 81 BPM

## 2021-11-11 DIAGNOSIS — L20.89 OTHER ATOPIC DERMATITIS: ICD-10-CM

## 2021-11-11 DIAGNOSIS — K58.1 IRRITABLE BOWEL SYNDROME WITH CONSTIPATION: ICD-10-CM

## 2021-11-11 DIAGNOSIS — Z91.040 LATEX ALLERGY: ICD-10-CM

## 2021-11-11 DIAGNOSIS — Z12.31 ENCOUNTER FOR SCREENING MAMMOGRAM FOR MALIGNANT NEOPLASM OF BREAST: Primary | ICD-10-CM

## 2021-11-11 PROCEDURE — 1160F RVW MEDS BY RX/DR IN RCRD: CPT | Performed by: INTERNAL MEDICINE

## 2021-11-11 PROCEDURE — 1126F AMNT PAIN NOTED NONE PRSNT: CPT | Performed by: INTERNAL MEDICINE

## 2021-11-11 PROCEDURE — 99213 OFFICE O/P EST LOW 20 MIN: CPT | Performed by: INTERNAL MEDICINE

## 2021-11-11 PROCEDURE — G0439 PPPS, SUBSEQ VISIT: HCPCS | Performed by: INTERNAL MEDICINE

## 2021-11-11 PROCEDURE — 1170F FXNL STATUS ASSESSED: CPT | Performed by: INTERNAL MEDICINE

## 2021-11-11 NOTE — PROGRESS NOTES
Subjective   Carol A Osgood is a 70 y.o. female here to follow up on labs and medicare wellness.    History of Present Illness   She has been under stress   She is doing better  She has been seeing GI about her chronic abd pain    The following portions of the patient's history were reviewed and updated as appropriate: allergies, current medications, past medical history, past social history and problem list.    Review of Systems    Objective   Physical Exam  Vitals reviewed.   Constitutional:       Appearance: She is well-developed.   HENT:      Head: Normocephalic and atraumatic.      Right Ear: External ear normal.      Left Ear: External ear normal.   Eyes:      Conjunctiva/sclera: Conjunctivae normal.      Pupils: Pupils are equal, round, and reactive to light.   Neck:      Thyroid: No thyromegaly.      Trachea: No tracheal deviation.   Cardiovascular:      Rate and Rhythm: Normal rate and regular rhythm.      Heart sounds: Normal heart sounds.   Pulmonary:      Effort: Pulmonary effort is normal.      Breath sounds: Normal breath sounds.   Abdominal:      General: Bowel sounds are normal. There is no distension.      Palpations: Abdomen is soft.      Tenderness: There is no abdominal tenderness.   Musculoskeletal:         General: No deformity. Normal range of motion.      Cervical back: Normal range of motion.   Skin:     General: Skin is warm and dry.   Neurological:      Mental Status: She is alert and oriented to person, place, and time.   Psychiatric:         Behavior: Behavior normal.         Thought Content: Thought content normal.         Judgment: Judgment normal.         Vitals:    11/11/21 1047   BP: 114/62   Pulse: 81   Temp: 98.2 °F (36.8 °C)   SpO2: 95%     Orders Only on 11/03/2021   Component Date Value Ref Range Status   • 25 Hydroxy, Vitamin D 11/05/2021 45.6  30.0 - 100.0 ng/ml Final   • TSH 11/05/2021 4.150  0.270 - 4.200 uIU/mL Final   • Total Cholesterol 11/05/2021 175  100 - 199 mg/dL  Final   • Triglycerides 11/05/2021 64  0 - 149 mg/dL Final   • HDL Cholesterol 11/05/2021 85  >39 mg/dL Final   • VLDL Cholesterol Law 11/05/2021 12  5 - 40 mg/dL Final   • LDL Chol Calc ([facility]) 11/05/2021 78  0 - 99 mg/dL Final   • LDL/HDL RATIO 11/05/2021 0.9  0.0 - 3.2 ratio Final                                        LDL/HDL Ratio                                              Men  Women                                1/2 Avg.Risk  1.0    1.5                                    Avg.Risk  3.6    3.2                                 2X Avg.Risk  6.2    5.0                                 3X Avg.Risk  8.0    6.1   • Glucose 11/05/2021 79  65 - 99 mg/dL Final   • BUN 11/05/2021 42* 8 - 23 mg/dL Final   • Creatinine 11/05/2021 1.48* 0.57 - 1.00 mg/dL Final   • Sodium 11/05/2021 142  136 - 145 mmol/L Final   • Potassium 11/05/2021 4.1  3.5 - 5.2 mmol/L Final   • Chloride 11/05/2021 105  98 - 107 mmol/L Final   • CO2 11/05/2021 27.2  22.0 - 29.0 mmol/L Final   • Calcium 11/05/2021 10.1  8.6 - 10.5 mg/dL Final   • Total Protein 11/05/2021 7.0  6.0 - 8.5 g/dL Final   • Albumin 11/05/2021 4.30  3.50 - 5.20 g/dL Final   • ALT (SGPT) 11/05/2021 18  1 - 33 U/L Final   • AST (SGOT) 11/05/2021 22  1 - 32 U/L Final   • Alkaline Phosphatase 11/05/2021 75  39 - 117 U/L Final   • Total Bilirubin 11/05/2021 0.4  0.0 - 1.2 mg/dL Final   • eGFR Non African Amer 11/05/2021 35* >60 mL/min/1.73 Final   • Globulin 11/05/2021 2.7  gm/dL Final   • A/G Ratio 11/05/2021 1.6  g/dL Final   • BUN/Creatinine Ratio 11/05/2021 28.4* 7.0 - 25.0 Final   • Anion Gap 11/05/2021 9.8  5.0 - 15.0 mmol/L Final   • WBC 11/05/2021 4.21  3.40 - 10.80 10*3/mm3 Final   • RBC 11/05/2021 3.63* 3.77 - 5.28 10*6/mm3 Final   • Hemoglobin 11/05/2021 11.7* 12.0 - 15.9 g/dL Final   • Hematocrit 11/05/2021 33.8* 34.0 - 46.6 % Final   • MCV 11/05/2021 93.1  79.0 - 97.0 fL Final   • MCH 11/05/2021 32.2  26.6 - 33.0 pg Final   • MCHC 11/05/2021 34.6  31.5 - 35.7 g/dL Final   •  RDW 11/05/2021 12.1* 12.3 - 15.4 % Final   • RDW-SD 11/05/2021 40.8  37.0 - 54.0 fl Final   • MPV 11/05/2021 12.2* 6.0 - 12.0 fL Final   • Platelets 11/05/2021 128* 140 - 450 10*3/mm3 Final   • Neutrophil % 11/05/2021 67.6  42.7 - 76.0 % Final   • Lymphocyte % 11/05/2021 19.5* 19.6 - 45.3 % Final   • Monocyte % 11/05/2021 8.8  5.0 - 12.0 % Final   • Eosinophil % 11/05/2021 2.9  0.3 - 6.2 % Final   • Basophil % 11/05/2021 1.0  0.0 - 1.5 % Final   • Immature Grans % 11/05/2021 0.2  0.0 - 0.5 % Final   • Neutrophils, Absolute 11/05/2021 2.85  1.70 - 7.00 10*3/mm3 Final   • Lymphocytes, Absolute 11/05/2021 0.82  0.70 - 3.10 10*3/mm3 Final   • Monocytes, Absolute 11/05/2021 0.37  0.10 - 0.90 10*3/mm3 Final   • Eosinophils, Absolute 11/05/2021 0.12  0.00 - 0.40 10*3/mm3 Final   • Basophils, Absolute 11/05/2021 0.04  0.00 - 0.20 10*3/mm3 Final   • Immature Grans, Absolute 11/05/2021 0.01  0.00 - 0.05 10*3/mm3 Final   • nRBC 11/05/2021 0.0  0.0 - 0.2 /100 WBC Final     Current Outpatient Medications   Medication Instructions   • Ascorbic Acid (VITAMIN C PO) Oral   • Cholecalciferol (VITAMIN D3) 50 MCG (2000 UT) tablet 1 tablet, Oral, Daily   • Elderberry 575 MG/5ML syrup Oral   • erythromycin (ROMYCIN) 5 MG/GM ophthalmic ointment 1 application, Daily PRN   • NON FORMULARY As Needed, ASHWAGANDHA   • Polyethyl Glycol-Propyl Glycol (SYSTANE ULTRA OP) 1 drop, Ophthalmic, Daily   • Zinc Sulfate (ZINC 15 PO) 1 tablet, Oral, Daily       Body mass index is 19.94 kg/m².         Assessment/Plan   Diagnoses and all orders for this visit:    1. Encounter for screening mammogram for malignant neoplasm of breast (Primary)  -     Mammo Screening Bilateral With CAD; Future    2. Irritable bowel syndrome with constipation        1.  IBS-  Seeing GI for this  She has lots of issues and she thinks she is some better but still an issue  2.  Muscle cramps-  Try mag

## 2021-11-11 NOTE — PROGRESS NOTES
The ABCs of the Annual Wellness Visit  Subsequent Medicare Wellness Visit    No chief complaint on file.     Subjective    History of Present Illness:  Carol A Osgood is a 70 y.o. female who presents for a Subsequent Medicare Wellness Visit.    The following portions of the patient's history were reviewed and   updated as appropriate: allergies, current medications, past medical history, past social history and problem list.    Compared to one year ago, the patient feels her physical   health is the same.    Compared to one year ago, the patient feels her mental   health is the same.    Recent Hospitalizations:  She was not admitted to the hospital during the last year.       Current Medical Providers:  Patient Care Team:  Ciera Newman MD as PCP - General  MyrnaFredrick haines MD as Consulting Physician (Nephrology)  Dania Sanchez APRN as Nurse Practitioner (Gastroenterology)  Vineet Tran MD as Consulting Physician (Gastroenterology)    Outpatient Medications Prior to Visit   Medication Sig Dispense Refill   • Ascorbic Acid (VITAMIN C PO) Take  by mouth.     • Cholecalciferol (VITAMIN D3) 50 MCG (2000 UT) tablet Take 1 tablet by mouth Daily.     • Elderberry 575 MG/5ML syrup Take  by mouth.     • NON FORMULARY As Needed. ASHWAGANDHA      • Polyethyl Glycol-Propyl Glycol (SYSTANE ULTRA OP) Apply 1 drop to eye Daily.     • Zinc Sulfate (ZINC 15 PO) Take 1 tablet by mouth Daily.     • calcium carbonate (OS-MOLLY) 600 MG tablet Take 600 mg by mouth Daily.     • erythromycin (ROMYCIN) 5 MG/GM ophthalmic ointment 1 application Daily As Needed.       No facility-administered medications prior to visit.       No opioid medication identified on active medication list. I have reviewed chart for other potential  high risk medication/s and harmful drug interactions in the elderly.          Aspirin is not on active medication list.  Aspirin use is not indicated based on review of current medical condition/s. Risk of harm  "outweighs potential benefits.  .    Patient Active Problem List   Diagnosis   • Low back pain   • Low hemoglobin   • Menopause present   • Osteoporosis   • Thrombocytopenia (HCC)   • Renal insufficiency   • Pain and swelling of left knee   • Anxiety   • Vitamin D deficiency   • Constipation   • Memory loss   • Incontinence of feces   • Colon cancer screening   • Hx of adenomatous colonic polyps   • Glaucomatous atrophy of optic disc   • Nuclear senile cataract   • Vitreous degeneration     Advance Care Planning  Advance Directive is not on file.  ACP discussion was held with the patient during this visit. Patient has an advance directive (not in EMR), copy requested.          Objective    Vitals:    11/11/21 1047   BP: 114/62   BP Location: Left arm   Patient Position: Sitting   Cuff Size: Adult   Pulse: 81   Temp: 98.2 °F (36.8 °C)   SpO2: 95%   Weight: 57.7 kg (127 lb 4.8 oz)   Height: 170.2 cm (67\")   PainSc: 0-No pain     BMI Readings from Last 1 Encounters:   11/11/21 19.94 kg/m²   BMI is within normal parameters. No follow-up required.    Does the patient have evidence of cognitive impairment? Yes occas forgetful but then remebers if she has some time    Physical Exam  Lab Results   Component Value Date    CHLPL 175 11/05/2021    TRIG 64 11/05/2021    HDL 85 11/05/2021    LDL 78 11/05/2021    VLDL 12 11/05/2021            HEALTH RISK ASSESSMENT    Smoking Status:  Social History     Tobacco Use   Smoking Status Never Smoker   Smokeless Tobacco Never Used   Tobacco Comment    no caffeine     Alcohol Consumption:  Social History     Substance and Sexual Activity   Alcohol Use No     Fall Risk Screen:    STEADI Fall Risk Assessment was completed, and patient is at MODERATE risk for falls. Assessment completed on:11/11/2021    Depression Screening:  PHQ-2/PHQ-9 Depression Screening 11/11/2021   Little interest or pleasure in doing things 0   Feeling down, depressed, or hopeless 0   Trouble falling or staying " asleep, or sleeping too much 1   Feeling tired or having little energy 1   Poor appetite or overeating 0   Feeling bad about yourself - or that you are a failure or have let yourself or your family down 0   Trouble concentrating on things, such as reading the newspaper or watching television 1   Moving or speaking so slowly that other people could have noticed. Or the opposite - being so fidgety or restless that you have been moving around a lot more than usual 0   Thoughts that you would be better off dead, or of hurting yourself in some way 0   Total Score 3   If you checked off any problems, how difficult have these problems made it for you to do your work, take care of things at home, or get along with other people? Not difficult at all       Health Habits and Functional and Cognitive Screening:  Functional & Cognitive Status 11/11/2021   Do you have difficulty preparing food and eating? No   Do you have difficulty bathing yourself, getting dressed or grooming yourself? No   Do you have difficulty using the toilet? No   Do you have difficulty moving around from place to place? No   Do you have trouble with steps or getting out of a bed or a chair? No   Current Diet Well Balanced Diet   Dental Exam Up to date   Eye Exam Up to date   Exercise (times per week) 0 times per week   Current Exercises Include (No Data)        Exercise Comment but pretty active.   Current Exercise Activities Include -   Do you need help using the phone?  No   Are you deaf or do you have serious difficulty hearing?  No   Do you need help with transportation? No   Do you need help shopping? No   Do you need help preparing meals?  No   Do you need help with housework?  Yes   Do you need help with laundry? No   Do you need help taking your medications? No   Do you need help managing money? No   Do you ever drive or ride in a car without wearing a seat belt? No   Have you felt unusual stress, anger or loneliness in the last month? No   Who do  you live with? Spouse   If you need help, do you have trouble finding someone available to you? No   Have you been bothered in the last four weeks by sexual problems? No   Do you have difficulty concentrating, remembering or making decisions? Yes       Age-appropriate Screening Schedule:  Refer to the list below for future screening recommendations based on patient's age, sex and/or medical conditions. Orders for these recommended tests are listed in the plan section. The patient has been provided with a written plan.    Health Maintenance   Topic Date Due   • TDAP/TD VACCINES (2 - Tdap) 09/01/2019   • MAMMOGRAM  09/27/2021   • ZOSTER VACCINE (3 of 3) 11/11/2021 (Originally 5/10/2017)   • INFLUENZA VACCINE  11/11/2022 (Originally 8/1/2021)   • DXA SCAN  12/04/2021   • LIPID PANEL  11/05/2022              Assessment/Plan   CMS Preventative Services Quick Reference  Risk Factors Identified During Encounter  Fall Risk-High or Moderate  Immunizations Discussed/Encouraged (specific Immunizations; Influenza, Pneumococcal 23, Shingrix and COVID19  The above risks/problems have been discussed with the patient.  Follow up actions/plans if indicated are seen below in the Assessment/Plan Section.  Pertinent information has been shared with the patient in the After Visit Summary.    Diagnoses and all orders for this visit:    1. Encounter for screening mammogram for malignant neoplasm of breast (Primary)  -     Mammo Screening Bilateral With CAD; Future    2. Irritable bowel syndrome with constipation        Follow Up:   No follow-ups on file.     An After Visit Summary and PPPS were made available to the patient.

## 2021-11-11 NOTE — PATIENT INSTRUCTIONS
Pneumococcal Vaccine, Polyvalent solution for injection  What is this medicine?  PNEUMOCOCCAL VACCINE, POLYVALENT (AARON mo CLEO al ranulfo WYNNE, mercedes post) is a vaccine to prevent pneumococcus bacteria infection. These bacteria are a major cause of ear infections, Strep throat infections, and serious pneumonia, meningitis, or blood infections worldwide. These vaccines help the body to produce antibodies (protective substances) that help your body defend against these bacteria. This vaccine is recommended for people 2 years of age and older with health problems. It is also recommended for all adults over 50 years old. This vaccine will not treat an infection.  This medicine may be used for other purposes; ask your health care provider or pharmacist if you have questions.  COMMON BRAND NAME(S): Pneumovax 23  What should I tell my health care provider before I take this medicine?  They need to know if you have any of these conditions:  · bleeding problems  · bone marrow or organ transplant  · cancer, Hodgkin's disease  · fever  · infection  · immune system problems  · low platelet count in the blood  · seizures  · an unusual or allergic reaction to pneumococcal vaccine, diphtheria toxoid, other vaccines, latex, other medicines, foods, dyes, or preservatives  · pregnant or trying to get pregnant  · breast-feeding  How should I use this medicine?  This vaccine is for injection into a muscle or under the skin. It is given by a health care professional.  A copy of Vaccine Information Statements will be given before each vaccination. Read this sheet carefully each time. The sheet may change frequently.  Talk to your pediatrician regarding the use of this medicine in children. While this drug may be prescribed for children as young as 2 years of age for selected conditions, precautions do apply.  Overdosage: If you think you have taken too much of this medicine contact a poison control center or emergency room at  once.  NOTE: This medicine is only for you. Do not share this medicine with others.  What if I miss a dose?  It is important not to miss your dose. Call your doctor or health care professional if you are unable to keep an appointment.  What may interact with this medicine?  · medicines for cancer chemotherapy  · medicines that suppress your immune function  · medicines that treat or prevent blood clots like warfarin, enoxaparin, and dalteparin  · steroid medicines like prednisone or cortisone  This list may not describe all possible interactions. Give your health care provider a list of all the medicines, herbs, non-prescription drugs, or dietary supplements you use. Also tell them if you smoke, drink alcohol, or use illegal drugs. Some items may interact with your medicine.  What should I watch for while using this medicine?  Mild fever and pain should go away in 3 days or less. Report any unusual symptoms to your doctor or health care professional.  What side effects may I notice from receiving this medicine?  Side effects that you should report to your doctor or health care professional as soon as possible:  · allergic reactions like skin rash, itching or hives, swelling of the face, lips, or tongue  · breathing problems  · confused  · fever over 102 degrees F  · pain, tingling, numbness in the hands or feet  · seizures  · unusual bleeding or bruising  · unusual muscle weakness  Side effects that usually do not require medical attention (report to your doctor or health care professional if they continue or are bothersome):  · aches and pains  · diarrhea  · fever of 102 degrees F or less  · headache  · irritable  · loss of appetite  · pain, tender at site where injected  · trouble sleeping  This list may not describe all possible side effects. Call your doctor for medical advice about side effects. You may report side effects to FDA at 7-600-FDA-0061.  Where should I keep my medicine?  This does not apply. This  vaccine is given in a clinic, pharmacy, doctor's office, or other health care setting and will not be stored at home.  NOTE: This sheet is a summary. It may not cover all possible information. If you have questions about this medicine, talk to your doctor, pharmacist, or health care provider.  © 2021 [employer] (2009-07-24 14:32:37)    Sit-to-Stand Exercise    The sit-to-stand exercise (also known as the chair stand or chair rise exercise) strengthens your lower body and helps you maintain or improve your mobility and independence. The goal is to do the sit-to-stand exercise without using your hands. This will be easier as you become stronger. You should always talk with your health care provider before starting any exercise program, especially if you have had recent surgery.  Do the exercise exactly as told by your health care provider and adjust it as directed. It is normal to feel mild stretching, pulling, tightness, or discomfort as you do this exercise, but you should stop right away if you feel sudden pain or your pain gets worse. Do not begin doing this exercise until told by your health care provider.  What the sit-to-stand exercise does  The sit-to-stand exercise helps to strengthen the muscles in your thighs and the muscles in the center of your body that give you stability (core muscles). This exercise is especially helpful if:  · You have had knee or hip surgery.  · You have trouble getting up from a chair, out of a car, or off the toilet.  How to do the sit-to-stand exercise  1. Sit toward the front edge of a sturdy chair without armrests. Your knees should be bent and your feet should be flat on the floor and shoulder-width apart.  2. Place your hands lightly on each side of the seat. Keep your back and neck as straight as possible, with your chest slightly forward.  3. Breathe in slowly. Lean forward and slightly shift your weight to the front of your feet.  4. Breathe out as you slowly  stand up. Use your hands as little as possible.  5. Stand and pause for a full breath in and out.  6. Breathe in as you sit down slowly. Tighten your core and abdominal muscles to control your lowering as much as possible.  7. Breathe out slowly.  8. Do this exercise 10-15 times. If needed, do it fewer times until you build up strength.  9. Rest for 1 minute, then do another set of 10-15 repetitions.  To change the difficulty of the sit-to-stand exercise  · If the exercise is too difficult, use a chair with sturdy armrests, and push off the armrests to help you come to the standing position. You can also use the armrests to help slowly lower yourself back to sitting. As this gets easier, try to use your arms less. You can also place a firm cushion or pillow on the chair to make the surface higher.  · If this exercise is too easy, do not use your arms to help raise or lower yourself. You can also wear a weighted vest, use hand weights, increase your repetitions, or try a lower chair.  General tips  · You may feel tired when starting an exercise routine. This is normal.  · You may have muscle soreness that lasts a few days. This is normal. As you get stronger, you may not feel muscle soreness.  · Use smooth, steady movements.  · Do not  hold your breath during strength exercises. This can cause unsafe changes in your blood pressure.  · Breathe in slowly through your nose, and breathe out slowly through your mouth.  Summary  · Strengthening your lower body is an important step to help you move safely and independently.  · The sit-to-stand exercise helps strengthen the muscles in your thighs and core.  · You should always talk with your health care provider before starting any exercise program, especially if you have had recent surgery.  This information is not intended to replace advice given to you by your health care provider. Make sure you discuss any questions you have with your health care provider.  Document  Revised: 10/16/2019 Document Reviewed: 02/08/2018  [employer] Patient Education © 2021 [employer]

## 2022-01-12 ENCOUNTER — HOSPITAL ENCOUNTER (OUTPATIENT)
Dept: MAMMOGRAPHY | Facility: HOSPITAL | Age: 72
Discharge: HOME | End: 2022-01-12
Admitting: INTERNAL MEDICINE

## 2022-01-12 DIAGNOSIS — Z12.31 ENCOUNTER FOR SCREENING MAMMOGRAM FOR MALIGNANT NEOPLASM OF BREAST: ICD-10-CM

## 2022-01-12 PROCEDURE — 77067 SCR MAMMO BI INCL CAD: CPT

## 2022-02-15 ENCOUNTER — TELEPHONE (OUTPATIENT)
Dept: INTERNAL MEDICINE | Facility: CLINIC | Age: 72
End: 2022-02-15

## 2022-02-16 ENCOUNTER — OFFICE VISIT (OUTPATIENT)
Dept: INTERNAL MEDICINE | Facility: CLINIC | Age: 72
End: 2022-02-16

## 2022-02-16 VITALS
SYSTOLIC BLOOD PRESSURE: 102 MMHG | TEMPERATURE: 97.8 F | OXYGEN SATURATION: 100 % | HEART RATE: 72 BPM | HEIGHT: 67 IN | WEIGHT: 129.5 LBS | BODY MASS INDEX: 20.33 KG/M2 | DIASTOLIC BLOOD PRESSURE: 62 MMHG

## 2022-02-16 DIAGNOSIS — I83.893 VARICOSE VEINS OF LEG WITH EDEMA, BILATERAL: Primary | ICD-10-CM

## 2022-02-16 DIAGNOSIS — I87.2 STASIS DERMATITIS OF BOTH LEGS: ICD-10-CM

## 2022-02-16 PROCEDURE — 99213 OFFICE O/P EST LOW 20 MIN: CPT | Performed by: INTERNAL MEDICINE

## 2022-02-16 RX ORDER — CLOBETASOL PROPIONATE 0.5 MG/G
1 CREAM TOPICAL 2 TIMES DAILY
Qty: 30 G | Refills: 2 | Status: SHIPPED | OUTPATIENT
Start: 2022-02-16 | End: 2022-05-12

## 2022-02-16 NOTE — PROGRESS NOTES
Subjective   Carol A Osgood is a 71 y.o. female.     History of Present Illness   Patient complains of some worsening swelling and a lot of varicosities on both leg.  This is chronic but is causing her more difficulty because she is getting a red bumpy rash on her legs.  She says her swelling is better in the morning.    The following portions of the patient's history were reviewed and updated as appropriate: allergies, current medications, past medical history, past social history and problem list.    Review of Systems    Objective   Physical Exam  Vitals reviewed.   Constitutional:       Appearance: She is well-developed.   HENT:      Head: Normocephalic and atraumatic.      Right Ear: External ear normal.      Left Ear: External ear normal.   Eyes:      Conjunctiva/sclera: Conjunctivae normal.      Pupils: Pupils are equal, round, and reactive to light.   Neck:      Thyroid: No thyromegaly.      Trachea: No tracheal deviation.   Cardiovascular:      Rate and Rhythm: Normal rate and regular rhythm.      Heart sounds: Normal heart sounds.   Pulmonary:      Effort: Pulmonary effort is normal.      Breath sounds: Normal breath sounds.   Abdominal:      General: Bowel sounds are normal. There is no distension.      Palpations: Abdomen is soft.      Tenderness: There is no abdominal tenderness.   Musculoskeletal:         General: No deformity. Normal range of motion.      Cervical back: Normal range of motion.      Comments: Some trace edema with extensive varicosities in both lower extremities.  She has a red bumpy rash on the left anterior shin   Skin:     General: Skin is warm and dry.   Neurological:      Mental Status: She is alert and oriented to person, place, and time.   Psychiatric:         Behavior: Behavior normal.         Thought Content: Thought content normal.         Judgment: Judgment normal.         Vitals:    02/16/22 0711   BP: 102/62   Pulse: 72   Temp: 97.8 °F (36.6 °C)   SpO2: 100%     Body mass  index is 20.28 kg/m².         Assessment/Plan   Diagnoses and all orders for this visit:    1. Varicose veins of leg with edema, bilateral (Primary)  -     Ambulatory Referral to Vascular Surgery    2. Stasis dermatitis of both legs  -     Ambulatory Referral to Vascular Surgery    Other orders  -     clobetasol (TEMOVATE) 0.05 % cream; Apply 1 application topically to the appropriate area as directed 2 (Two) Times a Day.  Dispense: 30 g; Refill: 2      1.  Stasis dermatitis: I really think that that is what is going on with the red rash on her leg.  We will try a steroid cream.  She is to continue to try compression stockings  2.  Extensive varicose veins bilateral lower extremities-patient says this is getting worse.  She wants to know there is anything that can be done.  I will go ahead and have her see vascular surgery.  She has had venous Dopplers done in the past that have been negative.  She has no history of blood clots

## 2022-02-16 NOTE — PATIENT INSTRUCTIONS
Stasis Dermatitis  Stasis dermatitis is a long-term (chronic) skin condition that happens when veins can no longer pump blood back to the heart (poor circulation). This condition causes a red or brown scaly rash or sores (ulcers) from the pooling of blood (stasis). This condition usually affects the lower legs. It may affect one leg or both legs.  Without treatment, severe stasis dermatitis can lead to other skin conditions and infections.  What are the causes?  This condition is caused by poor circulation.  What increases the risk?  You are more likely to develop this condition if:  · You are not very active.  · You stand for long periods of time.  · You have veins that have become enlarged and twisted (varicose veins).  · You have leg veins that are not strong enough to send blood back to the heart (venous insufficiency).  · You have had a blood clot.  · You have been pregnant many times.  · You have had vein surgery.  · You are obese.  · You have heart or kidney failure.  · You are 50 years of age or older.  · You have had injuries to your legs in the past.  What are the signs or symptoms?  Common early symptoms of this condition include:  · Itchiness in one or both of your legs.  · Swelling in your ankle or leg. This might get better overnight but be worse again during the day.  · Skin that looks thin on your ankle and leg.  · Red or brown marks that develop slowly.  · Skin that is dry, cracked, or easily irritated.  · Red, swollen skin that is sore or has a burning feeling.  · An achy or heavy feeling after you walk or stand for long periods of time.  · Pain.  Later and more severe symptoms of this condition include:  · Skin that looks shiny.  · Small, open sores (ulcers). These are often red or purple and leak fluid.  · Skin that feels hard.  · Severe itching.  · A change in the shape or color of your lower legs.  · Severe pain.  · Difficulty walking.  How is this diagnosed?  This condition may be diagnosed  based on:  · Your symptoms and medical history.  · A physical exam.  You may also have tests, including:  · Blood tests.  · Imaging tests to check blood flow (Doppler ultrasound).  · Allergy tests.  You may need to see a health care provider who specializes in skin diseases (dermatologist).  How is this treated?  This condition may be treated with:  · Compression stockings or an elastic wrap to improve circulation.  · Medicines, such as:  ? Corticosteroid creams and ointments.  ? Non-corticosteroid medicines applied to the skin (topical).  ? Medicine to reduce swelling in the legs (diuretics).  ? Antibiotics.  ? Medicine to relieve itching (antihistamines).  · A bandage (dressing).  · A wrap that contains zinc and gelatin (Unna boot).  Follow these instructions at home:  Skin care  · Moisturize your skin as told by your health care provider. Do not use moisturizers with fragrance. This can irritate your skin.  · Apply a cool, wet cloth (cool compress) to the affected areas.  · Do not scratch your skin.  · Do not rub your skin dry after a bath or shower. Gently pat your skin dry.  · Do not use scented soaps, detergents, or perfumes.  Medicines  · Take or use over-the-counter and prescription medicines only as told by your health care provider.  · If you were prescribed an antibiotic medicine, take or use it as told by your health care provider. Do not stop taking or using the antibiotic even if your condition improves.  Activity  · Walk as told by your health care provider. Walking increases blood flow.  · Do calf and ankle exercises throughout the day as told by your health care provider. This will help increase blood flow.  · Raise (elevate) your legs above the level of your heart when you are sitting or lying down.  Lifestyle  · Work with your health care provider to lose weight, if needed.  · Do not cross your legs when you sit.  · Do not stand or sit in one position for long periods of time.  · Wear comfortable,  loose-fitting clothing. Circulation in your legs will be worse if you wear tight pants, belts, and waistbands.  · Do not use any products that contain nicotine or tobacco, such as cigarettes, e-cigarettes, and chewing tobacco. If you need help quitting, ask your health care provider.  General instructions  · If you were asked to use one of the following to help with your condition, follow instructions from your health care provider on how to:  ? Remove and change any dressing.  ? Wear compression stockings. These stockings help to prevent blood clots and reduce swelling in your legs.  ? Wear the Unna boot.  · Keep all follow-up visits as told by your health care provider. This is important.  Contact a health care provider if:  · Your condition does not improve with treatment.  · Your condition gets worse.  · You have signs of infection in the affected area. Watch for:  ? Swelling.  ? Tenderness.  ? Redness.  ? Soreness.  ? Warmth.  · You have a fever.  Get help right away if:  · You notice red streaks coming from the affected area.  · Your bone or joint underneath the affected area becomes painful after the skin has healed.  · The affected area turns darker.  · You feel a deep pain in your leg or groin.  · You are short of breath.  Summary  · Stasis dermatitis is a long-term (chronic) skin condition that happens when veins can no longer pump blood back to the heart (poor circulation).  · Wear compression stockings as told by your health care provider. These stockings help to prevent blood clots and reduce swelling in your legs.  · Follow instructions from your health care provider about activity, medicines, and lifestyle.  · Contact a health care provider if you have a fever or have signs of infection in the affected area.  · Keep all follow-up visits as told by your health care provider. This is important.  This information is not intended to replace advice given to you by your health care provider. Make sure you  discuss any questions you have with your health care provider.  Document Revised: 05/20/2019 Document Reviewed: 05/20/2019  Elsevier Patient Education © 2021 [employer]

## 2022-05-02 ENCOUNTER — TELEPHONE (OUTPATIENT)
Dept: INTERNAL MEDICINE | Facility: CLINIC | Age: 72
End: 2022-05-02

## 2022-05-02 NOTE — TELEPHONE ENCOUNTER
Caller: Osgood,Michael    Relationship: Emergency Contact    Best call back number: [phone]    Who are you requesting to speak with (clinical staff, provider,  specific staff member): DR SANTOS OR MA    What was the call regarding: PATIENT'S  STATES SHE HAD AN APPOINTMENT AT [facility], AND DURING A MAPPING OF THE VESSELS IN HER LEGS, THEY DISCOVERED A BLOOD CLOT. SHE WAS PRESCRIBED ELIQUIS AS A BLOOD THINNER. SHE HAS A FOLLOW UP WITH DR HEREDIA TO GO OVER THE MAPPING AND THE CLOT. THEY WANTED TO UPDATE DR SANTOS OF THE STATUS    Do you require a callback: IF THERE ARE ADDITIONAL QUESTIONS

## 2022-05-05 ENCOUNTER — TELEPHONE (OUTPATIENT)
Dept: INTERNAL MEDICINE | Facility: CLINIC | Age: 72
End: 2022-05-05

## 2022-05-05 ENCOUNTER — LAB (OUTPATIENT)
Dept: LAB | Facility: HOSPITAL | Age: 72
End: 2022-05-05

## 2022-05-05 DIAGNOSIS — N18.9 CHRONIC RENAL IMPAIRMENT, UNSPECIFIED CKD STAGE: ICD-10-CM

## 2022-05-05 DIAGNOSIS — D64.9 ANEMIA, UNSPECIFIED TYPE: Primary | ICD-10-CM

## 2022-05-05 LAB
ALBUMIN SERPL-MCNC: 4.3 G/DL (ref 3.5–5.2)
ALBUMIN/GLOB SERPL: 1.7 G/DL
ALP SERPL-CCNC: 84 U/L (ref 39–117)
ALT SERPL W P-5'-P-CCNC: 12 U/L (ref 1–33)
ANION GAP SERPL CALCULATED.3IONS-SCNC: 6.6 MMOL/L (ref 5–15)
AST SERPL-CCNC: 19 U/L (ref 1–32)
BASOPHILS # BLD AUTO: 0.06 10*3/MM3 (ref 0–0.2)
BASOPHILS NFR BLD AUTO: 1.7 % (ref 0–1.5)
BILIRUB SERPL-MCNC: 0.5 MG/DL (ref 0–1.2)
BUN SERPL-MCNC: 38 MG/DL (ref 8–23)
BUN/CREAT SERPL: 25.5 (ref 7–25)
CALCIUM SPEC-SCNC: 9.8 MG/DL (ref 8.6–10.5)
CHLORIDE SERPL-SCNC: 105 MMOL/L (ref 98–107)
CO2 SERPL-SCNC: 29.4 MMOL/L (ref 22–29)
CREAT SERPL-MCNC: 1.49 MG/DL (ref 0.57–1)
DEPRECATED RDW RBC AUTO: 43.9 FL (ref 37–54)
EGFRCR SERPLBLD CKD-EPI 2021: 36.9 ML/MIN/1.73
EOSINOPHIL # BLD AUTO: 0.07 10*3/MM3 (ref 0–0.4)
EOSINOPHIL NFR BLD AUTO: 1.9 % (ref 0.3–6.2)
ERYTHROCYTE [DISTWIDTH] IN BLOOD BY AUTOMATED COUNT: 12.8 % (ref 12.3–15.4)
FERRITIN SERPL-MCNC: 148.5 NG/ML (ref 13–150)
GLOBULIN UR ELPH-MCNC: 2.6 GM/DL
GLUCOSE SERPL-MCNC: 82 MG/DL (ref 65–99)
HCT VFR BLD AUTO: 35.9 % (ref 34–46.6)
HGB BLD-MCNC: 11.8 G/DL (ref 12–15.9)
IMM GRANULOCYTES # BLD AUTO: 0.01 10*3/MM3 (ref 0–0.05)
IMM GRANULOCYTES NFR BLD AUTO: 0.3 % (ref 0–0.5)
IRON 24H UR-MRATE: 90 MCG/DL (ref 37–145)
IRON SATN MFR SERPL: 25 % (ref 20–50)
LYMPHOCYTES # BLD AUTO: 0.67 10*3/MM3 (ref 0.7–3.1)
LYMPHOCYTES NFR BLD AUTO: 18.6 % (ref 19.6–45.3)
MCH RBC QN AUTO: 31.2 PG (ref 26.6–33)
MCHC RBC AUTO-ENTMCNC: 32.9 G/DL (ref 31.5–35.7)
MCV RBC AUTO: 95 FL (ref 79–97)
MONOCYTES # BLD AUTO: 0.33 10*3/MM3 (ref 0.1–0.9)
MONOCYTES NFR BLD AUTO: 9.1 % (ref 5–12)
NEUTROPHILS NFR BLD AUTO: 2.47 10*3/MM3 (ref 1.7–7)
NEUTROPHILS NFR BLD AUTO: 68.4 % (ref 42.7–76)
NRBC BLD AUTO-RTO: 0 /100 WBC (ref 0–0.2)
PLAT MORPH BLD: NORMAL
PLATELET # BLD AUTO: 136 10*3/MM3 (ref 140–450)
PMV BLD AUTO: 11.9 FL (ref 6–12)
POTASSIUM SERPL-SCNC: 4.7 MMOL/L (ref 3.5–5.2)
PROT SERPL-MCNC: 6.9 G/DL (ref 6–8.5)
RBC # BLD AUTO: 3.78 10*6/MM3 (ref 3.77–5.28)
RBC MORPH BLD: NORMAL
SODIUM SERPL-SCNC: 141 MMOL/L (ref 136–145)
TIBC SERPL-MCNC: 365 MCG/DL (ref 298–536)
TRANSFERRIN SERPL-MCNC: 245 MG/DL (ref 200–360)
WBC MORPH BLD: NORMAL
WBC NRBC COR # BLD: 3.61 10*3/MM3 (ref 3.4–10.8)

## 2022-05-05 PROCEDURE — 36415 COLL VENOUS BLD VENIPUNCTURE: CPT | Performed by: INTERNAL MEDICINE

## 2022-05-05 PROCEDURE — 85007 BL SMEAR W/DIFF WBC COUNT: CPT | Performed by: INTERNAL MEDICINE

## 2022-05-05 PROCEDURE — 80053 COMPREHEN METABOLIC PANEL: CPT | Performed by: INTERNAL MEDICINE

## 2022-05-05 PROCEDURE — 83540 ASSAY OF IRON: CPT | Performed by: INTERNAL MEDICINE

## 2022-05-05 PROCEDURE — 85025 COMPLETE CBC W/AUTO DIFF WBC: CPT | Performed by: INTERNAL MEDICINE

## 2022-05-05 PROCEDURE — 84466 ASSAY OF TRANSFERRIN: CPT | Performed by: INTERNAL MEDICINE

## 2022-05-05 PROCEDURE — 82728 ASSAY OF FERRITIN: CPT | Performed by: INTERNAL MEDICINE

## 2022-05-05 NOTE — TELEPHONE ENCOUNTER
----- Message from Ciera Newman MD sent at 5/5/2022  7:30 AM EDT -----  Cmp cbc ferritin iron panel (anemia and CRI)  ----- Message -----  From: Eve Burns MA  Sent: 5/4/2022   3:53 PM EDT  To: Ciera Newman MD    PT SCHEDULED FOR LABS PLEASE Advise

## 2022-05-12 ENCOUNTER — OFFICE VISIT (OUTPATIENT)
Dept: INTERNAL MEDICINE | Facility: CLINIC | Age: 72
End: 2022-05-12

## 2022-05-12 VITALS
BODY MASS INDEX: 19.98 KG/M2 | DIASTOLIC BLOOD PRESSURE: 72 MMHG | HEART RATE: 77 BPM | WEIGHT: 127.3 LBS | OXYGEN SATURATION: 99 % | SYSTOLIC BLOOD PRESSURE: 118 MMHG | TEMPERATURE: 97.5 F | HEIGHT: 67 IN

## 2022-05-12 DIAGNOSIS — F51.01 PRIMARY INSOMNIA: ICD-10-CM

## 2022-05-12 DIAGNOSIS — N28.9 RENAL INSUFFICIENCY: Primary | ICD-10-CM

## 2022-05-12 DIAGNOSIS — E78.5 HYPERLIPIDEMIA, UNSPECIFIED HYPERLIPIDEMIA TYPE: ICD-10-CM

## 2022-05-12 DIAGNOSIS — R41.3 MEMORY LOSS: ICD-10-CM

## 2022-05-12 PROCEDURE — 99214 OFFICE O/P EST MOD 30 MIN: CPT | Performed by: INTERNAL MEDICINE

## 2022-05-12 NOTE — PROGRESS NOTES
Subjective   Carol A Osgood is a 71 y.o. female here to follow up on anemia, elevated creatinine with labs.  Pt would like to discuss abt memory problems.     History of Present Illness   Started on eloquis by vascular sx  She does feel like she is having trouble with her memory  She cannot remember the name of things.  Her daughter wants her to get a memory medicien  She does think she has had a couple falls  Including a fall a couple months ago where she fell and hit her head.  No syncope    The following portions of the patient's history were reviewed and updated as appropriate: allergies, current medications, past medical history, past social history and problem list.  No tob no etoh  She does have stress with brother in al and sistr in law living with her    Review of Systems    Objective   Physical Exam  Vitals reviewed.   Constitutional:       Appearance: She is well-developed.   HENT:      Head: Normocephalic and atraumatic.      Right Ear: External ear normal.      Left Ear: External ear normal.   Eyes:      Conjunctiva/sclera: Conjunctivae normal.      Pupils: Pupils are equal, round, and reactive to light.   Neck:      Thyroid: No thyromegaly.      Trachea: No tracheal deviation.   Cardiovascular:      Rate and Rhythm: Normal rate and regular rhythm.      Heart sounds: Normal heart sounds.   Pulmonary:      Effort: Pulmonary effort is normal.      Breath sounds: Normal breath sounds.   Abdominal:      General: Bowel sounds are normal. There is no distension.      Palpations: Abdomen is soft.      Tenderness: There is no abdominal tenderness.   Musculoskeletal:         General: No deformity. Normal range of motion.      Cervical back: Normal range of motion.   Skin:     General: Skin is warm and dry.   Neurological:      Mental Status: She is alert and oriented to person, place, and time.   Psychiatric:         Behavior: Behavior normal.         Thought Content: Thought content normal.         Judgment:  Judgment normal.         Vitals:    05/12/22 1100   BP: 118/72   Pulse: 77   Temp: 97.5 °F (36.4 °C)   SpO2: 99%     Telephone on 05/05/2022   Component Date Value Ref Range Status   • Glucose 05/05/2022 82  65 - 99 mg/dL Final   • BUN 05/05/2022 38 (A) 8 - 23 mg/dL Final   • Creatinine 05/05/2022 1.49 (A) 0.57 - 1.00 mg/dL Final   • Sodium 05/05/2022 141  136 - 145 mmol/L Final   • Potassium 05/05/2022 4.7  3.5 - 5.2 mmol/L Final   • Chloride 05/05/2022 105  98 - 107 mmol/L Final   • CO2 05/05/2022 29.4 (A) 22.0 - 29.0 mmol/L Final   • Calcium 05/05/2022 9.8  8.6 - 10.5 mg/dL Final   • Total Protein 05/05/2022 6.9  6.0 - 8.5 g/dL Final   • Albumin 05/05/2022 4.30  3.50 - 5.20 g/dL Final   • ALT (SGPT) 05/05/2022 12  1 - 33 U/L Final   • AST (SGOT) 05/05/2022 19  1 - 32 U/L Final   • Alkaline Phosphatase 05/05/2022 84  39 - 117 U/L Final   • Total Bilirubin 05/05/2022 0.5  0.0 - 1.2 mg/dL Final   • Globulin 05/05/2022 2.6  gm/dL Final   • A/G Ratio 05/05/2022 1.7  g/dL Final   • BUN/Creatinine Ratio 05/05/2022 25.5 (A) 7.0 - 25.0 Final   • Anion Gap 05/05/2022 6.6  5.0 - 15.0 mmol/L Final   • eGFR 05/05/2022 36.9 (A) >60.0 mL/min/1.73 Final    National Kidney Foundation and American Society of Nephrology (ASN) Task Force recommended calculation based on the Chronic Kidney Disease Epidemiology Collaboration (CKD-EPI) equation refit without adjustment for race.   • Ferritin 05/05/2022 148.50  13.00 - 150.00 ng/mL Final   • Iron 05/05/2022 90  37 - 145 mcg/dL Final   • Iron Saturation 05/05/2022 25  20 - 50 % Final   • Transferrin 05/05/2022 245  200 - 360 mg/dL Final   • TIBC 05/05/2022 365  298 - 536 mcg/dL Final   • WBC 05/05/2022 3.61  3.40 - 10.80 10*3/mm3 Final   • RBC 05/05/2022 3.78  3.77 - 5.28 10*6/mm3 Final   • Hemoglobin 05/05/2022 11.8 (A) 12.0 - 15.9 g/dL Final   • Hematocrit 05/05/2022 35.9  34.0 - 46.6 % Final   • MCV 05/05/2022 95.0  79.0 - 97.0 fL Final   • MCH 05/05/2022 31.2  26.6 - 33.0 pg Final    • MCHC 05/05/2022 32.9  31.5 - 35.7 g/dL Final   • RDW 05/05/2022 12.8  12.3 - 15.4 % Final   • RDW-SD 05/05/2022 43.9  37.0 - 54.0 fl Final   • MPV 05/05/2022 11.9  6.0 - 12.0 fL Final   • Platelets 05/05/2022 136 (A) 140 - 450 10*3/mm3 Final   • Neutrophil % 05/05/2022 68.4  42.7 - 76.0 % Final   • Lymphocyte % 05/05/2022 18.6 (A) 19.6 - 45.3 % Final   • Monocyte % 05/05/2022 9.1  5.0 - 12.0 % Final   • Eosinophil % 05/05/2022 1.9  0.3 - 6.2 % Final   • Basophil % 05/05/2022 1.7 (A) 0.0 - 1.5 % Final   • Immature Grans % 05/05/2022 0.3  0.0 - 0.5 % Final   • Neutrophils, Absolute 05/05/2022 2.47  1.70 - 7.00 10*3/mm3 Final   • Lymphocytes, Absolute 05/05/2022 0.67 (A) 0.70 - 3.10 10*3/mm3 Final   • Monocytes, Absolute 05/05/2022 0.33  0.10 - 0.90 10*3/mm3 Final   • Eosinophils, Absolute 05/05/2022 0.07  0.00 - 0.40 10*3/mm3 Final   • Basophils, Absolute 05/05/2022 0.06  0.00 - 0.20 10*3/mm3 Final   • Immature Grans, Absolute 05/05/2022 0.01  0.00 - 0.05 10*3/mm3 Final   • nRBC 05/05/2022 0.0  0.0 - 0.2 /100 WBC Final   • RBC Morphology 05/05/2022 Normal  Normal Final   • WBC Morphology 05/05/2022 Normal  Normal Final   • Platelet Morphology 05/05/2022 Normal  Normal Final     Current Outpatient Medications:   •  apixaban (ELIQUIS) 5 MG tablet tablet, Take 5 mg by mouth 2 (Two) Times a Day., Disp: , Rfl:   •  Ascorbic Acid (VITAMIN C PO), Take  by mouth., Disp: , Rfl:   •  Cholecalciferol (VITAMIN D3) 50 MCG (2000 UT) tablet, Take 1 tablet by mouth Daily., Disp: , Rfl:   •  Elderberry 575 MG/5ML syrup, Take  by mouth., Disp: , Rfl:   •  erythromycin (ROMYCIN) 5 MG/GM ophthalmic ointment, 1 application Daily As Needed., Disp: , Rfl:   •  Polyethyl Glycol-Propyl Glycol (SYSTANE ULTRA OP), Apply 1 drop to eye Daily., Disp: , Rfl:   •  NON FORMULARY, As Needed. ASHWAGANDHA, Disp: , Rfl:     Body mass index is 19.93 kg/m².         Assessment & Plan   Diagnoses and all orders for this visit:    1. Renal insufficiency  (Primary)  -     CBC & Differential; Future  -     Comprehensive Metabolic Panel; Future  -     LP+LDL / HDL Ratio ([facility]); Future  -     TSH Rfx On Abnormal To Free T4; Future    2. Primary insomnia  -     CBC & Differential; Future  -     Comprehensive Metabolic Panel; Future  -     LP+LDL / HDL Ratio ([facility]); Future  -     TSH Rfx On Abnormal To Free T4; Future    3. Memory loss  -     CBC & Differential; Future  -     Comprehensive Metabolic Panel; Future  -     LP+LDL / HDL Ratio ([facility]); Future  -     TSH Rfx On Abnormal To Free T4; Future  -     MRI Brain With & Without Contrast  -     Vitamin B12  -     TSH Rfx On Abnormal To Free T4    4. Hyperlipidemia, unspecified hyperlipidemia type  -     CBC & Differential; Future  -     Comprehensive Metabolic Panel; Future  -     LP+LDL / HDL Ratio ([facility]); Future  -     TSH Rfx On Abnormal To Free T4; Future        1.  Memory loss: She is having a very hard time focusing and really feels like she is forgetting the names of things.  We did a Mini-Mental status today and she scored 28 out of 30.  I do think she would benefit from further neuropsych testing.  The only thing that concerns me about this scenario if she does believe it is gotten acutely worse and she does report a fall a couple of months back where she hit her head very hard.  I am going to go ahead and check an MRI.  We can refer to neurology after this initial evaluation is done.  In addition I am going to check a B12 on her today  2.  Venous insufficiency: She has been seen by vascular surgery and started on Eliquis

## 2022-05-13 ENCOUNTER — TELEPHONE (OUTPATIENT)
Dept: INTERNAL MEDICINE | Facility: CLINIC | Age: 72
End: 2022-05-13

## 2022-05-13 ENCOUNTER — OFFICE VISIT (OUTPATIENT)
Dept: INTERNAL MEDICINE | Facility: CLINIC | Age: 72
End: 2022-05-13

## 2022-05-13 VITALS
DIASTOLIC BLOOD PRESSURE: 72 MMHG | WEIGHT: 127 LBS | HEIGHT: 67 IN | SYSTOLIC BLOOD PRESSURE: 122 MMHG | OXYGEN SATURATION: 98 % | BODY MASS INDEX: 19.93 KG/M2 | HEART RATE: 64 BPM

## 2022-05-13 DIAGNOSIS — R31.9 HEMATURIA, UNSPECIFIED TYPE: Primary | ICD-10-CM

## 2022-05-13 LAB
BILIRUB BLD-MCNC: ABNORMAL MG/DL
CLARITY, POC: CLEAR
COLOR UR: ABNORMAL
EXPIRATION DATE: ABNORMAL
GLUCOSE UR STRIP-MCNC: NEGATIVE MG/DL
KETONES UR QL: NEGATIVE
LEUKOCYTE EST, POC: ABNORMAL
Lab: ABNORMAL
NITRITE UR-MCNC: NEGATIVE MG/ML
PH UR: 5.5 [PH] (ref 5–8)
PROT UR STRIP-MCNC: ABNORMAL MG/DL
RBC # UR STRIP: ABNORMAL /UL
SP GR UR: 1.01 (ref 1–1.03)
TSH SERPL DL<=0.005 MIU/L-ACNC: 3.53 UIU/ML (ref 0.45–4.5)
UROBILINOGEN UR QL: NORMAL
VIT B12 SERPL-MCNC: 380 PG/ML (ref 232–1245)

## 2022-05-13 PROCEDURE — 99213 OFFICE O/P EST LOW 20 MIN: CPT | Performed by: INTERNAL MEDICINE

## 2022-05-13 PROCEDURE — 81003 URINALYSIS AUTO W/O SCOPE: CPT | Performed by: INTERNAL MEDICINE

## 2022-05-13 NOTE — TELEPHONE ENCOUNTER
PATIENT'S  CALLED IN ON HER BEHALF. PATIENT STARTED SHOWING BLOOD IN URINE OVER NIGHT AND IS STILL SHOWING BLOOD IN URINE. THEY ARE WONDERING IF ITS THE MEDICATION APIXABAN (ELIQUIS) SIDE EFFECT OR IF ITS SOMETHING THE PATIENT SHOULD COME IN AND TAKE TEST FOR.

## 2022-05-13 NOTE — PROGRESS NOTES
Subjective   Carol A Osgood is a 71 y.o. female here today for hematuria x last night     History of Present Illness   Patient noticed last night she had marked gross hematuria.  No clots.  Again today she has been peeing lots of blood.  She has not had any flank pain.  No abdominal pain.  She is denying any dysuria or hematuria.  She does not feel any differently.  He does not have a history of hematuria.  She last had a urinary tract a year ago.  She denies any history of kidney stones.  She was recently started on Eliquis for a very distal ankle clot on the right.    The following portions of the patient's history were reviewed and updated as appropriate: allergies, current medications, past medical history, past social history and problem list.  No history of blood clots new to Eliquis  Review of Systems    Objective   Physical Exam  Vitals reviewed.   Constitutional:       Appearance: She is well-developed.   HENT:      Head: Normocephalic and atraumatic.      Right Ear: External ear normal.      Left Ear: External ear normal.   Eyes:      Conjunctiva/sclera: Conjunctivae normal.      Pupils: Pupils are equal, round, and reactive to light.   Neck:      Thyroid: No thyromegaly.      Trachea: No tracheal deviation.   Cardiovascular:      Rate and Rhythm: Normal rate and regular rhythm.      Heart sounds: Normal heart sounds.   Pulmonary:      Effort: Pulmonary effort is normal.      Breath sounds: Normal breath sounds.   Abdominal:      General: Bowel sounds are normal. There is no distension.      Palpations: Abdomen is soft.      Tenderness: There is no abdominal tenderness.   Musculoskeletal:         General: No deformity. Normal range of motion.      Cervical back: Normal range of motion.   Skin:     General: Skin is warm and dry.   Neurological:      Mental Status: She is alert and oriented to person, place, and time.   Psychiatric:         Behavior: Behavior normal.         Thought Content: Thought content  normal.         Judgment: Judgment normal.         Vitals:    05/13/22 1153   BP: 122/72   Pulse: 64   SpO2: 98%     Body mass index is 19.89 kg/m².       Current Outpatient Medications   Medication Instructions   • apixaban (ELIQUIS) 5 mg, Oral, 2 Times Daily   • Ascorbic Acid (VITAMIN C PO) Oral   • Cholecalciferol (VITAMIN D3) 50 MCG (2000 UT) tablet 1 tablet, Oral, Daily   • Elderberry 575 MG/5ML syrup Oral   • erythromycin (ROMYCIN) 5 MG/GM ophthalmic ointment 1 application, Daily PRN   • NON FORMULARY As Needed, ASHWAGANDHA    • Polyethyl Glycol-Propyl Glycol (SYSTANE ULTRA OP) 1 drop, Ophthalmic, Daily         Assessment & Plan   Diagnoses and all orders for this visit:    1. Hematuria, unspecified type (Primary)  -     Ambulatory Referral to Urology      1.  Hematuria-patient was just started on Eliquis for a very distal thrombosis in the right ankle.  She is now having gross hematuria.  She has no symptoms of urinary tract infection she has no symptoms of a kidney stone.  Clearly this needs a urologic referral but I am going to have to hold her Eliquis until then.  If she has worsening symptoms she will go to the ER as of right now she is completely asymptomatic

## 2022-05-13 NOTE — TELEPHONE ENCOUNTER
----- Message from Ciera Newman MD sent at 5/13/2022 12:46 PM EDT -----  Please call and leave a message with Dr. Lawson office that we are holding Ms. Osgood's Eliquis due to gross hematuria and urology referral tell him to call me with any questions.

## 2022-05-20 ENCOUNTER — TELEPHONE (OUTPATIENT)
Dept: INTERNAL MEDICINE | Facility: CLINIC | Age: 72
End: 2022-05-20

## 2022-05-20 ENCOUNTER — TRANSCRIBE ORDERS (OUTPATIENT)
Dept: ADMINISTRATIVE | Facility: HOSPITAL | Age: 72
End: 2022-05-20

## 2022-05-20 DIAGNOSIS — R31.0 GROSS HEMATURIA: Primary | ICD-10-CM

## 2022-05-20 NOTE — TELEPHONE ENCOUNTER
LMOM ASKING FOR A CALL BACK    ----- Message from Ciera Newman MD sent at 5/20/2022  9:48 AM EDT -----  Has she let vascular know she is holding the blood thinner?  ----- Message -----  From: Eve Burns MA  Sent: 5/19/2022   3:19 PM EDT  To: Ciera Newman MD    Pt has f/u with urology. She will have a ct scan and then a scope of the bladder. Her hematuria cleared up 1 day after stopping the eliquis. MRI brain scheduled for 6/8

## 2022-06-08 ENCOUNTER — HOSPITAL ENCOUNTER (OUTPATIENT)
Dept: MRI IMAGING | Facility: HOSPITAL | Age: 72
Discharge: HOME | End: 2022-06-08

## 2022-06-08 ENCOUNTER — HOSPITAL ENCOUNTER (OUTPATIENT)
Dept: CT IMAGING | Facility: HOSPITAL | Age: 72
Discharge: HOME | End: 2022-06-08

## 2022-06-08 DIAGNOSIS — R31.0 GROSS HEMATURIA: ICD-10-CM

## 2022-06-08 PROCEDURE — 70553 MRI BRAIN STEM W/O & W/DYE: CPT

## 2022-06-08 PROCEDURE — 82565 ASSAY OF CREATININE: CPT

## 2022-06-08 PROCEDURE — 25010000002 IOPAMIDOL 61 % SOLUTION: Performed by: UROLOGY

## 2022-06-08 PROCEDURE — A9577 INJ MULTIHANCE: HCPCS | Performed by: INTERNAL MEDICINE

## 2022-06-08 PROCEDURE — 74178 CT ABD&PLV WO CNTR FLWD CNTR: CPT

## 2022-06-08 PROCEDURE — 0 GADOBENATE DIMEGLUMINE 529 MG/ML SOLUTION: Performed by: INTERNAL MEDICINE

## 2022-06-08 RX ADMIN — GADOBENATE DIMEGLUMINE 10 ML: 529 INJECTION, SOLUTION INTRAVENOUS at 15:27

## 2022-06-08 RX ADMIN — IOPAMIDOL 100 ML: 612 INJECTION, SOLUTION INTRAVENOUS at 15:44

## 2022-06-09 LAB — CREAT BLDA-MCNC: 1.6 MG/DL (ref 0.6–1.3)

## 2022-06-13 ENCOUNTER — APPOINTMENT (OUTPATIENT)
Dept: CT IMAGING | Facility: HOSPITAL | Age: 72
End: 2022-06-13

## 2022-06-14 ENCOUNTER — APPOINTMENT (OUTPATIENT)
Dept: CT IMAGING | Facility: HOSPITAL | Age: 72
End: 2022-06-14

## 2022-08-12 ENCOUNTER — TELEPHONE (OUTPATIENT)
Dept: INTERNAL MEDICINE | Facility: CLINIC | Age: 72
End: 2022-08-12

## 2022-08-12 NOTE — TELEPHONE ENCOUNTER
Caller: Osgood, Carol A    Relationship: Self    Best call back number: [phone] (H)    What is the best time to reach you:     Who are you requesting to speak with (clinical staff, provider,  specific staff member):     Do you know the name of the person who called: *    What was the call regarding: *    Do you require a callback: *  CAN LEAVE MESSAGE    WANTED TO KNOW THE STRENGTH OF VITAMIN C AND ZINC SHE SHOULD BE TAKING

## 2022-10-26 ENCOUNTER — OFFICE VISIT (OUTPATIENT)
Dept: INTERNAL MEDICINE | Facility: CLINIC | Age: 72
End: 2022-10-26

## 2022-10-26 VITALS
HEART RATE: 76 BPM | WEIGHT: 127 LBS | TEMPERATURE: 97.8 F | BODY MASS INDEX: 19.93 KG/M2 | SYSTOLIC BLOOD PRESSURE: 126 MMHG | HEIGHT: 67 IN | DIASTOLIC BLOOD PRESSURE: 70 MMHG | OXYGEN SATURATION: 99 %

## 2022-10-26 DIAGNOSIS — R49.9 CHANGE IN VOICE: Primary | ICD-10-CM

## 2022-10-26 DIAGNOSIS — N28.9 RENAL INSUFFICIENCY: ICD-10-CM

## 2022-10-26 DIAGNOSIS — E55.9 VITAMIN D DEFICIENCY: ICD-10-CM

## 2022-10-26 DIAGNOSIS — R43.2 LOSS OF TASTE: ICD-10-CM

## 2022-10-26 DIAGNOSIS — E78.5 HYPERLIPIDEMIA, UNSPECIFIED HYPERLIPIDEMIA TYPE: ICD-10-CM

## 2022-10-26 PROCEDURE — 99214 OFFICE O/P EST MOD 30 MIN: CPT | Performed by: INTERNAL MEDICINE

## 2022-10-26 RX ORDER — LANOLIN ALCOHOL/MO/W.PET/CERES
1000 CREAM (GRAM) TOPICAL DAILY
COMMUNITY

## 2022-10-26 RX ORDER — OMEPRAZOLE 40 MG/1
40 CAPSULE, DELAYED RELEASE ORAL DAILY
Qty: 30 CAPSULE | Refills: 2 | Status: SHIPPED | OUTPATIENT
Start: 2022-10-26 | End: 2023-01-23

## 2022-10-26 NOTE — PROGRESS NOTES
Subjective   Carol A Osgood is a 71 y.o. female here today for abnormal taste with food, loosing/changing voice x 2 months    History of Present Illness   She feels like her voice give out.  No sore throat  This has been going on for a couple month  She also has lost her taste for 2 months    SHe does smell ok  SHe has been     The following portions of the patient's history were reviewed and updated as appropriate: allergies, current medications, past medical history, past social history and problem list.  Patient denies any change in diet.  She denies any change in eating habits.  She does not eat late in the day.  She denies any weight loss  Review of Systems    Objective   Physical Exam  Vitals reviewed.   Constitutional:       Appearance: She is well-developed.   HENT:      Head: Normocephalic and atraumatic.      Right Ear: External ear normal.      Left Ear: External ear normal.   Eyes:      Conjunctiva/sclera: Conjunctivae normal.      Pupils: Pupils are equal, round, and reactive to light.   Neck:      Thyroid: No thyromegaly.      Trachea: No tracheal deviation.   Cardiovascular:      Rate and Rhythm: Normal rate and regular rhythm.      Heart sounds: Normal heart sounds.   Pulmonary:      Effort: Pulmonary effort is normal.      Breath sounds: Normal breath sounds.   Abdominal:      General: Bowel sounds are normal. There is no distension.      Palpations: Abdomen is soft.      Tenderness: There is no abdominal tenderness.   Musculoskeletal:         General: No deformity. Normal range of motion.      Cervical back: Normal range of motion.   Skin:     General: Skin is warm and dry.   Neurological:      Mental Status: She is alert and oriented to person, place, and time.   Psychiatric:         Behavior: Behavior normal.         Thought Content: Thought content normal.         Judgment: Judgment normal.         Vitals:    10/26/22 0810   BP: 126/70   Pulse: 76   Temp: 97.8 °F (36.6 °C)   SpO2: 99%     Body  mass index is 19.89 kg/m².      Current Outpatient Medications:   •  Ascorbic Acid (VITAMIN C PO), Take  by mouth., Disp: , Rfl:   •  Cholecalciferol (VITAMIN D3) 50 MCG (2000 UT) tablet, Take 1 tablet by mouth Daily., Disp: , Rfl:   •  Elderberry 575 MG/5ML syrup, Take  by mouth., Disp: , Rfl:   •  vitamin B-12 (CYANOCOBALAMIN) 1000 MCG tablet, Take 1 tablet by mouth Daily., Disp: , Rfl:   •  erythromycin (ROMYCIN) 5 MG/GM ophthalmic ointment, 1 application Daily As Needed., Disp: , Rfl:   •  NON FORMULARY, As Needed. ASHWAGANDHA, Disp: , Rfl:   •  omeprazole (priLOSEC) 40 MG capsule, Take 1 capsule by mouth Daily., Disp: 30 capsule, Rfl: 2  •  Polyethyl Glycol-Propyl Glycol (SYSTANE ULTRA OP), Apply 1 drop to eye Daily., Disp: , Rfl:          Assessment & Plan   Diagnoses and all orders for this visit:    1. Change in voice (Primary)  -     CBC & Differential  -     Comprehensive Metabolic Panel  -     LP+LDL / HDL Ratio ([facility])  -     TSH Rfx On Abnormal To Free T4  -     Vitamin B12  -     Vitamin D,25-Hydroxy    2. Loss of taste  -     CBC & Differential  -     Comprehensive Metabolic Panel  -     LP+LDL / HDL Ratio ([facility])  -     TSH Rfx On Abnormal To Free T4  -     Vitamin B12  -     Vitamin D,25-Hydroxy    3. Vitamin D deficiency  -     CBC & Differential  -     Comprehensive Metabolic Panel  -     LP+LDL / HDL Ratio ([facility])  -     TSH Rfx On Abnormal To Free T4  -     Vitamin B12  -     Vitamin D,25-Hydroxy    4. Renal insufficiency  -     CBC & Differential  -     Comprehensive Metabolic Panel  -     LP+LDL / HDL Ratio ([facility])  -     TSH Rfx On Abnormal To Free T4  -     Vitamin B12  -     Vitamin D,25-Hydroxy    5. Hyperlipidemia, unspecified hyperlipidemia type  -     omeprazole (priLOSEC) 40 MG capsule; Take 1 capsule by mouth Daily.  Dispense: 30 capsule; Refill: 2  -     CBC & Differential  -     Comprehensive Metabolic Panel  -     LP+LDL / HDL Ratio ([facility])  -     TSH Rfx On Abnormal To  Free T4  -     Vitamin B12  -     Vitamin D,25-Hydroxy      1.  Change in voice and loss of taste: She has no other associated symptoms.  She has not recently been sick.  We are going to go ahead and try proton pump inhibitor for the next couple of weeks and we will treat check her labs today.  I am seeing her back in a month for her regular follow-up.  I did discuss with her good vocal mechanics as far as taking deep breaths.  She might benefit from speech therapy at some point.  But if this voice change continue she will need to see ENT

## 2022-10-27 LAB
25(OH)D3+25(OH)D2 SERPL-MCNC: 51.3 NG/ML (ref 30–100)
ALBUMIN SERPL-MCNC: 4.4 G/DL (ref 3.5–5.2)
ALBUMIN/GLOB SERPL: 1.8 G/DL
ALP SERPL-CCNC: 84 U/L (ref 39–117)
ALT SERPL-CCNC: 18 U/L (ref 1–33)
AST SERPL-CCNC: 25 U/L (ref 1–32)
BASOPHILS # BLD AUTO: 0.09 10*3/MM3 (ref 0–0.2)
BASOPHILS NFR BLD AUTO: 1.7 % (ref 0–1.5)
BILIRUB SERPL-MCNC: 0.4 MG/DL (ref 0–1.2)
BUN SERPL-MCNC: 34 MG/DL (ref 8–23)
BUN/CREAT SERPL: 21.5 (ref 7–25)
CALCIUM SERPL-MCNC: 9.6 MG/DL (ref 8.6–10.5)
CHLORIDE SERPL-SCNC: 104 MMOL/L (ref 98–107)
CHOLEST SERPL-MCNC: 180 MG/DL (ref 0–200)
CO2 SERPL-SCNC: 29.4 MMOL/L (ref 22–29)
CREAT SERPL-MCNC: 1.58 MG/DL (ref 0.57–1)
EGFRCR SERPLBLD CKD-EPI 2021: 34.4 ML/MIN/1.73
EOSINOPHIL # BLD AUTO: 0.16 10*3/MM3 (ref 0–0.4)
EOSINOPHIL NFR BLD AUTO: 3.1 % (ref 0.3–6.2)
ERYTHROCYTE [DISTWIDTH] IN BLOOD BY AUTOMATED COUNT: 12 % (ref 12.3–15.4)
GLOBULIN SER CALC-MCNC: 2.4 GM/DL
GLUCOSE SERPL-MCNC: 86 MG/DL (ref 65–99)
HCT VFR BLD AUTO: 33.4 % (ref 34–46.6)
HDLC SERPL-MCNC: 77 MG/DL (ref 40–60)
HGB BLD-MCNC: 11.4 G/DL (ref 12–15.9)
IMM GRANULOCYTES # BLD AUTO: 0.01 10*3/MM3 (ref 0–0.05)
IMM GRANULOCYTES NFR BLD AUTO: 0.2 % (ref 0–0.5)
LDLC SERPL CALC-MCNC: 88 MG/DL (ref 0–100)
LDLC/HDLC SERPL: 1.13 {RATIO}
LYMPHOCYTES # BLD AUTO: 0.89 10*3/MM3 (ref 0.7–3.1)
LYMPHOCYTES NFR BLD AUTO: 17.2 % (ref 19.6–45.3)
MCH RBC QN AUTO: 31.8 PG (ref 26.6–33)
MCHC RBC AUTO-ENTMCNC: 34.1 G/DL (ref 31.5–35.7)
MCV RBC AUTO: 93 FL (ref 79–97)
MONOCYTES # BLD AUTO: 0.44 10*3/MM3 (ref 0.1–0.9)
MONOCYTES NFR BLD AUTO: 8.5 % (ref 5–12)
NEUTROPHILS # BLD AUTO: 3.57 10*3/MM3 (ref 1.7–7)
NEUTROPHILS NFR BLD AUTO: 69.3 % (ref 42.7–76)
NRBC BLD AUTO-RTO: 0 /100 WBC (ref 0–0.2)
PLATELET # BLD AUTO: 166 10*3/MM3 (ref 140–450)
POTASSIUM SERPL-SCNC: 4.1 MMOL/L (ref 3.5–5.2)
PROT SERPL-MCNC: 6.8 G/DL (ref 6–8.5)
RBC # BLD AUTO: 3.59 10*6/MM3 (ref 3.77–5.28)
SODIUM SERPL-SCNC: 142 MMOL/L (ref 136–145)
T4 FREE SERPL-MCNC: 1.04 NG/DL (ref 0.93–1.7)
TRIGL SERPL-MCNC: 80 MG/DL (ref 0–150)
TSH SERPL DL<=0.005 MIU/L-ACNC: 6.76 UIU/ML (ref 0.27–4.2)
VIT B12 SERPL-MCNC: 565 PG/ML (ref 211–946)
VLDLC SERPL CALC-MCNC: 15 MG/DL (ref 5–40)
WBC # BLD AUTO: 5.16 10*3/MM3 (ref 3.4–10.8)

## 2022-11-30 ENCOUNTER — OFFICE VISIT (OUTPATIENT)
Dept: INTERNAL MEDICINE | Facility: CLINIC | Age: 72
End: 2022-11-30

## 2022-11-30 VITALS
DIASTOLIC BLOOD PRESSURE: 78 MMHG | WEIGHT: 132 LBS | HEART RATE: 76 BPM | HEIGHT: 67 IN | OXYGEN SATURATION: 98 % | SYSTOLIC BLOOD PRESSURE: 130 MMHG | TEMPERATURE: 97.8 F | BODY MASS INDEX: 20.72 KG/M2

## 2022-11-30 DIAGNOSIS — E03.9 HYPOTHYROIDISM, UNSPECIFIED TYPE: Primary | ICD-10-CM

## 2022-11-30 PROCEDURE — 1159F MED LIST DOCD IN RCRD: CPT | Performed by: INTERNAL MEDICINE

## 2022-11-30 PROCEDURE — 1170F FXNL STATUS ASSESSED: CPT | Performed by: INTERNAL MEDICINE

## 2022-11-30 PROCEDURE — 99213 OFFICE O/P EST LOW 20 MIN: CPT | Performed by: INTERNAL MEDICINE

## 2022-11-30 PROCEDURE — G0439 PPPS, SUBSEQ VISIT: HCPCS | Performed by: INTERNAL MEDICINE

## 2022-11-30 RX ORDER — LEVOTHYROXINE SODIUM 0.03 MG/1
25 TABLET ORAL DAILY
Qty: 30 TABLET | Refills: 2 | Status: SHIPPED | OUTPATIENT
Start: 2022-11-30 | End: 2023-01-18 | Stop reason: SDUPTHER

## 2022-11-30 NOTE — PROGRESS NOTES
The ABCs of the Annual Wellness Visit  Subsequent Medicare Wellness Visit    Chief Complaint   Patient presents with   • Follow-up     Patient is here for a one month follow up with labs prior       Subjective    History of Present Illness:  Carol A Osgood is a 72 y.o. female who presents for a Subsequent Medicare Wellness Visit.    The following portions of the patient's history were reviewed and   updated as appropriate: allergies, current medications, past medical history, past social history and problem list.    Compared to one year ago, the patient feels her physical   health is worse.less active     Compared to one year ago, the patient feels her mental   health is worse.mewmory worse    Recent Hospitalizations:  She was not admitted to the hospital during the last year.       Current Medical Providers:  Patient Care Team:  Ciera Newman MD as PCP - General  Fredrick Norris MD as Consulting Physician (Nephrology)  Dania Sanchez APRN as Nurse Practitioner (Gastroenterology)  Vineet Tran MD as Consulting Physician (Gastroenterology)  Raad Lawson MD as Consulting Physician (Vascular Surgery)    Outpatient Medications Prior to Visit   Medication Sig Dispense Refill   • Ascorbic Acid (VITAMIN C PO) Take  by mouth.     • Cholecalciferol (VITAMIN D3) 50 MCG (2000 UT) tablet Take 1 tablet by mouth Daily.     • Elderberry 575 MG/5ML syrup Take  by mouth.     • erythromycin (ROMYCIN) 5 MG/GM ophthalmic ointment 1 application Daily As Needed.     • NON FORMULARY As Needed. ASHWAGANDHA     • omeprazole (priLOSEC) 40 MG capsule Take 1 capsule by mouth Daily. 30 capsule 2   • Polyethyl Glycol-Propyl Glycol (SYSTANE ULTRA OP) Apply 1 drop to eye Daily.     • vitamin B-12 (CYANOCOBALAMIN) 1000 MCG tablet Take 1 tablet by mouth Daily.       No facility-administered medications prior to visit.       No opioid medication identified on active medication list. I have reviewed chart for other potential  high risk  "medication/s and harmful drug interactions in the elderly.          Aspirin is not on active medication list.  Aspirin use is not indicated based on review of current medical condition/s. Risk of harm outweighs potential benefits.  .    Patient Active Problem List   Diagnosis   • Low back pain   • Low hemoglobin   • Menopause present   • Osteoporosis   • Thrombocytopenia (HCC)   • Renal insufficiency   • Pain and swelling of left knee   • Anxiety   • Vitamin D deficiency   • Constipation   • Memory loss   • Incontinence of feces   • Colon cancer screening   • Hx of adenomatous colonic polyps   • Glaucomatous atrophy of optic disc   • Nuclear senile cataract   • Vitreous degeneration     Advance Care Planning  Advance Directive is not on file.  ACP discussion was held with the patient during this visit. Patient has an advance directive (not in EMR), copy requested.          Objective    Vitals:    11/30/22 1133   BP: 130/78   Pulse: 76   Temp: 97.8 °F (36.6 °C)   SpO2: 98%   Weight: 59.9 kg (132 lb)   Height: 170.2 cm (67.01\")     Estimated body mass index is 20.67 kg/m² as calculated from the following:    Height as of this encounter: 170.2 cm (67.01\").    Weight as of this encounter: 59.9 kg (132 lb).    BMI is within normal parameters. No other follow-up for BMI required.      Does the patient have evidence of cognitive impairment? Yes    Physical Exam  Lab Results   Component Value Date    CHLPL 180 10/26/2022    TRIG 80 10/26/2022    HDL 77 (H) 10/26/2022    LDL 88 10/26/2022    VLDL 15 10/26/2022            HEALTH RISK ASSESSMENT    Smoking Status:  Social History     Tobacco Use   Smoking Status Never   Smokeless Tobacco Never   Tobacco Comments    no caffeine     Alcohol Consumption:  Social History     Substance and Sexual Activity   Alcohol Use No     Fall Risk Screen:    STEADI Fall Risk Assessment was completed, and patient is at HIGH risk for falls. Assessment completed on:10/26/2022    Depression " Screening:  PHQ-2/PHQ-9 Depression Screening 5/12/2022   Retired PHQ-9 Total Score -   Retired Total Score -   Little Interest or Pleasure in Doing Things 0-->not at all   Feeling Down, Depressed or Hopeless 0-->not at all   PHQ-9: Brief Depression Severity Measure Score 0       Health Habits and Functional and Cognitive Screening:  Functional & Cognitive Status 11/11/2021   Do you have difficulty preparing food and eating? No   Do you have difficulty bathing yourself, getting dressed or grooming yourself? No   Do you have difficulty using the toilet? No   Do you have difficulty moving around from place to place? No   Do you have trouble with steps or getting out of a bed or a chair? No   Current Diet Well Balanced Diet   Dental Exam Up to date   Eye Exam Up to date   Exercise (times per week) 0 times per week   Current Exercises Include (No Data)        Exercise Comment but pretty active.   Current Exercise Activities Include -   Do you need help using the phone?  No   Are you deaf or do you have serious difficulty hearing?  No   Do you need help with transportation? No   Do you need help shopping? No   Do you need help preparing meals?  No   Do you need help with housework?  Yes   Do you need help with laundry? No   Do you need help taking your medications? No   Do you need help managing money? No   Do you ever drive or ride in a car without wearing a seat belt? No   Have you felt unusual stress, anger or loneliness in the last month? No   Who do you live with? Spouse   If you need help, do you have trouble finding someone available to you? No   Have you been bothered in the last four weeks by sexual problems? No   Do you have difficulty concentrating, remembering or making decisions? Yes       Age-appropriate Screening Schedule:  Refer to the list below for future screening recommendations based on patient's age, sex and/or medical conditions. Orders for these recommended tests are listed in the plan section. The  patient has been provided with a written plan.    Health Maintenance   Topic Date Due   • TDAP/TD VACCINES (2 - Tdap) 09/01/2019   • DXA SCAN  12/04/2021   • ZOSTER VACCINE (3 of 3) 11/30/2022 (Originally 5/10/2017)   • INFLUENZA VACCINE  03/31/2023 (Originally 8/1/2022)   • LIPID PANEL  10/26/2023   • MAMMOGRAM  01/12/2024              Assessment & Plan   CMS Preventative Services Quick Reference  Risk Factors Identified During Encounter  memroy issues  The above risks/problems have been discussed with the patient.  Follow up actions/plans if indicated are seen below in the Assessment/Plan Section.  Pertinent information has been shared with the patient in the After Visit Summary.    There are no diagnoses linked to this encounter.    Follow Up:   No follow-ups on file.     An After Visit Summary and PPPS were made available to the patient.

## 2022-11-30 NOTE — PROGRESS NOTES
Subjective   Carol A Osgood is a 72 y.o. female.     History of Present Illness   Patient is continuing to have the bad taste in her mouth.  The hoarseness has seemed to resolved since she has been on the PPI whether or not that is related or not.  She also has some fatigue and mental fogginess.  She did have some neuropsych testing but was not really found to have any significant dementia  .  She does still feel like she has difficulty focusing  The following portions of the patient's history were reviewed and updated as appropriate: allergies, current medications, past medical history, past social history and problem list.  Patient denies any changes in her medications no change in her supplements she does eat a healthy diet.  She does try to exercise regularly  Review of Systems    Objective   Physical Exam  Vitals reviewed.   Constitutional:       Appearance: She is well-developed.   HENT:      Head: Normocephalic and atraumatic.      Right Ear: External ear normal.      Left Ear: External ear normal.   Eyes:      Conjunctiva/sclera: Conjunctivae normal.      Pupils: Pupils are equal, round, and reactive to light.   Neck:      Thyroid: No thyromegaly.      Trachea: No tracheal deviation.   Cardiovascular:      Rate and Rhythm: Normal rate and regular rhythm.      Heart sounds: Normal heart sounds.   Pulmonary:      Effort: Pulmonary effort is normal.      Breath sounds: Normal breath sounds.   Abdominal:      General: Bowel sounds are normal. There is no distension.      Palpations: Abdomen is soft.      Tenderness: There is no abdominal tenderness.   Musculoskeletal:         General: No deformity. Normal range of motion.      Cervical back: Normal range of motion.   Skin:     General: Skin is warm and dry.   Neurological:      Mental Status: She is alert and oriented to person, place, and time.   Psychiatric:         Behavior: Behavior normal.         Thought Content: Thought content normal.         Judgment:  Judgment normal.         Vitals:    11/30/22 1133   BP: 130/78   Pulse: 76   Temp: 97.8 °F (36.6 °C)   SpO2: 98%     Body mass index is 20.67 kg/m².         Assessment & Plan   Diagnoses and all orders for this visit:    1. Hypothyroidism, unspecified type (Primary)  -     TSH; Future  -     T4, free; Future    Other orders  -     levothyroxine (SYNTHROID, LEVOTHROID) 25 MCG tablet; Take 1 tablet by mouth Daily.  Dispense: 30 tablet; Refill: 2      1.  Hypothyroidism: Perhaps this is causing some of her symptoms since this is a new finding for her.  We will put her on 25 mcg a day and plan on rechecking this in 6 to 8 weeks  2.  Memory loss and focus trouble: Neuropsych said we could try a medicine for Alzheimer's to help with this however at this time I think we should fix the thyroid and see if any of the symptoms improve

## 2022-11-30 NOTE — PATIENT INSTRUCTIONS
Medicare Wellness  Personal Prevention Plan of Service     Date of Office Visit:    Encounter Provider:  Ciera Newman MD  Place of Service:  [facility]  Patient Name: Carol A Osgood  :  1950    As part of the Medicare Wellness portion of your visit today, we are providing you with this personalized preventive plan of services (PPPS). This plan is based upon recommendations of the [place] Preventive Services Task Force (USPSTF) and the Advisory Committee on Immunization Practices (ACIP).    This lists the preventive care services that should be considered, and provides dates of when you are due. Items listed as completed are up-to-date and do not require any further intervention.    Health Maintenance   Topic Date Due    Pneumococcal Vaccine 65+ (1 - PCV) Never done    TDAP/TD VACCINES (2 - Tdap) 2019    DXA SCAN  2021    ANNUAL WELLNESS VISIT  2022    ZOSTER VACCINE (3 of 3) 2022 (Originally 5/10/2017)    COVID-19 Vaccine (1) 2022 (Originally 1951)    INFLUENZA VACCINE  2023 (Originally 2022)    LIPID PANEL  10/26/2023    MAMMOGRAM  2024    COLORECTAL CANCER SCREENING  2024    HEPATITIS C SCREENING  Completed       No orders of the defined types were placed in this encounter.      Return in about 6 weeks (around 2023) for fu hypothyrodism.

## 2023-01-16 ENCOUNTER — LAB (OUTPATIENT)
Dept: LAB | Facility: HOSPITAL | Age: 73
End: 2023-01-16
Payer: MEDICARE

## 2023-01-16 PROCEDURE — 84439 ASSAY OF FREE THYROXINE: CPT | Performed by: INTERNAL MEDICINE

## 2023-01-16 PROCEDURE — 84443 ASSAY THYROID STIM HORMONE: CPT | Performed by: INTERNAL MEDICINE

## 2023-01-18 ENCOUNTER — OFFICE VISIT (OUTPATIENT)
Dept: INTERNAL MEDICINE | Facility: CLINIC | Age: 73
End: 2023-01-18
Payer: MEDICARE

## 2023-01-18 VITALS
HEART RATE: 77 BPM | OXYGEN SATURATION: 99 % | WEIGHT: 134.3 LBS | DIASTOLIC BLOOD PRESSURE: 74 MMHG | HEIGHT: 67 IN | BODY MASS INDEX: 21.08 KG/M2 | TEMPERATURE: 97.1 F | SYSTOLIC BLOOD PRESSURE: 130 MMHG

## 2023-01-18 DIAGNOSIS — E55.9 VITAMIN D DEFICIENCY: Primary | ICD-10-CM

## 2023-01-18 DIAGNOSIS — E03.9 HYPOTHYROIDISM, UNSPECIFIED TYPE: ICD-10-CM

## 2023-01-18 DIAGNOSIS — E78.5 HYPERLIPIDEMIA, UNSPECIFIED HYPERLIPIDEMIA TYPE: ICD-10-CM

## 2023-01-18 PROCEDURE — 99214 OFFICE O/P EST MOD 30 MIN: CPT | Performed by: INTERNAL MEDICINE

## 2023-01-18 RX ORDER — LEVOTHYROXINE SODIUM 0.03 MG/1
25 TABLET ORAL DAILY
Qty: 90 TABLET | Refills: 3 | Status: SHIPPED | OUTPATIENT
Start: 2023-01-18

## 2023-01-18 NOTE — PROGRESS NOTES
Subjective   Carol A Osgood is a 72 y.o. female here today to f/u on hypothyroidism.      History of Present Illness   Pt has been doing well with thyroid meds.  Taking as perscribed without any complications  She got better with the omeprazole  40.  She has no reflux symptoms so she never did she was taking it for an acid taste in her mouth.  No side effects no diarrhea    The following portions of the patient's history were reviewed and updated as appropriate: allergies, current medications, past medical history, past social history and problem list.  She has been eating a healthy diet and she stays very active  Review of Systems    Objective   Physical Exam  Vitals reviewed.   Constitutional:       Appearance: She is well-developed.   HENT:      Head: Normocephalic and atraumatic.      Right Ear: External ear normal.      Left Ear: External ear normal.   Eyes:      Conjunctiva/sclera: Conjunctivae normal.      Pupils: Pupils are equal, round, and reactive to light.   Neck:      Thyroid: No thyromegaly.      Trachea: No tracheal deviation.   Cardiovascular:      Rate and Rhythm: Normal rate and regular rhythm.      Heart sounds: Normal heart sounds.   Pulmonary:      Effort: Pulmonary effort is normal.      Breath sounds: Normal breath sounds.   Abdominal:      General: Bowel sounds are normal. There is no distension.      Palpations: Abdomen is soft.      Tenderness: There is no abdominal tenderness.   Musculoskeletal:         General: No deformity. Normal range of motion.      Cervical back: Normal range of motion.   Skin:     General: Skin is warm and dry.   Neurological:      Mental Status: She is alert and oriented to person, place, and time.   Psychiatric:         Behavior: Behavior normal.         Thought Content: Thought content normal.         Judgment: Judgment normal.         Vitals:    01/18/23 1019   BP: 130/74   Pulse: 77   Temp: 97.1 °F (36.2 °C)   SpO2: 99%     Body mass index is 21.03  kg/m².    Results Encounter on 01/11/2023   Component Date Value Ref Range Status   • TSH 01/16/2023 2.590  0.270 - 4.200 uIU/mL Final   • Free T4 01/16/2023 1.22  0.93 - 1.70 ng/dL Final         Current Outpatient Medications:   •  Ascorbic Acid (VITAMIN C PO), Take  by mouth., Disp: , Rfl:   •  Cholecalciferol (VITAMIN D3) 50 MCG (2000 UT) tablet, Take 1 tablet by mouth Daily., Disp: , Rfl:   •  Elderberry 575 MG/5ML syrup, Take  by mouth., Disp: , Rfl:   •  levothyroxine (SYNTHROID, LEVOTHROID) 25 MCG tablet, Take 1 tablet by mouth Daily., Disp: 90 tablet, Rfl: 3  •  omeprazole (priLOSEC) 40 MG capsule, Take 1 capsule by mouth Daily., Disp: 30 capsule, Rfl: 2  •  Polyethyl Glycol-Propyl Glycol (SYSTANE ULTRA OP), Apply 1 drop to eye Daily., Disp: , Rfl:   •  vitamin B-12 (CYANOCOBALAMIN) 1000 MCG tablet, Take 1 tablet by mouth Daily., Disp: , Rfl:   •  erythromycin (ROMYCIN) 5 MG/GM ophthalmic ointment, 1 application Daily As Needed., Disp: , Rfl:   •  NON FORMULARY, As Needed. ASHWAGANDHA, Disp: , Rfl:            Assessment & Plan   Diagnoses and all orders for this visit:    1. Vitamin D deficiency (Primary)    2. Hyperlipidemia, unspecified hyperlipidemia type  -     CBC & Differential; Future  -     Comprehensive Metabolic Panel; Future  -     LP+LDL / HDL Ratio ([facility]); Future  -     T4, free; Future  -     TSH; Future    3. Hypothyroidism, unspecified type  -     CBC & Differential; Future  -     Comprehensive Metabolic Panel; Future  -     LP+LDL / HDL Ratio ([facility]); Future  -     T4, free; Future  -     TSH; Future    Other orders  -     levothyroxine (SYNTHROID, LEVOTHROID) 25 MCG tablet; Take 1 tablet by mouth Daily.  Dispense: 90 tablet; Refill: 3    1. Hypothyrodism-  Ok with current   25mcg I have refilled her prescription today  2. GERD-patient says the odd taste she was having has resolved on the omeprazole.  She is not sure if she even needs it anymore.  She is going to take it every other day  for a couple of weeks and then stop it.  She will call me back if she needs it resumed

## 2023-01-21 DIAGNOSIS — E78.5 HYPERLIPIDEMIA, UNSPECIFIED HYPERLIPIDEMIA TYPE: ICD-10-CM

## 2023-01-23 RX ORDER — OMEPRAZOLE 40 MG/1
CAPSULE, DELAYED RELEASE ORAL
Qty: 90 CAPSULE | Refills: 1 | Status: SHIPPED | OUTPATIENT
Start: 2023-01-23

## 2023-05-10 DIAGNOSIS — E78.5 HYPERLIPIDEMIA, UNSPECIFIED HYPERLIPIDEMIA TYPE: ICD-10-CM

## 2023-05-10 DIAGNOSIS — E03.9 HYPOTHYROIDISM, UNSPECIFIED TYPE: ICD-10-CM

## 2023-05-11 ENCOUNTER — LAB (OUTPATIENT)
Dept: LAB | Facility: HOSPITAL | Age: 73
End: 2023-05-11
Payer: MEDICARE

## 2023-05-11 LAB
ALBUMIN SERPL-MCNC: 4.1 G/DL (ref 3.5–5.2)
ALBUMIN/GLOB SERPL: 1.4 G/DL
ALP SERPL-CCNC: 84 U/L (ref 39–117)
ALT SERPL W P-5'-P-CCNC: 11 U/L (ref 1–33)
ANION GAP SERPL CALCULATED.3IONS-SCNC: 10.6 MMOL/L (ref 5–15)
AST SERPL-CCNC: 19 U/L (ref 1–32)
BASOPHILS # BLD AUTO: 0.05 10*3/MM3 (ref 0–0.2)
BASOPHILS NFR BLD AUTO: 1.2 % (ref 0–1.5)
BILIRUB SERPL-MCNC: 0.4 MG/DL (ref 0–1.2)
BUN SERPL-MCNC: 34 MG/DL (ref 8–23)
BUN/CREAT SERPL: 22.8 (ref 7–25)
CALCIUM SPEC-SCNC: 10.2 MG/DL (ref 8.6–10.5)
CHLORIDE SERPL-SCNC: 105 MMOL/L (ref 98–107)
CO2 SERPL-SCNC: 27.4 MMOL/L (ref 22–29)
CREAT SERPL-MCNC: 1.49 MG/DL (ref 0.57–1)
DEPRECATED RDW RBC AUTO: 43.3 FL (ref 37–54)
EGFRCR SERPLBLD CKD-EPI 2021: 36.7 ML/MIN/1.73
EOSINOPHIL # BLD AUTO: 0.14 10*3/MM3 (ref 0–0.4)
EOSINOPHIL NFR BLD AUTO: 3.2 % (ref 0.3–6.2)
ERYTHROCYTE [DISTWIDTH] IN BLOOD BY AUTOMATED COUNT: 12.5 % (ref 12.3–15.4)
GLOBULIN UR ELPH-MCNC: 3 GM/DL
GLUCOSE SERPL-MCNC: 86 MG/DL (ref 65–99)
HCT VFR BLD AUTO: 34.6 % (ref 34–46.6)
HGB BLD-MCNC: 11.6 G/DL (ref 12–15.9)
IMM GRANULOCYTES # BLD AUTO: 0.01 10*3/MM3 (ref 0–0.05)
IMM GRANULOCYTES NFR BLD AUTO: 0.2 % (ref 0–0.5)
LYMPHOCYTES # BLD AUTO: 0.69 10*3/MM3 (ref 0.7–3.1)
LYMPHOCYTES NFR BLD AUTO: 16 % (ref 19.6–45.3)
MCH RBC QN AUTO: 31.7 PG (ref 26.6–33)
MCHC RBC AUTO-ENTMCNC: 33.5 G/DL (ref 31.5–35.7)
MCV RBC AUTO: 94.5 FL (ref 79–97)
MONOCYTES # BLD AUTO: 0.41 10*3/MM3 (ref 0.1–0.9)
MONOCYTES NFR BLD AUTO: 9.5 % (ref 5–12)
NEUTROPHILS NFR BLD AUTO: 3.02 10*3/MM3 (ref 1.7–7)
NEUTROPHILS NFR BLD AUTO: 69.9 % (ref 42.7–76)
NRBC BLD AUTO-RTO: 0 /100 WBC (ref 0–0.2)
PLATELET # BLD AUTO: 146 10*3/MM3 (ref 140–450)
PMV BLD AUTO: 11.2 FL (ref 6–12)
POTASSIUM SERPL-SCNC: 4.5 MMOL/L (ref 3.5–5.2)
PROT SERPL-MCNC: 7.1 G/DL (ref 6–8.5)
RBC # BLD AUTO: 3.66 10*6/MM3 (ref 3.77–5.28)
SODIUM SERPL-SCNC: 143 MMOL/L (ref 136–145)
T4 FREE SERPL-MCNC: 1.05 NG/DL (ref 0.93–1.7)
TSH SERPL DL<=0.05 MIU/L-ACNC: 2.55 UIU/ML (ref 0.27–4.2)
WBC NRBC COR # BLD: 4.32 10*3/MM3 (ref 3.4–10.8)

## 2023-05-11 PROCEDURE — 80061 LIPID PANEL: CPT | Performed by: INTERNAL MEDICINE

## 2023-05-11 PROCEDURE — 36415 COLL VENOUS BLD VENIPUNCTURE: CPT | Performed by: INTERNAL MEDICINE

## 2023-05-11 PROCEDURE — 84439 ASSAY OF FREE THYROXINE: CPT | Performed by: INTERNAL MEDICINE

## 2023-05-11 PROCEDURE — 80053 COMPREHEN METABOLIC PANEL: CPT | Performed by: INTERNAL MEDICINE

## 2023-05-11 PROCEDURE — 85025 COMPLETE CBC W/AUTO DIFF WBC: CPT | Performed by: INTERNAL MEDICINE

## 2023-05-11 PROCEDURE — 84443 ASSAY THYROID STIM HORMONE: CPT | Performed by: INTERNAL MEDICINE

## 2023-05-12 LAB
CHOLEST SERPL-MCNC: 180 MG/DL (ref 100–199)
HDLC SERPL-MCNC: 71 MG/DL
LDLC SERPL CALC-MCNC: 95 MG/DL (ref 0–99)
LDLC/HDLC SERPL: 1.3 RATIO (ref 0–3.2)
TRIGL SERPL-MCNC: 76 MG/DL (ref 0–149)
VLDLC SERPL CALC-MCNC: 14 MG/DL (ref 5–40)

## 2023-05-18 ENCOUNTER — OFFICE VISIT (OUTPATIENT)
Dept: INTERNAL MEDICINE | Facility: CLINIC | Age: 73
End: 2023-05-18
Payer: MEDICARE

## 2023-05-18 VITALS
SYSTOLIC BLOOD PRESSURE: 126 MMHG | BODY MASS INDEX: 21.03 KG/M2 | DIASTOLIC BLOOD PRESSURE: 76 MMHG | HEIGHT: 67 IN | TEMPERATURE: 97.5 F | OXYGEN SATURATION: 99 % | HEART RATE: 68 BPM

## 2023-05-18 DIAGNOSIS — M81.0 AGE-RELATED OSTEOPOROSIS WITHOUT CURRENT PATHOLOGICAL FRACTURE: Primary | ICD-10-CM

## 2023-05-18 DIAGNOSIS — R41.3 MEMORY LOSS: ICD-10-CM

## 2023-05-18 DIAGNOSIS — R27.0 ATAXIA: ICD-10-CM

## 2023-05-18 DIAGNOSIS — N28.9 RENAL INSUFFICIENCY: ICD-10-CM

## 2023-05-18 DIAGNOSIS — F41.9 ANXIETY: ICD-10-CM

## 2023-05-18 PROCEDURE — 1160F RVW MEDS BY RX/DR IN RCRD: CPT | Performed by: INTERNAL MEDICINE

## 2023-05-18 PROCEDURE — 99214 OFFICE O/P EST MOD 30 MIN: CPT | Performed by: INTERNAL MEDICINE

## 2023-05-18 PROCEDURE — 1159F MED LIST DOCD IN RCRD: CPT | Performed by: INTERNAL MEDICINE

## 2023-05-18 NOTE — PROGRESS NOTES
Subjective   Carol A Osgood is a 72 y.o. female here today to f/u on hypothyroidism.  Pt c/o LLQ discomfort and abdominal bloating     History of Present Illness   She has seen nephrology in the past and she has been stable with this  She has cont to struggle with memory loss    She has been weaker and feels like she is losing balnce    The following portions of the patient's history were reviewed and updated as appropriate: allergies, current medications, past medical history, past social history and problem list.  No recent  trauma  Review of Systems    Objective   Physical Exam  Vitals reviewed.   Constitutional:       Appearance: She is well-developed.   HENT:      Head: Normocephalic and atraumatic.      Right Ear: External ear normal.      Left Ear: External ear normal.   Eyes:      Conjunctiva/sclera: Conjunctivae normal.      Pupils: Pupils are equal, round, and reactive to light.   Neck:      Thyroid: No thyromegaly.      Trachea: No tracheal deviation.   Cardiovascular:      Rate and Rhythm: Normal rate and regular rhythm.      Heart sounds: Normal heart sounds.   Pulmonary:      Effort: Pulmonary effort is normal.      Breath sounds: Normal breath sounds.   Abdominal:      General: Bowel sounds are normal. There is no distension.      Palpations: Abdomen is soft.      Tenderness: There is no abdominal tenderness.   Musculoskeletal:         General: No deformity. Normal range of motion.      Cervical back: Normal range of motion.   Skin:     General: Skin is warm and dry.   Neurological:      Mental Status: She is alert and oriented to person, place, and time.   Psychiatric:         Behavior: Behavior normal.         Thought Content: Thought content normal.         Judgment: Judgment normal.         Vitals:    05/18/23 1032   BP: 126/76   Pulse: 68   Temp: 97.5 °F (36.4 °C)   SpO2: 99%     Body mass index is 21.03 kg/m².       Orders Only on 05/10/2023   Component Date Value Ref Range Status   • TSH  05/11/2023 2.550  0.270 - 4.200 uIU/mL Final   • Free T4 05/11/2023 1.05  0.93 - 1.70 ng/dL Final   • Total Cholesterol 05/11/2023 180  100 - 199 mg/dL Final   • Triglycerides 05/11/2023 76  0 - 149 mg/dL Final   • HDL Cholesterol 05/11/2023 71  >39 mg/dL Final   • VLDL Cholesterol Law 05/11/2023 14  5 - 40 mg/dL Final   • LDL Chol Calc ([facility]) 05/11/2023 95  0 - 99 mg/dL Final   • LDL/HDL RATIO 05/11/2023 1.3  0.0 - 3.2 ratio Final                                        LDL/HDL Ratio                                              Men  Women                                1/2 Avg.Risk  1.0    1.5                                    Avg.Risk  3.6    3.2                                 2X Avg.Risk  6.2    5.0                                 3X Avg.Risk  8.0    6.1   • Glucose 05/11/2023 86  65 - 99 mg/dL Final   • BUN 05/11/2023 34 (H)  8 - 23 mg/dL Final   • Creatinine 05/11/2023 1.49 (H)  0.57 - 1.00 mg/dL Final   • Sodium 05/11/2023 143  136 - 145 mmol/L Final   • Potassium 05/11/2023 4.5  3.5 - 5.2 mmol/L Final   • Chloride 05/11/2023 105  98 - 107 mmol/L Final   • CO2 05/11/2023 27.4  22.0 - 29.0 mmol/L Final   • Calcium 05/11/2023 10.2  8.6 - 10.5 mg/dL Final   • Total Protein 05/11/2023 7.1  6.0 - 8.5 g/dL Final   • Albumin 05/11/2023 4.1  3.5 - 5.2 g/dL Final   • ALT (SGPT) 05/11/2023 11  1 - 33 U/L Final   • AST (SGOT) 05/11/2023 19  1 - 32 U/L Final   • Alkaline Phosphatase 05/11/2023 84  39 - 117 U/L Final   • Total Bilirubin 05/11/2023 0.4  0.0 - 1.2 mg/dL Final   • Globulin 05/11/2023 3.0  gm/dL Final   • A/G Ratio 05/11/2023 1.4  g/dL Final   • BUN/Creatinine Ratio 05/11/2023 22.8  7.0 - 25.0 Final   • Anion Gap 05/11/2023 10.6  5.0 - 15.0 mmol/L Final   • eGFR 05/11/2023 36.7 (L)  >60.0 mL/min/1.73 Final   • WBC 05/11/2023 4.32  3.40 - 10.80 10*3/mm3 Final   • RBC 05/11/2023 3.66 (L)  3.77 - 5.28 10*6/mm3 Final   • Hemoglobin 05/11/2023 11.6 (L)  12.0 - 15.9 g/dL Final   • Hematocrit 05/11/2023 34.6  34.0 -  46.6 % Final   • MCV 05/11/2023 94.5  79.0 - 97.0 fL Final   • MCH 05/11/2023 31.7  26.6 - 33.0 pg Final   • MCHC 05/11/2023 33.5  31.5 - 35.7 g/dL Final   • RDW 05/11/2023 12.5  12.3 - 15.4 % Final   • RDW-SD 05/11/2023 43.3  37.0 - 54.0 fl Final   • MPV 05/11/2023 11.2  6.0 - 12.0 fL Final   • Platelets 05/11/2023 146  140 - 450 10*3/mm3 Final   • Neutrophil % 05/11/2023 69.9  42.7 - 76.0 % Final   • Lymphocyte % 05/11/2023 16.0 (L)  19.6 - 45.3 % Final   • Monocyte % 05/11/2023 9.5  5.0 - 12.0 % Final   • Eosinophil % 05/11/2023 3.2  0.3 - 6.2 % Final   • Basophil % 05/11/2023 1.2  0.0 - 1.5 % Final   • Immature Grans % 05/11/2023 0.2  0.0 - 0.5 % Final   • Neutrophils, Absolute 05/11/2023 3.02  1.70 - 7.00 10*3/mm3 Final   • Lymphocytes, Absolute 05/11/2023 0.69 (L)  0.70 - 3.10 10*3/mm3 Final   • Monocytes, Absolute 05/11/2023 0.41  0.10 - 0.90 10*3/mm3 Final   • Eosinophils, Absolute 05/11/2023 0.14  0.00 - 0.40 10*3/mm3 Final   • Basophils, Absolute 05/11/2023 0.05  0.00 - 0.20 10*3/mm3 Final   • Immature Grans, Absolute 05/11/2023 0.01  0.00 - 0.05 10*3/mm3 Final   • nRBC 05/11/2023 0.0  0.0 - 0.2 /100 WBC Final     Current Outpatient Medications   Medication Instructions   • Ascorbic Acid (VITAMIN C PO) Oral   • Cholecalciferol (VITAMIN D3) 50 MCG (2000 UT) tablet 1 tablet, Oral, Daily   • Elderberry 575 MG/5ML syrup Oral   • levothyroxine (SYNTHROID, LEVOTHROID) 25 mcg, Oral, Daily   • NON FORMULARY As Needed, ASHWAGANDHA    • Polyethyl Glycol-Propyl Glycol (SYSTANE ULTRA OP) 1 drop, Ophthalmic, Daily   • vitamin B-12 (CYANOCOBALAMIN) 1,000 mcg, Oral, Daily         Assessment & Plan   Diagnoses and all orders for this visit:    1. Age-related osteoporosis without current pathological fracture (Primary)  -     DEXA Bone Density Axial; Future  -     Ambulatory Referral to Physical Therapy Evaluate and treat    2. Renal insufficiency    3. Memory loss    4. Anxiety    5. Ataxia  -     Ambulatory Referral to  Physical Therapy Evaluate and treat    1.  Memory loss-  Stable  Better if she sleeps better  Seen neuropsych  May need aricept t some point  will follow  She is going to try to increase PA  2. CRI-she has been stable  Cont to avoid nsaids  3.   HYpothyroidism-ok with current meds  4.  Gait imbalance  Refer to PT  5.   Osteoporosis- she does take calcium and has some vit d daily  Recheck scan and consider medicatio n for this

## 2023-06-07 ENCOUNTER — TREATMENT (OUTPATIENT)
Dept: PHYSICAL THERAPY | Facility: CLINIC | Age: 73
End: 2023-06-07
Payer: MEDICARE

## 2023-06-07 DIAGNOSIS — Z74.09 IMPAIRED FUNCTIONAL MOBILITY, BALANCE, GAIT, AND ENDURANCE: Primary | ICD-10-CM

## 2023-06-07 DIAGNOSIS — M54.50 LUMBAR PAIN: ICD-10-CM

## 2023-06-07 DIAGNOSIS — R53.1 WEAKNESS: ICD-10-CM

## 2023-06-07 PROCEDURE — 97161 PT EVAL LOW COMPLEX 20 MIN: CPT | Performed by: PHYSICAL THERAPIST

## 2023-06-07 PROCEDURE — 97110 THERAPEUTIC EXERCISES: CPT | Performed by: PHYSICAL THERAPIST

## 2023-06-07 NOTE — PROGRESS NOTES
Physical Therapy Initial Evaluation and Plan of Care  [facility] [place]   [address], [address]  [place] [address]  P: [phone]  F: [phone]    Patient: Carol A Osgood   : 1950  Diagnosis/ICD-10 Code:  Impaired functional mobility, balance, gait, and endurance [Z74.09]  Referring practitioner: Ciera Newman MD  Date of Initial Visit: 2023  Today's Date: 2023  Patient seen for 1 session         Visit Diagnoses:    ICD-10-CM ICD-9-CM   1. Impaired functional mobility, balance, gait, and endurance  Z74.09 V49.89   2. Lumbar pain  M54.50 724.2   3. Weakness  R53.1 780.79       PMHx Reviewed : 2023      Subjective Evaluation    History of Present Illness  Mechanism of injury: Pt reports she feels like she is getting weaker and balance is worsening. Reports low back pain and feels posture is worsening. Reports she has had recent falls where she fell off the toilet and hit her head.     Pain  Current pain ratin  At worst pain ratin  Location: low back  Quality: discomfort, tight, dull ache and needle-like  Relieving factors: change in position and medications  Aggravating factors: movement, standing and prolonged positioning    Social Support  Lives with: spouse    Treatments  Current treatment: physical therapy  Patient Goals  Patient goals for therapy: decreased pain, improved balance, increased motion, increased strength, independence with ADLs/IADLs and return to sport/leisure activities           Objective          Tenderness     Left Hip   Tenderness in the PSIS.     Right Hip   Tenderness in the PSIS.     Active Range of Motion     Lumbar   Flexion: WFL  Extension: WFL and with pain  Left lateral flexion: WFL  Right lateral flexion: WFL    Strength/Myotome Testing     Left Hip   Planes of Motion   Flexion: 4-  Abduction: 4-  Adduction: 4+    Right Hip   Planes of Motion   Flexion: 4  Abduction: 4-  Adduction: 4+    Left Knee   Flexion:  5  Extension: 5    Right Knee   Flexion: 5  Extension: 5    Tests     Lumbar     Left   Negative passive SLR.     Right   Negative passive SLR.     Lumbar Flexibility Comments:   Decreased B hip ER flexibility     Functional Assessment     Comments  ABC: 71        Assessment & Plan     Assessment  Impairments: abnormal or restricted ROM, activity intolerance, impaired balance, impaired physical strength, lacks appropriate home exercise program and pain with function  Functional Limitations: lifting, sleeping, walking, uncomfortable because of pain, sitting and unable to perform repetitive tasks  Assessment details: Patient will benefit from skilled PT services in order to address impairments and facilitate return to normal daily activities including ADL's, work and recreational activities.      Prognosis: good    Goals  Plan Goals: Short Term Goals: 2-4weeks. Patient will:  1. Be independent with initial HEP  2. Be instructed in posture and body mechanics  3. Demonstrate TrA contraction during exercises in clinic without need for cueing.  4. Report pain of </= 5/10 with daily activities    Long Term Goals: 4-8 weeks. Patient will:  1. Demonstrate improved Bilateral lower extremity/lower abdominal MMT of >/= 4+/5 to allow for performance of daily activities without pain.  2. Demonstrate lower extremity flexibility and lumbar ROM WFL to allow for return to household & recreational activities w/o increased symptoms  3. Report pain of </= 3/10 with all daily activities.  4. ABC score >/= 75  5. Be independent with long term HEP    Plan  Therapy options: will be seen for skilled therapy services  Planned modality interventions: cryotherapy, thermotherapy (hydrocollator packs), TENS and ultrasound  Planned therapy interventions: abdominal trunk stabilization, manual therapy, neuromuscular re-education, body mechanics training, postural training, soft tissue mobilization, spinal/joint mobilization, strengthening,  stretching, home exercise program, functional ROM exercises, flexibility, therapeutic activities and balance/weight-bearing training  Frequency: 2x week  Duration in weeks: 8  Treatment plan discussed with: patient        Manual Therapy:          mins  77976;  Therapeutic Exercise:      10    mins  49486;     Neuromuscular Frank:           mins  11775;    Therapeutic Activity:           mins  40041;     Gait Training:            mins  92302;     Ultrasound:           mins  01789;    Electrical Stimulation:          mins  37199 ( );  Dry Needling           mins self-pay  Traction           mins 50564  Canalith Repositioning        mins 88341      Timed Treatment:   10   mins   Total Treatment:     40   mins      PT: Petty Ho PT     License Number: [id number]  Electronically signed by Petty Ho PT, 06/07/23, 10:39 AM EDT    Certification Period: 6/7/2023 thru 9/4/2023  I certify that the therapy services are furnished while this patient is under my care.  The services outlined above are required by this patient, and will be reviewed every 90 days.         Physician Signature:__________________________________________________    PHYSICIAN: Ciera Newman MD  NPI: [id number]                                      DATE:      Please sign in Epic or return via fax to [email or web address] . Thank you, [facility].

## 2023-06-07 NOTE — PATIENT INSTRUCTIONS
Access Code: [email or web address]  URL: [email or web address]  Date: 06/07/2023  Prepared by: Petty Ho    Exercises  - Bridge  - 1 x daily - 7 x weekly - 1 sets - 10 reps - 5 hold  - Supine Piriformis Stretch  - 1 x daily - 7 x weekly - 1 sets - 5 reps - 10 hold  - Tandem Stance in Corner  - 1 x daily - 7 x weekly - 1 sets - 10 reps - 10 hold  - Heel Toe Raises with Counter Support  - 1 x daily - 7 x weekly - 3 sets - 10 reps  - Seated Scapular Retraction  - 1 x daily - 7 x weekly - 3 sets - 10 reps - 5 hold  - Seated Long Arc Quad  - 1 x daily - 7 x weekly - 1 sets - 10 reps - 5 hold  Pt was educated on findings of evaluation, purpose of treatment and goals for therapy. Treatment options discussed and questions answered. Pt was educated on exercises, self treatment and pain relief techniques.

## 2023-06-12 ENCOUNTER — TREATMENT (OUTPATIENT)
Dept: PHYSICAL THERAPY | Facility: CLINIC | Age: 73
End: 2023-06-12
Payer: MEDICARE

## 2023-06-12 DIAGNOSIS — Z74.09 IMPAIRED FUNCTIONAL MOBILITY, BALANCE, GAIT, AND ENDURANCE: Primary | ICD-10-CM

## 2023-06-12 DIAGNOSIS — R53.1 WEAKNESS: ICD-10-CM

## 2023-06-12 DIAGNOSIS — M54.50 LUMBAR PAIN: ICD-10-CM

## 2023-06-12 PROCEDURE — 97112 NEUROMUSCULAR REEDUCATION: CPT | Performed by: PHYSICAL THERAPIST

## 2023-06-12 PROCEDURE — 97530 THERAPEUTIC ACTIVITIES: CPT | Performed by: PHYSICAL THERAPIST

## 2023-06-12 PROCEDURE — 97110 THERAPEUTIC EXERCISES: CPT | Performed by: PHYSICAL THERAPIST

## 2023-06-12 NOTE — PROGRESS NOTES
Physical Therapy Daily Treatment Note  [facility] [place]   [address], [address]  [place] [address]  P: [phone]  F: [phone]    Patient: Carol A Osgood   : 1950  Referring practitioner: Ciera Newman MD  Date of Initial Visit: Type: THERAPY  Noted: 2023  Today's Date: 2023  Patient seen for 2 sessions       Visit Diagnoses:    ICD-10-CM ICD-9-CM   1. Impaired functional mobility, balance, gait, and endurance  Z74.09 V49.89   2. Lumbar pain  M54.50 724.2   3. Weakness  R53.1 780.79         Carol Osgood reports: compliance with HEP    Subjective     Objective   See Exercise, Manual, and Modality Logs for complete treatment.       Assessment/Plan Compliant/cooperative with current rehab efforts.  Benefits from verbal/tactile cues to ensure correct exercise performance/technique, hold time and position. Plan details: Progress ROM / strengthening / stabilization / functional activity as tolerated       Timed:         Manual Therapy:         mins  73136;     Therapeutic Exercise:     20    mins  77307;     Neuromuscular Frank:     10   mins  54919;    Therapeutic Activity:      8    mins  87868;     Gait Training:           mins  65622;     Ultrasound:          mins  24259;    Ionto                                   mins  17402  Self Care                            mins  25990  Traction          mins 46087      Un-Timed:  Canalith Repos         mins 52652  Electrical Stimulation:         mins  05672 ( );  Dry Needling          mins self-pay  Traction          mins 33143        Timed Treatment:   38   mins   Total Treatment:     38   mins    Petty Ho, PT  [place] License #: [id number]    Physical Therapist

## 2023-06-14 ENCOUNTER — TREATMENT (OUTPATIENT)
Dept: PHYSICAL THERAPY | Facility: CLINIC | Age: 73
End: 2023-06-14
Payer: MEDICARE

## 2023-06-14 DIAGNOSIS — Z74.09 IMPAIRED FUNCTIONAL MOBILITY, BALANCE, GAIT, AND ENDURANCE: Primary | ICD-10-CM

## 2023-06-14 DIAGNOSIS — M54.50 LUMBAR PAIN: ICD-10-CM

## 2023-06-14 DIAGNOSIS — R53.1 WEAKNESS: ICD-10-CM

## 2023-06-14 PROCEDURE — 97110 THERAPEUTIC EXERCISES: CPT | Performed by: PHYSICAL THERAPIST

## 2023-06-14 PROCEDURE — 97112 NEUROMUSCULAR REEDUCATION: CPT | Performed by: PHYSICAL THERAPIST

## 2023-06-14 NOTE — PROGRESS NOTES
Physical Therapy Daily Treatment Note  [facility] [place]   [address], [address]  [place] [address]  P: [phone]  F: [phone]    Patient: Carol A Osgood   : 1950  Referring practitioner: Ciera Newman MD  Date of Initial Visit: Type: THERAPY  Noted: 2023  Today's Date: 2023  Patient seen for 3 sessions       Visit Diagnoses:    ICD-10-CM ICD-9-CM   1. Impaired functional mobility, balance, gait, and endurance  Z74.09 V49.89   2. Weakness  R53.1 780.79   3. Lumbar pain  M54.50 724.2         Carol Osgood reports: compliance with HEP     Subjective     Objective   See Exercise, Manual, and Modality Logs for complete treatment.       Assessment/Plan Compliant/cooperative with current rehab efforts.  Benefits from verbal/tactile cues to ensure correct exercise performance/technique, hold time and position. Plan details: Progress ROM / strengthening / stabilization / functional activity as tolerated       Timed:         Manual Therapy:         mins  78949;     Therapeutic Exercise:   20      mins  99223;     Neuromuscular Frank:   10     mins  72461;    Therapeutic Activity:          mins  42024;     Gait Training:           mins  39619;     Ultrasound:          mins  04332;    Ionto                                   mins  65957  Self Care                            mins  24897  Traction          mins 49128      Un-Timed:  Canalith Repos         mins 79055  Electrical Stimulation:         mins  76541 ( );  Dry Needling          mins self-pay  Traction          mins 13729        Timed Treatment:  30  mins   Total Treatment:     30   mins    Petty Ho, PT  [place] License #: [id number]    Physical Therapist

## 2023-07-31 ENCOUNTER — HOSPITAL ENCOUNTER (OUTPATIENT)
Dept: BONE DENSITY | Facility: HOSPITAL | Age: 73
Discharge: HOME | End: 2023-07-31
Admitting: INTERNAL MEDICINE
Payer: MEDICARE

## 2023-07-31 DIAGNOSIS — M81.0 AGE-RELATED OSTEOPOROSIS WITHOUT CURRENT PATHOLOGICAL FRACTURE: ICD-10-CM

## 2023-07-31 PROCEDURE — 77080 DXA BONE DENSITY AXIAL: CPT

## 2023-08-02 ENCOUNTER — HOSPITAL ENCOUNTER (OUTPATIENT)
Dept: GENERAL RADIOLOGY | Facility: HOSPITAL | Age: 73
Discharge: HOME | End: 2023-08-02
Admitting: INTERNAL MEDICINE
Payer: MEDICARE

## 2023-08-02 ENCOUNTER — OFFICE VISIT (OUTPATIENT)
Dept: INTERNAL MEDICINE | Facility: CLINIC | Age: 73
End: 2023-08-02
Payer: MEDICARE

## 2023-08-02 VITALS
DIASTOLIC BLOOD PRESSURE: 62 MMHG | BODY MASS INDEX: 20.03 KG/M2 | OXYGEN SATURATION: 100 % | HEIGHT: 67 IN | WEIGHT: 127.6 LBS | HEART RATE: 75 BPM | TEMPERATURE: 97.9 F | SYSTOLIC BLOOD PRESSURE: 106 MMHG

## 2023-08-02 DIAGNOSIS — M54.50 ACUTE RIGHT-SIDED LOW BACK PAIN WITHOUT SCIATICA: Primary | ICD-10-CM

## 2023-08-02 DIAGNOSIS — M81.0 AGE-RELATED OSTEOPOROSIS WITHOUT CURRENT PATHOLOGICAL FRACTURE: ICD-10-CM

## 2023-08-02 DIAGNOSIS — M81.0 AGE-RELATED OSTEOPOROSIS WITHOUT CURRENT PATHOLOGICAL FRACTURE: Primary | ICD-10-CM

## 2023-08-02 DIAGNOSIS — R41.3 MEMORY LOSS: ICD-10-CM

## 2023-08-02 PROCEDURE — 72100 X-RAY EXAM L-S SPINE 2/3 VWS: CPT

## 2023-08-02 PROCEDURE — 99214 OFFICE O/P EST MOD 30 MIN: CPT | Performed by: INTERNAL MEDICINE

## 2023-08-02 RX ORDER — COCONUT OIL
OIL (ML) MISCELLANEOUS
COMMUNITY

## 2023-08-16 ENCOUNTER — TELEPHONE (OUTPATIENT)
Dept: INTERNAL MEDICINE | Facility: CLINIC | Age: 73
End: 2023-08-16
Payer: MEDICARE

## 2023-08-16 ENCOUNTER — CLINICAL SUPPORT (OUTPATIENT)
Dept: INTERNAL MEDICINE | Facility: CLINIC | Age: 73
End: 2023-08-16
Payer: MEDICARE

## 2023-08-16 VITALS — SYSTOLIC BLOOD PRESSURE: 114 MMHG | DIASTOLIC BLOOD PRESSURE: 62 MMHG

## 2023-08-16 DIAGNOSIS — R42 DIZZINESS: Primary | ICD-10-CM

## 2023-08-16 NOTE — TELEPHONE ENCOUNTER
Caller: Osgood, Carol A    Relationship: Self    Best call back number:     [phone]       What is the best time to reach you: ANYTIME    Who are you requesting to speak with (clinical staff, provider,  specific staff member): DR LAURY SANTOS        What was the call regarding: PATIENT STATED SHE HAS BEEN EXPERIENCING DIZZINESS IN THE MORNINGS AND UNSTEADINESS WHILE WALKING    PATIENT IS WANTING TO SPEAK WITH DR SANTOS REGARDING THIS AS IT HAS HAPPENED MULTIPLE TIMES THIS MONTH    PLEASE CALL TO DISCUSS AND ADVISE

## 2023-08-16 NOTE — TELEPHONE ENCOUNTER
Advised Pt's  that she needs her BP checked. It was low at the last visit. If it is low she is advised to increase fluid intake and add a little salt to the diet. She will be in touch

## 2023-08-17 ENCOUNTER — TREATMENT (OUTPATIENT)
Dept: PHYSICAL THERAPY | Facility: CLINIC | Age: 73
End: 2023-08-17
Payer: MEDICARE

## 2023-08-17 DIAGNOSIS — R29.898 LEG WEAKNESS, BILATERAL: ICD-10-CM

## 2023-08-17 DIAGNOSIS — M54.50 LUMBAR PAIN: Primary | ICD-10-CM

## 2023-08-17 PROCEDURE — 97161 PT EVAL LOW COMPLEX 20 MIN: CPT | Performed by: PHYSICAL THERAPIST

## 2023-08-17 PROCEDURE — 97110 THERAPEUTIC EXERCISES: CPT | Performed by: PHYSICAL THERAPIST

## 2023-08-17 NOTE — PATIENT INSTRUCTIONS
Access Code: [email or web address]  URL: [email or web address]  Date: 08/17/2023  Prepared by: Petty Ho    Exercises  - Supine Lower Trunk Rotation  - 1 x daily - 7 x weekly - 3 sets - 10 reps  - Supine Piriformis Stretch with Foot on Ground  - 1 x daily - 7 x weekly - 1 sets - 5 reps - 20 hold  - Supine Bridge  - 1 x daily - 7 x weekly - 1 sets - 10 reps - 5 hold  - Hooklying Single Leg Bent Knee Fallouts with Resistance  - 1 x daily - 7 x weekly - 3 sets - 10 reps  - Scapular Retraction with Resistance  - 1 x daily - 7 x weekly - 3 sets - 10 reps  Pt was educated on findings of evaluation, purpose of treatment and goals for therapy. Treatment options discussed and questions answered. Pt was educated on exercises, self treatment and pain relief techniques.    Addended by: KYLER WORTHY on: 6/19/2023 09:35 AM     Modules accepted: Orders

## 2023-08-17 NOTE — PROGRESS NOTES
Physical Therapy Initial Evaluation and Plan of Care  [facility] [place]   [address], [address]  [place] [address]  P: [phone]  F: [phone]    Patient: Carol A Osgood   : 1950  Diagnosis/ICD-10 Code:  Lumbar pain [M54.50]  Referring practitioner: Ciera Newman MD  Date of Initial Visit: 2023  Today's Date: 2023  Patient seen for 1 session         Visit Diagnoses:    ICD-10-CM ICD-9-CM   1. Lumbar pain  M54.50 724.2   2. Leg weakness, bilateral  R29.898 729.89       PMHx Reviewed : 2023      Subjective Evaluation    History of Present Illness  Mechanism of injury: Pt reports having more consistent low back pain and worsened after long car ride about a month ago when visiting daughter. Reports pain radiates to L hip and groin.   X-ray taken 23:   Alignment is in range of normal. Vertebral body heights appear   preserved. No acute fracture is identified. Multilevel endplate spurring   and facet arthropathy are present. Disc space narrowing is apparent at   at L3/4, L4/5. A 1.3 cm calcification of the right kidney suggesting   nephrolithiasis.       Pain  Current pain ratin  At best pain ratin  At worst pain ratin  Location: low back and L hip  Quality: sharp, discomfort, dull ache and knife-like  Relieving factors: change in position and heat  Aggravating factors: movement and prolonged positioning    Social Support  Lives in: multiple-level home  Lives with: spouse    Diagnostic Tests  X-ray: abnormal    Treatments  Current treatment: physical therapy  Patient Goals  Patient goals for therapy: decreased pain, increased motion, improved balance, increased strength and independence with ADLs/IADLs           Objective          Static Posture     Shoulders  Rounded.    Thoracic Spine  Hyperkyphosis.    Palpation   Left   Tenderness of the erector spinae, lumbar paraspinals and quadratus lumborum.     Right Tenderness of the erector  spinae, lumbar paraspinals and quadratus lumborum.     Tenderness     Left Hip   Tenderness in the PSIS.     Right Hip   Tenderness in the PSIS.     Active Range of Motion     Lumbar   Flexion: WFL and with pain  Extension: with pain  Left lateral flexion: Active left lumbar lateral flexion: 50% with pain  Right lateral flexion: Active right lumbar lateral flexion: 50% with pain  Left rotation: Active left lumbar rotation: 50% with pain  Right rotation: Active right lumbar rotation: 50% with pain    Strength/Myotome Testing     Left Hip   Planes of Motion   Flexion: 4  Abduction: 3+  Adduction: 4    Right Hip   Planes of Motion   Flexion: 4-  Abduction: 3+  Adduction: 4    Left Knee   Flexion: 4+  Extension: 5    Right Knee   Flexion: 4+  Extension: 5    Left Ankle/Foot   Dorsiflexion: 4+    Right Ankle/Foot   Dorsiflexion: 4+    Lumbar Flexibility Comments:   Decreased B hip ER flexibility     Functional Assessment     Comments  Owestry: 19 (38%)        Assessment & Plan       Assessment  Impairments: abnormal or restricted ROM, activity intolerance, impaired balance, impaired physical strength, lacks appropriate home exercise program and pain with function   Functional limitations: lifting, sleeping, walking, uncomfortable because of pain, moving in bed and sitting   Assessment details: Patient will benefit from skilled PT services in order to address impairments and facilitate return to normal daily activities including ADL's, work and recreational activities.      Prognosis: good    Goals  Plan Goals: Short Term Goals: 2-4 weeks. Patient will:  1. Be independent with initial HEP  2. Be instructed in posture and body mechanics  3. Demonstrate TrA contraction during exercises in clinic without need for cueing.  4. Report pain of </= 5/10 with daily activities    Long Term Goals: 4-8 weeks. Patient will:  1. Demonstrate improved Bilateral lower extremity/lower abdominal MMT of >/= 4+/5 to allow for performance of  daily activities without pain.  2. Demonstrate lower extremity flexibility and lumbar ROM WFL to allow for return to household & recreational activities w/o increased symptoms  3. Report pain of </= 3/10 with all daily activities.  4. Perceived disability </= 25% as measured by Oswestry Questionnaire.  5. Be independent with long term HEP    Plan  Therapy options: will be seen for skilled therapy services  Planned modality interventions: cryotherapy and thermotherapy (hydrocollator packs)  Planned therapy interventions: abdominal trunk stabilization, manual therapy, neuromuscular re-education, body mechanics training, postural training, soft tissue mobilization, spinal/joint mobilization, strengthening, stretching, home exercise program, functional ROM exercises, flexibility, balance/weight-bearing training and therapeutic activities  Frequency: 2x week  Duration in weeks: 8  Treatment plan discussed with: patient        Manual Therapy:          mins  58440;  Therapeutic Exercise:  10        mins  56898;     Neuromuscular Frank:          mins  21495;    Therapeutic Activity:           mins  52443;     Gait Training:            mins  78322;     Ultrasound:           mins  48977;    Electrical Stimulation:          mins  52127 ( );  Dry Needling           mins self-pay  Traction           mins 28558  Canalith Repositioning         mins 60220      Timed Treatment:    10  mins   Total Treatment:     35   mins      PT: Petty Ho PT     License Number: [id number]  Electronically signed by Petty Ho PT, 08/17/23, 10:23 AM EDT    Certification Period: 8/17/2023 thru 11/14/2023  I certify that the therapy services are furnished while this patient is under my care.  The services outlined above are required by this patient, and will be reviewed every 90 days.         Physician Signature:__________________________________________________    PHYSICIAN: Ciera Newman MD  NPI: [id number]                                       DATE:      Please sign in Epic or return via fax to [email or web address] . Thank you, [facility].

## 2023-08-21 ENCOUNTER — TELEPHONE (OUTPATIENT)
Dept: PHYSICAL THERAPY | Facility: CLINIC | Age: 73
End: 2023-08-21
Payer: MEDICARE

## 2023-08-21 NOTE — TELEPHONE ENCOUNTER
Caller: Osgood,Michael    Relationship: Emergency Contact         What was the call regarding: HAS A OPEN WOUND ON HER ARM THAT IS STILL BLEEDING AND WEEPING

## 2023-08-22 ENCOUNTER — TELEPHONE (OUTPATIENT)
Dept: INTERNAL MEDICINE | Facility: CLINIC | Age: 73
End: 2023-08-22
Payer: MEDICARE

## 2023-08-22 DIAGNOSIS — R06.81 WITNESSED EPISODE OF APNEA: ICD-10-CM

## 2023-08-22 DIAGNOSIS — R06.83 SNORING: Primary | ICD-10-CM

## 2023-08-22 NOTE — TELEPHONE ENCOUNTER
----- Message from Mattie BENJAMIN. Osgood sent at 8/22/2023  2:55 PM EDT -----  Regarding: Sleep Apnea Test - aMttie Blevinsod  Contact: [phone]  Bebeto NapierEve,    When I was in to see Dr. Newman last week/ Thursday I asked her about a sleep apnea test for Mattie. This is due since Mattie not only snores, but at time appears to stop breathing, is typically tired after a nights sleep, and continues to be tired throughout the day. Dr. Newman stated as I was leaving that she sent you a note to set up. Is there something we need to do on our end? Thanks for your help.    Mike Osgood

## 2023-08-23 ENCOUNTER — TELEPHONE (OUTPATIENT)
Dept: INTERNAL MEDICINE | Facility: CLINIC | Age: 73
End: 2023-08-23
Payer: MEDICARE

## 2023-08-23 ENCOUNTER — OFFICE VISIT (OUTPATIENT)
Dept: INTERNAL MEDICINE | Facility: CLINIC | Age: 73
End: 2023-08-23
Payer: MEDICARE

## 2023-08-23 VITALS
HEIGHT: 67 IN | BODY MASS INDEX: 19.82 KG/M2 | HEART RATE: 72 BPM | SYSTOLIC BLOOD PRESSURE: 116 MMHG | TEMPERATURE: 97.6 F | DIASTOLIC BLOOD PRESSURE: 66 MMHG | WEIGHT: 126.3 LBS | OXYGEN SATURATION: 98 %

## 2023-08-23 DIAGNOSIS — R42 POSITIONAL LIGHTHEADEDNESS: ICD-10-CM

## 2023-08-23 DIAGNOSIS — S41.112A SKIN TEAR OF LEFT UPPER ARM WITHOUT COMPLICATION, INITIAL ENCOUNTER: Primary | ICD-10-CM

## 2023-08-23 DIAGNOSIS — R26.89 BALANCE PROBLEM: ICD-10-CM

## 2023-08-23 DIAGNOSIS — R42 VERTIGO: ICD-10-CM

## 2023-08-23 PROCEDURE — 1160F RVW MEDS BY RX/DR IN RCRD: CPT | Performed by: NURSE PRACTITIONER

## 2023-08-23 PROCEDURE — 1159F MED LIST DOCD IN RCRD: CPT | Performed by: NURSE PRACTITIONER

## 2023-08-23 PROCEDURE — 99213 OFFICE O/P EST LOW 20 MIN: CPT | Performed by: NURSE PRACTITIONER

## 2023-08-23 NOTE — TELEPHONE ENCOUNTER
Caller: Osgood,Michael    Relationship: Emergency Contact    Best call back number: [phone]    What is the best time to reach you: ANYTIME    Who are you requesting to speak with (clinical staff, provider,  specific staff member): [facility]    What was the call regarding: PATIENT'S  IS REQUESTING A CALLBACK FROM [facility] REGARDING A WOUND PATIENT HAS ON ARM.HE STATES SHE HIT IT AND IT IS NOW WORSE AND WANTING ADVICE ON WHAT TO DO. PLEASE ADVISE.

## 2023-08-23 NOTE — PROGRESS NOTES
Subjective   Carol A Osgood is a 72 y.o. female.     Chief Complaint   Patient presents with    Wound Check     Pt c/o wound on left arm X saturday        History of Present Illness   She is here today with c/o left arm wound.  She is accompanied by her .  This occurred on Saturday after getting up and losing her balance hitting the left forearm on the side of the door.  She then a few days later hit the area on another door while walking, reopening the wound.  She notes some bleeding from the area and surrounding bruising.  The bruising has improved today.  She initially cleaned the wound out with hydrogen peroxide and has been applying a waterproof bandage to the area.  She notes occasional episodes of vertigo recently, described as the room spinning.  Positive history of vertigo in the past.  She has also been having some difficulty with balance and intermittent lightheadedness. She notes that this is worse with position changes when she goes from a seated to standing position or when she gets up from bed in the middle of the night.  She struggles with adequate hydration.  She has been working on trying to hydrate better with water.  She is starting physical therapy for low back pain on Wednesday.    The following portions of the patient's history were reviewed and updated as appropriate: allergies, current medications, past family history, past medical history, past social history, past surgical history, and problem list.    Review of Systems   Constitutional:  Negative for chills, fatigue and fever.   Respiratory:  Negative for cough, chest tightness, shortness of breath and wheezing.    Cardiovascular:  Negative for chest pain, palpitations and leg swelling.   Skin:  Positive for wound and bruise.   Neurological:  Positive for dizziness and light-headedness. Negative for tremors, seizures, syncope, facial asymmetry, speech difficulty, weakness, numbness, headache, memory problem and confusion.         Balance issues     Objective   Physical Exam  Constitutional:       Appearance: She is well-developed.   HENT:      Right Ear: Hearing, tympanic membrane, ear canal and external ear normal.      Left Ear: Hearing, tympanic membrane, ear canal and external ear normal.   Neck:      Thyroid: No thyroid mass, thyromegaly or thyroid tenderness.      Vascular: No carotid bruit.      Trachea: Trachea normal.   Cardiovascular:      Rate and Rhythm: Normal rate and regular rhythm.      Chest Wall: PMI is not displaced.      Pulses:           Radial pulses are 2+ on the right side and 2+ on the left side.        Dorsalis pedis pulses are 2+ on the right side and 2+ on the left side.        Posterior tibial pulses are 2+ on the right side and 2+ on the left side.      Heart sounds: S1 normal and S2 normal.   Pulmonary:      Effort: Pulmonary effort is normal.      Breath sounds: Normal breath sounds.   Musculoskeletal:      Right lower leg: No edema.      Left lower leg: No edema.      Comments: Lower extremity strength 4 out of 5 symmetric and equal bilaterally.   Lymphadenopathy:      Head:      Right side of head: No submental, submandibular, tonsillar or occipital adenopathy.      Left side of head: No submental, submandibular, tonsillar or occipital adenopathy.      Cervical: No cervical adenopathy.   Skin:     General: Skin is warm and dry.      Capillary Refill: Capillary refill takes less than 2 seconds.      Findings: Bruising and wound present.      Nails: There is no clubbing.             Comments: 3 skin tears present left forearm.  Small amount of serosanguineous drainage present.  Surrounding ecchymosis.  No warmth or purulent drainage present.   Neurological:      Mental Status: She is alert and oriented to person, place, and time.      Cranial Nerves: Cranial nerves 2-12 are intact.      Sensory: Sensation is intact.      Motor: Motor function is intact.      Coordination: Coordination is intact.      Gait:  Gait abnormal.      Comments: Stooped posture with difficulty getting on and off the exam table.    Psychiatric:         Attention and Perception: Attention normal.         Mood and Affect: Mood and affect normal.         Speech: Speech normal.         Behavior: Behavior normal.         Thought Content: Thought content normal.         Cognition and Memory: Cognition normal.       Vitals:    08/23/23 1334   BP: 116/66   Pulse: 72   Temp: 97.6 øF (36.4 øC)   SpO2: 98%      Body mass index is 19.78 kg/mý.    Assessment & Plan   Problems Addressed this Visit    None  Visit Diagnoses       Skin tear of left upper arm without complication, initial encounter    -  Primary    Balance problem        Relevant Orders    Ambulatory Referral to Physical Therapy Evaluate and treat, Vestibular    Positional lightheadedness        Relevant Orders    Ambulatory Referral to Physical Therapy Evaluate and treat, Vestibular    Vertigo        Relevant Orders    Ambulatory Referral to Physical Therapy Evaluate and treat, Vestibular          Diagnoses         Codes Comments    Skin tear of left upper arm without complication, initial encounter    -  Primary ICD-10-CM: S41.112A  ICD-9-CM: 880.03     Balance problem     ICD-10-CM: R26.89  ICD-9-CM: 781.99     Positional lightheadedness     ICD-10-CM: R42  ICD-9-CM: 780.4     Vertigo     ICD-10-CM: R42  ICD-9-CM: 780.4           1.  Skin tear of left upper arm-healing well.  Wound care provided today in office.  Bacitracin ointment applied to skin followed by nonocclusive dressing, Kerlix and Coband.  Discussed with her and her  to keep skin clean and dry.  Okay to keep the wound open to air at home.  Recommend covering the wound at bedtime.  Monitor the area for signs of infection.  Encouraged her to increase protein intake.  Follow-up next week to assess healing.  2.  Balance problem/positional lightheadedness/vertigo-she does have some positional lightheadedness when going from a  sitting to standing position.  Orthostatic blood pressures negative today in office.  Instructed her to focus on safe position changes.  Recommend dangling legs on the side of the bed for few minutes prior to getting up.  Recommend hydrating well with fluids and wearing compression socks.  She would benefit from physical therapy to focus on balance and lower extremity strength along with vestibular rehab.  Referral placed.

## 2023-08-24 ENCOUNTER — TREATMENT (OUTPATIENT)
Dept: PHYSICAL THERAPY | Facility: CLINIC | Age: 73
End: 2023-08-24
Payer: MEDICARE

## 2023-08-24 DIAGNOSIS — R29.898 LEG WEAKNESS, BILATERAL: ICD-10-CM

## 2023-08-24 DIAGNOSIS — Z74.09 IMPAIRED FUNCTIONAL MOBILITY, BALANCE, GAIT, AND ENDURANCE: ICD-10-CM

## 2023-08-24 DIAGNOSIS — M54.50 LUMBAR PAIN: Primary | ICD-10-CM

## 2023-08-24 PROCEDURE — 97110 THERAPEUTIC EXERCISES: CPT | Performed by: PHYSICAL THERAPIST

## 2023-08-24 PROCEDURE — 97112 NEUROMUSCULAR REEDUCATION: CPT | Performed by: PHYSICAL THERAPIST

## 2023-08-24 NOTE — PROGRESS NOTES
Physical Therapy Daily Treatment Note  [facility] [place]   [address], [address]  [place] [address]  P: [phone]  F: [phone]    Patient: Carol A Osgood   : 1950  Referring practitioner: Ciera Newman MD  Date of Initial Visit: Type: THERAPY  Noted: 2023  Today's Date: 2023  Patient seen for 2 sessions       Visit Diagnoses:    ICD-10-CM ICD-9-CM   1. Lumbar pain  M54.50 724.2   2. Leg weakness, bilateral  R29.898 729.89   3. Impaired functional mobility, balance, gait, and endurance  Z74.09 V49.89         Carol Osgood reports: now having vertigo issues; bumped into door and has skin abrasion on arm     Subjective     Objective   See Exercise, Manual, and Modality Logs for complete treatment.       Assessment/Plan  Compliant/cooperative with current rehab efforts.  Benefits from verbal/tactile cues to ensure correct exercise performance/technique, hold time and position. Plan details: Progress ROM / strengthening / stabilization / functional activity as tolerated       Timed:         Manual Therapy:         mins  45337;     Therapeutic Exercise:     30    mins  87142;     Neuromuscular Frank:     8   mins  02058;    Therapeutic Activity:          mins  58981;     Gait Training:           mins  67548;     Ultrasound:          mins  56212;    Ionto                                   mins  57844  Self Care                            mins  74921  Traction          mins 15165      Un-Timed:  Canalith Repos         mins 50466  Electrical Stimulation:         mins  74717 (MC );  Dry Needling          mins self-pay  Traction          mins 47682        Timed Treatment:  38    mins   Total Treatment:        38 mins    Petty Ho PT  [place] License #: [id number]    Physical Therapist

## 2023-08-28 ENCOUNTER — OFFICE VISIT (OUTPATIENT)
Dept: INTERNAL MEDICINE | Facility: CLINIC | Age: 73
End: 2023-08-28
Payer: MEDICARE

## 2023-08-28 VITALS
OXYGEN SATURATION: 96 % | BODY MASS INDEX: 19.54 KG/M2 | WEIGHT: 124.5 LBS | HEART RATE: 85 BPM | DIASTOLIC BLOOD PRESSURE: 72 MMHG | TEMPERATURE: 98.1 F | SYSTOLIC BLOOD PRESSURE: 112 MMHG | HEIGHT: 67 IN

## 2023-08-28 DIAGNOSIS — R26.89 BALANCE PROBLEM: ICD-10-CM

## 2023-08-28 DIAGNOSIS — S41.112D SKIN TEAR OF LEFT UPPER ARM WITHOUT COMPLICATION, SUBSEQUENT ENCOUNTER: Primary | ICD-10-CM

## 2023-08-28 NOTE — PROGRESS NOTES
Subjective   Carol A Osgood is a 72 y.o. female.     Chief Complaint   Patient presents with    Wound Check        History of Present Illness   She is here today for f/u for left arm skin tear. At last office visit wound care completed in the office for left arm skin tear. She was instructed to keep the wound open to air during the day and cover it at night for bedtime as she was having issues with reopening the wound secondary to balance issues.  She has been using bacitracin at night and applying a nonadherent dressing and gauze over the area.  She notes significant improvement in wound healing.  She notes minimal drainage at the site.  She denies any worsening erythema, pain, edema, fever or chills.  She is currently in physical therapy for back pain and balance.  She is scheduled to start vestibular rehab next month.    The following portions of the patient's history were reviewed and updated as appropriate: allergies, current medications, past family history, past medical history, past social history, past surgical history, and problem list.    Review of Systems   Constitutional:  Negative for chills, fatigue and fever.   Respiratory: Negative.     Cardiovascular: Negative.    Skin:  Positive for wound.   Neurological:         Balance issue       Objective   Physical Exam  Constitutional:       Appearance: She is well-developed.   Neck:      Thyroid: No thyroid mass, thyromegaly or thyroid tenderness.      Vascular: No carotid bruit.      Trachea: Trachea normal.   Cardiovascular:      Rate and Rhythm: Normal rate and regular rhythm.      Chest Wall: PMI is not displaced.      Pulses:           Radial pulses are 2+ on the right side and 2+ on the left side.        Dorsalis pedis pulses are 2+ on the right side and 2+ on the left side.        Posterior tibial pulses are 2+ on the right side and 2+ on the left side.      Heart sounds: S1 normal and S2 normal.   Pulmonary:      Effort: Pulmonary effort is normal.       Breath sounds: Normal breath sounds.   Musculoskeletal:      Right lower leg: No edema.      Left lower leg: No edema.   Lymphadenopathy:      Head:      Right side of head: No submental, submandibular, tonsillar or occipital adenopathy.      Left side of head: No submental, submandibular, tonsillar or occipital adenopathy.      Cervical: No cervical adenopathy.   Skin:     General: Skin is warm and dry.      Capillary Refill: Capillary refill takes less than 2 seconds.      Findings: Bruising and wound present.      Nails: There is no clubbing.             Comments: Skin tear is healing well.  Wound base pink without drainage.  Surrounding bruising improving.   Neurological:      Mental Status: She is alert and oriented to person, place, and time.   Psychiatric:         Attention and Perception: Attention normal.         Mood and Affect: Mood and affect normal.         Speech: Speech normal.         Behavior: Behavior normal.         Thought Content: Thought content normal.         Cognition and Memory: Cognition normal.       Vitals:    08/28/23 1059   BP: 112/72   Pulse: 85   Temp: 98.1 øF (36.7 øC)   SpO2: 96%      Body mass index is 19.49 kg/mý.    Assessment & Plan   Problems Addressed this Visit    None  Visit Diagnoses       Skin tear of left upper arm without complication, subsequent encounter    -  Primary    Balance problem              Diagnoses         Codes Comments    Skin tear of left upper arm without complication, subsequent encounter    -  Primary ICD-10-CM: S41.112D  ICD-9-CM: V58.89, 880.03     Balance problem     ICD-10-CM: R26.89  ICD-9-CM: 781.99           1.  Skin tear of left upper arm-healing well.  Recommend continuing to cover the wound at bedtime for the next 4 to 5 days.  Okay to continue topical bacitracin at bedtime.  Continue using nonadherent gauze, Curlex and Coban and when wrapping the arm.  Notify for signs of infection.  2.  Balance problem-encouraged her to continue  physical therapy with vestibular rehab next month.

## 2023-08-30 ENCOUNTER — TREATMENT (OUTPATIENT)
Dept: PHYSICAL THERAPY | Facility: CLINIC | Age: 73
End: 2023-08-30
Payer: MEDICARE

## 2023-08-30 DIAGNOSIS — M54.50 LUMBAR PAIN: Primary | ICD-10-CM

## 2023-08-30 DIAGNOSIS — R29.898 LEG WEAKNESS, BILATERAL: ICD-10-CM

## 2023-08-30 PROCEDURE — 97530 THERAPEUTIC ACTIVITIES: CPT | Performed by: PHYSICAL THERAPIST

## 2023-08-30 PROCEDURE — 97110 THERAPEUTIC EXERCISES: CPT | Performed by: PHYSICAL THERAPIST

## 2023-08-30 PROCEDURE — 97112 NEUROMUSCULAR REEDUCATION: CPT | Performed by: PHYSICAL THERAPIST

## 2023-08-30 NOTE — PROGRESS NOTES
Physical Therapy Daily Treatment Note  [facility] [place]   [address], [address]  [place] [address]  P: [phone]  F: [phone]    Patient: Carol A Osgood   : 1950  Referring practitioner: Ciera Newman MD  Date of Initial Visit: Type: THERAPY  Noted: 2023  Today's Date: 2023  Patient seen for 3 sessions       Visit Diagnoses:    ICD-10-CM ICD-9-CM   1. Lumbar pain  M54.50 724.2   2. Leg weakness, bilateral  R29.898 729.89         Carol Osgood reports: compliance with HEP     Subjective     Objective   See Exercise, Manual, and Modality Logs for complete treatment.       Assessment/Plan Compliant/cooperative with current rehab efforts.  Benefits from  constant one on one verbal/tactile cues to ensure correct exercise performance/technique, hold time and position. Plan details: Progress ROM / strengthening / stabilization / functional activity as tolerated       Timed:         Manual Therapy:         mins  87898;     Therapeutic Exercise:    20     mins  51168;     Neuromuscular Frank:   10     mins  98582;    Therapeutic Activity:       8   mins  00270;     Gait Training:           mins  27859;     Ultrasound:          mins  23079;    Ionto                                   mins  86663  Self Care                            mins  09099  Traction          mins 57198      Un-Timed:  Canalith Repos         mins 88237  Electrical Stimulation:         mins  34867 ( );  Dry Needling          mins self-pay  Traction          mins 84099        Timed Treatment:  38    mins   Total Treatment:     38   mins    KYLAH Sparrow License #: [id number]    Physical Therapist

## 2023-08-31 ENCOUNTER — TELEPHONE (OUTPATIENT)
Dept: PHYSICAL THERAPY | Facility: CLINIC | Age: 73
End: 2023-08-31
Payer: MEDICARE

## 2023-08-31 ENCOUNTER — HOSPITAL ENCOUNTER (EMERGENCY)
Facility: HOSPITAL | Age: 73
Discharge: HOME | End: 2023-08-31
Attending: EMERGENCY MEDICINE
Payer: MEDICARE

## 2023-08-31 ENCOUNTER — APPOINTMENT (OUTPATIENT)
Dept: GENERAL RADIOLOGY | Facility: HOSPITAL | Age: 73
End: 2023-08-31
Payer: MEDICARE

## 2023-08-31 ENCOUNTER — TELEPHONE (OUTPATIENT)
Dept: INTERNAL MEDICINE | Facility: CLINIC | Age: 73
End: 2023-08-31

## 2023-08-31 VITALS
RESPIRATION RATE: 16 BRPM | WEIGHT: 124.56 LBS | SYSTOLIC BLOOD PRESSURE: 107 MMHG | BODY MASS INDEX: 20.02 KG/M2 | DIASTOLIC BLOOD PRESSURE: 62 MMHG | OXYGEN SATURATION: 100 % | HEART RATE: 72 BPM | TEMPERATURE: 102 F | HEIGHT: 66 IN

## 2023-08-31 DIAGNOSIS — U07.1 COVID-19: Primary | ICD-10-CM

## 2023-08-31 DIAGNOSIS — R53.1 GENERALIZED WEAKNESS: ICD-10-CM

## 2023-08-31 LAB
ALBUMIN SERPL-MCNC: 4.3 G/DL (ref 3.5–5.2)
ALBUMIN/GLOB SERPL: 1.7 G/DL
ALP SERPL-CCNC: 62 U/L (ref 39–117)
ALT SERPL W P-5'-P-CCNC: 16 U/L (ref 1–33)
ANION GAP SERPL CALCULATED.3IONS-SCNC: 14 MMOL/L (ref 5–15)
AST SERPL-CCNC: 27 U/L (ref 1–32)
B PARAPERT DNA SPEC QL NAA+PROBE: NOT DETECTED
B PERT DNA SPEC QL NAA+PROBE: NOT DETECTED
BACTERIA UR QL AUTO: ABNORMAL /HPF
BASOPHILS # BLD AUTO: 0.01 10*3/MM3 (ref 0–0.2)
BASOPHILS NFR BLD AUTO: 0.2 % (ref 0–1.5)
BILIRUB SERPL-MCNC: 0.5 MG/DL (ref 0–1.2)
BILIRUB UR QL STRIP: NEGATIVE
BUN SERPL-MCNC: 31 MG/DL (ref 8–23)
BUN/CREAT SERPL: 16.9 (ref 7–25)
C PNEUM DNA NPH QL NAA+NON-PROBE: NOT DETECTED
CALCIUM SPEC-SCNC: 9.8 MG/DL (ref 8.6–10.5)
CHLORIDE SERPL-SCNC: 98 MMOL/L (ref 98–107)
CLARITY UR: ABNORMAL
CO2 SERPL-SCNC: 27 MMOL/L (ref 22–29)
COD CRY URNS QL: ABNORMAL /HPF
COLOR UR: YELLOW
CREAT SERPL-MCNC: 1.83 MG/DL (ref 0.57–1)
D-LACTATE SERPL-SCNC: 1.8 MMOL/L (ref 0.5–2)
DEPRECATED RDW RBC AUTO: 40.2 FL (ref 37–54)
EGFRCR SERPLBLD CKD-EPI 2021: 28.7 ML/MIN/1.73
EOSINOPHIL # BLD AUTO: 0 10*3/MM3 (ref 0–0.4)
EOSINOPHIL NFR BLD AUTO: 0 % (ref 0.3–6.2)
ERYTHROCYTE [DISTWIDTH] IN BLOOD BY AUTOMATED COUNT: 12.1 % (ref 12.3–15.4)
FLUAV SUBTYP SPEC NAA+PROBE: NOT DETECTED
FLUBV RNA ISLT QL NAA+PROBE: NOT DETECTED
GLOBULIN UR ELPH-MCNC: 2.5 GM/DL
GLUCOSE SERPL-MCNC: 98 MG/DL (ref 65–99)
GLUCOSE UR STRIP-MCNC: NEGATIVE MG/DL
HADV DNA SPEC NAA+PROBE: NOT DETECTED
HCOV 229E RNA SPEC QL NAA+PROBE: NOT DETECTED
HCOV HKU1 RNA SPEC QL NAA+PROBE: NOT DETECTED
HCOV NL63 RNA SPEC QL NAA+PROBE: NOT DETECTED
HCOV OC43 RNA SPEC QL NAA+PROBE: NOT DETECTED
HCT VFR BLD AUTO: 34.6 % (ref 34–46.6)
HGB BLD-MCNC: 11.9 G/DL (ref 12–15.9)
HGB UR QL STRIP.AUTO: ABNORMAL
HMPV RNA NPH QL NAA+NON-PROBE: NOT DETECTED
HPIV1 RNA ISLT QL NAA+PROBE: NOT DETECTED
HPIV2 RNA SPEC QL NAA+PROBE: NOT DETECTED
HPIV3 RNA NPH QL NAA+PROBE: NOT DETECTED
HPIV4 P GENE NPH QL NAA+PROBE: NOT DETECTED
HYALINE CASTS UR QL AUTO: ABNORMAL /LPF
KETONES UR QL STRIP: ABNORMAL
LEUKOCYTE ESTERASE UR QL STRIP.AUTO: ABNORMAL
LYMPHOCYTES # BLD AUTO: 0.27 10*3/MM3 (ref 0.7–3.1)
LYMPHOCYTES NFR BLD AUTO: 5.4 % (ref 19.6–45.3)
M PNEUMO IGG SER IA-ACNC: NOT DETECTED
MCH RBC QN AUTO: 31.3 PG (ref 26.6–33)
MCHC RBC AUTO-ENTMCNC: 34.4 G/DL (ref 31.5–35.7)
MCV RBC AUTO: 91.1 FL (ref 79–97)
MONOCYTES # BLD AUTO: 0.66 10*3/MM3 (ref 0.1–0.9)
MONOCYTES NFR BLD AUTO: 13.2 % (ref 5–12)
NEUTROPHILS NFR BLD AUTO: 4.05 10*3/MM3 (ref 1.7–7)
NEUTROPHILS NFR BLD AUTO: 81 % (ref 42.7–76)
NITRITE UR QL STRIP: NEGATIVE
PH UR STRIP.AUTO: 5.5 [PH] (ref 5–8)
PLATELET # BLD AUTO: 110 10*3/MM3 (ref 140–450)
PMV BLD AUTO: 11 FL (ref 6–12)
POTASSIUM SERPL-SCNC: 4 MMOL/L (ref 3.5–5.2)
PROT SERPL-MCNC: 6.8 G/DL (ref 6–8.5)
PROT UR QL STRIP: ABNORMAL
RBC # BLD AUTO: 3.8 10*6/MM3 (ref 3.77–5.28)
RBC # UR STRIP: ABNORMAL /HPF
REF LAB TEST METHOD: ABNORMAL
RHINOVIRUS RNA SPEC NAA+PROBE: NOT DETECTED
RSV RNA NPH QL NAA+NON-PROBE: NOT DETECTED
SARS-COV-2 RNA NPH QL NAA+NON-PROBE: DETECTED
SODIUM SERPL-SCNC: 139 MMOL/L (ref 136–145)
SP GR UR STRIP: 1.01 (ref 1–1.03)
SQUAMOUS #/AREA URNS HPF: ABNORMAL /HPF
UROBILINOGEN UR QL STRIP: ABNORMAL
WBC # UR STRIP: ABNORMAL /HPF
WBC NRBC COR # BLD: 5 10*3/MM3 (ref 3.4–10.8)

## 2023-08-31 PROCEDURE — 83605 ASSAY OF LACTIC ACID: CPT | Performed by: PHYSICIAN ASSISTANT

## 2023-08-31 PROCEDURE — 81001 URINALYSIS AUTO W/SCOPE: CPT | Performed by: PHYSICIAN ASSISTANT

## 2023-08-31 PROCEDURE — 0202U NFCT DS 22 TRGT SARS-COV-2: CPT | Performed by: PHYSICIAN ASSISTANT

## 2023-08-31 PROCEDURE — 80053 COMPREHEN METABOLIC PANEL: CPT | Performed by: PHYSICIAN ASSISTANT

## 2023-08-31 PROCEDURE — 71045 X-RAY EXAM CHEST 1 VIEW: CPT

## 2023-08-31 PROCEDURE — 99283 EMERGENCY DEPT VISIT LOW MDM: CPT

## 2023-08-31 PROCEDURE — 85025 COMPLETE CBC W/AUTO DIFF WBC: CPT | Performed by: PHYSICIAN ASSISTANT

## 2023-08-31 RX ORDER — ACETAMINOPHEN 500 MG
1000 TABLET ORAL ONCE
Status: COMPLETED | OUTPATIENT
Start: 2023-08-31 | End: 2023-08-31

## 2023-08-31 RX ADMIN — ACETAMINOPHEN 1000 MG: 500 TABLET ORAL at 13:27

## 2023-08-31 RX ADMIN — SODIUM CHLORIDE 500 ML: 9 INJECTION, SOLUTION INTRAVENOUS at 13:21

## 2023-08-31 NOTE — TELEPHONE ENCOUNTER
Caller: Osgood, Carol A    Relationship: Self         What was the call regarding: RONNI CALLEDMATTIE IS IN THE ER AND HAS TESTED POSITIVE FOR COVID AND WANTED TO LET YOU ALL KNOW.

## 2023-08-31 NOTE — ED PROVIDER NOTES
" EMERGENCY DEPARTMENT ENCOUNTER    Room Number:  32/32  PCP: Ciera Newman MD  Discussed/ obtained information from independent historians: patient,       HPI:  Chief Complaint: weakness  A complete HPI/ROS/PMH/PSH/SH/FH are unobtainable due to: \"memory loss\"  Context: Carol A Osgood is a 72 y.o. female who presents to the ED c/o generalized weakness.  Patient has a history of \"memory loss\" and  gives most of the history.  Last couple of months she has had some increasing weakness, has been seeing her PCP and started physical therapy.  Yesterday was quite tired of physical therapy and today upon getting out of the shower she was much more weak than usual.  Yesterday was quite tired at physical therapy and today upon getting out of the shower she was much more weak than usual and had some shaking chills.  Upon present to the ER she has a temperature of 102, which they were unaware of.  She has had a cough and some congestion, it is mild and nonproductive cough.  She has chronic hematuria, denies any dysuria or frequency, states she felt a little short of breath today.  Denies chest pain.      External (non-ED) record review: Office visit 8/2/2023 with PCP for low back pain, x-ray ordered and referred to physical therapy.  Memory issues, follow-up with neuropsych testing in November had been scheduled, hypothyroidism was currently under control.      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    Nuclear senile cataract 03/11/2016    Vitreous degeneration 03/11/2016     Resolved Ambulatory Problems     Diagnosis Date Noted    Elevated pancreatic enzyme 02/24/2016    Generalized abdominal pain 07/17/2017     Past Medical History:   Diagnosis Date    Arthritis          PAST SURGICAL HISTORY  Past Surgical History:   Procedure Laterality Date    APPENDECTOMY      COLONOSCOPY N/A 9/14/2021    Procedure: COLONOSCOPY;  Surgeon: Vineet Tran MD;  Location: [facility] MAIN OR;  Service: Gastroenterology;  Laterality: N/A;         FAMILY HISTORY  Family History   Problem Relation Age of Onset    COPD Other     Heart attack Other     Stroke Other     Thrombocytopenia Other     Diabetes Other     Brain cancer Other     COPD Mother     Heart attack Mother     Stroke Mother     Heart disease Father         PARALYSIS, WAIST DOWN    Thrombocytopenia Sister     Diabetes Maternal Grandmother     Cancer Paternal Grandmother         BRAIN    Breast cancer Paternal Aunt     Hepatitis Brother     Prostate cancer Brother     Colon cancer Paternal Aunt     Colon polyps Paternal Aunt     Colon cancer Other         NEPHEW    Colon polyps Other     Irritable bowel syndrome Neg Hx     Ulcerative colitis Neg Hx     Crohn's disease Neg Hx     Inflammatory bowel disease Neg Hx          SOCIAL HISTORY  Social History     Socioeconomic History    Marital status:      Spouse name: Michael J Osgood    Number of children: 1   Tobacco Use    Smoking status: Never    Smokeless tobacco: Never    Tobacco comments:     no caffeine   Vaping Use    Vaping Use: Never used   Substance and Sexual Activity    Alcohol use: No    Drug use: No    Sexual activity: Defer         ALLERGIES  Adhesive tape; Latex, natural rubber; and Celecoxib        REVIEW OF SYSTEMS  Review of Systems         PHYSICAL EXAM  ED Triage Vitals   Temp Heart Rate Resp BP SpO2   08/31/23 1257 08/31/23 1257 08/31/23 1257 08/31/23 1303 08/31/23 1257   (!)  102 °F (38.9 °C) 116 16 135/66 97 %      Temp src Heart Rate Source Patient Position BP Location FiO2 (%)   -- -- 08/31/23 1303 08/31/23 1303 --     Lying Right arm        Physical Exam      GENERAL: no acute distress  HENT: normocephalic, atraumatic  EYES: no scleral icterus  CV: regular rhythm, normal rate  RESPIRATORY: normal effort CTA B  ABDOMEN: nondistended soft nontender normal bowel sounds no guarding or rigidity  MUSCULOSKELETAL: no deformity  NEURO: alert, moves all extremities, follows commands  PSYCH:  calm, cooperative  SKIN: warm, dry    Vital signs and nursing notes reviewed.          LAB RESULTS  Recent Results (from the past 24 hour(s))   Comprehensive Metabolic Panel    Collection Time: 08/31/23  1:16 PM    Specimen: Blood   Result Value Ref Range    Glucose 98 65 - 99 mg/dL    BUN 31 (H) 8 - 23 mg/dL    Creatinine 1.83 (H) 0.57 - 1.00 mg/dL    Sodium 139 136 - 145 mmol/L    Potassium 4.0 3.5 - 5.2 mmol/L    Chloride 98 98 - 107 mmol/L    CO2 27.0 22.0 - 29.0 mmol/L    Calcium 9.8 8.6 - 10.5 mg/dL    Total Protein 6.8 6.0 - 8.5 g/dL    Albumin 4.3 3.5 - 5.2 g/dL    ALT (SGPT) 16 1 - 33 U/L    AST (SGOT) 27 1 - 32 U/L    Alkaline Phosphatase 62 39 - 117 U/L    Total Bilirubin 0.5 0.0 - 1.2 mg/dL    Globulin 2.5 gm/dL    A/G Ratio 1.7 g/dL    BUN/Creatinine Ratio 16.9 7.0 - 25.0    Anion Gap 14.0 5.0 - 15.0 mmol/L    eGFR 28.7 (L) >60.0 mL/min/1.73   Lactic Acid, Plasma    Collection Time: 08/31/23  1:16 PM    Specimen: Blood   Result Value Ref Range    Lactate 1.8 0.5 - 2.0 mmol/L   CBC Auto Differential    Collection Time: 08/31/23  1:16 PM    Specimen: Blood   Result Value Ref Range    WBC 5.00 3.40 - 10.80 10*3/mm3    RBC 3.80 3.77 - 5.28 10*6/mm3    Hemoglobin 11.9 (L) 12.0 - 15.9 g/dL    Hematocrit 34.6 34.0 - 46.6 %    MCV 91.1 79.0 - 97.0 fL    MCH 31.3 26.6 - 33.0 pg    MCHC 34.4 31.5 - 35.7 g/dL    RDW 12.1 (L) 12.3 - 15.4 %    RDW-SD 40.2 37.0 - 54.0 fl    MPV 11.0 6.0 - 12.0 fL     Platelets 110 (L) 140 - 450 10*3/mm3    Neutrophil % 81.0 (H) 42.7 - 76.0 %    Lymphocyte % 5.4 (L) 19.6 - 45.3 %    Monocyte % 13.2 (H) 5.0 - 12.0 %    Eosinophil % 0.0 (L) 0.3 - 6.2 %    Basophil % 0.2 0.0 - 1.5 %    Neutrophils, Absolute 4.05 1.70 - 7.00 10*3/mm3    Lymphocytes, Absolute 0.27 (L) 0.70 - 3.10 10*3/mm3    Monocytes, Absolute 0.66 0.10 - 0.90 10*3/mm3    Eosinophils, Absolute 0.00 0.00 - 0.40 10*3/mm3    Basophils, Absolute 0.01 0.00 - 0.20 10*3/mm3   Respiratory Panel PCR w/COVID-19(SARS-CoV-2) CRISTINE/SABINA/BRUCE/PAD/COR/MAD/JAZMYN In-House, NP Swab in UTM/VTM, 3-4 HR TAT - Swab, Nasopharynx    Collection Time: 08/31/23  1:27 PM    Specimen: Nasopharynx; Swab   Result Value Ref Range    ADENOVIRUS, PCR Not Detected Not Detected    Coronavirus 229E Not Detected Not Detected    Coronavirus HKU1 Not Detected Not Detected    Coronavirus NL63 Not Detected Not Detected    Coronavirus OC43 Not Detected Not Detected    COVID19 Detected (C) Not Detected - Ref. Range    Human Metapneumovirus Not Detected Not Detected    Human Rhinovirus/Enterovirus Not Detected Not Detected    Influenza A PCR Not Detected Not Detected    Influenza B PCR Not Detected Not Detected    Parainfluenza Virus 1 Not Detected Not Detected    Parainfluenza Virus 2 Not Detected Not Detected    Parainfluenza Virus 3 Not Detected Not Detected    Parainfluenza Virus 4 Not Detected Not Detected    RSV, PCR Not Detected Not Detected    Bordetella pertussis pcr Not Detected Not Detected    Bordetella parapertussis PCR Not Detected Not Detected    Chlamydophila pneumoniae PCR Not Detected Not Detected    Mycoplasma pneumo by PCR Not Detected Not Detected       Ordered the above labs and reviewed the results.        RADIOLOGY  XR Chest 1 View    Result Date: 8/31/2023  Clinical: Fever  COMPARISON 9/2/2019  FINDINGS: Cardiac size within normal limits. No mediastinal or hilar abnormality. Lungs clear.  CONCLUSION: No active disease of the chest  This  report was finalized on 8/31/2023 2:41 PM by Dr. Gwyn Isbell M.D.       Ordered the above noted radiological studies. Reviewed by me in PACS.            PROCEDURES  Procedures              MEDICATIONS GIVEN IN ER  Medications   sodium chloride 0.9 % bolus 500 mL (500 mL Intravenous New Bag 8/31/23 1321)   acetaminophen (TYLENOL) tablet 1,000 mg (1,000 mg Oral Given 8/31/23 1327)                   MEDICAL DECISION MAKING, PROGRESS, and CONSULTS    All labs have been independently reviewed by me.  All radiology studies have been reviewed by me and I have also reviewed the radiology report.   EKG's independently viewed and interpreted by me.  Discussion below represents my analysis of pertinent findings related to patient's condition, differential diagnosis, treatment plan and final disposition.            Orders placed during this visit:  Orders Placed This Encounter   Procedures    Respiratory Panel PCR w/COVID-19(SARS-CoV-2) CRISTINE/SABINA/BRUCE/PAD/COR/MAD/JAZMYN In-House, NP Swab in UTM/VTM, 3-4 HR TAT - Swab, Nasopharynx    XR Chest 1 View    Comprehensive Metabolic Panel    Urinalysis With Culture If Indicated - Urine, Clean Catch    Lactic Acid, Plasma    CBC Auto Differential    Monitor Blood Pressure    Pulse Oximetry, Continuous    SCANNED - TELEMETRY      SCANNED - TELEMETRY      SCANNED - TELEMETRY      CBC & Differential           Differential diagnosis:  UTI, pneumonia, viral URI, bacteremia, dehydration, electrolyte abnormality      Independent interpretation of labs, radiology studies, and discussions with consultants:  ED Course as of 09/01/23 1230   Thu Aug 31, 2023   1412 Lactate: 1.8 [KA]   1413 Creatinine(!): 1.83  1.49 three months ago [KA]   1413 My independent interpretation of chest x-ray is no cardiomegaly or acute infiltrate [KA]   1459 COVID19(!!): Detected [KA]   1503 COVID19(!!): Detected [DC]   1507 I reassessed the patient, she is feeling improved after fluids and Tylenol.  She has not been able  to provide a urine sample yet.  She is not having any urinary symptoms but she and  would like to have this checked before she leaves.  We discussed admission versus discharge and they prefer discharge.  I think this is reasonable because she is not hypoxic and her vitals are normal, her chest x-ray is clear.  She has been rehydrated with IV fluids.  Plan is to check urinalysis and discharge with or without antibiotics pending results. [KA]   1555 COVID19(!!): Detected [DC-2]      ED Course User Index  [DC] Gorge Angelo MD  [DC-2] Uri Carpenter PA  [KA] Rachel Murphy PA       - Shared decision making: We discussed admission versus discharge and patient and family prefer discharge at this time    Additional orders considered but not ordered:  I considered admitting the patient however she is improved, not hypoxic, admission not indicated at this time.      Additional sources:    - Chronic or social conditions impacting care: memory loss, poor historian          DIAGNOSIS  Final diagnoses:   COVID-19   Generalized weakness           Follow Up:  Ciera Newman MD  [address]  [address] [place] [address] [id number] [phone]          [facility] EMERGENCY DEPARTMENT  [address] [facility] [address] [phone]    If symptoms worsen      RX:     Medication List      No changes were made to your prescriptions during this visit.         Latest Documented Vital Signs:  As of 15:23 EDT  BP- 120/84 HR- 78 Temp- (!) 102 °F (38.9 °C) O2 sat- 98%              --    Please note that portions of this were completed with a voice recognition program.       Note Disclaimer: At [facility], we believe that sharing information builds trust and better relationships. You are receiving this note because you are receiving care at [facility] or recently visited. It is possible you will see health information before a provider has talked with you about it. This kind of information  can be easy to misunderstand. To help you fully understand what it means for your health, we urge you to discuss this note with your provider.             Rachel Murphy PA  09/01/23 [id number]

## 2023-08-31 NOTE — TELEPHONE ENCOUNTER
Caller: Osgood,Michael    Relationship to patient: Emergency Contact    Best call back number: [phone]     Date of exposure:     Date of positive COVID19 test: 8/31/23    Date if possible COVID19 exposure:     COVID19 symptoms: WEAKNESS, DIZZINESS, FEVER    Date of initial quarantine:     Additional information or concerns: PATIENT'S SPOUSE CALLED AND STATES THAT PATIENT IS CURRENTLY AT [facility] EMERGENCY DEPARTMENT 8/31/23 AND TESTED POSITIVE FOR COVID.    What is the patients preferred pharmacy: [facility]/pharmacy #[id number] - [place], [place] - [address] AT [facility] [phone] [address] [phone]

## 2023-08-31 NOTE — DISCHARGE INSTRUCTIONS
stay well-hydrated  You can take Tylenol as needed for body aches and fever  Return to ER if worse or shortness of breath or if oxygen runs persistently below 92%.

## 2023-09-20 ENCOUNTER — TELEPHONE (OUTPATIENT)
Dept: ONCOLOGY | Facility: HOSPITAL | Age: 73
End: 2023-09-20
Payer: MEDICARE

## 2023-09-20 NOTE — TELEPHONE ENCOUNTER
Spoke with Eve with Dr Newman's office and per MD richardson to treat with prolia with CMP lab that was done on 8/31 with no additional labs (mag, phos and vit D) needed.

## 2023-09-22 ENCOUNTER — INFUSION (OUTPATIENT)
Dept: ONCOLOGY | Facility: HOSPITAL | Age: 73
End: 2023-09-22
Payer: MEDICARE

## 2023-09-22 ENCOUNTER — TELEPHONE (OUTPATIENT)
Dept: INTERNAL MEDICINE | Facility: CLINIC | Age: 73
End: 2023-09-22

## 2023-09-22 VITALS
HEART RATE: 60 BPM | BODY MASS INDEX: 20.18 KG/M2 | HEIGHT: 66 IN | RESPIRATION RATE: 15 BRPM | TEMPERATURE: 97 F | WEIGHT: 125.6 LBS | SYSTOLIC BLOOD PRESSURE: 113 MMHG | OXYGEN SATURATION: 98 % | DIASTOLIC BLOOD PRESSURE: 70 MMHG

## 2023-09-22 DIAGNOSIS — M81.0 AGE-RELATED OSTEOPOROSIS WITHOUT CURRENT PATHOLOGICAL FRACTURE: Primary | ICD-10-CM

## 2023-09-22 PROCEDURE — 25010000002 DENOSUMAB 60 MG/ML SOLUTION PREFILLED SYRINGE: Performed by: INTERNAL MEDICINE

## 2023-09-22 PROCEDURE — 96372 THER/PROPH/DIAG INJ SC/IM: CPT

## 2023-09-22 RX ADMIN — DENOSUMAB 60 MG: 60 INJECTION SUBCUTANEOUS at 11:21

## 2023-09-22 NOTE — TELEPHONE ENCOUNTER
Caller: Osgood,Michael    Relationship: Emergency Contact    Best call back number: [phone]     What is the best time to reach you: ANY    Who are you requesting to speak with (clinical staff, provider,  specific staff member): MD LAURY SANTOS    Do you know the name of the person who called: MICHAEL OSGOOD     What was the call regarding: PATIENTS  IS WANTING TO KNOW IF SHE NEEDS TO START TAKING CALCIUM SUPPLEMENTS AND IF SO, WHAT DOSAGE NEEDS TO BE TAKEN     Is it okay if the provider responds through MyChart: YES

## 2023-09-29 ENCOUNTER — TREATMENT (OUTPATIENT)
Dept: PHYSICAL THERAPY | Facility: CLINIC | Age: 73
End: 2023-09-29
Payer: MEDICARE

## 2023-09-29 DIAGNOSIS — H81.11 BPPV (BENIGN PAROXYSMAL POSITIONAL VERTIGO), RIGHT: Primary | ICD-10-CM

## 2023-09-29 DIAGNOSIS — R42 DIZZINESS: ICD-10-CM

## 2023-09-29 NOTE — PROGRESS NOTES
Physical Therapy Vestibular Initial Evaluation and Plan of Care  [facility] [place]  [address], [address]  [place] [address]      Patient Name: Carol A Osgood       Patient MRN: [id number]  : 1950  PHYSICIAN: Tiffany Gibbons APRN  NPI: [id number]                                     Date: 2023  Encounter Diagnoses   Name Primary?    BPPV (benign paroxysmal positional vertigo), right Yes    Dizziness        Subjective:  Subjective Outcome Measures  Dizziness Handicap Inventory: Minimal Perception of having a handicap (30/100)       Objective Testing:     Positional Testing  Positional Testing: With infrared goggles  Haider-Hallpike Right: Right-beating Nystagmus  Haider-Hallpike Right Onset Time : immediate  [place]-Hallpike Right Duration Time : > 1 min  Haider-Hallpike Left: Upbeat Nystagmus  [place]-Hallpike Left Duration Time : > 1 min  Horizontal Roll Test Right: Right-beating  Horizontal Roll Test Right Duration Time : 50 sec  Horizontal Roll Test Left: No nystagmus     Occulomotor Exam Fixation Present  Spontaneous Nystagmus: Absent  VOR with Occulomotor Exam Fixation Absent   Spontaneous Nystagmus: Absent       THERAPY ASSESSMENT: Patient is a 72 y.o. female. Patient presents to physical therapy due to complaints of episodes of dizziness/vertigo that started 20-25 years ago. She reports that currently she has not had symptoms for 1-2 months. When she is having symptoms she reports a spinning sensation when she gets out of bed in the morning that lasts seconds. She, along with her , reports feeling off balance when she has to close her eyes and  turn around and with ambulation. Her  reports she tends to veer when walking. She was tested for BPPV with infrared goggles. She demonstrated nystagmus but it was not symptomatic and was nonspecific to BPPV. However we will attempt CRP treatment. Signs and symptoms are suggesting R LC Canalithiasis BPPV. No signs of  central vestibular involvement. Patient is a good candidate for physical therapy to address the following:    Functional Limitations: Walking, Difficulty moving, Decreased ability to perform ADL's   Length of Therapy:  (8 weeks)   PT Frequency: PT 1x week   PT Interventions: Canal Repositioning Procedure, Home Exercise Program   Patient Agrees with Plan of Care: Yes    History # of Personal Factors and/or Comorbidities: MODERATE (1-2)  Examination of Body System(s): # of elements: LOW (1-2)  Clinical Presentation: EVOLVING  Clinical Decision Making: LOW       REHAB POTENTIAL: excellent            SHORT TERM GOALS: To be met in 2 weeks:  1. Patient is independent with HEP.  2. Patient will report at least 30% improvement in overall condition.  3. Patient will report no falls.  LONG TERM GOALS:To be met in 4 weeks:  1. Patient will report decreased disequilibrium/dizziness by at least 90% which demonstrates improved quality of life.  2. Patient will report no loss of balance with ADLs to demonstrate improved functional balance and reduced risk for falls.  3. Patient denies dizziness with daily activity especially with transitional movements.  4. DHI score is less than 10 to demonstrate improved balance and dizziness.       Ther Act 62921 8 minutes and Other Procedure CPT 32624 minutes 10    Timed Code Treatment: 8   Minutes     Total Treatment Time: 60      Minutes      PT SIGNATURE: Amalia Mcnally PT, DPT, CHT, KARLA   [place] license #[id number]  DATE TREATMENT INITIATED: 9/29/2023     Medicare Initial Certification  Certification Period: 9/29/2023 thru 12/27/2023  I certify that the therapy services are furnished while this patient is under my care.  The services outlined above are required by this patient, and will be reviewed every 90 days.     PHYSICIAN: Tiffany Gibbons, NAOMI      DATE:     Please sign and return via fax to [phone].. Thank you, [facility].

## 2023-10-03 ENCOUNTER — TREATMENT (OUTPATIENT)
Dept: PHYSICAL THERAPY | Facility: CLINIC | Age: 73
End: 2023-10-03
Payer: MEDICARE

## 2023-10-03 DIAGNOSIS — H81.11 BPPV (BENIGN PAROXYSMAL POSITIONAL VERTIGO), RIGHT: ICD-10-CM

## 2023-10-03 DIAGNOSIS — R42 DIZZINESS: Primary | ICD-10-CM

## 2023-10-03 NOTE — PROGRESS NOTES
Physical Therapy Daily Progress Note-Vestibular Rehab  [facility] [place]  [address]  [facility] [address]      Visit#:2  Subjective   I had a fall since my last visit but it was not because I was dizzy. I was walking and bent down to pet a dog and my foot slipped off the side of the sidewalk. I fell onto my R hip and bruised it.   Objective            Positional Testing  Positional Testing: With infrared goggles  Haider-Hallpike Right: Upbeat, right rotatory nystagmus  [place]-Hallpike Right Onset Time : 5 sec  Haider-Hallpike Right Duration Time : 10 sec  Haider-Hallpike Left:  (NA)  Horizontal Roll Test Right: Right-beating  Horizontal Roll Test Right Onset Time : immediate  Horizontal Roll Test Right Duration Time : > 1 min  Horizontal Roll Test Left: No nystagmus  Treatment: R Epley             PROCEDURES AND MODALITIES:  See Exercise, Manual, and Modality Logs for complete treatment.     Paraffin:   pre-rx  Moist Heat:    Ice:    post-rx  E-Stim:    Ultrasound:    Ionto:   Traction:      Ther Act 45533 8 minutes and Other Procedure CPT 66840 minutes 5    Timed Code Treatment: 8 Minutes  Total Treatment Time: 30 Minutes    Assessment & Plan   Patient reports one fall since last visit. She tested positive for R PC Canalithiasis BPPV today. This may have been from the fall. Treated with the Epley, which was tolerated well. Issued for HEP. I suspect this will clear quickly so I encouraged her to return to R BBQ roll when she is clear and testing negative in the R PC. Pt is progressing well under current treatment plan and will continue to improve with skilled PT and HEP performance.       Progress per Plan of Care    Amalia Mcnally, PT, DPT, CHT, CIDN  Physical Therapist  [place] license # [id number]

## 2023-10-06 ENCOUNTER — PRE-ADMISSION TESTING (OUTPATIENT)
Dept: PREADMISSION TESTING | Facility: HOSPITAL | Age: 73
End: 2023-10-06
Payer: MEDICARE

## 2023-10-06 VITALS
SYSTOLIC BLOOD PRESSURE: 150 MMHG | HEIGHT: 65 IN | DIASTOLIC BLOOD PRESSURE: 69 MMHG | RESPIRATION RATE: 16 BRPM | OXYGEN SATURATION: 100 % | WEIGHT: 128.3 LBS | BODY MASS INDEX: 21.38 KG/M2 | HEART RATE: 72 BPM | TEMPERATURE: 97.7 F

## 2023-10-06 LAB
ANION GAP SERPL CALCULATED.3IONS-SCNC: 10.7 MMOL/L (ref 5–15)
BUN SERPL-MCNC: 32 MG/DL (ref 8–23)
BUN/CREAT SERPL: 25.6 (ref 7–25)
CALCIUM SPEC-SCNC: 9.3 MG/DL (ref 8.6–10.5)
CHLORIDE SERPL-SCNC: 107 MMOL/L (ref 98–107)
CO2 SERPL-SCNC: 24.3 MMOL/L (ref 22–29)
CREAT SERPL-MCNC: 1.25 MG/DL (ref 0.57–1)
DEPRECATED RDW RBC AUTO: 41.6 FL (ref 37–54)
EGFRCR SERPLBLD CKD-EPI 2021: 45.3 ML/MIN/1.73
ERYTHROCYTE [DISTWIDTH] IN BLOOD BY AUTOMATED COUNT: 12.8 % (ref 12.3–15.4)
GLUCOSE SERPL-MCNC: 73 MG/DL (ref 65–99)
HCT VFR BLD AUTO: 30.8 % (ref 34–46.6)
HGB BLD-MCNC: 10.7 G/DL (ref 12–15.9)
MCH RBC QN AUTO: 31.8 PG (ref 26.6–33)
MCHC RBC AUTO-ENTMCNC: 34.7 G/DL (ref 31.5–35.7)
MCV RBC AUTO: 91.4 FL (ref 79–97)
PLATELET # BLD AUTO: 141 10*3/MM3 (ref 140–450)
PMV BLD AUTO: 11.7 FL (ref 6–12)
POTASSIUM SERPL-SCNC: 4.1 MMOL/L (ref 3.5–5.2)
QT INTERVAL: 404 MS
QTC INTERVAL: 411 MS
RBC # BLD AUTO: 3.37 10*6/MM3 (ref 3.77–5.28)
SODIUM SERPL-SCNC: 142 MMOL/L (ref 136–145)
WBC NRBC COR # BLD: 4.2 10*3/MM3 (ref 3.4–10.8)

## 2023-10-06 PROCEDURE — 85027 COMPLETE CBC AUTOMATED: CPT

## 2023-10-06 PROCEDURE — 36415 COLL VENOUS BLD VENIPUNCTURE: CPT

## 2023-10-06 PROCEDURE — 93005 ELECTROCARDIOGRAM TRACING: CPT

## 2023-10-06 PROCEDURE — 80048 BASIC METABOLIC PNL TOTAL CA: CPT

## 2023-10-06 NOTE — DISCHARGE INSTRUCTIONS
Take the following medications the morning of surgery with a small sip of water  LEVOTHYROXINE    ARRIVE 10:00 AM 10/18      If you are on prescription narcotic pain medication to control your pain you may also take that medication the morning of surgery.    General Instructions:  Do not eat or drink anything after midnight the night before surgery.  Infants may have breast milk up to four hours before surgery.  Infants drinking formula may drink formula up to six hours before surgery.   Patients who avoid smoking, chewing tobacco and alcohol for 4 weeks prior to surgery have a reduced risk of post-operative complications.  Quit smoking as many days before surgery as you can.  Do not smoke, use chewing tobacco or drink alcohol the day of surgery.   If applicable bring your C-PAP/ BI-PAP machine in with you to preop day of surgery.  Bring any papers given to you in the doctor’s office.  Wear clean comfortable clothes.  Do not wear contact lenses, false eyelashes or make-up.  Bring a case for your glasses.   Bring crutches or walker if applicable.  Remove all piercings.  Leave jewelry and any other valuables at home.  Hair extensions with metal clips must be removed prior to surgery.  The Pre-Admission Testing nurse will instruct you to bring medications if unable to obtain an accurate list in Pre-Admission Testing.          Preventing a Surgical Site Infection:  For 2 to 3 days before surgery, avoid shaving with a razor because the razor can irritate skin and make it easier to develop an infection.    Any areas of open skin can increase the risk of a post-operative wound infection by allowing bacteria to enter and travel throughout the body.  Notify your surgeon if you have any skin wounds / rashes even if it is not near the expected surgical site.  The area will need assessed to determine if surgery should be delayed until it is healed.  The night prior to surgery shower using a fresh bar of anti-bacterial soap  (such as Dial) and clean washcloth.  Sleep in a clean bed with clean clothing.  Do not allow pets to sleep with you.  Shower on the morning of surgery using a fresh bar of anti-bacterial soap (such as Dial) and clean washcloth.  Dry with a clean towel and dress in clean clothing.  Ask your surgeon if you will be receiving antibiotics prior to surgery.  Make sure you, your family, and all healthcare providers clean their hands with soap and water or an alcohol based hand  before caring for you or your wound.    Day of surgery:  Your arrival time is approximately two hours before your scheduled surgery time.  Upon arrival, a Pre-op nurse and Anesthesiologist will review your health history, obtain vital signs, and answer questions you may have.  The only belongings needed at this time will be your home medications and if applicable your C-PAP/BI-PAP machine.  A Pre-op nurse will start an IV and you may receive medication in preparation for surgery, including something to help you relax.      Please be aware that surgery does come with discomfort.  We want to make every effort to control your discomfort so please discuss any uncontrolled symptoms with your nurse.   Your doctor will most likely have prescribed pain medications.      If you are going home after surgery you will receive individualized written care instructions before being discharged.  A responsible adult must drive you to and from the hospital on the day of your surgery and stay with you for 24 hours.  Discharge prescriptions can be filled by the hospital pharmacy during regular pharmacy hours.  If you are having surgery late in the day/evening your prescription may be e-prescribed to your pharmacy.  Please verify your pharmacy hours or chose a 24 hour pharmacy to avoid not having access to your prescription because your pharmacy has closed for the day.    If you are staying overnight following surgery, you will be transported to your hospital  room following the recovery period.  [facility] has all private rooms.    If you have any questions please call Pre-Admission Testing at [phone].  Deductibles and co-payments are collected on the day of service. Please be prepared to pay the required co-pay, deductible or deposit on the day of service as defined by your plan.    Call your surgeon immediately if you experience any of the following symptoms:  Sore Throat  Shortness of Breath or difficulty breathing  Cough  Chills  Body soreness or muscle pain  Headache  Fever  New loss of taste or smell  Do not arrive for your surgery ill.  Your procedure will need to be rescheduled to another time.  You will need to call your physician before the day of surgery to avoid any unnecessary exposure to hospital staff as well as other patients.

## 2023-10-18 ENCOUNTER — HOSPITAL ENCOUNTER (OUTPATIENT)
Facility: HOSPITAL | Age: 73
Setting detail: HOSPITAL OUTPATIENT SURGERY
Discharge: HOME | End: 2023-10-18
Attending: UROLOGY | Admitting: UROLOGY
Payer: MEDICARE

## 2023-10-18 ENCOUNTER — ANESTHESIA EVENT (OUTPATIENT)
Dept: PERIOP | Facility: HOSPITAL | Age: 73
End: 2023-10-18
Payer: MEDICARE

## 2023-10-18 ENCOUNTER — ANESTHESIA (OUTPATIENT)
Dept: PERIOP | Facility: HOSPITAL | Age: 73
End: 2023-10-18
Payer: MEDICARE

## 2023-10-18 VITALS
HEART RATE: 63 BPM | OXYGEN SATURATION: 100 % | SYSTOLIC BLOOD PRESSURE: 151 MMHG | TEMPERATURE: 97 F | DIASTOLIC BLOOD PRESSURE: 82 MMHG | WEIGHT: 127.87 LBS | BODY MASS INDEX: 20.55 KG/M2 | RESPIRATION RATE: 16 BRPM | HEIGHT: 66 IN

## 2023-10-18 DIAGNOSIS — N20.0 RENAL CALCULUS: Primary | ICD-10-CM

## 2023-10-18 PROCEDURE — 25810000003 LACTATED RINGERS PER 1000 ML: Performed by: NURSE ANESTHETIST, CERTIFIED REGISTERED

## 2023-10-18 PROCEDURE — 25010000002 PROPOFOL 10 MG/ML EMULSION: Performed by: NURSE ANESTHETIST, CERTIFIED REGISTERED

## 2023-10-18 PROCEDURE — 25010000002 ONDANSETRON PER 1 MG: Performed by: NURSE ANESTHETIST, CERTIFIED REGISTERED

## 2023-10-18 PROCEDURE — 25010000002 DEXAMETHASONE SODIUM PHOSPHATE 20 MG/5ML SOLUTION: Performed by: NURSE ANESTHETIST, CERTIFIED REGISTERED

## 2023-10-18 RX ORDER — FAMOTIDINE 10 MG/ML
20 INJECTION, SOLUTION INTRAVENOUS ONCE
Status: CANCELLED | OUTPATIENT
Start: 2023-10-18 | End: 2023-10-18

## 2023-10-18 RX ORDER — ONDANSETRON 2 MG/ML
4 INJECTION INTRAMUSCULAR; INTRAVENOUS ONCE AS NEEDED
Status: DISCONTINUED | OUTPATIENT
Start: 2023-10-18 | End: 2023-10-18 | Stop reason: HOSPADM

## 2023-10-18 RX ORDER — LIDOCAINE HYDROCHLORIDE 10 MG/ML
0.5 INJECTION, SOLUTION INFILTRATION; PERINEURAL ONCE AS NEEDED
Status: CANCELLED | OUTPATIENT
Start: 2023-10-18

## 2023-10-18 RX ORDER — DROPERIDOL 2.5 MG/ML
0.62 INJECTION, SOLUTION INTRAMUSCULAR; INTRAVENOUS
Status: DISCONTINUED | OUTPATIENT
Start: 2023-10-18 | End: 2023-10-18 | Stop reason: HOSPADM

## 2023-10-18 RX ORDER — SODIUM CHLORIDE 0.9 % (FLUSH) 0.9 %
3-10 SYRINGE (ML) INJECTION AS NEEDED
Status: CANCELLED | OUTPATIENT
Start: 2023-10-18

## 2023-10-18 RX ORDER — SODIUM CHLORIDE 0.9 % (FLUSH) 0.9 %
3 SYRINGE (ML) INJECTION EVERY 12 HOURS SCHEDULED
Status: CANCELLED | OUTPATIENT
Start: 2023-10-18

## 2023-10-18 RX ORDER — FLUMAZENIL 0.1 MG/ML
0.2 INJECTION INTRAVENOUS AS NEEDED
Status: DISCONTINUED | OUTPATIENT
Start: 2023-10-18 | End: 2023-10-18 | Stop reason: HOSPADM

## 2023-10-18 RX ORDER — HYDROCODONE BITARTRATE AND ACETAMINOPHEN 7.5; 325 MG/1; MG/1
1 TABLET ORAL EVERY 4 HOURS PRN
Status: DISCONTINUED | OUTPATIENT
Start: 2023-10-18 | End: 2023-10-18 | Stop reason: HOSPADM

## 2023-10-18 RX ORDER — EPHEDRINE SULFATE 50 MG/ML
5 INJECTION, SOLUTION INTRAVENOUS ONCE AS NEEDED
Status: DISCONTINUED | OUTPATIENT
Start: 2023-10-18 | End: 2023-10-18 | Stop reason: HOSPADM

## 2023-10-18 RX ORDER — DIPHENHYDRAMINE HYDROCHLORIDE 50 MG/ML
12.5 INJECTION INTRAMUSCULAR; INTRAVENOUS
Status: DISCONTINUED | OUTPATIENT
Start: 2023-10-18 | End: 2023-10-18 | Stop reason: HOSPADM

## 2023-10-18 RX ORDER — MIDAZOLAM HYDROCHLORIDE 1 MG/ML
0.5 INJECTION INTRAMUSCULAR; INTRAVENOUS
Status: CANCELLED | OUTPATIENT
Start: 2023-10-18

## 2023-10-18 RX ORDER — HYDROMORPHONE HYDROCHLORIDE 1 MG/ML
0.25 INJECTION, SOLUTION INTRAMUSCULAR; INTRAVENOUS; SUBCUTANEOUS
Status: DISCONTINUED | OUTPATIENT
Start: 2023-10-18 | End: 2023-10-18 | Stop reason: HOSPADM

## 2023-10-18 RX ORDER — LABETALOL HYDROCHLORIDE 5 MG/ML
5 INJECTION, SOLUTION INTRAVENOUS
Status: DISCONTINUED | OUTPATIENT
Start: 2023-10-18 | End: 2023-10-18 | Stop reason: HOSPADM

## 2023-10-18 RX ORDER — PROMETHAZINE HYDROCHLORIDE 25 MG/1
25 SUPPOSITORY RECTAL ONCE AS NEEDED
Status: DISCONTINUED | OUTPATIENT
Start: 2023-10-18 | End: 2023-10-18 | Stop reason: HOSPADM

## 2023-10-18 RX ORDER — IPRATROPIUM BROMIDE AND ALBUTEROL SULFATE 2.5; .5 MG/3ML; MG/3ML
3 SOLUTION RESPIRATORY (INHALATION) ONCE AS NEEDED
Status: DISCONTINUED | OUTPATIENT
Start: 2023-10-18 | End: 2023-10-18 | Stop reason: HOSPADM

## 2023-10-18 RX ORDER — LIDOCAINE HYDROCHLORIDE 20 MG/ML
INJECTION, SOLUTION INFILTRATION; PERINEURAL AS NEEDED
Status: DISCONTINUED | OUTPATIENT
Start: 2023-10-18 | End: 2023-10-18 | Stop reason: SURG

## 2023-10-18 RX ORDER — PROMETHAZINE HYDROCHLORIDE 25 MG/1
25 TABLET ORAL ONCE AS NEEDED
Status: DISCONTINUED | OUTPATIENT
Start: 2023-10-18 | End: 2023-10-18 | Stop reason: HOSPADM

## 2023-10-18 RX ORDER — FENTANYL CITRATE 50 UG/ML
50 INJECTION, SOLUTION INTRAMUSCULAR; INTRAVENOUS ONCE AS NEEDED
Status: CANCELLED | OUTPATIENT
Start: 2023-10-18

## 2023-10-18 RX ORDER — ONDANSETRON 2 MG/ML
INJECTION INTRAMUSCULAR; INTRAVENOUS AS NEEDED
Status: DISCONTINUED | OUTPATIENT
Start: 2023-10-18 | End: 2023-10-18 | Stop reason: SURG

## 2023-10-18 RX ORDER — HYDROCODONE BITARTRATE AND ACETAMINOPHEN 5; 325 MG/1; MG/1
1 TABLET ORAL ONCE AS NEEDED
Status: COMPLETED | OUTPATIENT
Start: 2023-10-18 | End: 2023-10-18

## 2023-10-18 RX ORDER — FENTANYL CITRATE 50 UG/ML
25 INJECTION, SOLUTION INTRAMUSCULAR; INTRAVENOUS
Status: DISCONTINUED | OUTPATIENT
Start: 2023-10-18 | End: 2023-10-18 | Stop reason: HOSPADM

## 2023-10-18 RX ORDER — EPHEDRINE SULFATE 50 MG/ML
INJECTION INTRAVENOUS AS NEEDED
Status: DISCONTINUED | OUTPATIENT
Start: 2023-10-18 | End: 2023-10-18 | Stop reason: SURG

## 2023-10-18 RX ORDER — DEXAMETHASONE SODIUM PHOSPHATE 4 MG/ML
INJECTION, SOLUTION INTRA-ARTICULAR; INTRALESIONAL; INTRAMUSCULAR; INTRAVENOUS; SOFT TISSUE AS NEEDED
Status: DISCONTINUED | OUTPATIENT
Start: 2023-10-18 | End: 2023-10-18 | Stop reason: SURG

## 2023-10-18 RX ORDER — SODIUM CHLORIDE, SODIUM LACTATE, POTASSIUM CHLORIDE, CALCIUM CHLORIDE 600; 310; 30; 20 MG/100ML; MG/100ML; MG/100ML; MG/100ML
9 INJECTION, SOLUTION INTRAVENOUS CONTINUOUS
Status: CANCELLED | OUTPATIENT
Start: 2023-10-18

## 2023-10-18 RX ORDER — HYDRALAZINE HYDROCHLORIDE 20 MG/ML
5 INJECTION INTRAMUSCULAR; INTRAVENOUS
Status: DISCONTINUED | OUTPATIENT
Start: 2023-10-18 | End: 2023-10-18 | Stop reason: HOSPADM

## 2023-10-18 RX ORDER — PROPOFOL 10 MG/ML
VIAL (ML) INTRAVENOUS AS NEEDED
Status: DISCONTINUED | OUTPATIENT
Start: 2023-10-18 | End: 2023-10-18 | Stop reason: SURG

## 2023-10-18 RX ORDER — SODIUM CHLORIDE, SODIUM LACTATE, POTASSIUM CHLORIDE, CALCIUM CHLORIDE 600; 310; 30; 20 MG/100ML; MG/100ML; MG/100ML; MG/100ML
INJECTION, SOLUTION INTRAVENOUS CONTINUOUS PRN
Status: DISCONTINUED | OUTPATIENT
Start: 2023-10-18 | End: 2023-10-18 | Stop reason: SURG

## 2023-10-18 RX ORDER — OXYCODONE HYDROCHLORIDE AND ACETAMINOPHEN 5; 325 MG/1; MG/1
1-2 TABLET ORAL EVERY 6 HOURS PRN
Qty: 30 TABLET | Refills: 0 | Status: SHIPPED | OUTPATIENT
Start: 2023-10-18

## 2023-10-18 RX ORDER — NALOXONE HCL 0.4 MG/ML
0.2 VIAL (ML) INJECTION AS NEEDED
Status: DISCONTINUED | OUTPATIENT
Start: 2023-10-18 | End: 2023-10-18 | Stop reason: HOSPADM

## 2023-10-18 RX ADMIN — LIDOCAINE HYDROCHLORIDE 50 MG: 20 INJECTION, SOLUTION INFILTRATION; PERINEURAL at 12:19

## 2023-10-18 RX ADMIN — PROPOFOL 120 MG: 10 INJECTION, EMULSION INTRAVENOUS at 12:19

## 2023-10-18 RX ADMIN — EPHEDRINE SULFATE 10 MG: 50 INJECTION INTRAVENOUS at 12:39

## 2023-10-18 RX ADMIN — HYDROCODONE BITARTRATE AND ACETAMINOPHEN 1 TABLET: 5; 325 TABLET ORAL at 13:22

## 2023-10-18 RX ADMIN — SODIUM CHLORIDE, POTASSIUM CHLORIDE, SODIUM LACTATE AND CALCIUM CHLORIDE: 600; 310; 30; 20 INJECTION, SOLUTION INTRAVENOUS at 11:25

## 2023-10-18 RX ADMIN — ONDANSETRON 4 MG: 2 INJECTION INTRAMUSCULAR; INTRAVENOUS at 12:24

## 2023-10-18 RX ADMIN — DEXAMETHASONE SODIUM PHOSPHATE 8 MG: 4 INJECTION, SOLUTION INTRAMUSCULAR; INTRAVENOUS at 12:22

## 2023-10-18 NOTE — ANESTHESIA PREPROCEDURE EVALUATION
Anesthesia Evaluation     Patient summary reviewed and Nursing notes reviewed   no history of anesthetic complications:   NPO Solid Status: > 8 hours  NPO Liquid Status: > 2 hours           Airway   Mallampati: II  TM distance: >3 FB  Neck ROM: full  No difficulty expected  Dental      Comment: Bridges, permanent    Pulmonary - normal exam   (-) COPD, asthma  Cardiovascular   Exercise tolerance: good (4-7 METS)    ECG reviewed  Rhythm: regular    (+) DVT resolved  (-) past MI, angina, cardiac stents      Neuro/Psych  (+) dizziness/light headedness (vertigo)  (-) seizures, CVA  GI/Hepatic/Renal/Endo    (+) renal disease- stones and CRI, thyroid problem hypothyroidism  (-) GERD, liver disease    Musculoskeletal     Abdominal    Substance History - negative use     OB/GYN          Other   arthritis,                       Anesthesia Plan    ASA 2     general     (I have reviewed the patient's history and chart with the patient, including all pertinent laboratory results and imaging. I have explained the risks of anesthesia including but not limited to dental or airway injury, nausea, cardio-pulmonary complications, such as aspiration, MI, stroke, or death.     VITALS:  )  intravenous induction     Anesthetic plan, risks, benefits, and alternatives have been provided, discussed and informed consent has been obtained with: patient.  Pre-procedure education provided      CODE STATUS:

## 2023-10-18 NOTE — OP NOTE
Operative Report     [facility]    Patient: Carol A Osgood  Age:      72 y.o.  :     1950  Sex:      female    Medical Record:  [id number]    Date of Operation/Procedure:  10/18/2023    Pre-operative Diagnosis Code: Right renal stone    Post-Op Diagnosis Codes:     * Renal calculus, right [N20.0]    Pre-operative Diagnosis Free Text:  Right renal stone         Surgeon(s) and Role:     * Jeffery Reynolds MD - Primary     Name of Operation/Procedure:  Procedure(s) and Anesthesia Type:     * EXTRACORPOREAL SHOCKWAVE LITHOTRIPSY- RIGHT - General    Findings/Complications: Large stone right lower pole kidney appears to have good fragmentation.  No stent placed    Description of procedure:  The patient was taken to the lithotripsy suite and placed under general anesthesia in supine position on the [employer] SLX-F2 lithotripter table.  Biplanar fluoroscopic imaging was used to identify and target the 14 mm stone in the right lower pole kidney.  ESWL was commenced using slow escalation of power and rate to a peak power level of 7 and a total number of thousand shocks given.  Intermittent fluoroscopy to confirm proper stone targeting was used throughout the case.  ECG monitoring was performed throughout the case to assure no ventricular ectopy or waveform changes from the lithotripter.  Excellent pulverization was observed.    Specimens: * No orders in the log *     Estimated Blood Loss: none    Stent: No stent placed    Jeffery Reynolds MD  10/18/2023  13:01 EDT

## 2023-10-18 NOTE — H&P
"     [facility] Surgical History and Physical    Patient Care Team:  Ciera Newman MD as PCP - General  Fredrick Norris MD as Consulting Physician (Nephrology)  Dania Sanchez APRN as Nurse Practitioner (Gastroenterology)  Vineet Tran MD as Consulting Physician (Gastroenterology)  Raad Lawson MD as Consulting Physician (Vascular Surgery)    Chief complaint right flank pain    Subjective     Patient is a 72 y.o. female presents with right-sided flank pain.  She had gross hematuria.  KUB shows a 13 mm stone in the right kidney.  CAT scan a year ago showed bilateral nephro calculi with a large stone measuring about 10 mm lower pole right kidney.     Review of Systems   Pertinent items are noted in HPI    Past Medical History:   Diagnosis Date    Arthritis     Bruises easily     Constipation     Difficulty swallowing pills     \"LARGE PILLS, NEED TO TAKE WITH YOGURT\"    DVT, lower extremity 2022    RESOLVED    History of 2019 novel coronavirus disease (COVID-19) 08/2023    History of recent fall 09/2023    Hypothyroidism     Kidney stone     Memory loss, short term     Osteoporosis     Vertigo      Past Surgical History:   Procedure Laterality Date    APPENDECTOMY      COLONOSCOPY N/A 09/14/2021    Procedure: COLONOSCOPY;  Surgeon: Vineet Tran MD;  [place] [facility] [place]  Service: Gastroenterology;  Laterality: N/A;    VEIN SURGERY       Family History   Problem Relation Age of Onset    COPD Mother     Heart attack Mother     Stroke Mother     Heart disease Father         PARALYSIS, WAIST DOWN    Thrombocytopenia Sister     Hepatitis Brother     Prostate cancer Brother     Breast cancer Paternal Aunt     Colon cancer Paternal Aunt     Colon polyps Paternal Aunt     Diabetes Maternal Grandmother     Cancer Paternal Grandmother         BRAIN    COPD Other     Heart attack Other     Stroke Other     Thrombocytopenia Other     Diabetes Other     Brain cancer Other     Colon cancer Other         " "NEPHEW    Colon polyps Other     Irritable bowel syndrome Neg Hx     Ulcerative colitis Neg Hx     Crohn's disease Neg Hx     Inflammatory bowel disease Neg Hx     Malig Hyperthermia Neg Hx      Social History     Tobacco Use    Smoking status: Never    Smokeless tobacco: Never    Tobacco comments:     no caffeine   Vaping Use    Vaping Use: Never used   Substance Use Topics    Alcohol use: No    Drug use: No       Meds:  Medications Prior to Admission   Medication Sig Dispense Refill Last Dose    levothyroxine (SYNTHROID, LEVOTHROID) 25 MCG tablet Take 1 tablet by mouth Daily. 90 tablet 3 10/18/2023 at 0630    Ascorbic Acid (VITAMIN C PO) Take 1 tablet by mouth Daily. PT HOLDING FOR SURGERY   10/4/2023    Cholecalciferol (VITAMIN D3) 50 MCG (2000 UT) tablet Take 1 tablet by mouth Daily.   10/11/2023    Coconut Oil oil Use 1 dose Daily. PT HOLDING FOR SURGERY   10/4/2023    denosumab (PROLIA) 60 MG/ML solution prefilled syringe syringe Inject 1 mL under the skin into the appropriate area as directed Every 6 (Six) Months. FIRST DOSE 9/2023 9/20/2023    Polyethyl Glycol-Propyl Glycol (SYSTANE ULTRA OP) Apply 1 drop to eye(s) as directed by provider Daily.   10/15/2023    vitamin B-12 (CYANOCOBALAMIN) 1000 MCG tablet Take 1 tablet by mouth Daily. PT HOLDING FOR SURGERY   10/4/2023    ZINC OXIDE PO Take 1 tablet by mouth Daily. PT HOLDING FOR SURGERY   10/4/2023       Allergies:  Adhesive tape; Latex, natural rubber; and Celecoxib    Debilities:  None    Objective     Vital Signs  Temp:  [97 °F (36.1 °C)] 97 °F (36.1 °C)  Heart Rate:  [69] 69  Resp:  [16] 16  BP: (134)/(75) 134/75  No intake or output data in the 24 hours ending 10/18/23 1045  Flowsheet Rows      Flowsheet Row First Filed Value   Admission Height 167.6 cm (66\") Documented at 10/18/2023 1044   Admission Weight 58 kg (127 lb 13.9 oz) Documented at 10/18/2023 1044             Physical Exam:     General Appearance:    Alert, cooperative, NAD   HEENT:    " No trauma, pupils reactive, hearing intact   Back:     No CVA tenderness   Lungs:     Respirations unlabored, no wheezing    Heart:    RRR, intact peripheral pulses   Abdomen:     Soft, NDNT, no masses, no guarding   :  Normal external genitalia   Extremities:   No edema, no deformity   Lymphatic:   No neck or groin LAD   Skin:   No bleeding, bruising or rashes   Neuro/Psych:   Orientation intact, mood/affect pleasant, no focal findings     Results Review:    I reviewed the patient's new clinical results.  Results for orders placed or performed in visit on 10/06/23   Basic Metabolic Panel    Specimen: Blood   Result Value Ref Range    Glucose 73 65 - 99 mg/dL    BUN 32 (H) 8 - 23 mg/dL    Creatinine 1.25 (H) 0.57 - 1.00 mg/dL    Sodium 142 136 - 145 mmol/L    Potassium 4.1 3.5 - 5.2 mmol/L    Chloride 107 98 - 107 mmol/L    CO2 24.3 22.0 - 29.0 mmol/L    Calcium 9.3 8.6 - 10.5 mg/dL    BUN/Creatinine Ratio 25.6 (H) 7.0 - 25.0    Anion Gap 10.7 5.0 - 15.0 mmol/L    eGFR 45.3 (L) >60.0 mL/min/1.73   CBC (No Diff)    Specimen: Blood   Result Value Ref Range    WBC 4.20 3.40 - 10.80 10*3/mm3    RBC 3.37 (L) 3.77 - 5.28 10*6/mm3    Hemoglobin 10.7 (L) 12.0 - 15.9 g/dL    Hematocrit 30.8 (L) 34.0 - 46.6 %    MCV 91.4 79.0 - 97.0 fL    MCH 31.8 26.6 - 33.0 pg    MCHC 34.7 31.5 - 35.7 g/dL    RDW 12.8 12.3 - 15.4 %    RDW-SD 41.6 37.0 - 54.0 fl    MPV 11.7 6.0 - 12.0 fL    Platelets 141 140 - 450 10*3/mm3   ECG 12 Lead   Result Value Ref Range    QT Interval 404 ms    QTC Interval 411 ms        Assessment:  Right renal calculus    Plan:    Right kidney shockwave lithotripsy    I discussed the patient's findings and my recommendations with patient.   Risks, complications, outcomes and alternatives discussed with the patient at the bedside and office.    Jeffery Reynolds MD  10/18/23  10:45 EDT

## 2023-10-18 NOTE — ANESTHESIA POSTPROCEDURE EVALUATION
"Patient: Carol A Osgood    Procedure Summary       Date: 10/18/23 Room / Location: [facility] OR  / [facility] OR    Anesthesia Start: 1215 Anesthesia Stop: 1305    Procedure: EXTRACORPOREAL SHOCKWAVE LITHOTRIPSY- RIGHT (Right) Diagnosis:       Renal calculus, right      (Right renal stone)    Surgeons: Jeffery Reynolds MD Provider: Adam Sosa MD    Anesthesia Type: general ASA Status: 2            Anesthesia Type: general    Vitals  Vitals Value Taken Time   /80 10/18/23 1330   Temp     Pulse 72 10/18/23 1332   Resp     SpO2 100 % 10/18/23 1332   Vitals shown include unfiled device data.        Post Anesthesia Care and Evaluation    Patient location during evaluation: PACU  Patient participation: complete - patient participated  Level of consciousness: awake  Pain management: adequate    Airway patency: patent  Anesthetic complications: No anesthetic complications    Cardiovascular status: acceptable  Respiratory status: acceptable  Hydration status: acceptable    Comments: /82   Pulse 63   Temp 36.1 °C (97 °F) (Oral)   Resp 16   Ht 167.6 cm (66\")   Wt 58 kg (127 lb 13.9 oz)   LMP  (LMP Unknown)   SpO2 100%   BMI 20.64 kg/m²       "

## 2023-10-18 NOTE — ANESTHESIA PROCEDURE NOTES
Airway  Urgency: elective    Date/Time: 10/18/2023 12:21 PM  Airway not difficult    General Information and Staff    Patient location during procedure: OR  Anesthesiologist: Adam Sosa MD  CRNA/CAA: Maine Wade CRNA    Indications and Patient Condition  Indications for airway management: airway protection    Preoxygenated: yes  MILS not maintained throughout  Mask difficulty assessment: 0 - not attempted    Final Airway Details  Final airway type: supraglottic airway      Successful airway: unique  Size 4     Number of attempts at approach: 1  Assessment: lips, teeth, and gum same as pre-op and atraumatic intubation

## 2023-10-26 ENCOUNTER — HOSPITAL ENCOUNTER (OUTPATIENT)
Dept: GENERAL RADIOLOGY | Facility: HOSPITAL | Age: 73
Discharge: HOME | End: 2023-10-26
Admitting: UROLOGY
Payer: MEDICARE

## 2023-10-26 DIAGNOSIS — N20.0 CALCULUS, RENAL: ICD-10-CM

## 2023-10-26 PROCEDURE — 74018 RADEX ABDOMEN 1 VIEW: CPT

## 2023-11-10 ENCOUNTER — TREATMENT (OUTPATIENT)
Dept: PHYSICAL THERAPY | Facility: CLINIC | Age: 73
End: 2023-11-10
Payer: MEDICARE

## 2023-11-10 DIAGNOSIS — H81.11 BPPV (BENIGN PAROXYSMAL POSITIONAL VERTIGO), RIGHT: Primary | ICD-10-CM

## 2023-11-10 DIAGNOSIS — R42 DIZZINESS: ICD-10-CM

## 2023-11-10 NOTE — PROGRESS NOTES
Physical Therapy Daily Progress Note-Vestibular Rehab  [facility] [place]  [address], [address]  [facility] [address]      Visit#:3  Subjective   Feels like spinning could start but does not. Tends to veer to L when walking. Feels fullness in B ears.   Objective            Positional Testing  Positional Testing: With infrared goggles  [place]-Hallpike Right: Upbeat Nystagmus  [place]-Hallpike Right Onset Time :  (immediate)  [place]-Hallpike Right Duration Time : > 1 min  [place]-Hallpike Left: Upbeat Nystagmus  [place]-Hallpike Left Onset Time : immediate  [place]-Hallpike Left Duration Time : > 1 min  Horizontal Roll Test Right: Right-beating  Horizontal Roll Test Right Onset Time : immediate  Horizontal Roll Test Right Duration Time : > 1 min  Horizontal Roll Test Left: No nystagmus  Horizontal Roll Test Left Onset Time : immediate  Horizontal Roll Test Left Duration Time : > 1 min  Treatment: R Appiani             PROCEDURES AND MODALITIES:  See Exercise, Manual, and Modality Logs for complete treatment.     Paraffin:   pre-rx  Moist Heat:    Ice:    post-rx  E-Stim:    Ultrasound:    Ionto:   Traction:      Ther Act 95058 8 minutes and Other Procedure CPT 91791 minutes 10    Timed Code Treatment: 8 Minutes  Total Treatment Time: 30 Minutes    Assessment & Plan   Patient is reporting improvement in symptoms but not resolution. R PC Canalithiasis BPPV is resolved. She has upbeating which may indicated a central vestibular issue or a PC Cupulolithiasis BPPV. She continues to have R LC Canalithiasis BPPV. This has not resolved with BBQ roll so alternate treatment was attempted today, R Appiani, and issued for HEP. Pt is progressing well under current treatment plan and will continue to improve with skilled PT and HEP performance.      Progress per Plan of Care    Amalia Mcnally, PT, DPT, CHT, CIDN  Physical Therapist  [place] license # [id number]

## 2023-11-16 ENCOUNTER — TREATMENT (OUTPATIENT)
Dept: PHYSICAL THERAPY | Facility: CLINIC | Age: 73
End: 2023-11-16
Payer: MEDICARE

## 2023-11-16 DIAGNOSIS — H81.11 BPPV (BENIGN PAROXYSMAL POSITIONAL VERTIGO), RIGHT: Primary | ICD-10-CM

## 2023-11-16 DIAGNOSIS — R42 DIZZINESS: ICD-10-CM

## 2023-11-16 NOTE — PROGRESS NOTES
Physical Therapy Daily Progress Note-Vestibular Rehab  [facility] [place]  [address], [address]  [facility] [address]      Visit#:4  Subjective   I still feel some symptoms of imbalance when I am walking. I feel like the dizziness could come on but it does not. Happens mostly when I go from lying down to sitting up.  Objective            Positional Testing  Positional Testing: With infrared goggles  [place]-Hallpike Right: Upbeat Nystagmus (stronger than L)  [place]-Hallpike Right Onset Time : immediate  [place]-Hallpike Right Duration Time : > 1 min  [place]-Hallpike Left: Upbeat Nystagmus  [place]-Hallpike Left Onset Time : immediate  [place]-Hallpike Left Duration Time : > 1 min  Horizontal Roll Test Right: Up-beating  Horizontal Roll Test Left: Up-beating  Treatment: R Semont             PROCEDURES AND MODALITIES:  See Exercise, Manual, and Modality Logs for complete treatment.     Paraffin:   pre-rx  Moist Heat:    Ice:    post-rx  E-Stim:    Ultrasound:    Ionto:   Traction:      Ther Act 63169 8 minutes and Other Procedure CPT 49555 minutes 6    Timed Code Treatment: 8 Minutes  Total Treatment Time: 30 Minutes    Assessment & Plan   Patient is demonstrating improved nystagmus with roll test today. She continues to have upbeating nystamgus in the DH position. This could be central or PC Cupulolithiasis BPPV. Her symptoms are more consistent with a peripheral problem. R side with treated with CRP for R PC Cupulolithiasis BPPV today. CRP was tolerated well and issued to her to attempt at home with her 's help.     Progress per Plan of Care    Amalia Mcnally, PT, DPT, CHT, WESTONN  Physical Therapist  [place] license # [id number]

## 2023-11-20 ENCOUNTER — TREATMENT (OUTPATIENT)
Dept: PHYSICAL THERAPY | Facility: CLINIC | Age: 73
End: 2023-11-20
Payer: MEDICARE

## 2023-11-20 DIAGNOSIS — H81.11 BPPV (BENIGN PAROXYSMAL POSITIONAL VERTIGO), RIGHT: Primary | ICD-10-CM

## 2023-11-20 DIAGNOSIS — R42 DIZZINESS: ICD-10-CM

## 2023-11-20 NOTE — PROGRESS NOTES
Physical Therapy Daily Progress Note-Vestibular Rehab  [facility] [place]  [address], [address]  [facility] [address]      Visit#:5  Subjective   No change in symptoms.   Objective            Positional Testing  Positional Testing: With infrared goggles  [place]-Hallpike Right: Upbeat Nystagmus  Ha[place]-Hallpike Right Onset Time : immediate  [place]-Hallpike Right Duration Time : > 1 min  [place]-Hallpike Left: Upbeat Nystagmus  [place]-Hallpike Left Onset Time : immediate  [place]-Hallpike Left Duration Time : > 1 min  Horizontal Roll Test Right:  (NA)  Horizontal Roll Test Left:  (NA)  Treatment: L Semont             PROCEDURES AND MODALITIES:  See Exercise, Manual, and Modality Logs for complete treatment.     Paraffin:   pre-rx  Moist Heat:    Ice:    post-rx  E-Stim:    Ultrasound:    Ionto:   Traction:      Ther Act 54938 8 minutes and Other Procedure CPT 65492 minutes 6    Timed Code Treatment: 8 Minutes  Total Treatment Time: 30 Minutes    Assessment & Plan   Testing of the R side was improved from last visit and less provoking of symptoms. She has more provocation of symptoms with testing of the L side. We treated L for PC Cupulolithiasis BPPV. Tolerated well and issued for HEP to attempt at home with help of her . Pt is progressing well under current treatment plan and will continue to improve with skilled PT and HEP performance.      Progress per Plan of Care    Amalia Mcnally, PT, DPT, CHT, KARLA  Physical Therapist  [place] license # [id number]

## 2023-11-21 NOTE — PATIENT INSTRUCTIONS
Patient was educated on BPPV dx, CRP treatment, HEP and post CRP instructions.  hEP: [facility]

## 2023-11-27 DIAGNOSIS — E03.9 HYPOTHYROIDISM, UNSPECIFIED TYPE: ICD-10-CM

## 2023-11-27 DIAGNOSIS — N28.9 RENAL INSUFFICIENCY: Primary | ICD-10-CM

## 2023-11-27 DIAGNOSIS — E78.5 HYPERLIPIDEMIA, UNSPECIFIED HYPERLIPIDEMIA TYPE: ICD-10-CM

## 2023-11-28 ENCOUNTER — TREATMENT (OUTPATIENT)
Dept: PHYSICAL THERAPY | Facility: CLINIC | Age: 73
End: 2023-11-28
Payer: MEDICARE

## 2023-11-28 DIAGNOSIS — R42 DIZZINESS: ICD-10-CM

## 2023-11-28 DIAGNOSIS — H81.11 BPPV (BENIGN PAROXYSMAL POSITIONAL VERTIGO), RIGHT: Primary | ICD-10-CM

## 2023-11-28 PROCEDURE — 92542 POSITIONAL NYSTAGMUS TEST: CPT | Performed by: PHYSICAL THERAPIST

## 2023-11-28 PROCEDURE — 97112 NEUROMUSCULAR REEDUCATION: CPT | Performed by: PHYSICAL THERAPIST

## 2023-11-28 NOTE — PROGRESS NOTES
Physical Therapy Daily Progress Note-Vestibular Rehab  [facility] Physical [facility] [place]  [address], [address]  [facility] [address]      Visit#:6  Subjective   No dizziness but I am still feeling off balance.  reports no issues with performance of home exercise.   Objective            Positional Testing  Positional Testing: With infrared goggles  [place]-Hallpike Right: Upbeat Nystagmus  [place]-Hallpike Right Onset Time : immediate  [place]-Hallpike Right Duration Time : > 1 min  [place]-Hallpike Left: Upbeat Nystagmus  [place]-Hallpike Left Onset Time : immediate  [place]-Hallpike Left Duration Time : > 1 min  Horizontal Roll Test Right: Up-beating  Horizontal Roll Test Right Onset Time : immediate  Horizontal Roll Test Right Duration Time : > 1 min  Horizontal Roll Test Left: Up-beating  Horizontal Roll Test Left Onset Time : immediate  Horizontal Roll Test Left Duration Time : > 1 min                PROCEDURES AND MODALITIES:  See Exercise, Manual, and Modality Logs for complete treatment.     Paraffin:   pre-rx  Moist Heat:    Ice:    post-rx  E-Stim:    Ultrasound:    Ionto:   Traction:      NMR 53426 10 minutes and Other Procedure CPT 87788 minutes 15    Timed Code Treatment: 10 Minutes  Total Treatment Time: 30 Minutes    Assessment & Plan    Pt is demonstrating upbeating nystagmus in all positions of BPPV testing. This may indicate central vestibular involvement. No CRP performed today, but patient was issued a progressive balance HEP to perform independently. Will see her for formal balance rehab including oculomotor exercises to improve vestibular function and balance.     Progress per Plan of Care    Amalia Mcnally, PT, DPT, CHT, WESTONN  Physical Therapist  [place] license # [id number]  
 used

## 2023-11-30 ENCOUNTER — PRE-ADMISSION TESTING (OUTPATIENT)
Dept: PREADMISSION TESTING | Facility: HOSPITAL | Age: 73
End: 2023-11-30
Payer: MEDICARE

## 2023-11-30 VITALS
SYSTOLIC BLOOD PRESSURE: 137 MMHG | TEMPERATURE: 97.9 F | HEART RATE: 73 BPM | BODY MASS INDEX: 22.2 KG/M2 | DIASTOLIC BLOOD PRESSURE: 82 MMHG | OXYGEN SATURATION: 100 % | WEIGHT: 130 LBS | HEIGHT: 64 IN | RESPIRATION RATE: 20 BRPM

## 2023-11-30 LAB
ANION GAP SERPL CALCULATED.3IONS-SCNC: 8.9 MMOL/L (ref 5–15)
BUN SERPL-MCNC: 32 MG/DL (ref 8–23)
BUN/CREAT SERPL: 23.5 (ref 7–25)
CALCIUM SPEC-SCNC: 9.8 MG/DL (ref 8.6–10.5)
CHLORIDE SERPL-SCNC: 105 MMOL/L (ref 98–107)
CO2 SERPL-SCNC: 26.1 MMOL/L (ref 22–29)
CREAT SERPL-MCNC: 1.36 MG/DL (ref 0.57–1)
DEPRECATED RDW RBC AUTO: 42.5 FL (ref 37–54)
EGFRCR SERPLBLD CKD-EPI 2021: 40.7 ML/MIN/1.73
ERYTHROCYTE [DISTWIDTH] IN BLOOD BY AUTOMATED COUNT: 12.6 % (ref 12.3–15.4)
GLUCOSE SERPL-MCNC: 80 MG/DL (ref 65–99)
HCT VFR BLD AUTO: 32.7 % (ref 34–46.6)
HGB BLD-MCNC: 11.2 G/DL (ref 12–15.9)
MCH RBC QN AUTO: 31.9 PG (ref 26.6–33)
MCHC RBC AUTO-ENTMCNC: 34.3 G/DL (ref 31.5–35.7)
MCV RBC AUTO: 93.2 FL (ref 79–97)
PLATELET # BLD AUTO: 116 10*3/MM3 (ref 140–450)
PMV BLD AUTO: 12 FL (ref 6–12)
POTASSIUM SERPL-SCNC: 4.2 MMOL/L (ref 3.5–5.2)
RBC # BLD AUTO: 3.51 10*6/MM3 (ref 3.77–5.28)
SODIUM SERPL-SCNC: 140 MMOL/L (ref 136–145)
WBC NRBC COR # BLD AUTO: 4.18 10*3/MM3 (ref 3.4–10.8)

## 2023-11-30 PROCEDURE — 80048 BASIC METABOLIC PNL TOTAL CA: CPT

## 2023-11-30 PROCEDURE — 36415 COLL VENOUS BLD VENIPUNCTURE: CPT

## 2023-11-30 PROCEDURE — 85027 COMPLETE CBC AUTOMATED: CPT

## 2023-11-30 RX ORDER — ZINC GLUCONATE 50 MG
1 TABLET ORAL DAILY
COMMUNITY

## 2023-11-30 RX ORDER — ACETAMINOPHEN 325 MG/1
650 TABLET ORAL EVERY 6 HOURS PRN
COMMUNITY

## 2023-11-30 NOTE — DISCHARGE INSTRUCTIONS
Take the following medications the morning of surgery with a small sip of water:    levothyroxine    If you are on prescription narcotic pain medication to control your pain you may also take that medication the morning of surgery.    General Instructions:  Do not eat or drink anything after 5 am before surgery.  Infants may have breast milk up to four hours before surgery.  Infants drinking formula may drink formula up to six hours before surgery.   Patients who avoid smoking, chewing tobacco and alcohol for 4 weeks prior to surgery have a reduced risk of post-operative complications.  Quit smoking as many days before surgery as you can.  Do not smoke, use chewing tobacco or drink alcohol the day of surgery.   If applicable bring your C-PAP/ BI-PAP machine in with you to preop day of surgery.  Bring any papers given to you in the doctor’s office.  Wear clean comfortable clothes.  Do not wear contact lenses, false eyelashes or make-up.  Bring a case for your glasses.   Bring crutches or walker if applicable.  Remove all piercings.  Leave jewelry and any other valuables at home.  Hair extensions with metal clips must be removed prior to surgery.  The Pre-Admission Testing nurse will instruct you to bring medications if unable to obtain an accurate list in Pre-Admission Testing.        If you were given a blood bank ID arm band remember to bring it with you the day of surgery.    Preventing a Surgical Site Infection:  For 2 to 3 days before surgery, avoid shaving with a razor because the razor can irritate skin and make it easier to develop an infection.    Any areas of open skin can increase the risk of a post-operative wound infection by allowing bacteria to enter and travel throughout the body.  Notify your surgeon if you have any skin wounds / rashes even if it is not near the expected surgical site.  The area will need assessed to determine if surgery should be delayed until it is healed.  The night prior to  surgery shower using a fresh bar of anti-bacterial soap (such as Dial) and clean washcloth.  Sleep in a clean bed with clean clothing.  Do not allow pets to sleep with you.  Shower on the morning of surgery using a fresh bar of anti-bacterial soap (such as Dial) and clean washcloth.  Dry with a clean towel and dress in clean clothing.  Ask your surgeon if you will be receiving antibiotics prior to surgery.  Make sure you, your family, and all healthcare providers clean their hands with soap and water or an alcohol based hand  before caring for you or your wound.    Day of surgery:12/6/2023   1PM  Your arrival time is approximately two hours before your scheduled surgery time.  Upon arrival, a Pre-op nurse and Anesthesiologist will review your health history, obtain vital signs, and answer questions you may have.  The only belongings needed at this time will be your home medications and if applicable your C-PAP/BI-PAP machine.  A Pre-op nurse will start an IV and you may receive medication in preparation for surgery, including something to help you relax.      Please be aware that surgery does come with discomfort.  We want to make every effort to control your discomfort so please discuss any uncontrolled symptoms with your nurse.   Your doctor will most likely have prescribed pain medications.      If you are going home after surgery you will receive individualized written care instructions before being discharged.  A responsible adult must drive you to and from the hospital on the day of your surgery and stay with you for 24 hours.  Discharge prescriptions can be filled by the hospital pharmacy during regular pharmacy hours.  If you are having surgery late in the day/evening your prescription may be e-prescribed to your pharmacy.  Please verify your pharmacy hours or chose a 24 hour pharmacy to avoid not having access to your prescription because your pharmacy has closed for the day.    If you are staying  overnight following surgery, you will be transported to your hospital room following the recovery period.  [facility] has all private rooms.    If you have any questions please call Pre-Admission Testing at [phone].  Deductibles and co-payments are collected on the day of service. Please be prepared to pay the required co-pay, deductible or deposit on the day of service as defined by your plan.    Call your surgeon immediately if you experience any of the following symptoms:  Sore Throat  Shortness of Breath or difficulty breathing  Cough  Chills  Body soreness or muscle pain  Headache  Fever  New loss of taste or smell  Do not arrive for your surgery ill.  Your procedure will need to be rescheduled to another time.  You will need to call your physician before the day of surgery to avoid any unnecessary exposure to hospital staff as well as other patients.

## 2023-12-06 ENCOUNTER — ANESTHESIA EVENT (OUTPATIENT)
Dept: PERIOP | Facility: HOSPITAL | Age: 73
End: 2023-12-06
Payer: MEDICARE

## 2023-12-06 ENCOUNTER — HOSPITAL ENCOUNTER (OUTPATIENT)
Facility: HOSPITAL | Age: 73
Setting detail: HOSPITAL OUTPATIENT SURGERY
Discharge: HOME | End: 2023-12-06
Attending: UROLOGY | Admitting: UROLOGY
Payer: MEDICARE

## 2023-12-06 ENCOUNTER — ANESTHESIA (OUTPATIENT)
Dept: PERIOP | Facility: HOSPITAL | Age: 73
End: 2023-12-06
Payer: MEDICARE

## 2023-12-06 VITALS
RESPIRATION RATE: 16 BRPM | HEART RATE: 70 BPM | SYSTOLIC BLOOD PRESSURE: 151 MMHG | OXYGEN SATURATION: 99 % | DIASTOLIC BLOOD PRESSURE: 90 MMHG | TEMPERATURE: 97.4 F

## 2023-12-06 PROCEDURE — 25810000003 LACTATED RINGERS PER 1000 ML: Performed by: ANESTHESIOLOGY

## 2023-12-06 PROCEDURE — 25010000002 DEXAMETHASONE SODIUM PHOSPHATE 20 MG/5ML SOLUTION: Performed by: NURSE ANESTHETIST, CERTIFIED REGISTERED

## 2023-12-06 PROCEDURE — 25010000002 PROPOFOL 10 MG/ML EMULSION: Performed by: NURSE ANESTHETIST, CERTIFIED REGISTERED

## 2023-12-06 PROCEDURE — 25010000002 ONDANSETRON PER 1 MG: Performed by: NURSE ANESTHETIST, CERTIFIED REGISTERED

## 2023-12-06 PROCEDURE — 25010000002 FENTANYL CITRATE (PF) 50 MCG/ML SOLUTION: Performed by: ANESTHESIOLOGY

## 2023-12-06 PROCEDURE — 25010000002 CEFAZOLIN IN DEXTROSE 2-4 GM/100ML-% SOLUTION: Performed by: UROLOGY

## 2023-12-06 RX ORDER — SODIUM CHLORIDE, SODIUM LACTATE, POTASSIUM CHLORIDE, CALCIUM CHLORIDE 600; 310; 30; 20 MG/100ML; MG/100ML; MG/100ML; MG/100ML
9 INJECTION, SOLUTION INTRAVENOUS CONTINUOUS
Status: DISCONTINUED | OUTPATIENT
Start: 2023-12-06 | End: 2023-12-06 | Stop reason: HOSPADM

## 2023-12-06 RX ORDER — HYDROMORPHONE HYDROCHLORIDE 1 MG/ML
0.25 INJECTION, SOLUTION INTRAMUSCULAR; INTRAVENOUS; SUBCUTANEOUS
Status: DISCONTINUED | OUTPATIENT
Start: 2023-12-06 | End: 2023-12-06 | Stop reason: HOSPADM

## 2023-12-06 RX ORDER — DROPERIDOL 2.5 MG/ML
0.62 INJECTION, SOLUTION INTRAMUSCULAR; INTRAVENOUS
Status: DISCONTINUED | OUTPATIENT
Start: 2023-12-06 | End: 2023-12-06 | Stop reason: HOSPADM

## 2023-12-06 RX ORDER — EPHEDRINE SULFATE 50 MG/ML
INJECTION INTRAVENOUS AS NEEDED
Status: DISCONTINUED | OUTPATIENT
Start: 2023-12-06 | End: 2023-12-06 | Stop reason: SURG

## 2023-12-06 RX ORDER — IPRATROPIUM BROMIDE AND ALBUTEROL SULFATE 2.5; .5 MG/3ML; MG/3ML
3 SOLUTION RESPIRATORY (INHALATION) ONCE AS NEEDED
Status: DISCONTINUED | OUTPATIENT
Start: 2023-12-06 | End: 2023-12-06 | Stop reason: HOSPADM

## 2023-12-06 RX ORDER — FAMOTIDINE 10 MG/ML
20 INJECTION, SOLUTION INTRAVENOUS ONCE
Status: COMPLETED | OUTPATIENT
Start: 2023-12-06 | End: 2023-12-06

## 2023-12-06 RX ORDER — PROMETHAZINE HYDROCHLORIDE 25 MG/1
25 TABLET ORAL ONCE AS NEEDED
Status: DISCONTINUED | OUTPATIENT
Start: 2023-12-06 | End: 2023-12-06 | Stop reason: HOSPADM

## 2023-12-06 RX ORDER — DEXAMETHASONE SODIUM PHOSPHATE 4 MG/ML
INJECTION, SOLUTION INTRA-ARTICULAR; INTRALESIONAL; INTRAMUSCULAR; INTRAVENOUS; SOFT TISSUE AS NEEDED
Status: DISCONTINUED | OUTPATIENT
Start: 2023-12-06 | End: 2023-12-06 | Stop reason: SURG

## 2023-12-06 RX ORDER — LIDOCAINE HYDROCHLORIDE 20 MG/ML
INJECTION, SOLUTION INFILTRATION; PERINEURAL AS NEEDED
Status: DISCONTINUED | OUTPATIENT
Start: 2023-12-06 | End: 2023-12-06 | Stop reason: SURG

## 2023-12-06 RX ORDER — HYDRALAZINE HYDROCHLORIDE 20 MG/ML
5 INJECTION INTRAMUSCULAR; INTRAVENOUS
Status: DISCONTINUED | OUTPATIENT
Start: 2023-12-06 | End: 2023-12-06 | Stop reason: HOSPADM

## 2023-12-06 RX ORDER — MIDAZOLAM HYDROCHLORIDE 1 MG/ML
0.5 INJECTION INTRAMUSCULAR; INTRAVENOUS
Status: DISCONTINUED | OUTPATIENT
Start: 2023-12-06 | End: 2023-12-06 | Stop reason: HOSPADM

## 2023-12-06 RX ORDER — FLUMAZENIL 0.1 MG/ML
0.2 INJECTION INTRAVENOUS AS NEEDED
Status: DISCONTINUED | OUTPATIENT
Start: 2023-12-06 | End: 2023-12-06 | Stop reason: HOSPADM

## 2023-12-06 RX ORDER — PROPOFOL 10 MG/ML
VIAL (ML) INTRAVENOUS AS NEEDED
Status: DISCONTINUED | OUTPATIENT
Start: 2023-12-06 | End: 2023-12-06 | Stop reason: SURG

## 2023-12-06 RX ORDER — DIPHENHYDRAMINE HYDROCHLORIDE 50 MG/ML
12.5 INJECTION INTRAMUSCULAR; INTRAVENOUS
Status: DISCONTINUED | OUTPATIENT
Start: 2023-12-06 | End: 2023-12-06 | Stop reason: HOSPADM

## 2023-12-06 RX ORDER — FENTANYL CITRATE 50 UG/ML
25 INJECTION, SOLUTION INTRAMUSCULAR; INTRAVENOUS
Status: DISCONTINUED | OUTPATIENT
Start: 2023-12-06 | End: 2023-12-06 | Stop reason: HOSPADM

## 2023-12-06 RX ORDER — LABETALOL HYDROCHLORIDE 5 MG/ML
5 INJECTION, SOLUTION INTRAVENOUS
Status: DISCONTINUED | OUTPATIENT
Start: 2023-12-06 | End: 2023-12-06 | Stop reason: HOSPADM

## 2023-12-06 RX ORDER — NALOXONE HCL 0.4 MG/ML
0.2 VIAL (ML) INJECTION AS NEEDED
Status: DISCONTINUED | OUTPATIENT
Start: 2023-12-06 | End: 2023-12-06 | Stop reason: HOSPADM

## 2023-12-06 RX ORDER — EPHEDRINE SULFATE 50 MG/ML
5 INJECTION, SOLUTION INTRAVENOUS ONCE AS NEEDED
Status: DISCONTINUED | OUTPATIENT
Start: 2023-12-06 | End: 2023-12-06 | Stop reason: HOSPADM

## 2023-12-06 RX ORDER — ONDANSETRON 2 MG/ML
INJECTION INTRAMUSCULAR; INTRAVENOUS AS NEEDED
Status: DISCONTINUED | OUTPATIENT
Start: 2023-12-06 | End: 2023-12-06 | Stop reason: SURG

## 2023-12-06 RX ORDER — FENTANYL CITRATE 50 UG/ML
50 INJECTION, SOLUTION INTRAMUSCULAR; INTRAVENOUS ONCE AS NEEDED
Status: COMPLETED | OUTPATIENT
Start: 2023-12-06 | End: 2023-12-06

## 2023-12-06 RX ORDER — HYDROCODONE BITARTRATE AND ACETAMINOPHEN 7.5; 325 MG/1; MG/1
1 TABLET ORAL EVERY 4 HOURS PRN
Status: DISCONTINUED | OUTPATIENT
Start: 2023-12-06 | End: 2023-12-06 | Stop reason: HOSPADM

## 2023-12-06 RX ORDER — SODIUM CHLORIDE 0.9 % (FLUSH) 0.9 %
3 SYRINGE (ML) INJECTION EVERY 12 HOURS SCHEDULED
Status: DISCONTINUED | OUTPATIENT
Start: 2023-12-06 | End: 2023-12-06 | Stop reason: HOSPADM

## 2023-12-06 RX ORDER — CEFAZOLIN SODIUM 2 G/100ML
2000 INJECTION, SOLUTION INTRAVENOUS ONCE
Status: COMPLETED | OUTPATIENT
Start: 2023-12-06 | End: 2023-12-06

## 2023-12-06 RX ORDER — LIDOCAINE HYDROCHLORIDE 10 MG/ML
0.5 INJECTION, SOLUTION INFILTRATION; PERINEURAL ONCE AS NEEDED
Status: DISCONTINUED | OUTPATIENT
Start: 2023-12-06 | End: 2023-12-06 | Stop reason: HOSPADM

## 2023-12-06 RX ORDER — SODIUM CHLORIDE 0.9 % (FLUSH) 0.9 %
3-10 SYRINGE (ML) INJECTION AS NEEDED
Status: DISCONTINUED | OUTPATIENT
Start: 2023-12-06 | End: 2023-12-06 | Stop reason: HOSPADM

## 2023-12-06 RX ORDER — ONDANSETRON 2 MG/ML
4 INJECTION INTRAMUSCULAR; INTRAVENOUS ONCE AS NEEDED
Status: DISCONTINUED | OUTPATIENT
Start: 2023-12-06 | End: 2023-12-06 | Stop reason: HOSPADM

## 2023-12-06 RX ORDER — PROMETHAZINE HYDROCHLORIDE 25 MG/1
25 SUPPOSITORY RECTAL ONCE AS NEEDED
Status: DISCONTINUED | OUTPATIENT
Start: 2023-12-06 | End: 2023-12-06 | Stop reason: HOSPADM

## 2023-12-06 RX ORDER — HYDROCODONE BITARTRATE AND ACETAMINOPHEN 5; 325 MG/1; MG/1
1 TABLET ORAL ONCE AS NEEDED
Status: DISCONTINUED | OUTPATIENT
Start: 2023-12-06 | End: 2023-12-06 | Stop reason: HOSPADM

## 2023-12-06 RX ADMIN — CEFAZOLIN SODIUM 2000 MG: 2 INJECTION, SOLUTION INTRAVENOUS at 15:38

## 2023-12-06 RX ADMIN — FENTANYL CITRATE 50 MCG: 50 INJECTION, SOLUTION INTRAMUSCULAR; INTRAVENOUS at 16:01

## 2023-12-06 RX ADMIN — FAMOTIDINE 20 MG: 10 INJECTION INTRAVENOUS at 14:47

## 2023-12-06 RX ADMIN — PROPOFOL 140 MG: 10 INJECTION, EMULSION INTRAVENOUS at 15:55

## 2023-12-06 RX ADMIN — LIDOCAINE HYDROCHLORIDE 60 MG: 20 INJECTION, SOLUTION INFILTRATION; PERINEURAL at 15:54

## 2023-12-06 RX ADMIN — ONDANSETRON 4 MG: 2 INJECTION INTRAMUSCULAR; INTRAVENOUS at 15:53

## 2023-12-06 RX ADMIN — DEXAMETHASONE SODIUM PHOSPHATE 4 MG: 4 INJECTION, SOLUTION INTRAMUSCULAR; INTRAVENOUS at 15:53

## 2023-12-06 RX ADMIN — EPHEDRINE SULFATE 10 MG: 50 INJECTION INTRAVENOUS at 16:08

## 2023-12-06 RX ADMIN — SODIUM CHLORIDE, POTASSIUM CHLORIDE, SODIUM LACTATE AND CALCIUM CHLORIDE 9 ML/HR: 600; 310; 30; 20 INJECTION, SOLUTION INTRAVENOUS at 14:47

## 2023-12-06 NOTE — ANESTHESIA PREPROCEDURE EVALUATION
Anesthesia Evaluation     Patient summary reviewed and Nursing notes reviewed   no history of anesthetic complications:   NPO Solid Status: > 8 hours  NPO Liquid Status: > 2 hours           Airway   Mallampati: II  TM distance: >3 FB  Neck ROM: full  No difficulty expected  Dental      Comment: Bridges, permanent    Pulmonary - normal exam   (-) COPD, asthma  Cardiovascular   Exercise tolerance: good (4-7 METS)    ECG reviewed  Rhythm: regular    (+) DVT resolved  (-) past MI, angina, cardiac stents      Neuro/Psych  (+) dizziness/light headedness (vertigo)  (-) seizures, CVA  GI/Hepatic/Renal/Endo    (+) renal disease- stones and CRI, thyroid problem hypothyroidism  (-) GERD, liver disease    Musculoskeletal     Abdominal    Substance History - negative use     OB/GYN          Other   arthritis,                       Anesthesia Plan    ASA 2     general     (I  )  intravenous induction     Anesthetic plan, risks, benefits, and alternatives have been provided, discussed and informed consent has been obtained with: patient and spouse/significant other.        CODE STATUS:

## 2023-12-06 NOTE — H&P
"     [facility] Surgical [facility] and Physical    Patient Care Team:  Ciera Newman MD as PCP - General  Fredrick Norris MD as Consulting Physician (Nephrology)  Dania Sanchez APRN as Nurse Practitioner (Gastroenterology)  Vineet Tran MD as Consulting Physician (Gastroenterology)  Raad Lawson MD as Consulting Physician (Vascular Surgery)    Chief complaint right renal stones    Subjective     Patient is a 73 y.o. female presents with right renal stone fragments for eswl.     Review of Systems   Pertinent items are noted in HPI    Past Medical History:   Diagnosis Date    Arthritis     Bruises easily     Colon cancer screening 06/03/2021    Constipation     Constipation     Difficulty swallowing pills     \"LARGE PILLS, NEED TO TAKE WITH YOGURT\"    DVT, lower extremity 2022    RESOLVED    History of 2019 novel coronavirus disease (COVID-19) 08/2023    History of recent fall 09/2023    Hypothyroidism     Kidney stone     Kidney stone     Memory loss, short term     Osteoporosis     Renal calculus 10/18/2023    Vertigo      Past Surgical History:   Procedure Laterality Date    APPENDECTOMY      COLONOSCOPY N/A 09/14/2021    Procedure: COLONOSCOPY;  Surgeon: Vineet Tran MD;  Location: [facility] MAIN OR;  Service: Gastroenterology;  Laterality: N/A;    EXTRACORPOREAL SHOCK WAVE LITHOTRIPSY (ESWL) Right 10/18/2023    Procedure: EXTRACORPOREAL SHOCKWAVE LITHOTRIPSY- RIGHT;  Surgeon: Jeffery Reynolds MD;  Location: [facility] MAIN OR;  Service: Urology;  Laterality: Right;    VEIN SURGERY       Family History   Problem Relation Age of Onset    COPD Mother     Heart attack Mother     Stroke Mother     Heart disease Father         PARALYSIS, WAIST DOWN    Thrombocytopenia Sister     Hepatitis Brother     Prostate cancer Brother     Breast cancer Paternal Aunt     Colon cancer Paternal Aunt     Colon polyps Paternal Aunt     Diabetes Maternal Grandmother     Cancer Paternal Grandmother         BRAIN    " COPD Other     Heart attack Other     Stroke Other     Thrombocytopenia Other     Diabetes Other     Brain cancer Other     Colon cancer Other         NEPHEW    Colon polyps Other     Irritable bowel syndrome Neg Hx     Ulcerative colitis Neg Hx     Crohn's disease Neg Hx     Inflammatory bowel disease Neg Hx     Malig Hyperthermia Neg Hx      Social History     Tobacco Use    Smoking status: Never    Smokeless tobacco: Never    Tobacco comments:     no caffeine   Vaping Use    Vaping Use: Never used   Substance Use Topics    Alcohol use: No    Drug use: No       Meds:  Medications Prior to Admission   Medication Sig Dispense Refill Last Dose    acetaminophen (TYLENOL) 325 MG tablet Take 2 tablets by mouth Every 6 (Six) Hours As Needed for Mild Pain.   Past Month    Ascorbic Acid (VITAMIN C PO) Take 1 tablet by mouth Daily.   Past Month    Cholecalciferol (VITAMIN D3) 50 MCG (2000 UT) tablet Take 1 tablet by mouth Daily.   Past Month    levothyroxine (SYNTHROID, LEVOTHROID) 25 MCG tablet Take 1 tablet by mouth Daily. (Patient taking differently: Take 1 tablet by mouth Every Morning.) 90 tablet 3 12/6/2023 at 0830    NON FORMULARY Take  by mouth 2 (Two) Times a Day. Name Colon Ease   12/5/2023    Polyethyl Glycol-Propyl Glycol (SYSTANE ULTRA OP) Administer 1 drop to both eyes Every Night.   12/5/2023    vitamin B-12 (CYANOCOBALAMIN) 1000 MCG tablet Take 1 tablet by mouth Daily.   Past Month    Zinc 50 MG tablet Take 1 tablet by mouth Daily.   Past Month    denosumab (PROLIA) 60 MG/ML solution prefilled syringe syringe Inject 1 mL under the skin into the appropriate area as directed Every 6 (Six) Months. FIRST DOSE 9/2023   More than a month       Allergies:  Adhesive tape and Celecoxib    Debilities:  none    Objective     Vital Signs  Temp:  [97.6 °F (36.4 °C)] 97.6 °F (36.4 °C)  Heart Rate:  [59] 59  Resp:  [20] 20  No intake or output data in the 24 hours ending 12/06/23 1516       Physical Exam:     General  Appearance:    Alert, cooperative, NAD   HEENT:    No trauma, pupils reactive, hearing intact   Back:     No CVA tenderness   Lungs:     Respirations unlabored, no wheezing    Heart:    RRR, intact peripheral pulses   Abdomen:     Soft, NDNT, no masses, no guarding   :    def   Extremities:   No edema, no deformity   Lymphatic:   No neck or groin LAD   Skin:   No bleeding, bruising or rashes   Neuro/Psych:   Orientation intact, mood/affect pleasant, no focal findings     Results Review:    I reviewed the patient's new clinical results.  Results for orders placed or performed in visit on 11/30/23   Basic Metabolic Panel    Specimen: Blood   Result Value Ref Range    Glucose 80 65 - 99 mg/dL    BUN 32 (H) 8 - 23 mg/dL    Creatinine 1.36 (H) 0.57 - 1.00 mg/dL    Sodium 140 136 - 145 mmol/L    Potassium 4.2 3.5 - 5.2 mmol/L    Chloride 105 98 - 107 mmol/L    CO2 26.1 22.0 - 29.0 mmol/L    Calcium 9.8 8.6 - 10.5 mg/dL    BUN/Creatinine Ratio 23.5 7.0 - 25.0    Anion Gap 8.9 5.0 - 15.0 mmol/L    eGFR 40.7 (L) >60.0 mL/min/1.73   CBC (No Diff)    Specimen: Blood   Result Value Ref Range    WBC 4.18 3.40 - 10.80 10*3/mm3    RBC 3.51 (L) 3.77 - 5.28 10*6/mm3    Hemoglobin 11.2 (L) 12.0 - 15.9 g/dL    Hematocrit 32.7 (L) 34.0 - 46.6 %    MCV 93.2 79.0 - 97.0 fL    MCH 31.9 26.6 - 33.0 pg    MCHC 34.3 31.5 - 35.7 g/dL    RDW 12.6 12.3 - 15.4 %    RDW-SD 42.5 37.0 - 54.0 fl    MPV 12.0 6.0 - 12.0 fL    Platelets 116 (L) 140 - 450 10*3/mm3        Assessment:  Right renal stones    Plan:    Right kidney shock wave lithotripsy    I discussed the patient's findings and my recommendations with patient.   Risks, complications, outcomes and alternatives discussed with the patient at the bedside and office.    Jeffery Reynolds MD  12/06/23  15:16 EST

## 2023-12-06 NOTE — ANESTHESIA PROCEDURE NOTES
Airway  Urgency: elective    Date/Time: 12/6/2023 3:56 PM    General Information and Staff    Patient location during procedure: OR    Indications and Patient Condition  Indications for airway management: airway protection    Preoxygenated: yes  Mask difficulty assessment: 0 - not attempted    Final Airway Details  Final airway type: supraglottic airway      Successful airway: LMA  Size 4     Number of attempts at approach: 1  Assessment: lips, teeth, and gum same as pre-op and atraumatic intubation

## 2023-12-06 NOTE — ANESTHESIA POSTPROCEDURE EVALUATION
Patient: Carol A Osgood    Procedure Summary       Date: 12/06/23 Room / Location: [facility] OR 03 / [facility] MAIN OR    Anesthesia Start: 1543 Anesthesia Stop: 1635    Procedure: RIGHT KIDNEY SHOCK WAVE LITHOTRIPSY (Right) Diagnosis:       Renal calculi      (Right Renal Stones)    Surgeons: Jeffery Reynolds MD Provider: Mary Smith MD    Anesthesia Type: general ASA Status: 2            Anesthesia Type: general    Vitals  Vitals Value Taken Time   /80 12/06/23 1645   Temp     Pulse 58 12/06/23 1652   Resp 16 12/06/23 1630   SpO2 100 % 12/06/23 1652   Vitals shown include unfiled device data.        Post Anesthesia Care and Evaluation    Pain management: adequate    Airway patency: patent  Anesthetic complications: No anesthetic complications    Cardiovascular status: acceptable  Respiratory status: acceptable  Hydration status: acceptable    Comments: /76   Pulse 64   Temp 36.4 °C (97.6 °F) (Oral)   Resp 16   LMP  (LMP Unknown)   SpO2 100%

## 2023-12-06 NOTE — OP NOTE
Operative Report     [facility]    Patient: Carol A Osgood  Age:      73 y.o.  :     1950  Sex:      female    Medical Record:  [id number]    Date of Operation/Procedure:  2023    Pre-operative Diagnosis Code: Right Renal Stones    Post-Op Diagnosis Codes:     * Renal calculi [N20.0]    Pre-operative Diagnosis Free Text:  Right Renal Stones         Surgeon(s) and Role:     * Jeffery Reynolds MD - Primary     Name of Operation/Procedure:  Procedure(s) and Anesthesia Type:     * RIGHT KIDNEY SHOCK WAVE LITHOTRIPSY - General    Findings/Complications:  multiple stones right lower pole kidney    Description of procedure:  The patient was taken to the lithotripsy suite and placed under general anesthesia in supine position on the TAXI5.[id number] SLX-F2 lithotripter table.  Biplanar fluoroscopic imaging was used to identify and target the 15 mm stone in the right renal pelvis and lower pole.  ESWL was commenced using slow escalation of power and rate to a peak power level of 7and a total number of 3000 shocks given.  Intermittent fluoroscopy to confirm proper stone targeting was used throughout the case.  ECG monitoring was performed throughout the case to assure no ventricular ectopy or waveform changes from the lithotripter.  pulverization was observed.    Specimens: * No orders in the log *     Estimated Blood Loss: none    Stent: no stent    Jeffery Reynolds MD  2023  16:53 EST

## 2023-12-21 ENCOUNTER — TREATMENT (OUTPATIENT)
Dept: PHYSICAL THERAPY | Facility: CLINIC | Age: 73
End: 2023-12-21
Payer: MEDICARE

## 2023-12-21 DIAGNOSIS — H81.11 BPPV (BENIGN PAROXYSMAL POSITIONAL VERTIGO), RIGHT: Primary | ICD-10-CM

## 2023-12-21 DIAGNOSIS — R42 DIZZINESS: ICD-10-CM

## 2023-12-21 PROCEDURE — 97530 THERAPEUTIC ACTIVITIES: CPT | Performed by: PHYSICAL THERAPIST

## 2023-12-21 PROCEDURE — 97112 NEUROMUSCULAR REEDUCATION: CPT | Performed by: PHYSICAL THERAPIST

## 2023-12-21 NOTE — PROGRESS NOTES
Physical Therapy Monthly Progress Note  [facility] [place]  [address]  [place] [address]    Name: Carol Osgood  Date: 12/21/2023  Diagnosis/ICD-10 Code:  BPPV (benign paroxysmal positional vertigo), right [H81.11]  Referring practitioner: NAOMI Dumas  Date of Initial Visit:   Visit #: 7  Subjective:   Mattie Blevinsod reports: I have not had any more dizziness but I am still feeling off balance. I have not worked on the balance exercises at home because I forgot and because of treatments for my kidney stones.   Subjective Questionnaire: DHI: 12/100,  improved from 30/100  Clinical Progress: improved  Home Program Compliance: Yes  Treatment has included: neuromuscular re-education, therapeutic activity, and CRP  Objective:   Objective         Positional Testing  Positional Testing: With infrared goggles  [place]-Hallpike Right: Upbeat Nystagmus  [place]-Hallpike Right Onset Time : immediate  [place]-Hallpike Right Duration Time : > 1 min  [place]-Hallpike Left: Upbeat Nystagmus  [place]-Hallpike Left Onset Time : immediate  [place]-Hallpike Left Duration Time : > 1 min  Horizontal Roll Test Right: Up-beating  Horizontal Roll Test Right Onset Time : immediate  Horizontal Roll Test Right Duration Time : > 1 min  Horizontal Roll Test Left: Up-beating  Horizontal Roll Test Left Onset Time : immediate  Horizontal Roll Test Left Duration Time : > 1 min             Computerized Dynamic Posturography  Composite Equilibrium Score: 50, 22% below normal  Sensory Analysis WDL: Exceptions to WDL  Sensory Analysis: Visual, Vestibular (VIS: 71, VEST: 22)  Strategy Analysis: Ankle Dominant    Assessment:   Functional Limitations: Walking, Difficulty moving, Decreased ability to perform ADL's  Patient is reporting resolution of dizziness with BPPV treatments. At this time BPPV is resolved but patient is demonstrating upbeating nystagmus indicating central vestibular involvement. This is likely the cause of  imbalance at this time. SOT testing today revealed a vestibular dysfunction pattern. We will transition treatment from BPPV to vestibular strengthening including balance work and oculomotor training.  Additonal PT is warranted to address listed functional limitations and address fall risk.   Plan:   PT Interventions: Canal Repositioning Procedure, Home Exercise Program  Progress toward previous goals: Partially Met  SHORT TERM GOALS: To be met in 2 weeks: (ALL MET)  1. Patient is independent with HEP.  2. Patient will report at least 30% improvement in overall condition.  3. Patient will report no falls.  LONG TERM GOALS:To be met in 4 weeks:  1. Patient will report decreased disequilibrium/dizziness by at least 90% which demonstrates improved quality of life. MET  2. Patient will report no loss of balance with ADLs to demonstrate improved functional balance and reduced risk for falls. PROGRESSING  3. Patient denies dizziness with daily activity especially with transitional movements. MET  4. DHI score is less than 10 to demonstrate improved balance and dizziness. PROGRESSING  Recommendations: Continue as planned  Timeframe: 1 month  Prognosis to achieve goals: good    12/21/2023 Treatments:     NMR 14822 20 minutes and Ther Act 74949 15 minutes    Timed Code Treatment: 25   Minutes     Total Treatment Time: 25      Minutes    PT: Amalia Mcnally PT, DPT, CHT, CIDN  License Number: [id number]  Electronically signed by Amalia Mcnally PT, 12/21/23, 10:33 AM EST    Certification Period: 12/22/2023 thru 3/20/2024  I certify that the therapy services are furnished while this patient is under my care.  The services outlined above are required by this patient, and will be reviewed every 90 days.         Physician Signature:__________________________________________________    PHYSICIAN: Tiffany Gibbons APRN  NPI: [id number]                                      DATE:    Please sign and return via fax to [phone] at  [facility] . Thank you, [facility]

## 2024-01-04 ENCOUNTER — TREATMENT (OUTPATIENT)
Dept: PHYSICAL THERAPY | Facility: CLINIC | Age: 74
End: 2024-01-04
Payer: MEDICARE

## 2024-01-04 DIAGNOSIS — H81.11 BPPV (BENIGN PAROXYSMAL POSITIONAL VERTIGO), RIGHT: Primary | ICD-10-CM

## 2024-01-04 DIAGNOSIS — R42 DIZZINESS: ICD-10-CM

## 2024-01-04 PROCEDURE — 97530 THERAPEUTIC ACTIVITIES: CPT | Performed by: PHYSICAL THERAPIST

## 2024-01-04 PROCEDURE — 97112 NEUROMUSCULAR REEDUCATION: CPT | Performed by: PHYSICAL THERAPIST

## 2024-01-05 NOTE — PROGRESS NOTES
Physical Therapy Daily Progress Note-Vestibular Rehab  [facility] [place]  [address], [address]  [facility] [address]      Visit#:8  Subjective    I have been working on the balance program at home. Still feeling unsteady.  Objective                             PROCEDURES AND MODALITIES:  See Exercise, Manual, and Modality Logs for complete treatment.     Paraffin:   pre-rx  Moist Heat:    Ice:    post-rx  E-Stim:    Ultrasound:    Ionto:   Traction:      NMR 97005 21 minutes and Ther Act 09807 9 minutes    Timed Code Treatment: 30 Minutes  Total Treatment Time: 30 Minutes    Assessment & Plan   Patient tolerated vestibular strengthening exercise well. No reports of dizziness today. She does have a vestibular dysfunction pattern when standing on compliant surface with EC. Continue to progress vestibular challenges to improve balance and stability.    Progress per Plan of Care    Amalia Mcnally PT, DPT, CHT, WESTONN  Physical Therapist  [place] license # [id number]

## 2024-01-10 ENCOUNTER — ANESTHESIA (OUTPATIENT)
Age: 74
End: 2024-01-10
Payer: MEDICARE

## 2024-01-10 ENCOUNTER — ANESTHESIA EVENT (OUTPATIENT)
Age: 74
End: 2024-01-10
Payer: MEDICARE

## 2024-01-10 ENCOUNTER — APPOINTMENT (OUTPATIENT)
Age: 74
End: 2024-01-10
Payer: MEDICARE

## 2024-01-10 ENCOUNTER — HOSPITAL ENCOUNTER (OUTPATIENT)
Age: 74
Setting detail: HOSPITAL OUTPATIENT SURGERY
Discharge: HOME | End: 2024-01-10
Attending: UROLOGY | Admitting: UROLOGY
Payer: MEDICARE

## 2024-01-10 VITALS
SYSTOLIC BLOOD PRESSURE: 146 MMHG | RESPIRATION RATE: 16 BRPM | HEIGHT: 67 IN | BODY MASS INDEX: 20.15 KG/M2 | DIASTOLIC BLOOD PRESSURE: 86 MMHG | OXYGEN SATURATION: 100 % | TEMPERATURE: 97.5 F | WEIGHT: 128.4 LBS | HEART RATE: 66 BPM

## 2024-01-10 DIAGNOSIS — N20.0 RENAL CALCULUS: Primary | ICD-10-CM

## 2024-01-10 PROCEDURE — 52356 CYSTO/URETERO W/LITHOTRIPSY: CPT | Performed by: UROLOGY

## 2024-01-10 PROCEDURE — 76000 FLUOROSCOPY <1 HR PHYS/QHP: CPT

## 2024-01-10 PROCEDURE — 25010000002 ONDANSETRON PER 1 MG: Performed by: STUDENT IN AN ORGANIZED HEALTH CARE EDUCATION/TRAINING PROGRAM

## 2024-01-10 PROCEDURE — 25010000002 CEFAZOLIN PER 500 MG: Performed by: UROLOGY

## 2024-01-10 PROCEDURE — C1758 CATHETER, URETERAL: HCPCS | Performed by: UROLOGY

## 2024-01-10 PROCEDURE — 25010000002 PHENYLEPHRINE 10 MG/ML SOLUTION: Performed by: STUDENT IN AN ORGANIZED HEALTH CARE EDUCATION/TRAINING PROGRAM

## 2024-01-10 PROCEDURE — C2617 STENT, NON-COR, TEM W/O DEL: HCPCS | Performed by: UROLOGY

## 2024-01-10 PROCEDURE — 25010000002 DEXAMETHASONE PER 1 MG: Performed by: STUDENT IN AN ORGANIZED HEALTH CARE EDUCATION/TRAINING PROGRAM

## 2024-01-10 PROCEDURE — C1769 GUIDE WIRE: HCPCS | Performed by: UROLOGY

## 2024-01-10 PROCEDURE — 25810000003 LACTATED RINGERS PER 1000 ML: Performed by: STUDENT IN AN ORGANIZED HEALTH CARE EDUCATION/TRAINING PROGRAM

## 2024-01-10 PROCEDURE — 25010000002 PROPOFOL 10 MG/ML EMULSION: Performed by: STUDENT IN AN ORGANIZED HEALTH CARE EDUCATION/TRAINING PROGRAM

## 2024-01-10 RX ORDER — ONDANSETRON 2 MG/ML
4 INJECTION INTRAMUSCULAR; INTRAVENOUS ONCE AS NEEDED
Status: DISCONTINUED | OUTPATIENT
Start: 2024-01-10 | End: 2024-01-10 | Stop reason: HOSPADM

## 2024-01-10 RX ORDER — FENTANYL CITRATE 50 UG/ML
25 INJECTION, SOLUTION INTRAMUSCULAR; INTRAVENOUS
Status: DISCONTINUED | OUTPATIENT
Start: 2024-01-10 | End: 2024-01-10 | Stop reason: HOSPADM

## 2024-01-10 RX ORDER — PROPOFOL 10 MG/ML
VIAL (ML) INTRAVENOUS AS NEEDED
Status: DISCONTINUED | OUTPATIENT
Start: 2024-01-10 | End: 2024-01-10 | Stop reason: SURG

## 2024-01-10 RX ORDER — LABETALOL HYDROCHLORIDE 5 MG/ML
5 INJECTION, SOLUTION INTRAVENOUS
Status: DISCONTINUED | OUTPATIENT
Start: 2024-01-10 | End: 2024-01-10 | Stop reason: HOSPADM

## 2024-01-10 RX ORDER — CEPHALEXIN 500 MG/1
500 CAPSULE ORAL 3 TIMES DAILY
Qty: 21 CAPSULE | Refills: 0 | Status: SHIPPED | OUTPATIENT
Start: 2024-01-10 | End: 2024-01-17

## 2024-01-10 RX ORDER — DROPERIDOL 2.5 MG/ML
0.62 INJECTION, SOLUTION INTRAMUSCULAR; INTRAVENOUS
Status: DISCONTINUED | OUTPATIENT
Start: 2024-01-10 | End: 2024-01-10 | Stop reason: HOSPADM

## 2024-01-10 RX ORDER — PROMETHAZINE HYDROCHLORIDE 25 MG/1
25 SUPPOSITORY RECTAL ONCE AS NEEDED
Status: DISCONTINUED | OUTPATIENT
Start: 2024-01-10 | End: 2024-01-10 | Stop reason: HOSPADM

## 2024-01-10 RX ORDER — PHENYLEPHRINE HYDROCHLORIDE 10 MG/ML
INJECTION INTRAVENOUS AS NEEDED
Status: DISCONTINUED | OUTPATIENT
Start: 2024-01-10 | End: 2024-01-10 | Stop reason: SURG

## 2024-01-10 RX ORDER — DEXAMETHASONE SODIUM PHOSPHATE 4 MG/ML
INJECTION, SOLUTION INTRA-ARTICULAR; INTRALESIONAL; INTRAMUSCULAR; INTRAVENOUS; SOFT TISSUE AS NEEDED
Status: DISCONTINUED | OUTPATIENT
Start: 2024-01-10 | End: 2024-01-10 | Stop reason: SURG

## 2024-01-10 RX ORDER — SODIUM CHLORIDE, SODIUM LACTATE, POTASSIUM CHLORIDE, CALCIUM CHLORIDE 600; 310; 30; 20 MG/100ML; MG/100ML; MG/100ML; MG/100ML
INJECTION, SOLUTION INTRAVENOUS CONTINUOUS PRN
Status: DISCONTINUED | OUTPATIENT
Start: 2024-01-10 | End: 2024-01-10 | Stop reason: SURG

## 2024-01-10 RX ORDER — HYDROCODONE BITARTRATE AND ACETAMINOPHEN 5; 325 MG/1; MG/1
1 TABLET ORAL ONCE AS NEEDED
Status: DISCONTINUED | OUTPATIENT
Start: 2024-01-10 | End: 2024-01-10 | Stop reason: HOSPADM

## 2024-01-10 RX ORDER — MIDAZOLAM HYDROCHLORIDE 1 MG/ML
0.5 INJECTION INTRAMUSCULAR; INTRAVENOUS
Status: DISCONTINUED | OUTPATIENT
Start: 2024-01-10 | End: 2024-01-10 | Stop reason: HOSPADM

## 2024-01-10 RX ORDER — ONDANSETRON 2 MG/ML
INJECTION INTRAMUSCULAR; INTRAVENOUS AS NEEDED
Status: DISCONTINUED | OUTPATIENT
Start: 2024-01-10 | End: 2024-01-10 | Stop reason: SURG

## 2024-01-10 RX ORDER — SODIUM CHLORIDE 0.9 % (FLUSH) 0.9 %
3 SYRINGE (ML) INJECTION EVERY 12 HOURS SCHEDULED
Status: DISCONTINUED | OUTPATIENT
Start: 2024-01-10 | End: 2024-01-10 | Stop reason: HOSPADM

## 2024-01-10 RX ORDER — SODIUM CHLORIDE, SODIUM LACTATE, POTASSIUM CHLORIDE, CALCIUM CHLORIDE 600; 310; 30; 20 MG/100ML; MG/100ML; MG/100ML; MG/100ML
9 INJECTION, SOLUTION INTRAVENOUS CONTINUOUS
Status: DISCONTINUED | OUTPATIENT
Start: 2024-01-10 | End: 2024-01-10 | Stop reason: HOSPADM

## 2024-01-10 RX ORDER — HYDROMORPHONE HYDROCHLORIDE 1 MG/ML
0.25 INJECTION, SOLUTION INTRAMUSCULAR; INTRAVENOUS; SUBCUTANEOUS
Status: DISCONTINUED | OUTPATIENT
Start: 2024-01-10 | End: 2024-01-10 | Stop reason: HOSPADM

## 2024-01-10 RX ORDER — PROMETHAZINE HYDROCHLORIDE 12.5 MG/1
25 TABLET ORAL ONCE AS NEEDED
Status: DISCONTINUED | OUTPATIENT
Start: 2024-01-10 | End: 2024-01-10 | Stop reason: HOSPADM

## 2024-01-10 RX ORDER — SODIUM CHLORIDE 0.9 % (FLUSH) 0.9 %
3-10 SYRINGE (ML) INJECTION AS NEEDED
Status: DISCONTINUED | OUTPATIENT
Start: 2024-01-10 | End: 2024-01-10 | Stop reason: HOSPADM

## 2024-01-10 RX ORDER — NALOXONE HCL 0.4 MG/ML
0.2 VIAL (ML) INJECTION AS NEEDED
Status: DISCONTINUED | OUTPATIENT
Start: 2024-01-10 | End: 2024-01-10 | Stop reason: HOSPADM

## 2024-01-10 RX ORDER — FENTANYL CITRATE 50 UG/ML
50 INJECTION, SOLUTION INTRAMUSCULAR; INTRAVENOUS ONCE AS NEEDED
Status: DISCONTINUED | OUTPATIENT
Start: 2024-01-10 | End: 2024-01-10 | Stop reason: HOSPADM

## 2024-01-10 RX ORDER — LIDOCAINE HYDROCHLORIDE 20 MG/ML
INJECTION, SOLUTION INFILTRATION; PERINEURAL AS NEEDED
Status: DISCONTINUED | OUTPATIENT
Start: 2024-01-10 | End: 2024-01-10 | Stop reason: SURG

## 2024-01-10 RX ORDER — HYDROCODONE BITARTRATE AND ACETAMINOPHEN 7.5; 325 MG/1; MG/1
1 TABLET ORAL EVERY 4 HOURS PRN
Status: DISCONTINUED | OUTPATIENT
Start: 2024-01-10 | End: 2024-01-10 | Stop reason: HOSPADM

## 2024-01-10 RX ORDER — HYDRALAZINE HYDROCHLORIDE 20 MG/ML
5 INJECTION INTRAMUSCULAR; INTRAVENOUS
Status: DISCONTINUED | OUTPATIENT
Start: 2024-01-10 | End: 2024-01-10 | Stop reason: HOSPADM

## 2024-01-10 RX ORDER — DIPHENHYDRAMINE HYDROCHLORIDE 50 MG/ML
12.5 INJECTION INTRAMUSCULAR; INTRAVENOUS
Status: DISCONTINUED | OUTPATIENT
Start: 2024-01-10 | End: 2024-01-10 | Stop reason: HOSPADM

## 2024-01-10 RX ORDER — FLUMAZENIL 0.1 MG/ML
0.2 INJECTION INTRAVENOUS AS NEEDED
Status: DISCONTINUED | OUTPATIENT
Start: 2024-01-10 | End: 2024-01-10 | Stop reason: HOSPADM

## 2024-01-10 RX ADMIN — ONDANSETRON 4 MG: 2 INJECTION INTRAMUSCULAR; INTRAVENOUS at 14:38

## 2024-01-10 RX ADMIN — SODIUM CHLORIDE 2000 MG: 9 INJECTION, SOLUTION INTRAVENOUS at 13:56

## 2024-01-10 RX ADMIN — PHENYLEPHRINE HYDROCHLORIDE 100 MCG: 10 INJECTION INTRAVENOUS at 14:22

## 2024-01-10 RX ADMIN — SODIUM CHLORIDE, SODIUM LACTATE, POTASSIUM CHLORIDE, AND CALCIUM CHLORIDE: .6; .31; .03; .02 INJECTION, SOLUTION INTRAVENOUS at 14:04

## 2024-01-10 RX ADMIN — SODIUM CHLORIDE, POTASSIUM CHLORIDE, SODIUM LACTATE AND CALCIUM CHLORIDE 9 ML/HR: 600; 310; 30; 20 INJECTION, SOLUTION INTRAVENOUS at 13:00

## 2024-01-10 RX ADMIN — PROPOFOL 100 MG: 10 INJECTION, EMULSION INTRAVENOUS at 14:06

## 2024-01-10 RX ADMIN — DEXAMETHASONE SODIUM PHOSPHATE 4 MG: 4 INJECTION, SOLUTION INTRA-ARTICULAR; INTRALESIONAL; INTRAMUSCULAR; INTRAVENOUS; SOFT TISSUE at 14:13

## 2024-01-10 RX ADMIN — LIDOCAINE HYDROCHLORIDE 60 MG: 20 INJECTION, SOLUTION INFILTRATION; PERINEURAL at 14:06

## 2024-01-10 NOTE — ANESTHESIA PREPROCEDURE EVALUATION
Anesthesia Evaluation     Patient summary reviewed and Nursing notes reviewed   no history of anesthetic complications:   NPO Solid Status: > 8 hours  NPO Liquid Status: > 2 hours           Airway   Mallampati: II  TM distance: >3 FB  Neck ROM: full  Dental      Pulmonary    Cardiovascular     ECG reviewed      ROS comment: EKG normal    Neuro/Psych  GI/Hepatic/Renal/Endo    (+) renal disease- stones    Musculoskeletal     Abdominal    Substance History      OB/GYN          Other                          Anesthesia Plan    ASA 2     general     intravenous induction     Anesthetic plan, risks, benefits, and alternatives have been provided, discussed and informed consent has been obtained with: patient.        CODE STATUS:

## 2024-01-10 NOTE — H&P
"     [facility] Surgical History and Physical    Patient Care Team:  Ciera Newman MD as PCP - General  Fredrick Norris MD as Consulting Physician (Nephrology)  Dania Sanchez APRN as Nurse Practitioner (Gastroenterology)  Vineet Tran MD as Consulting Physician (Gastroenterology)  Raad Lawson MD as Consulting Physician (Vascular Surgery)    Chief complaint right flank pain    Subjective     Patient is a 73 y.o. female presents with right renal calculi who recently underwent ESWL last month.  She has had poor to incomplete fragmentation of this renal stone and now presents for right ureteroscopy with laser lithotripsy..     Review of Systems   Pertinent items are noted in HPI    Past Medical History:   Diagnosis Date    Arthritis     Bruises easily     Colon cancer screening 06/03/2021    Constipation     Constipation     Difficulty swallowing pills     \"LARGE PILLS, NEED TO TAKE WITH YOGURT\"    DVT, lower extremity 2022    RESOLVED    History of 2019 novel coronavirus disease (COVID-19) 08/2023    History of recent fall 09/2023    Hypothyroidism     Kidney stone     Kidney stone     Memory loss, short term     Osteoporosis     Renal calculus 10/18/2023    Vertigo      Past Surgical History:   Procedure Laterality Date    APPENDECTOMY      COLONOSCOPY N/A 09/14/2021    Procedure: COLONOSCOPY;  Surgeon: Vineet Tran MD;  Location: [facility] MAIN OR;  Service: Gastroenterology;  Laterality: N/A;    EXTRACORPOREAL SHOCK WAVE LITHOTRIPSY (ESWL) Right 10/18/2023    Procedure: EXTRACORPOREAL SHOCKWAVE LITHOTRIPSY- RIGHT;  Surgeon: Jeffery Reynolds MD;  Location: [facility] OR;  Service: Urology;  Laterality: Right;    EXTRACORPOREAL SHOCK WAVE LITHOTRIPSY (ESWL) Right 12/6/2023    Procedure: RIGHT KIDNEY SHOCK WAVE LITHOTRIPSY;  Surgeon: Jeffery Reynolds MD;  Location: [facility] OR;  Service: Urology;  Laterality: Right;    VEIN SURGERY       Family History   Problem Relation Age of Onset "    COPD Mother     Heart attack Mother     Stroke Mother     Heart disease Father         PARALYSIS, WAIST DOWN    Thrombocytopenia Sister     Hepatitis Brother     Prostate cancer Brother     Breast cancer Paternal Aunt     Colon cancer Paternal Aunt     Colon polyps Paternal Aunt     Diabetes Maternal Grandmother     Cancer Paternal Grandmother         BRAIN    COPD Other     Heart attack Other     Stroke Other     Thrombocytopenia Other     Diabetes Other     Brain cancer Other     Colon cancer Other         NEPHEW    Colon polyps Other     Irritable bowel syndrome Neg Hx     Ulcerative colitis Neg Hx     Crohn's disease Neg Hx     Inflammatory bowel disease Neg Hx     Malig Hyperthermia Neg Hx      Social History     Tobacco Use    Smoking status: Never    Smokeless tobacco: Never    Tobacco comments:     no caffeine   Vaping Use    Vaping Use: Never used   Substance Use Topics    Alcohol use: No    Drug use: No       Meds:  Medications Prior to Admission   Medication Sig Dispense Refill Last Dose    acetaminophen (TYLENOL) 325 MG tablet Take 2 tablets by mouth Every 6 (Six) Hours As Needed for Mild Pain.   Past Month    Ascorbic Acid (VITAMIN C PO) Take 1 tablet by mouth Daily.   Past Month    Cholecalciferol (VITAMIN D3) 50 MCG (2000 UT) tablet Take 1 tablet by mouth Daily.   Past Week    levothyroxine (SYNTHROID, LEVOTHROID) 25 MCG tablet Take 1 tablet by mouth Daily. (Patient taking differently: Take 1 tablet by mouth Every Morning.) 90 tablet 3 1/10/2024    NON FORMULARY Take  by mouth 2 (Two) Times a Day. Name Colon Ease   Past Week    Polyethyl Glycol-Propyl Glycol (SYSTANE ULTRA OP) Administer 1 drop to both eyes Every Night.   Past Month    vitamin B-12 (CYANOCOBALAMIN) 1000 MCG tablet Take 1 tablet by mouth Daily.   Past Month    Zinc 50 MG tablet Take 1 tablet by mouth Daily.   Past Month    denosumab (PROLIA) 60 MG/ML solution prefilled syringe syringe Inject 1 mL under the skin into the appropriate  "area as directed Every 6 (Six) Months. FIRST DOSE 9/2023   More than a month       Allergies:  Adhesive tape and Celecoxib    Debilities:  None    Objective     Vital Signs  Temp:  [98.1 °F (36.7 °C)] 98.1 °F (36.7 °C)  Heart Rate:  [68] 68  Resp:  [16] 16  BP: (142)/(80) 142/80  No intake or output data in the 24 hours ending 01/10/24 1306  Flowsheet Rows      Flowsheet Row First Filed Value   Admission Height 167.6 cm (66\") Documented at 01/03/2024 1405   Admission Weight 58.5 kg (129 lb) Documented at 01/03/2024 1405             Physical Exam:     General Appearance:     Alert, cooperative, NAD   HEENT:     No trauma, pupils reactive, hearing intact   Back:      No CVA tenderness   Lungs:      Respirations unlabored, no wheezing    Heart:     RRR, intact peripheral pulses   Abdomen:      Soft, NDNT, no masses, no guarding   :     Normal external genitalia   Extremities:    No edema, no deformity   Lymphatic:    No neck or groin LAD   Skin:    No bleeding, bruising or rashes   Neuro/Psych:    Orientation intact, mood/affect pleasant, no focal findings     Results Review:    I reviewed the patient's new clinical results.  Results for orders placed or performed in visit on 11/30/23   Basic Metabolic Panel    Specimen: Blood   Result Value Ref Range    Glucose 80 65 - 99 mg/dL    BUN 32 (H) 8 - 23 mg/dL    Creatinine 1.36 (H) 0.57 - 1.00 mg/dL    Sodium 140 136 - 145 mmol/L    Potassium 4.2 3.5 - 5.2 mmol/L    Chloride 105 98 - 107 mmol/L    CO2 26.1 22.0 - 29.0 mmol/L    Calcium 9.8 8.6 - 10.5 mg/dL    BUN/Creatinine Ratio 23.5 7.0 - 25.0    Anion Gap 8.9 5.0 - 15.0 mmol/L    eGFR 40.7 (L) >60.0 mL/min/1.73   CBC (No Diff)    Specimen: Blood   Result Value Ref Range    WBC 4.18 3.40 - 10.80 10*3/mm3    RBC 3.51 (L) 3.77 - 5.28 10*6/mm3    Hemoglobin 11.2 (L) 12.0 - 15.9 g/dL    Hematocrit 32.7 (L) 34.0 - 46.6 %    MCV 93.2 79.0 - 97.0 fL    MCH 31.9 26.6 - 33.0 pg    MCHC 34.3 31.5 - 35.7 g/dL    RDW 12.6 12.3 - " 15.4 %    RDW-SD 42.5 37.0 - 54.0 fl    MPV 12.0 6.0 - 12.0 fL    Platelets 116 (L) 140 - 450 10*3/mm3        Assessment:  Right renal calculi    Plan:    Right ureteroscopy laser lithotripsy possible stone basket extraction and stent placement    I discussed the patient's findings and my recommendations with patient.   Risks, complications, outcomes and alternatives discussed with the patient at the bedside and office.    Jeffery Reynolds MD  01/10/24  13:06 EST

## 2024-01-10 NOTE — ANESTHESIA POSTPROCEDURE EVALUATION
Patient: Carol A Osgood    Procedure Summary       Date: 01/10/24 Room / Location: [facility] OR 01 / [facility] MAIN OR    Anesthesia Start: 1404 Anesthesia Stop: 1449    Procedure: RIGHT URETEROSCOPY LASER LITHOTRIPSY WITH STENT PLACMENT (Right) Diagnosis:       Renal calculi      (Right renal calculi)    Surgeons: Jeffery Reynolds MD Provider: Gabino Nichols MD    Anesthesia Type: general ASA Status: 2            Anesthesia Type: general    Vitals  Vitals Value Taken Time   /67 01/10/24 1457   Temp     Pulse 62 01/10/24 1457   Resp 16 01/10/24 1456   SpO2 100 % 01/10/24 1457   Vitals shown include unfiled device data.        Post Anesthesia Care and Evaluation    Patient location during evaluation: bedside  Patient participation: complete - patient participated  Level of consciousness: awake and alert  Pain management: adequate    Airway patency: patent  Anesthetic complications: No anesthetic complications  PONV Status: controlled  Cardiovascular status: blood pressure returned to baseline and acceptable  Respiratory status: acceptable  Hydration status: acceptable

## 2024-01-10 NOTE — OP NOTE
URETEROSCOPY LASER LITHOTRIPSY WITH STENT INSERTION  Procedure Note    Carol A Osgood  1/10/2024    Pre-op Diagnosis:   Right renal calculi    Post-op Diagnosis:     Post-Op Diagnosis Codes:     * Renal calculi [N20.0]    Procedure(s):  RIGHT URETEROSCOPY LASER LITHOTRIPSY WITH STENT PLACMENT    Surgeon(s):  Jeffery Reynolds MD    Anesthesia: General    Staff:   Circulator: Caryn Mitchell RN; Isa Kong RN  Scrub Person: Lori Banda; Cesia Slaughter RN    Estimated Blood Loss: none    Specimens:                * No orders in the log *      Drains: * No LDAs found *    Findings: Multiple stones right lower pole kidney completely fragmented.  Then placed without tether.    Complications: None apparent    Indications: 73-year-old female with right renal stones recently underwent staged right ESWL has some persistent fragments in the lower pole right kidney she now presents for ureteroscopy and laser lithotripsy.    Procedure: Patient was taken the operative suite after informed sent was obtained.  Placed in a comfortable supine position.  She was given general anesthesia.  She was placed lithotomy.  She was prepped and draped in a sterile fashion.  Surgical timeout was performed.  Cystoscope was inserted.  Urethra was normal.  The bladder slowly visualized.  No acute or chronic cystitis was seen.  The right orifice was cannulated with a guidewire and passed up to the right renal pelvis.  Over this the flexible ureteroscope was placed.  Ureteroscopy showed a normal ureter normal UPJ the pelvis was normal.  Multiple stones were seen in the lower pole which had been partially fragmented.  No stone seen in the upper pole were noted.  The 200 µm holmium fiber was passed.  We used the Ryne laser.  We began breaking the stone up in a dusting fashion until the stone was completely fragmented into very small particles.  Most of the started washing down the ureter with the irrigation and very  little remaining that I could see visually or radiographically.  I felt very comfortable and then was broken up nicely.  The guidewire was replaced.  The ureteroscope was removed.  A 6 Albanian by 24 cm stent was then placed the right collecting system without a tether and she was awoken and taken to recovery in stable condition.      Jeffery Reynolds MD     Date: 1/10/2024  Time: 14:52 EST

## 2024-01-10 NOTE — ANESTHESIA PROCEDURE NOTES
Airway  Urgency: elective    Date/Time: 1/10/2024 2:07 PM  End Time:1/10/2024 2:07 PM  Airway not difficult    General Information and Staff    Patient location during procedure: OR  Anesthesiologist: Gabino Nichols MD    Indications and Patient Condition  Indications for airway management: airway protection    Preoxygenated: yes  MILS maintained throughout  Mask difficulty assessment: 0 - not attempted    Final Airway Details  Final airway type: supraglottic airway      Successful airway: LMA  Size 4     Number of attempts at approach: 1  Assessment: lips, teeth, and gum same as pre-op and atraumatic intubation

## 2024-01-17 ENCOUNTER — LAB (OUTPATIENT)
Dept: LAB | Facility: HOSPITAL | Age: 74
End: 2024-01-17
Payer: MEDICARE

## 2024-01-17 DIAGNOSIS — M81.0 AGE-RELATED OSTEOPOROSIS WITHOUT CURRENT PATHOLOGICAL FRACTURE: ICD-10-CM

## 2024-01-17 LAB
25(OH)D3 SERPL-MCNC: 109 NG/ML (ref 30–100)
ALBUMIN SERPL-MCNC: 4 G/DL (ref 3.5–5.2)
ALBUMIN/GLOB SERPL: 1.3 G/DL
ALP SERPL-CCNC: 75 U/L (ref 39–117)
ALT SERPL W P-5'-P-CCNC: 9 U/L (ref 1–33)
ANION GAP SERPL CALCULATED.3IONS-SCNC: 9.6 MMOL/L (ref 5–15)
AST SERPL-CCNC: 17 U/L (ref 1–32)
BILIRUB SERPL-MCNC: 0.3 MG/DL (ref 0–1.2)
BUN SERPL-MCNC: 38 MG/DL (ref 8–23)
BUN/CREAT SERPL: 24.2 (ref 7–25)
CALCIUM SPEC-SCNC: 10.2 MG/DL (ref 8.6–10.5)
CHLORIDE SERPL-SCNC: 104 MMOL/L (ref 98–107)
CHOLEST SERPL-MCNC: 188 MG/DL (ref 0–200)
CO2 SERPL-SCNC: 28.4 MMOL/L (ref 22–29)
CREAT SERPL-MCNC: 1.57 MG/DL (ref 0.57–1)
DEPRECATED RDW RBC AUTO: 43.3 FL (ref 37–54)
EGFRCR SERPLBLD CKD-EPI 2021: 34.3 ML/MIN/1.73
ERYTHROCYTE [DISTWIDTH] IN BLOOD BY AUTOMATED COUNT: 12.3 % (ref 12.3–15.4)
GLOBULIN UR ELPH-MCNC: 3 GM/DL
GLUCOSE SERPL-MCNC: 85 MG/DL (ref 65–99)
HCT VFR BLD AUTO: 37.6 % (ref 34–46.6)
HDLC SERPL-MCNC: 81 MG/DL (ref 40–60)
HGB BLD-MCNC: 12.2 G/DL (ref 12–15.9)
LDLC SERPL CALC-MCNC: 94 MG/DL (ref 0–100)
LDLC/HDLC SERPL: 1.15 {RATIO}
MAGNESIUM SERPL-MCNC: 2.3 MG/DL (ref 1.6–2.4)
MCH RBC QN AUTO: 31 PG (ref 26.6–33)
MCHC RBC AUTO-ENTMCNC: 32.4 G/DL (ref 31.5–35.7)
MCV RBC AUTO: 95.7 FL (ref 79–97)
PHOSPHATE SERPL-MCNC: 3.3 MG/DL (ref 2.5–4.5)
PLATELET # BLD AUTO: 158 10*3/MM3 (ref 140–450)
PMV BLD AUTO: 11 FL (ref 6–12)
POTASSIUM SERPL-SCNC: 4.5 MMOL/L (ref 3.5–5.2)
PROT SERPL-MCNC: 7 G/DL (ref 6–8.5)
RBC # BLD AUTO: 3.93 10*6/MM3 (ref 3.77–5.28)
SODIUM SERPL-SCNC: 142 MMOL/L (ref 136–145)
TRIGL SERPL-MCNC: 69 MG/DL (ref 0–150)
TSH SERPL DL<=0.05 MIU/L-ACNC: 1.56 UIU/ML (ref 0.27–4.2)
VLDLC SERPL-MCNC: 13 MG/DL (ref 5–40)
WBC NRBC COR # BLD AUTO: 6.28 10*3/MM3 (ref 3.4–10.8)

## 2024-01-17 PROCEDURE — 80061 LIPID PANEL: CPT | Performed by: INTERNAL MEDICINE

## 2024-01-17 PROCEDURE — 85027 COMPLETE CBC AUTOMATED: CPT | Performed by: INTERNAL MEDICINE

## 2024-01-17 PROCEDURE — 82306 VITAMIN D 25 HYDROXY: CPT

## 2024-01-17 PROCEDURE — 84100 ASSAY OF PHOSPHORUS: CPT

## 2024-01-17 PROCEDURE — 84443 ASSAY THYROID STIM HORMONE: CPT | Performed by: INTERNAL MEDICINE

## 2024-01-17 PROCEDURE — 83735 ASSAY OF MAGNESIUM: CPT

## 2024-01-17 PROCEDURE — 80053 COMPREHEN METABOLIC PANEL: CPT

## 2024-01-23 ENCOUNTER — OFFICE VISIT (OUTPATIENT)
Dept: INTERNAL MEDICINE | Facility: CLINIC | Age: 74
End: 2024-01-23
Payer: MEDICARE

## 2024-01-23 VITALS
HEART RATE: 71 BPM | WEIGHT: 131.3 LBS | TEMPERATURE: 97.8 F | DIASTOLIC BLOOD PRESSURE: 74 MMHG | OXYGEN SATURATION: 99 % | BODY MASS INDEX: 20.61 KG/M2 | SYSTOLIC BLOOD PRESSURE: 130 MMHG | HEIGHT: 67 IN

## 2024-01-23 DIAGNOSIS — R41.3 MEMORY LOSS: ICD-10-CM

## 2024-01-23 DIAGNOSIS — N28.9 RENAL INSUFFICIENCY: ICD-10-CM

## 2024-01-23 DIAGNOSIS — M81.0 AGE-RELATED OSTEOPOROSIS WITHOUT CURRENT PATHOLOGICAL FRACTURE: ICD-10-CM

## 2024-01-23 DIAGNOSIS — Z00.00 MEDICARE ANNUAL WELLNESS VISIT, SUBSEQUENT: Primary | ICD-10-CM

## 2024-01-23 RX ORDER — L-MEFOL/A-CYST/MEB12/ALGAL OIL 6-600-2 MG
1 TABLET ORAL DAILY
Qty: 30 TABLET | Refills: 0 | COMMUNITY
Start: 2024-01-23

## 2024-01-23 NOTE — PROGRESS NOTES
The ABCs of the Annual Wellness Visit  Subsequent Medicare Wellness Visit    Subjective    Carol A Osgood is a 73 y.o. female who presents for a Subsequent Medicare Wellness Visit.    The following portions of the patient's history were reviewed and   updated as appropriate: allergies, current medications, past family history, past medical history, past social history, past surgical history, and problem list.    Compared to one year ago, the patient feels her physical   health is the same.    Compared to one year ago, the patient feels her mental   health is the same.    Recent Hospitalizations:  She was not admitted to the hospital during the last year.       Current Medical Providers:  Patient Care Team:  Ciera Newman MD as PCP - General  Fredrick Norris MD as Consulting Physician (Nephrology)  Dania Sanchez APRN as Nurse Practitioner (Gastroenterology)  Vineet Tran MD as Consulting Physician (Gastroenterology)  Raad Lawson MD as Consulting Physician (Vascular Surgery)    Outpatient Medications Prior to Visit   Medication Sig Dispense Refill    Cholecalciferol (VITAMIN D3) 50 MCG (2000 UT) tablet Take 1 tablet by mouth Daily.      denosumab (PROLIA) 60 MG/ML solution prefilled syringe syringe Inject 1 mL under the skin into the appropriate area as directed Every 6 (Six) Months. FIRST DOSE 9/2023      levothyroxine (SYNTHROID, LEVOTHROID) 25 MCG tablet Take 1 tablet by mouth Daily. (Patient taking differently: Take 1 tablet by mouth Every Morning.) 90 tablet 3    Polyethyl Glycol-Propyl Glycol (SYSTANE ULTRA OP) Administer 1 drop to both eyes Every Night.      acetaminophen (TYLENOL) 325 MG tablet Take 2 tablets by mouth Every 6 (Six) Hours As Needed for Mild Pain. (Patient not taking: Reported on 1/23/2024)      Ascorbic Acid (VITAMIN C PO) Take 1 tablet by mouth Daily. (Patient not taking: Reported on 1/23/2024)      NON FORMULARY Take  by mouth 2 (Two) Times a Day. Name Colon Ease (Patient not  "taking: Reported on 1/23/2024)      vitamin B-12 (CYANOCOBALAMIN) 1000 MCG tablet Take 1 tablet by mouth Daily. (Patient not taking: Reported on 1/23/2024)      Zinc 50 MG tablet Take 1 tablet by mouth Daily. (Patient not taking: Reported on 1/23/2024)       No facility-administered medications prior to visit.       No opioid medication identified on active medication list. I have reviewed chart for other potential  high risk medication/s and harmful drug interactions in the elderly.        Aspirin is not on active medication list.  Aspirin use is not indicated based on review of current medical condition/s. Risk of harm outweighs potential benefits.  .    Patient Active Problem List   Diagnosis    Low back pain    Low hemoglobin    Menopause present    Osteoporosis    Thrombocytopenia    Renal insufficiency    Pain and swelling of left knee    Anxiety    Vitamin D deficiency    Constipation    Memory loss    Incontinence of feces    Colon cancer screening    Hx of adenomatous colonic polyps    Glaucomatous atrophy of optic disc    Nuclear senile cataract    Vitreous degeneration    Renal calculus     Advance Care Planning   Advance Care Planning     Advance Directive is not on file.  ACP discussion was held with the patient during this visit. Patient has an advance directive (not in EMR), copy requested.     Objective    Vitals:    01/23/24 1341   BP: 130/74   BP Location: Right arm   Patient Position: Sitting   Pulse: 71   Temp: 97.8 °F (36.6 °C)   TempSrc: Oral   SpO2: 99%   Weight: 59.6 kg (131 lb 4.8 oz)   Height: 170.2 cm (67\")   PainSc:   3     Estimated body mass index is 20.56 kg/m² as calculated from the following:    Height as of this encounter: 170.2 cm (67\").    Weight as of this encounter: 59.6 kg (131 lb 4.8 oz).    BMI is within normal parameters. No other follow-up for BMI required.      Does the patient have evidence of cognitive impairment? Yes    Lab Results   Component Value Date    TRIG 69 " 2024    HDL 81 (H) 2024    LDL 94 2024    VLDL 13 2024        HEALTH RISK ASSESSMENT    Smoking Status:  Social History     Tobacco Use   Smoking Status Never   Smokeless Tobacco Never   Tobacco Comments    no caffeine     Alcohol Consumption:  Social History     Substance and Sexual Activity   Alcohol Use No     Fall Risk Screen:    JOSE Fall Risk Assessment was completed, and patient is at HIGH risk for falls. Assessment completed on:2024    Depression Screenin/23/2024     1:00 PM   PHQ-2/PHQ-9 Depression Screening   Little Interest or Pleasure in Doing Things 0-->not at all   Feeling Down, Depressed or Hopeless 0-->not at all   PHQ-9: Brief Depression Severity Measure Score 0       Health Habits and Functional and Cognitive Screenin/20/2024     4:35 PM   Functional & Cognitive Status   Do you have difficulty preparing food and eating? No   Do you have difficulty bathing yourself, getting dressed or grooming yourself? No   Do you have difficulty using the toilet? No   Do you have difficulty moving around from place to place? No   Do you have trouble with steps or getting out of a bed or a chair? No   Current Diet Well Balanced Diet   Dental Exam Up to date   Eye Exam Up to date   Exercise (times per week) 2 times per week   Current Exercises Include House Cleaning;Other;Walking   Do you need help using the phone?  No   Are you deaf or do you have serious difficulty hearing?  No   Do you need help to go to places out of walking distance? Yes   Do you need help shopping? Yes   Do you need help preparing meals?  Yes   Do you need help with housework?  Yes   Do you need help with laundry? No   Do you need help taking your medications? No   Do you need help managing money? No   Do you ever drive or ride in a car without wearing a seat belt? No   Have you felt unusual stress, anger or loneliness in the last month? No   Who do you live with? Spouse   If you need help, do  you have trouble finding someone available to you? No   Have you been bothered in the last four weeks by sexual problems? No   Do you have difficulty concentrating, remembering or making decisions? Yes       Age-appropriate Screening Schedule:  Refer to the list below for future screening recommendations based on patient's age, sex and/or medical conditions. Orders for these recommended tests are listed in the plan section. The patient has been provided with a written plan.    Health Maintenance   Topic Date Due    Pneumococcal Vaccine 65+ (1 of 1 - PCV) Never done    TDAP/TD VACCINES (2 - Tdap) 09/01/2019    ANNUAL WELLNESS VISIT  11/30/2023    INFLUENZA VACCINE  03/31/2024 (Originally 8/1/2023)    COLORECTAL CANCER SCREENING  09/14/2024    LIPID PANEL  01/17/2025    DXA SCAN  07/31/2025    HEPATITIS C SCREENING  Completed    COVID-19 Vaccine  Discontinued    ZOSTER VACCINE  Discontinued                  CMS Preventative Services Quick Reference  Risk Factors Identified During Encounter  Fall Risk-High or Moderate:  getting PT now  she needs to make sure she is doing exercises at home  The above risks/problems have been discussed with the patient.  Pertinent information has been shared with the patient in the After Visit Summary.  An After Visit Summary and PPPS were made available to the patient.    Follow Up:   Next Medicare Wellness visit to be scheduled in 1 year.       Additional E&M Note during same encounter follows:  Patient has multiple medical problems which are significant and separately identifiable that require additional work above and beyond the Medicare Wellness Visit.      Chief Complaint  Medicare Wellness-subsequent and Hypothyroidism    Subjective        Hypothyroidism    Mattie A Osgood is also being seen today for fu with Fl  She has been working with PT on balance and thinks she is doing better  She also recently was dx with kidney stones and she has had lithotripsy  Pt has been doing well  "with thyroid meds.  Taking as perscribed without any complications         Objective   Vital Signs:  /74 (BP Location: Right arm, Patient Position: Sitting)   Pulse 71   Temp 97.8 °F (36.6 °C) (Oral)   Ht 170.2 cm (67\")   Wt 59.6 kg (131 lb 4.8 oz)   SpO2 99%   BMI 20.56 kg/m²     Physical Exam  Vitals reviewed.   Constitutional:       Appearance: She is well-developed.   HENT:      Head: Normocephalic and atraumatic.      Right Ear: External ear normal.      Left Ear: External ear normal.   Eyes:      Conjunctiva/sclera: Conjunctivae normal.      Pupils: Pupils are equal, round, and reactive to light.   Neck:      Thyroid: No thyromegaly.      Trachea: No tracheal deviation.   Cardiovascular:      Rate and Rhythm: Normal rate and regular rhythm.      Heart sounds: Normal heart sounds.   Pulmonary:      Effort: Pulmonary effort is normal.      Breath sounds: Normal breath sounds.   Abdominal:      General: Bowel sounds are normal. There is no distension.      Palpations: Abdomen is soft.      Tenderness: There is no abdominal tenderness.   Musculoskeletal:         General: No deformity. Normal range of motion.      Cervical back: Normal range of motion.   Skin:     General: Skin is warm and dry.   Neurological:      Mental Status: She is alert and oriented to person, place, and time.   Psychiatric:         Behavior: Behavior normal.         Thought Content: Thought content normal.         Judgment: Judgment normal.        The following data was reviewed by: Ciera Newman MD on 01/23/2024:  Common labs          10/6/2023    13:10 11/30/2023    15:35 1/17/2024    10:52   Common Labs   Glucose 73  80  85    BUN 32  32  38    Creatinine 1.25  1.36  1.57    Sodium 142  140  142    Potassium 4.1  4.2  4.5    Chloride 107  105  104    Calcium 9.3  9.8  10.2    Albumin   4.0    Total Bilirubin   0.3    Alkaline Phosphatase   75    AST (SGOT)   17    ALT (SGPT)   9    WBC 4.20  4.18  6.28    Hemoglobin 10.7  11.2  " 12.2    Hematocrit 30.8  32.7  37.6    Platelets 141  116  158    Total Cholesterol   188    Triglycerides   69    HDL Cholesterol   81    LDL Cholesterol    94                 Assessment and Plan   There are no diagnoses linked to this encounter.         Follow Up   No follow-ups on file.  Patient was given instructions and counseling regarding her condition or for health maintenance advice. Please see specific information pulled into the AVS if appropriate.      Kidney stones-  she has recovered from her kidneys  no hematuria  no pain  Hypothyroidms-  ok with current meds  Osteoporosis- on prolia doing well  Balance - better with PT   5.  Memory-  feels like a little worse  perhaps some worse from anesthetis for kidney procedures  rec cera folin    samples given

## 2024-01-23 NOTE — PATIENT INSTRUCTIONS
Medicare Wellness  Personal Prevention Plan of Service     Date of Office Visit:    Encounter Provider:  Ciera Newman MD  Place of Service:  [facility] INTERNAL MEDICINE  Patient Name: Carol A Osgood  :  1950    As part of the Medicare Wellness portion of your visit today, we are providing you with this personalized preventive plan of services (PPPS). This plan is based upon recommendations of the [place] Preventive Services Task Force (USPSTF) and the Advisory Committee on Immunization Practices (ACIP).    This lists the preventive care services that should be considered, and provides dates of when you are due. Items listed as completed are up-to-date and do not require any further intervention.    Health Maintenance   Topic Date Due    Pneumococcal Vaccine 65+ (1 of 1 - PCV) Never done    TDAP/TD VACCINES (2 - Tdap) 2019    INFLUENZA VACCINE  2024 (Originally 2023)    COLORECTAL CANCER SCREENING  2024    LIPID PANEL  2025    ANNUAL WELLNESS VISIT  2025    DXA SCAN  2025    HEPATITIS C SCREENING  Completed    COVID-19 Vaccine  Discontinued    ZOSTER VACCINE  Discontinued       No orders of the defined types were placed in this encounter.      Return in about 6 months (around 2024).

## 2024-01-25 ENCOUNTER — TREATMENT (OUTPATIENT)
Dept: PHYSICAL THERAPY | Facility: CLINIC | Age: 74
End: 2024-01-25
Payer: MEDICARE

## 2024-01-25 DIAGNOSIS — R42 DIZZINESS: ICD-10-CM

## 2024-01-25 DIAGNOSIS — H81.11 BPPV (BENIGN PAROXYSMAL POSITIONAL VERTIGO), RIGHT: Primary | ICD-10-CM

## 2024-01-25 PROCEDURE — 97530 THERAPEUTIC ACTIVITIES: CPT | Performed by: PHYSICAL THERAPIST

## 2024-01-25 PROCEDURE — 97112 NEUROMUSCULAR REEDUCATION: CPT | Performed by: PHYSICAL THERAPIST

## 2024-01-25 NOTE — PROGRESS NOTES
Physical Therapy Daily Progress Note-Vestibular Rehab  [facility] [place]  [address], [address]  [facility] [address]      Visit#:9  Subjective   Patient's  reports she has had the last treatment for kidney stones. Patient reports she continues to have imbalance daily.   Objective                             PROCEDURES AND MODALITIES:  See Exercise, Manual, and Modality Logs for complete treatment.     Paraffin:   pre-rx  Moist Heat:    Ice:    post-rx  E-Stim:    Ultrasound:    Ionto:   Traction:      NMR 90131 21 minutes and Ther Act 34051 9 minutes    Timed Code Treatment: 30 Minutes  Total Treatment Time: 30 Minutes    Assessment & Plan   Patient continues to demonstrate a vestibular dysfunction pattern. Her participation in PT and HEP has been limited due to recent treatments for kidney stones. Now we will attempt more frequent and consistent treatments to improve balance, QOL, reduce fall risk and improve vestibular system function.     Progress per Plan of Care    Amalia Mcnally, PT, DPT, CHT, KARLA  Physical Therapist  [place] license # [id number]

## 2024-01-30 RX ORDER — L-MEFOL/A-CYST/MEB12/ALGAL OIL 6-600-2 MG
1 TABLET ORAL DAILY
Qty: 90 TABLET | Refills: 1 | Status: SHIPPED | OUTPATIENT
Start: 2024-01-30

## 2024-02-01 ENCOUNTER — TREATMENT (OUTPATIENT)
Dept: PHYSICAL THERAPY | Facility: CLINIC | Age: 74
End: 2024-02-01
Payer: MEDICARE

## 2024-02-01 DIAGNOSIS — H81.11 BPPV (BENIGN PAROXYSMAL POSITIONAL VERTIGO), RIGHT: Primary | ICD-10-CM

## 2024-02-01 DIAGNOSIS — R42 DIZZINESS: ICD-10-CM

## 2024-02-01 PROCEDURE — 97112 NEUROMUSCULAR REEDUCATION: CPT | Performed by: PHYSICAL THERAPIST

## 2024-02-01 PROCEDURE — 97530 THERAPEUTIC ACTIVITIES: CPT | Performed by: PHYSICAL THERAPIST

## 2024-02-01 NOTE — PROGRESS NOTES
Physical Therapy Monthly Progress Note  [facility] Therapy [place]  [address], [address]  [place] [address]    Name: Carol Osgood  Date: 02/01/2024  Diagnosis/ICD-10 Code:  BPPV (benign paroxysmal positional vertigo), right [H81.11]  Referring practitioner: NAOMI Dumas  Date of Initial Visit: 9/29/2023  Visit #: 10  Subjective:   Mattie Blevinsod reports: ( not present for questioning today) I had back pain after last visit for a few days. I still have dizziness when I have pain. I still feel unsteady.   Subjective Questionnaire: DHI: 40/100,  declined from 30/100  Clinical Progress: improved  Home Program Compliance: Yes  Treatment has included: neuromuscular re-education, therapeutic activity, and CRP  Objective:   Objective      Computerized Dynamic Posturography  Composite Equilibrium Score: 62, 3% below normal (previously 50, 22% below normal)  Sensory Analysis WDL: Exceptions to WDL  Sensory Analysis: Vestibular (38)  Strategy Analysis: Ankle Dominant    Assessment:   Functional Limitations: Walking, Difficulty moving, Decreased ability to perform ADL's  Patient was not able to understand questioning today. She cannot portray an accurate description on her current symptoms and if they have improved since starting PT. Her  reports he cannot see any imbalance in her daily activity, just when doing balance exercises at home. She previously told me her dizziness was resolved but today she reports pain with dizziness. Clearly there is a memory/cognitive impairment. BPPV was present initially and was treated with resolution. She is demonstrating an upbeating nystagmus in all positions of BPPV testing that does not fatigue indicating a central vestibular involvement. To my knowledge she has not seen neurology for this issue. SOT was performed today and she did demonstrate improvement in her performance since initial test on 12/21/23. She demonstrated statistically  significant improvement in her vestibular function. I will suggest an additional month of treatment as she has only been see for PT 10 times since Sept 2023 due to other health issues. She seems compliant with HEP per her husbands reports.   Plan:   PT Interventions: Canal Repositioning Procedure, Home Exercise Program  Progress toward previous goals: Partially Met  SHORT TERM GOALS: To be met in 2 weeks:  1. Patient is independent with HEP.  2. Patient will report at least 30% improvement in overall condition.  3. Patient will report no falls.  LONG TERM GOALS:To be met in 4 weeks:  1. Patient will report decreased disequilibrium/dizziness by at least 90% which demonstrates improved quality of life.  2. Patient will report no loss of balance with ADLs to demonstrate improved functional balance and reduced risk for falls.  3. Patient denies dizziness with daily activity especially with transitional movements.  4. DHI score is less than 10 to demonstrate improved balance and dizziness.   Recommendations: Continue as planned  Timeframe: 1 month, 1-2 times a week  Prognosis to achieve goals: good    02/01/2024 Treatments:     NMR 08248 15 minutes and Ther Act 11558 10 minutes    Timed Code Treatment: 25   Minutes     Total Treatment Time: 30      Minutes    PT: Amalia Mcnally PT, DPT, CHT, CIDN  License Number: [id number]  Electronically signed by Amalia Mcnally PT, 02/01/24, 9:46 AM EST    Certification Period: 2/1/2024 thru 4/30/2024  I certify that the therapy services are furnished while this patient is under my care.  The services outlined above are required by this patient, and will be reviewed every 90 days.         Physician Signature:__________________________________________________    PHYSICIAN: Tiffany Gibbons APRN  NPI: [id number]                                      DATE:    Please sign and return via fax to [phone] at [facility] . Thank you, [facility] Physical Therapy

## 2024-02-08 ENCOUNTER — TREATMENT (OUTPATIENT)
Dept: PHYSICAL THERAPY | Facility: CLINIC | Age: 74
End: 2024-02-08
Payer: MEDICARE

## 2024-02-08 DIAGNOSIS — H81.11 BPPV (BENIGN PAROXYSMAL POSITIONAL VERTIGO), RIGHT: Primary | ICD-10-CM

## 2024-02-08 DIAGNOSIS — R42 DIZZINESS: ICD-10-CM

## 2024-02-08 PROCEDURE — 97112 NEUROMUSCULAR REEDUCATION: CPT | Performed by: PHYSICAL THERAPIST

## 2024-02-08 PROCEDURE — 97530 THERAPEUTIC ACTIVITIES: CPT | Performed by: PHYSICAL THERAPIST

## 2024-02-08 NOTE — PROGRESS NOTES
Physical Therapy Daily Progress Note-Vestibular Rehab  [facility] Physical Therapy [place]  [address], [address]  [facility] [address]      Visit#:11  Subjective   Patient denies pain in the back and hips today.  Objective                             PROCEDURES AND MODALITIES:  See Exercise, Manual, and Modality Logs for complete treatment.     Paraffin:   pre-rx  Moist Heat:    Ice:    post-rx  E-Stim:    Ultrasound:    Ionto:   Traction:      NMR 79807 22 minutes and Ther Act 25607 8 minutes    Timed Code Treatment: 30 Minutes  Total Treatment Time: 30 Minutes    Assessment & Plan   Patient did not report any back or hip pain during or post treatment today. She is performing well during treatment sessions. She is hesitant to try challenges most likely due to a fear of falling. She seems to be responding well to treatment as evidenced by improvement in her SOT test last visit. Appropriate to continue treatment.     Progress per Plan of Care    Amalia Mcnally PT, DPT, CHT, KARLA  Physical Therapist  [place] license # [id number]

## 2024-02-12 RX ORDER — LEVOTHYROXINE SODIUM 0.03 MG/1
25 TABLET ORAL DAILY
Qty: 90 TABLET | Refills: 3 | Status: SHIPPED | OUTPATIENT
Start: 2024-02-12

## 2024-02-16 ENCOUNTER — TREATMENT (OUTPATIENT)
Dept: PHYSICAL THERAPY | Facility: CLINIC | Age: 74
End: 2024-02-16
Payer: MEDICARE

## 2024-02-16 DIAGNOSIS — R42 DIZZINESS: ICD-10-CM

## 2024-02-16 DIAGNOSIS — H81.11 BPPV (BENIGN PAROXYSMAL POSITIONAL VERTIGO), RIGHT: Primary | ICD-10-CM

## 2024-02-16 PROCEDURE — 97112 NEUROMUSCULAR REEDUCATION: CPT | Performed by: PHYSICAL THERAPIST

## 2024-02-16 PROCEDURE — 97110 THERAPEUTIC EXERCISES: CPT | Performed by: PHYSICAL THERAPIST

## 2024-03-14 ENCOUNTER — TREATMENT (OUTPATIENT)
Dept: PHYSICAL THERAPY | Facility: CLINIC | Age: 74
End: 2024-03-14
Payer: MEDICARE
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
## 2024-03-14 DIAGNOSIS — R42 DIZZINESS: Primary | ICD-10-CM

## 2024-03-14 DIAGNOSIS — H81.11 BPPV (BENIGN PAROXYSMAL POSITIONAL VERTIGO), RIGHT: ICD-10-CM

## 2024-03-14 NOTE — PROGRESS NOTES
Physical Therapy Monthly Progress Note  [facility] Physical Therapy [place]  [address]  [place] [address]    Name: Carol Osgood  Date: 03/14/2024  Diagnosis/ICD-10 Code:  Dizziness [R42]  Referring practitioner: NAOMI Dumas  Date of Initial Visit: 09/29/2023  Visit #: 13  Subjective:   Carol Osgood reports: I had a fall while I was out of town. I tripped over a suitcase in a dark laundry room. I still feel unsteady every day. Patient's  reports she did do some of her home program while out of town.  Clinical Progress: improved  Home Program Compliance: Yes  Treatment has included: neuromuscular re-education and therapeutic activity  Objective:   Objective                    Computerized Dynamic Posturography  CDP: Sensory Organization Test  Composite Equilibrium Score: 54, 16% below normal  Sensory Analysis WDL: Exceptions to WDL  Sensory Analysis: Vestibular  Strategy Analysis: Ankle Dominant    Assessment:   Functional Limitations: Walking, Difficulty moving, Decreased ability to perform ADL's  Patient has been seen 2 time since last assessment since being out of town. Patient declined in her performance on the SOT. Composite equilibrium score was below normal for that of someone in her age range. She continues to demonstrate a vestibular dysfunction pattern. In last assessment positional testing with infrared goggles was performed and patient demonstrated persistent upbeating nystagmus in all positions indicative of a central vestibular dysfunction. This is consistent with her symptoms. Patient is at risk for falls. Patient would benefit from ongoing PT with a VRT to strengthen vestibular function, reduce risk for falls, and improve QOL.   Plan:   PT Interventions: Vestibulo-Ocular Retraining, Retraining of Balance Strategies, Adaptation Ex for CNS Compensation, Home Exercise Program, Balance Training  Progress toward previous goals: Partially Met  SHORT TERM GOALS:  To be met in 2 weeks:   1. Patient is independent with HEP. MET  2. Patient will report at least 30% improvement in overall condition. MET  3. Patient will report no falls. NOT MET   LONG TERM GOALS:To be met in 4 weeks: (ALL PROGRESSING)  1. Patient will report decreased disequilibrium/dizziness by at least 90% which demonstrates improved quality of life.   2. Patient will report no loss of balance with ADLs to demonstrate improved functional balance and reduced risk for falls.  3. Patient denies dizziness with daily activity especially with transitional movements.  4. DHI score is less than 10 to demonstrate improved balance and dizziness  Recommendations: Continue as planned  Timeframe: 1 month  Prognosis to achieve goals: good    03/14/2024 Treatments:     NMR 58139 28 minutes and Ther Act 70447 10 minutes    Timed Code Treatment: 38   Minutes     Total Treatment Time: 38      Minutes    PT: Amalia Mcnally PT, DPT, CHT, CIDN  License Number: [id number]  Electronically signed by Amalia Mcnally PT, 03/14/24, 10:34 AM EDT    Certification Period: 3/17/2024 thru 6/14/2024  I certify that the therapy services are furnished while this patient is under my care.  The services outlined above are required by this patient, and will be reviewed every 90 days.         Physician Signature:__________________________________________________    PHYSICIAN: Tiffany Gbibons APRN  NPI: [id number]                                      DATE:    Please sign and return via fax to [phone] at [facility] . Thank you, [facility] Physical Therapy

## 2024-03-21 ENCOUNTER — TELEPHONE (OUTPATIENT)
Dept: INTERNAL MEDICINE | Facility: CLINIC | Age: 74
End: 2024-03-21
Payer: MEDICARE

## 2024-03-21 DIAGNOSIS — M81.0 AGE-RELATED OSTEOPOROSIS WITHOUT CURRENT PATHOLOGICAL FRACTURE: Primary | ICD-10-CM

## 2024-03-21 NOTE — TELEPHONE ENCOUNTER
----- Message from Ciera Newman MD sent at 3/21/2024  2:35 PM EDT -----  I got a message from someone about labs for her prolia

## 2024-03-25 ENCOUNTER — INFUSION (OUTPATIENT)
Dept: ONCOLOGY | Facility: HOSPITAL | Age: 74
End: 2024-03-25
Payer: MEDICARE

## 2024-03-25 ENCOUNTER — LAB (OUTPATIENT)
Dept: OTHER | Facility: HOSPITAL | Age: 74
End: 2024-03-25
Payer: MEDICARE

## 2024-03-25 VITALS — RESPIRATION RATE: 16 BRPM | TEMPERATURE: 98.4 F

## 2024-03-25 DIAGNOSIS — M81.0 AGE-RELATED OSTEOPOROSIS WITHOUT CURRENT PATHOLOGICAL FRACTURE: Primary | ICD-10-CM

## 2024-03-25 LAB
ALBUMIN SERPL-MCNC: 4.2 G/DL (ref 3.5–5.2)
ALBUMIN/GLOB SERPL: 1.4 G/DL
ALP SERPL-CCNC: 81 U/L (ref 39–117)
ALT SERPL W P-5'-P-CCNC: 10 U/L (ref 1–33)
ANION GAP SERPL CALCULATED.3IONS-SCNC: 7.3 MMOL/L (ref 5–15)
AST SERPL-CCNC: 16 U/L (ref 1–32)
BILIRUB SERPL-MCNC: 0.2 MG/DL (ref 0–1.2)
BUN SERPL-MCNC: 49 MG/DL (ref 8–23)
BUN/CREAT SERPL: 33.3 (ref 7–25)
CALCIUM SPEC-SCNC: 10.7 MG/DL (ref 8.6–10.5)
CHLORIDE SERPL-SCNC: 106 MMOL/L (ref 98–107)
CO2 SERPL-SCNC: 29.7 MMOL/L (ref 22–29)
CREAT SERPL-MCNC: 1.47 MG/DL (ref 0.57–1)
EGFRCR SERPLBLD CKD-EPI 2021: 37.1 ML/MIN/1.73
GLOBULIN UR ELPH-MCNC: 3.1 GM/DL
GLUCOSE SERPL-MCNC: 101 MG/DL (ref 65–99)
POTASSIUM SERPL-SCNC: 4.4 MMOL/L (ref 3.5–5.2)
PROT SERPL-MCNC: 7.3 G/DL (ref 6–8.5)
SODIUM SERPL-SCNC: 143 MMOL/L (ref 136–145)

## 2024-03-25 PROCEDURE — 25010000002 DENOSUMAB 60 MG/ML SOLUTION PREFILLED SYRINGE: Performed by: INTERNAL MEDICINE

## 2024-03-25 PROCEDURE — 96372 THER/PROPH/DIAG INJ SC/IM: CPT

## 2024-03-25 PROCEDURE — 80053 COMPREHEN METABOLIC PANEL: CPT | Performed by: INTERNAL MEDICINE

## 2024-03-25 RX ADMIN — DENOSUMAB 60 MG: 60 INJECTION SUBCUTANEOUS at 15:47

## 2024-03-25 NOTE — NURSING NOTE
Patient arrived for prolia injection. Indication and side effects reviewed. Denies recent/upcoming dental work/jaw bone pain. Labs and medications verified. Patient denies taking calcium supplement. Instructed to review with Dr Newman for further follow-up regarding 10.7 calcium level. Prolia administered in right arm without incidence. Instructed to call prescribing MD for any concerns or questions and instructed on how to schedule future appts.  Patient verbalizes understanding and discharged in stable condition.

## 2024-03-29 ENCOUNTER — TELEPHONE (OUTPATIENT)
Dept: INTERNAL MEDICINE | Facility: CLINIC | Age: 74
End: 2024-03-29
Payer: MEDICARE

## 2024-03-29 DIAGNOSIS — N18.9 CHRONIC RENAL IMPAIRMENT, UNSPECIFIED CKD STAGE: Primary | ICD-10-CM

## 2024-03-29 NOTE — TELEPHONE ENCOUNTER
----- Message from Carol A. Osgood sent at 3/28/2024  5:28 PM EDT -----  Regarding: Carol Osgood  Contact: [phone]  Ok, thanks. Will the office set up the BMP in 1 month? Mattie typically goes to the lower level (1)at [facility] for her blood work. Venkat

## 2024-04-03 ENCOUNTER — TREATMENT (OUTPATIENT)
Dept: PHYSICAL THERAPY | Facility: CLINIC | Age: 74
End: 2024-04-03
Payer: MEDICARE

## 2024-04-03 DIAGNOSIS — R42 DIZZINESS: ICD-10-CM
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
## 2024-04-03 DIAGNOSIS — H81.11 BPPV (BENIGN PAROXYSMAL POSITIONAL VERTIGO), RIGHT: Primary | ICD-10-CM

## 2024-04-03 NOTE — PROGRESS NOTES
Physical Therapy Daily Treatment Note  [facility] Physical Therapy [place]   [address], [address]  [place] [address]  P: [phone]  F: [phone]    Patient: Carol A Osgood   : 1950  Referring practitioner: Ciera Newman MD  Date of Initial Visit: Type: THERAPY  Noted: 2023  Today's Date: 4/3/2024  Patient seen for 14 sessions       Visit Diagnoses:    ICD-10-CM ICD-9-CM   1. BPPV (benign paroxysmal positional vertigo), right  H81.11 386.11   2. Dizziness  R42 780.4   3. Fall, subsequent encounter  W19.XXXD V58.89     E888.9         Carol Osgood reports: no new c/o with dizziness today, reports having neck pain.     Subjective     Objective   See Exercise, Manual, and Modality Logs for complete treatment.   Discussed with pt and  that if they feel she needs to be seen for vestibular treatment, we can schedule her with another PT and if she feels she needs to be treated for neck pain, she would need a new referral for that.      Assessment/Plan some difficulty with low casa stepping initially but improved after 1 round. Plan details: Progress ROM / strengthening / stabilization / functional activity as tolerated       Timed:         Manual Therapy:         mins  57581;     Therapeutic Exercise:         mins  46292;     Neuromuscular Frank:  30      mins  78891;    Therapeutic Activity:     8    mins  96657;     Gait Training:           mins  93093;     Ultrasound:          mins  58092;    Ionto                                   mins  24164  Self Care                            mins  60863  Traction          mins 39985      Un-Timed:  Canalith Repos         mins 22376  Electrical Stimulation:         mins  34272 ( );  Dry Needling          mins self-pay  Traction          mins 19561        Timed Treatment:  38    mins   Total Treatment:      38  mins    Petty Ho PT  [place] License #: [id number]    Physical Therapist

## 2024-04-10 ENCOUNTER — TREATMENT (OUTPATIENT)
Dept: PHYSICAL THERAPY | Facility: CLINIC | Age: 74
End: 2024-04-10
Payer: MEDICARE

## 2024-04-10 DIAGNOSIS — H81.11 BPPV (BENIGN PAROXYSMAL POSITIONAL VERTIGO), RIGHT: Primary | ICD-10-CM
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
## 2024-04-10 DIAGNOSIS — R42 DIZZINESS: ICD-10-CM

## 2024-04-10 NOTE — PROGRESS NOTES
Physical Therapy Daily Treatment Note  [facility] Therapy [place]   [address], [address]  [place] [address]  P: [phone]  F: [phone]    Patient: Carol A Osgood   : 1950  Referring practitioner: Ciera Newman MD  Date of Initial Visit: Type: THERAPY  Noted: 2023  Today's Date: 4/10/2024  Patient seen for 15 sessions       Visit Diagnoses:    ICD-10-CM ICD-9-CM   1. BPPV (benign paroxysmal positional vertigo), right  H81.11 386.11   2. Dizziness  R42 780.4   3. Fall, subsequent encounter  W19.XXXD V58.89     E888.9         Carol Osgood reports: no new c/o     Subjective     Objective   See Exercise, Manual, and Modality Logs for complete treatment.   BPPV treatments deferred, continued balance treatments until appt with BPPV therapist.     Assessment/Plan tolerated treatment well today, no c/o pain or dizziness with balance activities and good balance responses to challenges. Appropriate to continue       Timed:         Manual Therapy:         mins  89403;     Therapeutic Exercise:         mins  97907;     Neuromuscular Frank: 20       mins  34738;    Therapeutic Activity:    10      mins  73003;     Gait Training:           mins  29964;     Ultrasound:          mins  01348;    Ionto                                   mins  64848  Self Care                            mins  26762  Traction          mins 13595      Un-Timed:  Canalith Repos         mins 91674  Electrical Stimulation:         mins  70669 ( );  Dry Needling          mins self-pay  Traction          mins 90542        Timed Treatment:  30    mins   Total Treatment:     30   mins    Petty Ho PT  [place] License #: [id number]    Physical Therapist

## 2024-04-24 ENCOUNTER — TREATMENT (OUTPATIENT)
Dept: PHYSICAL THERAPY | Facility: CLINIC | Age: 74
End: 2024-04-24
Payer: MEDICARE

## 2024-04-24 DIAGNOSIS — H81.11 BPPV (BENIGN PAROXYSMAL POSITIONAL VERTIGO), RIGHT: ICD-10-CM

## 2024-04-24 DIAGNOSIS — R42 DIZZINESS: ICD-10-CM
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
## 2024-04-24 PROCEDURE — 97112 NEUROMUSCULAR REEDUCATION: CPT | Performed by: PHYSICAL THERAPIST

## 2024-04-24 PROCEDURE — 97530 THERAPEUTIC ACTIVITIES: CPT | Performed by: PHYSICAL THERAPIST

## 2024-04-24 NOTE — PROGRESS NOTES
Re-Assessment / Progress Note  [facility] Therapy [place]   [address], [address]  [place] [address]  P: [phone]  F: [phone]  Patient: Carol A Osgood   : 1950  Diagnosis/ICD-10 Code:  Fall, subsequent encounter [W19.XXXD]  Referring practitioner: NAOMI Dumas  Date of Initial Visit: Episode Type: THERAPY  Noted: 2023    Today's Date: 2024  Patient seen for 16 sessions.      Subjective:   Carol Osgood reports: had a fall last week while in the kitchen preparing meals/cleaning.  reports still feels unsteady most of the time.     Subjective Questionnaire: ABC: deferred   Clinical Progress: improved  Home Program Compliance: Yes  Treatment has included: therapeutic exercise, neuromuscular re-education, and therapeutic activity    Subjective   Objective   Vestibular testing deferred today, will be reassessed on  5/3/24 by Ranjan Kenyon PT.    Computerized Dynamic Posturography  CDP: Sensory Organization Test  Composite Equilibrium Score: 54, 16% below normal  Sensory Analysis WDL: Exceptions to WDL  Sensory Analysis: Vestibular  Strategy Analysis: Ankle Dominant      Assessment/Plan  Progress toward previous goals: Partially Met    Goals    SHORT TERM GOALS: To be met in 2 weeks:   1. Patient is independent with HEP. MET  2. Patient will report at least 30% improvement in overall condition. MET  3. Patient will report no falls. NOT MET   LONG TERM GOALS:To be met in 4 weeks: (ALL PROGRESSING)  1. Patient will report decreased disequilibrium/dizziness by at least 90% which demonstrates improved quality of life.   2. Patient will report no loss of balance with ADLs to demonstrate improved functional balance and reduced risk for falls.  3. Patient denies dizziness with daily activity especially with transitional movements.  4. DHI score is less than 10 to demonstrate improved balance and dizziness      Recommendations: Continue as planned  Timeframe: 1  month  Prognosis to achieve goals: good    PT Signature: Petty Ho, PT  [place] Lic. # [id number]      Based upon review of the patient's progress and continued therapy plan, it is my medical opinion that Carol Osgood should continue physical therapy treatment at [facility] PHYSICAL THERAPY  [address] [facility], [address] [address] [phone] ; Fax Number [phone]    Signature: __________________________________  Tiffany Gibbons APRN    Manual Therapy:          mins  40450;  Therapeutic Exercise:          mins  42102;     Neuromuscular Frank: 25        mins  97413;    Therapeutic Activity:   15        mins  41043;     Gait Training:            mins  21233;     Ultrasound:           mins  48273;    Electrical Stimulation:          mins  12771 ( );  Dry Needling           mins self-pay  Traction           mins 29539  Canalith Repositioning         mins 69331      Timed Treatment:  40    mins   Total Treatment:     40   mins

## 2024-04-25 ENCOUNTER — LAB (OUTPATIENT)
Dept: LAB | Facility: HOSPITAL | Age: 74
End: 2024-04-25
Payer: MEDICARE

## 2024-04-25 DIAGNOSIS — N18.9 CHRONIC RENAL IMPAIRMENT, UNSPECIFIED CKD STAGE: ICD-10-CM

## 2024-04-25 LAB
ANION GAP SERPL CALCULATED.3IONS-SCNC: 6.8 MMOL/L (ref 5–15)
BUN SERPL-MCNC: 34 MG/DL (ref 8–23)
BUN/CREAT SERPL: 24.3 (ref 7–25)
CALCIUM SPEC-SCNC: 9.7 MG/DL (ref 8.6–10.5)
CHLORIDE SERPL-SCNC: 105 MMOL/L (ref 98–107)
CO2 SERPL-SCNC: 29.2 MMOL/L (ref 22–29)
CREAT SERPL-MCNC: 1.4 MG/DL (ref 0.57–1)
EGFRCR SERPLBLD CKD-EPI 2021: 39.3 ML/MIN/1.73
GLUCOSE SERPL-MCNC: 85 MG/DL (ref 65–99)
POTASSIUM SERPL-SCNC: 4.8 MMOL/L (ref 3.5–5.2)
SODIUM SERPL-SCNC: 141 MMOL/L (ref 136–145)

## 2024-04-25 PROCEDURE — 36415 COLL VENOUS BLD VENIPUNCTURE: CPT

## 2024-04-25 PROCEDURE — 80048 BASIC METABOLIC PNL TOTAL CA: CPT

## 2024-05-01 ENCOUNTER — TREATMENT (OUTPATIENT)
Dept: PHYSICAL THERAPY | Facility: CLINIC | Age: 74
End: 2024-05-01
Payer: MEDICARE

## 2024-05-01 ENCOUNTER — TELEPHONE (OUTPATIENT)
Dept: INTERNAL MEDICINE | Facility: CLINIC | Age: 74
End: 2024-05-01
Payer: MEDICARE

## 2024-05-01 DIAGNOSIS — R42 DIZZINESS: ICD-10-CM

## 2024-05-01 DIAGNOSIS — R41.3 MEMORY LOSS: Primary | ICD-10-CM

## 2024-05-01 DIAGNOSIS — H81.11 BPPV (BENIGN PAROXYSMAL POSITIONAL VERTIGO), RIGHT: ICD-10-CM
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
## 2024-05-01 NOTE — TELEPHONE ENCOUNTER
----- Message from Eve SYLVESTER sent at 4/30/2024  5:32 PM EDT -----  Regarding: [facility]  Contact: [phone]  Yes, I feel we need to look at this closer.  Thanks    Mike Osgood

## 2024-05-02 NOTE — PROGRESS NOTES
Physical Therapy Daily Treatment Note  [facility] [place]   [address], [address]  [place] [address]  P: [phone]  F: [phone]    Patient: Carol A Osgood   : 1950  Referring practitioner: NAOMI Dumas  Date of Initial Visit: Type: THERAPY  Noted: 2023  Today's Date: 2024  Patient seen for 17 sessions       Visit Diagnoses:    ICD-10-CM ICD-9-CM   1. Fall, subsequent encounter  W19.XXXD V58.89     E888.9   2. Dizziness  R42 780.4   3. BPPV (benign paroxysmal positional vertigo), right  H81.11 386.11         Carol Osgood reports: left side of low back is hurting today     Subjective     Objective   See Exercise, Manual, and Modality Logs for complete treatment.       Assessment/Plan god balance reactions and no LOB today with balance activities. Compliant/cooperative with current rehab efforts.  Benefits from verbal/tactile cues to ensure correct exercise performance/technique, hold time and position. Plan details: Progress ROM / strengthening / stabilization / functional activity as tolerated       Timed:         Manual Therapy:         mins  84646;     Therapeutic Exercise:    9     mins  41067;     Neuromuscular Frank:  20      mins  81336;    Therapeutic Activity:      10    mins  54722;     Gait Training:           mins  16305;     Ultrasound:          mins  77495;    Ionto                                   mins  05021  Self Care                            mins  93074  Traction          mins 43436      Un-Timed:  Canalith Repos         mins 37849  Electrical Stimulation:         mins  98762 ( );  Dry Needling          mins self-pay  Traction          mins 75473        Timed Treatment:  39    mins   Total Treatment:    39    mins    Petty Ho, PT  [place] License #: [id number]    Physical Therapist

## 2024-05-02 NOTE — PROGRESS NOTES
Pharmacy called to state that they think all they need is a diagnosis code.  Verbal ok.  Please advise.   Subjective   Carol Osgood is a 67 y.o. female here today for stye on right lower eye lid.      History of Present Illness   She has had this for m2-3 weeks  It it getting better  Less redness and she still has a sig knot  No vision changes    The following portions of the patient's history were reviewed and updated as appropriate: allergies, current medications, past medical history, past social history and problem list.    Review of Systems   All other systems reviewed and are negative.      Objective   Physical Exam   Constitutional: She is oriented to person, place, and time. She appears well-developed and well-nourished.   HENT:   Head: Normocephalic and atraumatic.   Right Ear: External ear normal.   Left Ear: External ear normal.   Mouth/Throat: Oropharynx is clear and moist.   Eyes: Conjunctivae and EOM are normal. Pupils are equal, round, and reactive to light.   Neck: Normal range of motion. No tracheal deviation present. No thyromegaly present.   Cardiovascular: Normal rate, regular rhythm, normal heart sounds and intact distal pulses.    Pulmonary/Chest: Effort normal and breath sounds normal.   Musculoskeletal: Normal range of motion. She exhibits no edema or deformity.   Neurological: She is alert and oriented to person, place, and time.   Skin: Skin is warm and dry.   Psychiatric: She has a normal mood and affect. Her behavior is normal. Judgment and thought content normal.   Vitals reviewed.      Vitals:    11/30/17 1308   BP: (!) 69/62   Pulse: 76   SpO2: 96%          Current Outpatient Prescriptions:   •  ALPRAZolam (XANAX) 0.5 MG tablet, Take 1 tablet by mouth 3 (Three) Times a Day As Needed for anxiety., Disp: 20 tablet, Rfl: 0  •  Cholecalciferol (VITAMIN D3) 2400 UNIT/ML liquid, 2,000 Units Daily., Disp: , Rfl:   •  erythromycin (ROMYCIN) 5 MG/GM ophthalmic ointment, Administer  to the right eye 2 (Two) Times a Day., Disp: 3.5 g, Rfl: 0  •  triamcinolone (KENALOG) 0.5 % ointment, Apply   topically 2 (Two) Times a Day., Disp: 15 g, Rfl: 1      Assessment/Plan   Diagnoses and all orders for this visit:    Hordeolum externum of right lower eyelid

## 2024-05-03 ENCOUNTER — TREATMENT (OUTPATIENT)
Dept: PHYSICAL THERAPY | Facility: CLINIC | Age: 74
End: 2024-05-03
Payer: MEDICARE

## 2024-05-03 DIAGNOSIS — H81.8X2 UNILATERAL VESTIBULAR WEAKNESS, LEFT: ICD-10-CM

## 2024-05-03 DIAGNOSIS — H81.90 BALANCE PROBLEM DUE TO VESTIBULAR DYSFUNCTION, UNSPECIFIED LATERALITY: Primary | ICD-10-CM

## 2024-05-03 DIAGNOSIS — R29.898 WEAKNESS OF BOTH HIPS: ICD-10-CM

## 2024-05-03 PROCEDURE — 97163 PT EVAL HIGH COMPLEX 45 MIN: CPT | Performed by: PHYSICAL THERAPIST

## 2024-05-03 PROCEDURE — 97110 THERAPEUTIC EXERCISES: CPT | Performed by: PHYSICAL THERAPIST

## 2024-05-03 PROCEDURE — 97530 THERAPEUTIC ACTIVITIES: CPT | Performed by: PHYSICAL THERAPIST

## 2024-05-03 NOTE — PROGRESS NOTES
Physical Therapy Initial Evaluation and Plan of Care  [facility] [place]   [address], [address]  [place] [address]  P: [phone]  F: [phone]    Patient: Carol A Osgood   : 1950  Diagnosis/ICD-10 Code:  Balance problem due to vestibular dysfunction, unspecified laterality [H81.90]  Referring practitioner: NAOMI Dumas  Date of Initial Visit: 5/3/2024  Today's Date: 5/3/2024  Patient seen for 1 session         Visit Diagnoses:    ICD-10-CM ICD-9-CM   1. Balance problem due to vestibular dysfunction, unspecified laterality  H81.90 386.50   2. Unilateral vestibular weakness, left  H81.8X2 386.8   3. Weakness of both hips  R29.898 729.89       PMHx Reviewed : 5/3/2024      Subjective Evaluation    History of Present Illness  Mechanism of injury: Pt presents to PT with her .  She is pleasant but does have some memory deficits showing, her  assisted with the hx.      Pt reports feeling some light headedness that can occur.  The pt reports that the symptoms don't last long.      Pt denies an allergies and/or sinus issues.        Occupation:  Retired - [employer]   Activities:  House activities, rebounding, Laundry, cleaning,  assists a lot.    PLOF: Independent  Medical Hx Reviewed.      Quality of life: fair    Social Support  Lives in: multiple-level home (Ranch w/ Basement)  Lives with: spouse (Cat;  Sister & brother in law live in the basement)             Objective          Strength/Myotome Testing     Left Hip   Planes of Motion   Flexion: 4  Abduction: 4-  External rotation: 4-  Internal rotation: 4-    Right Hip   Planes of Motion   Flexion: 4  Abduction: 4-  External rotation: 4-  Internal rotation: 4+    Left Knee   Flexion: 5  Extension: 5    Right Knee   Flexion: 5  Extension: 5    Left Ankle/Foot   Dorsiflexion: 4+    Right Ankle/Foot   Dorsiflexion: 5          Assessment & Plan       Assessment  Impairments: abnormal gait,  abnormal or restricted ROM, activity intolerance, impaired balance, impaired physical strength, lacks appropriate home exercise program and safety issue   Functional limitations: walking, standing and stooping   Assessment details: Pt presents to PT with symptoms consistent with vestibular weakness and (B) hip weakness.  Pt would benefit from skilled PT intervention to address the deficits noted.  Her (B) hip weakness seems to be a major contributor the balance treatment.  She is seeing Petty Ho, PT for balance and low back pain treatment, so we will incorporate the hip strengthening into her Tx.  We will focus on her vestibular dysfunction with my Tx.      The pt's memory deficit created some difficulty with cognition, but her  was a big help with her history and report of current symptoms.    Barriers to therapy: Memory Loss  Prognosis: fair    Goals  Plan Goals:   SHORT TERM GOALS: 5 weeks  1. Pt will improve standing balance with eyes closed to require only supervision > 15 sec on foam.     2. Pt. will improve (B) LE strength to >4/5 (in sitting) as needed to perform sit to stand txf x 2 without UE.   3. Pt. Will demonstrate the ability to ambulate with SPC with proper adjustments and forward gaze for 4 min.   4. Pt. Will traverse 10 stairs (up/down) with 1 railing and supervision for entering/exiting home safely.      LONG TERM GOALS: 10 weeks  1. Pt will improve standing balance with narrow TOYA, eyes closed to require only supervision > 1 min.   2. Pt. will improve gluteal and gastrocs strength to 4+/5 (in sitting) as needed to improve 30 sec sit to stand to 10x with UE assist on thighs.    3. Pt. Will demonstrate the ability to ambulate with SPC with proper adjustments and forward gaze for >7 min.   4. Pt. Will decrease TUG score to < 10  Sec; Increase Waite Balance > 43/56 demonstrating improved mobility safety.             Plan  Therapy options: will be seen for skilled therapy  services  Planned modality interventions: thermotherapy (hydrocollator packs)  Planned therapy interventions: abdominal trunk stabilization, home exercise program, gait training, functional ROM exercises, flexibility, body mechanics training, balance/weight-bearing training, postural training, neuromuscular re-education, therapeutic activities, transfer training, strengthening and motor coordination training  Frequency: 1x week  Duration in weeks: 10  Treatment plan discussed with: patient ()  Plan details: Will see for a limited amount while she is currently in Tx for her LBP and balance.            Manual Therapy:          mins  23327;  Therapeutic Exercise:     8     mins  19639;     Neuromuscular Frank:           mins  62434;    Therapeutic Activity:      20     mins  22846;     Gait Training:            mins  49712;     Ultrasound:           mins  02040;    Electrical Stimulation:          mins  74122 ( );  Dry Needling           mins self-pay  Traction           mins 10280  Canalith Repositioning         mins 43811      Timed Treatment:   28   mins   Total Treatment:     75   mins      PT: Ranjan Kenyon PT     License Number: [id number]  Electronically signed by Ranjan Kenyon PT, 05/03/24, 10:12 AM EDT    Certification Period: 5/3/2024 thru 7/31/2024  I certify that the therapy services are furnished while this patient is under my care.  The services outlined above are required by this patient, and will be reviewed every 90 days.         Physician Signature:__________________________________________________    PHYSICIAN: Tiffany Gibbons APRN  NPI: [id number]                                      DATE:      Please sign in Epic or return via fax to [email or web address] . Thank you, [facility].

## 2024-05-08 ENCOUNTER — TREATMENT (OUTPATIENT)
Dept: PHYSICAL THERAPY | Facility: CLINIC | Age: 74
End: 2024-05-08
Payer: MEDICARE

## 2024-05-08 DIAGNOSIS — R29.898 WEAKNESS OF BOTH HIPS: ICD-10-CM

## 2024-05-08 DIAGNOSIS — H81.8X2 UNILATERAL VESTIBULAR WEAKNESS, LEFT: ICD-10-CM

## 2024-05-08 DIAGNOSIS — H81.90 BALANCE PROBLEM DUE TO VESTIBULAR DYSFUNCTION, UNSPECIFIED LATERALITY: Primary | ICD-10-CM

## 2024-05-08 PROCEDURE — 97530 THERAPEUTIC ACTIVITIES: CPT | Performed by: PHYSICAL THERAPIST

## 2024-05-08 PROCEDURE — 97112 NEUROMUSCULAR REEDUCATION: CPT | Performed by: PHYSICAL THERAPIST

## 2024-05-08 PROCEDURE — 97110 THERAPEUTIC EXERCISES: CPT | Performed by: PHYSICAL THERAPIST

## 2024-05-10 ENCOUNTER — OFFICE VISIT (OUTPATIENT)
Dept: GASTROENTEROLOGY | Facility: CLINIC | Age: 74
End: 2024-05-10
Payer: MEDICARE

## 2024-05-10 VITALS
HEIGHT: 67 IN | OXYGEN SATURATION: 98 % | TEMPERATURE: 96.4 F | DIASTOLIC BLOOD PRESSURE: 80 MMHG | HEART RATE: 72 BPM | SYSTOLIC BLOOD PRESSURE: 125 MMHG | BODY MASS INDEX: 20.91 KG/M2 | WEIGHT: 133.2 LBS

## 2024-05-10 DIAGNOSIS — R19.4 CHANGE IN BOWEL HABITS: Chronic | ICD-10-CM

## 2024-05-10 DIAGNOSIS — R15.9 INCONTINENCE OF FECES, UNSPECIFIED FECAL INCONTINENCE TYPE: Primary | Chronic | ICD-10-CM

## 2024-05-10 DIAGNOSIS — R14.0 ABDOMINAL BLOATING: Chronic | ICD-10-CM

## 2024-05-10 PROCEDURE — 1160F RVW MEDS BY RX/DR IN RCRD: CPT | Performed by: NURSE PRACTITIONER

## 2024-05-10 PROCEDURE — 99214 OFFICE O/P EST MOD 30 MIN: CPT | Performed by: NURSE PRACTITIONER

## 2024-05-10 PROCEDURE — 1159F MED LIST DOCD IN RCRD: CPT | Performed by: NURSE PRACTITIONER

## 2024-05-10 RX ORDER — UBIDECARENONE 100 MG
100 CAPSULE ORAL DAILY
COMMUNITY

## 2024-05-30 ENCOUNTER — TREATMENT (OUTPATIENT)
Dept: PHYSICAL THERAPY | Facility: CLINIC | Age: 74
End: 2024-05-30
Payer: MEDICARE

## 2024-05-30 DIAGNOSIS — H81.8X2 UNILATERAL VESTIBULAR WEAKNESS, LEFT: ICD-10-CM

## 2024-05-30 DIAGNOSIS — R29.898 WEAKNESS OF BOTH HIPS: ICD-10-CM

## 2024-05-30 DIAGNOSIS — H81.90 BALANCE PROBLEM DUE TO VESTIBULAR DYSFUNCTION, UNSPECIFIED LATERALITY: Primary | ICD-10-CM

## 2024-05-30 PROCEDURE — 97110 THERAPEUTIC EXERCISES: CPT | Performed by: PHYSICAL THERAPIST

## 2024-05-30 PROCEDURE — 97530 THERAPEUTIC ACTIVITIES: CPT | Performed by: PHYSICAL THERAPIST

## 2024-05-30 PROCEDURE — 97112 NEUROMUSCULAR REEDUCATION: CPT | Performed by: PHYSICAL THERAPIST

## 2024-05-30 NOTE — PROGRESS NOTES
Physical Therapy Daily Treatment Note  [facility] [place]   [address], [address]  [place] [address]  P: [phone]  F: [phone]    Patient: Carol A Osgood   : 1950  Referring practitioner: NAOMI Dumas  Date of Initial Visit: Type: THERAPY  Noted: 5/3/2024  Today's Date: 2024  Patient seen for 3 sessions       Visit Diagnoses:    ICD-10-CM ICD-9-CM   1. Balance problem due to vestibular dysfunction, unspecified laterality  H81.90 386.50   2. Weakness of both hips  R29.898 729.89   3. Unilateral vestibular weakness, left  H81.8X2 386.8         Carol Osgood reports: no new c/o     Subjective     Objective   See Exercise, Manual, and Modality Logs for complete treatment.       Assessment/Plan Compliant/cooperative with current rehab efforts.  Benefits from verbal/tactile cues to ensure correct exercise performance/technique, hold time and position. Plan details: Progress ROM / strengthening / stabilization / functional activity as tolerated       Timed:         Manual Therapy:         mins  03201;     Therapeutic Exercise:    20     mins  13217;     Neuromuscular Frank:    10    mins  54533;    Therapeutic Activity:      10    mins  06570;     Gait Training:           mins  32990;     Ultrasound:          mins  30510;    Ionto                                   mins  26869  Self Care                            mins  44390  Traction          mins 68205      Un-Timed:  Canalith Repos         mins 99468  Electrical Stimulation:         mins  43042 ( );  Dry Needling          mins self-pay  Traction          mins 90984        Timed Treatment:   40   mins   Total Treatment:     40   mins    Petty Ho PT  [place] License #: [id number]    Physical Therapist      
DC done by resident MD/DC instructions

## 2024-06-05 ENCOUNTER — TREATMENT (OUTPATIENT)
Dept: PHYSICAL THERAPY | Facility: CLINIC | Age: 74
End: 2024-06-05
Payer: MEDICARE

## 2024-06-05 DIAGNOSIS — R29.898 WEAKNESS OF BOTH HIPS: ICD-10-CM

## 2024-06-05 DIAGNOSIS — H81.8X2 UNILATERAL VESTIBULAR WEAKNESS, LEFT: ICD-10-CM

## 2024-06-05 DIAGNOSIS — H81.90 BALANCE PROBLEM DUE TO VESTIBULAR DYSFUNCTION, UNSPECIFIED LATERALITY: Primary | ICD-10-CM

## 2024-06-05 NOTE — PROGRESS NOTES
Re-Assessment / Progress Note  [facility] [place]   [address], [address]  [place] [address]  P: [phone]  F: [phone]  Patient: Carol A Osgood   : 1950  Diagnosis/ICD-10 Code:  Balance problem due to vestibular dysfunction, unspecified laterality [H81.90]  Referring practitioner: NAOMI Dumas  Date of Initial Visit: Episode Type: THERAPY  Noted: 5/3/2024    Today's Date: 2024  Patient seen for 4 sessions.      Subjective:   Carol Osgood reports: no new c/o     Subjective Questionnaire: deferred   Clinical Progress: improved  Home Program Compliance: Yes  Treatment has included: therapeutic exercise, neuromuscular re-education, and therapeutic activity    Subjective   Objective   Strength/Myotome Testing      Left Hip   Planes of Motion   Flexion: 4  Abduction: 4-  External rotation: 4-  Internal rotation: 4     Right Hip   Planes of Motion   Flexion: 4  Abduction: 4-  External rotation: 4  Internal rotation: 4+     Left Knee   Flexion: 5  Extension: 5     Right Knee   Flexion: 5  Extension: 5     Left Ankle/Foot   Dorsiflexion: 4+     Right Ankle/Foot   Dorsiflexion: 5   Assessment/Plan  Progress toward previous goals: Not Met  Pt continues to need skilled therapy to improve strength, balance, and function.  Goals    Plan Goals:   SHORT TERM GOALS: 5 weeks  1. Pt will improve standing balance with eyes closed to require only supervision > 15 sec on foam.     2. Pt. will improve (B) LE strength to >4/5 (in sitting) as needed to perform sit to stand txf x 2 without UE.   3. Pt. Will demonstrate the ability to ambulate with SPC with proper adjustments and forward gaze for 4 min.   4. Pt. Will traverse 10 stairs (up/down) with 1 railing and supervision for entering/exiting home safely.       LONG TERM GOALS: 10 weeks  1. Pt will improve standing balance with narrow TOYA, eyes closed to require only supervision > 1 min.   2. Pt. will improve gluteal and  gastrocs strength to 4+/5 (in sitting) as needed to improve 30 sec sit to stand to 10x with UE assist on thighs.    3. Pt. Will demonstrate the ability to ambulate with SPC with proper adjustments and forward gaze for >7 min.   4. Pt. Will decrease TUG score to < 10  Sec; Increase Waite Balance > 43/56 demonstrating improved mobility safety.        Recommendations: Continue as planned  Timeframe: 1 month  Prognosis to achieve goals: good    PT Signature: Petty Ho, PT  [place] Lic. # [id number]      Based upon review of the patient's progress and continued therapy plan, it is my medical opinion that Carol Osgood should continue physical therapy treatment at [facility]  [address] [place] [address] [phone] ; Fax Number [phone]    Signature: __________________________________  iTffany Gibbons APRN    Manual Therapy:          mins  95832;  Therapeutic Exercise:    20      mins  74724;     Neuromuscular Frank:    10   mins  22309;    Therapeutic Activity:       10    mins  22513;     Gait Training:            mins  28797;     Ultrasound:           mins  21635;    Electrical Stimulation:          mins  81011 ( );  Dry Needling           mins self-pay  Traction           mins 83316  Canalith Repositioning         mins 01622      Timed Treatment:  40    mins   Total Treatment:     40   mins

## 2024-06-06 ENCOUNTER — OFFICE VISIT (OUTPATIENT)
Dept: NEUROLOGY | Facility: CLINIC | Age: 74
End: 2024-06-06
Payer: MEDICARE

## 2024-06-06 VITALS
BODY MASS INDEX: 20.88 KG/M2 | WEIGHT: 133 LBS | SYSTOLIC BLOOD PRESSURE: 110 MMHG | DIASTOLIC BLOOD PRESSURE: 64 MMHG | HEIGHT: 67 IN | HEART RATE: 73 BPM | OXYGEN SATURATION: 95 %

## 2024-06-06 DIAGNOSIS — R47.01 EXPRESSIVE APHASIA: ICD-10-CM

## 2024-06-06 DIAGNOSIS — G31.84 MCI (MILD COGNITIVE IMPAIRMENT): Primary | ICD-10-CM

## 2024-06-06 NOTE — PROGRESS NOTES
[facility] NEUROLOGY         Date of Visit: 2024    Name: Carol A Osgood    :  1950    PCP: Ciera Newman MD    Visit Type: an initial evaluation         Subjective     Patient ID: Mattie is a 73 y.o. female.         History of Present Illness  I had the pleasure of seeing your patient for the first time today.  As you may know she is a 73-year-old female here today for initial evaluation for treatment of mild cognitive impairment.  She was referred by her primary care physician.  They are also here to discuss possible additional options for management of memory disorder.    History:    Patient does have history of osteoporosis, hypothyroid, vitamin B12 and vitamin D deficiency, mild cognitive impairment.    Patient and  present today for the appointment.  Patient's  states that memory symptoms have been going on for several years now.  They did end up undergoing workup for this in .  At that time an MRI of the brain was performed which showed some microvascular changes with no other significant abnormalities.  They did end up completing neuropsychological testing at [facility] and [facility] also in .  Neuropsychological testing that time revealed evidence for a mild cognitive impairment.  They felt that the pattern did not fit an Alzheimer's type presentation however more likely a vascular dementia presentation however symptoms were too early to further delineate.    Patient did follow back up with primary care physician after this.  They did possibly discuss starting acetylcholinesterase inhibitors however decided to forego medication at that time.  Since then patient has had progressive worsening of some of the cognitive symptoms and is here today for additional recommendations and treatment options.    Current:    Patient's  states that patient's cognition has declined mainly in her ability to speak with significant difficulty being able to find her correct  words as well as difficulty holding conversations and communication.  They have also noticed a decrease in attention stating the patient gets very easily distracted and has trouble completing tasks efficiently.  They state that she is able to perform her daily activities without any assistance.  She is able to perform most household chores with little difficulty especially if it is quiet in the house and there are no distractions.  She does not drive as  was concerned with the distractibility of possible issues however she was not having any issues driving prior to this.  They did end up discontinuing driving approximately 6 months ago.  Patient's  also does help with medication administration and management as far as sorting pills into a pill container however patient is pretty good about remembering to take these at appropriate times.  She has not had any issues with hallucinations or vivid dreams.  She denies any swallowing difficulty.  She seems to understand instructions well however has difficulty communicating things to people.  They deny any recent gait changes.  No other new neurological complaints at today's visit.      The following portions of the patient's history were reviewed and updated as appropriate: allergies, current medications, past family history, past medical history, past social history, past surgical history, and problem list.                 Review of Systems   Constitutional:  Negative for activity change, appetite change, fatigue and unexpected weight change.   HENT:  Negative for hearing loss and trouble swallowing.    Eyes:  Negative for visual disturbance.   Respiratory:  Negative for chest tightness and shortness of breath.    Cardiovascular:  Negative for palpitations.   Gastrointestinal:  Negative for constipation, diarrhea, nausea and vomiting.   Genitourinary:  Negative for decreased urine volume, difficulty urinating and frequency.   Musculoskeletal:  Negative for  "back pain, gait problem and neck pain.   Neurological:  Positive for speech difficulty. Negative for tremors, syncope, facial asymmetry, weakness and light-headedness.   Psychiatric/Behavioral:  Positive for confusion. Negative for agitation, behavioral problems, decreased concentration, dysphoric mood, hallucinations and sleep disturbance. The patient is not nervous/anxious.             Current Medications:    Current Outpatient Medications   Medication Instructions    acetaminophen (TYLENOL) 650 mg, Oral, Every 6 Hours PRN    Ascorbic Acid (VITAMIN C PO) 1 tablet, Oral, Daily    Cholecalciferol (VITAMIN D3) 50 MCG (2000 UT) tablet 1 tablet, Oral, Daily    coenzyme Q10 100 mg, Oral, Daily    denosumab (PROLIA) 60 mg, Subcutaneous, Every 6 Months, FIRST DOSE 9/2023    levothyroxine (SYNTHROID, LEVOTHROID) 25 mcg, Oral, Daily    Methylfol-Algae-H19-Dmfdljoncf (Cerefolin NAC) 6-90.314-2-600 MG tablet 1 tablet, Oral, Daily    NON FORMULARY Oral, 2 Times Daily, Name Colon Ease    Polyethyl Glycol-Propyl Glycol (SYSTANE ULTRA OP) 1 drop, Both Eyes, Nightly    vitamin B-12 (CYANOCOBALAMIN) 1,000 mcg, Oral, Daily    Zinc 50 MG tablet 1 tablet, Oral, Daily          /64   Pulse 73   Ht 170.2 cm (67.01\")   Wt 60.3 kg (133 lb)   SpO2 95%   BMI 20.83 kg/m²                Objective     Neurological Exam  Mental Status  Awake and alert. Oriented only to person and situation. Patient was able to correctly name place including country city and state however had to be given choices of the terms in order to come up with the correct answer.  She was having difficulty naming specifically during evaluation.. Recalls 3 of 3 objects immediately. At 15 minutes recalls 0 of 3 elements. Recalls 0 of 3 objects with prompting. Able to copy figure. Speech is normal. Able to repeat and write. Follows complex commands. Attention and concentration are normal. 0/5. MMSE score: 16.    Cranial Nerves  CN II: Visual fields full to " confrontation.  CN III, IV, VI: Extraocular movements intact bilaterally. Normal lids and orbits bilaterally. Pupils equal round and reactive to light bilaterally.  CN V: Facial sensation is normal.  CN VII: Full and symmetric facial movement.  CN IX, X: Palate elevates symmetrically  CN XI: Shoulder shrug strength is normal.  CN XII: Tongue midline without atrophy or fasciculations.    Motor  Normal muscle bulk throughout. Normal muscle tone. No abnormal involuntary movements. Strength is 5/5 throughout all four extremities.    Sensory  Sensation is intact to light touch, pinprick, vibration and proprioception in all four extremities.    Reflexes  Deep tendon reflexes are 2+ and symmetric in all four extremities.    Coordination    Finger-to-nose, rapid alternating movements and heel-to-shin normal bilaterally without dysmetria.    Gait  Normal casual, toe, heel and tandem gait.      Physical Exam  Constitutional:       General: She is awake.      Appearance: Normal appearance. She is normal weight.   Eyes:      General: Lids are normal.      Extraocular Movements: Extraocular movements intact.      Pupils: Pupils are equal, round, and reactive to light.   Pulmonary:      Effort: Pulmonary effort is normal.   Skin:     General: Skin is warm.   Neurological:      Mental Status: She is alert.      Motor: Motor strength is normal.     Coordination: Coordination is intact.      Deep Tendon Reflexes: Reflexes are normal and symmetric.   Psychiatric:         Mood and Affect: Mood normal.         Speech: Speech normal.         Behavior: Behavior normal.                     Assessment & Plan     Diagnoses and all orders for this visit:    1. MCI (mild cognitive impairment) (Primary)  -     MRI Brain With & Without Contrast; Future  -     Ambulatory Referral to Speech Therapy  -     Ambulatory Referral to Neuropsychology    2. Expressive aphasia  -     MRI Brain With & Without Contrast; Future  -     Ambulatory Referral to  Speech Therapy  -     Ambulatory Referral to Neuropsychology    At this time patient did score fairly lowly on Mini-Mental status examination however I do believe that a lot of this is related to significant expressive aphasia symptoms.  I actually feel that there is a chance patient is experiencing symptoms of a primary progressive aphasia disease process which could have been too early to identify on neurocognitive testing 2 years ago.    Because of this I would like to go ahead and repeat neuropsychological testing to get updated evaluation.  This should help further delineate diagnosis.    I would also like to order updated MRI of the brain to rule out any kind of stroke or anything that could have occurred since last imaging that would cause such significant naming and conversational issues for the patient.    In addition we did discuss that speech therapy and cognitive therapy may be helpful for the patient and helping with some of the word finding and communication issues she is experiencing.    Did also discuss role of management and treatment of these kinds of disorders.  With primary progressive aphasia acetylcholinesterase inhibitors are not shown to be particularly beneficial.  If this is some sort of other dementia syndrome then these may be appropriate so we will hold off on initiating any therapies for now.  They had asked specifically about looking B for possible treatment however I do not feel that patient is experiencing a Alzheimer's type diagnosis at this time and even if she was she would be too far progressed based on Mini-Mental status examination to qualify for drug treatment so putting her through any kind of lumbar puncture at this time would not prove to be beneficial.    We did discuss things they can be doing at home to help with overall cognition including diet such as Mediterranean diet, daily exercise, at least 30 minutes of day of some sort of cognitive activities such as crossword  puzzles, C can find puzzles, to do go, card games, jigsaw puzzles.  We also discussed the importance of socialization and monitoring for any kind of depressive or anxiety symptoms.    We will go ahead and plan to follow-up after neuropsychological testing is complete.  Plan to call them with any abnormalities noted on MRI imaging.  Total of 65 minutes was spent with patient and  discussing diagnosis, prognosis, plan of care, reviewing testing results from previous and new testing as well as talking about expectations and lifestyle management.              Virginia SALGADO    Neurology    [facility] [place]    Phone: [phone]    6/6/2024 , 14:35 EDT

## 2024-06-07 ENCOUNTER — PATIENT ROUNDING (BHMG ONLY) (OUTPATIENT)
Dept: NEUROLOGY | Facility: CLINIC | Age: 74
End: 2024-06-07
Payer: MEDICARE

## 2024-06-12 ENCOUNTER — HOSPITAL ENCOUNTER (OUTPATIENT)
Dept: GENERAL RADIOLOGY | Facility: HOSPITAL | Age: 74
Discharge: HOME | End: 2024-06-12
Admitting: NURSE PRACTITIONER
Payer: MEDICARE

## 2024-06-12 PROCEDURE — 74018 RADEX ABDOMEN 1 VIEW: CPT

## 2024-06-19 ENCOUNTER — TREATMENT (OUTPATIENT)
Dept: PHYSICAL THERAPY | Facility: CLINIC | Age: 74
End: 2024-06-19
Payer: MEDICARE

## 2024-06-19 DIAGNOSIS — H81.8X2 UNILATERAL VESTIBULAR WEAKNESS, LEFT: ICD-10-CM

## 2024-06-19 DIAGNOSIS — H81.90 BALANCE PROBLEM DUE TO VESTIBULAR DYSFUNCTION, UNSPECIFIED LATERALITY: Primary | ICD-10-CM

## 2024-06-19 DIAGNOSIS — R29.898 WEAKNESS OF BOTH HIPS: ICD-10-CM

## 2024-06-19 NOTE — PROGRESS NOTES
Physical Therapy Daily Treatment Note  [facility] [place]   [address], [address]  [place] [address]  P: [phone]  F: [phone]    Patient: Carol A Osgood   : 1950  Referring practitioner: NAOMI Dumas  Date of Initial Visit: Type: THERAPY  Noted: 5/3/2024  Today's Date: 2024  Patient seen for 5 sessions       Visit Diagnoses:    ICD-10-CM ICD-9-CM   1. Balance problem due to vestibular dysfunction, unspecified laterality  H81.90 386.50   2. Weakness of both hips  R29.898 729.89   3. Unilateral vestibular weakness, left  H81.8X2 386.8         Carol Osgood reports: no new c/o     Subjective     Objective   See Exercise, Manual, and Modality Logs for complete treatment.       Assessment/Plan Compliant/cooperative with current rehab efforts.  Benefits from verbal/tactile cues to ensure correct exercise performance/technique, hold time and position. Plan details: Progress ROM / strengthening / stabilization / functional activity as tolerated       Timed:         Manual Therapy:         mins  54555;     Therapeutic Exercise:   15      mins  12941;     Neuromuscular Frank:   15     mins  25264;    Therapeutic Activity:      8    mins  74796;     Gait Training:          mins  08775;     Ultrasound:          mins  35671;    Ionto                                   mins  68276  Self Care                            mins  16379  Traction          mins 22248      Un-Timed:  Canalith Repos         mins 57777  Electrical Stimulation:         mins  25180 ( );  Dry Needling          mins self-pay  Traction          mins 61574        Timed Treatment:  38    mins   Total Treatment:     38   mins    Petty Ho PT  [place] License #: [id number]    Physical Therapist

## 2024-06-26 ENCOUNTER — TREATMENT (OUTPATIENT)
Dept: PHYSICAL THERAPY | Facility: CLINIC | Age: 74
End: 2024-06-26
Payer: MEDICARE

## 2024-06-26 DIAGNOSIS — H81.8X2 UNILATERAL VESTIBULAR WEAKNESS, LEFT: ICD-10-CM

## 2024-06-26 DIAGNOSIS — H81.90 BALANCE PROBLEM DUE TO VESTIBULAR DYSFUNCTION, UNSPECIFIED LATERALITY: Primary | ICD-10-CM

## 2024-06-26 DIAGNOSIS — R29.898 WEAKNESS OF BOTH HIPS: ICD-10-CM

## 2024-06-26 RX ORDER — L-MEFOL/A-CYST/MEB12/ALGAL OIL 6-600-2 MG
1 TABLET ORAL DAILY
Qty: 90 TABLET | Refills: 1 | Status: SHIPPED | OUTPATIENT
Start: 2024-06-26

## 2024-06-26 NOTE — PROGRESS NOTES
Physical Therapy Daily Treatment Note  [facility] [place]   [address], [address]  [place] [address]  P: [phone]  F: [phone]    Patient: Carol A Osgood   : 1950  Referring practitioner: NAOMI Dumas  Date of Initial Visit: Type: THERAPY  Noted: 5/3/2024  Today's Date: 2024  Patient seen for 6 sessions       Visit Diagnoses:    ICD-10-CM ICD-9-CM   1. Balance problem due to vestibular dysfunction, unspecified laterality  H81.90 386.50   2. Weakness of both hips  R29.898 729.89   3. Unilateral vestibular weakness, left  H81.8X2 386.8         Carol Osgood reports: both hips were hurting yesterday     Subjective     Objective   See Exercise, Manual, and Modality Logs for complete treatment.       Assessment/Plan Compliant/cooperative with current rehab efforts.  Benefits from verbal/tactile cues to ensure correct exercise performance/technique, hold time and position. Plan details: Progress ROM / strengthening / stabilization / functional activity as tolerated       Timed:         Manual Therapy:         mins  43527;     Therapeutic Exercise:     20    mins  22144;     Neuromuscular Frank:      10  mins  02302;    Therapeutic Activity:  10        mins  44238;     Gait Training:           mins  76012;     Ultrasound:         mins  94659;    Ionto                                   mins  81766  Self Care                            mins  76317  Traction         mins 24334      Un-Timed:  Canalith Repos         mins 99335  Electrical Stimulation:         mins  86178 (MC );  Dry Needling          mins self-pay  Traction          mins 37449        Timed Treatment:   40   mins   Total Treatment:     40   mins    Petty Ho PT  [place] License #: [id number]    Physical Therapist

## 2024-06-28 DIAGNOSIS — R15.9 INCONTINENCE OF FECES, UNSPECIFIED FECAL INCONTINENCE TYPE: Primary | ICD-10-CM

## 2024-07-02 ENCOUNTER — HOSPITAL ENCOUNTER (OUTPATIENT)
Dept: MRI IMAGING | Facility: HOSPITAL | Age: 74
Discharge: HOME | End: 2024-07-02
Admitting: NURSE PRACTITIONER
Payer: MEDICARE

## 2024-07-02 DIAGNOSIS — R47.01 EXPRESSIVE APHASIA: ICD-10-CM

## 2024-07-02 DIAGNOSIS — G31.84 MCI (MILD COGNITIVE IMPAIRMENT): ICD-10-CM

## 2024-07-02 PROCEDURE — 70551 MRI BRAIN STEM W/O DYE: CPT

## 2024-07-08 ENCOUNTER — HOSPITAL ENCOUNTER (OUTPATIENT)
Dept: SPEECH THERAPY | Facility: HOSPITAL | Age: 74
Setting detail: THERAPIES SERIES
Discharge: HOME | End: 2024-07-08
Payer: MEDICARE

## 2024-07-08 DIAGNOSIS — R47.1 DYSARTHRIA: Primary | ICD-10-CM

## 2024-07-08 PROCEDURE — 96125 COGNITIVE TEST BY HC PRO: CPT

## 2024-07-08 NOTE — THERAPY EVALUATION
"Outpatient Speech Language Pathology   Adult Speech Language Cognitive Initial Evaluation  [facility]     Patient Name: Carol A Osgood  : 1950  MRN: [id number]  Today's Date: 2024        Visit Date: 2024   Patient Active Problem List   Diagnosis    Low back pain    Low hemoglobin    Menopause present    Osteoporosis    Thrombocytopenia    Renal insufficiency    Pain and swelling of left knee    Anxiety    Vitamin D deficiency    Constipation    Memory loss    Incontinence of feces    Colon cancer screening    Hx of adenomatous colonic polyps    Glaucomatous atrophy of optic disc    Nuclear senile cataract    Vitreous degeneration    Renal calculus        Past Medical History:   Diagnosis Date    Arthritis     Bruises easily     Colon cancer screening 2021    Constipation     Constipation     Difficulty swallowing pills     \"LARGE PILLS, NEED TO TAKE WITH YOGURT\"    DVT, lower extremity     RESOLVED    History of 2019 novel coronavirus disease (COVID-19) 2023    History of recent fall 2023    Hypothyroidism     Kidney stone     Kidney stone     Memory loss     Memory loss, short term     Osteoporosis     Renal calculus 10/18/2023    Shingles     Vertigo         Past Surgical History:   Procedure Laterality Date    APPENDECTOMY      COLONOSCOPY N/A 2021    Procedure: COLONOSCOPY;  Surgeon: Vineet Tran MD;  Location: [facility];  Service: Gastroenterology;  Laterality: N/A;    EXTRACORPOREAL SHOCK WAVE LITHOTRIPSY (ESWL) Right 10/18/2023    Procedure: EXTRACORPOREAL SHOCKWAVE LITHOTRIPSY- RIGHT;  Surgeon: Jeffery Reynolds MD;  Location: [facility];  Service: Urology;  Laterality: Right;    EXTRACORPOREAL SHOCK WAVE LITHOTRIPSY (ESWL) Right 2023    Procedure: RIGHT KIDNEY SHOCK WAVE LITHOTRIPSY;  Surgeon: Jeffery Reynolds MD;  Location: [facility];  Service: Urology;  Laterality: Right;    URETEROSCOPY LASER LITHOTRIPSY WITH STENT INSERTION " "Right 01/10/2024    Procedure: RIGHT URETEROSCOPY LASER LITHOTRIPSY WITH STENT PLACMENT;  Surgeon: Jeffery Reynolds MD;  Location: [facility] OR;  Service: Urology;  Laterality: Right;    VASCULAR SURGERY      VEIN SURGERY           Visit Dx:    ICD-10-CM ICD-9-CM   1. Dysarthria  R47.1 784.51        Patient History       Row Name 07/08/24 1300             History    Patient/Caregiver Goals Other (comment)  -BB      Patient/Caregiver Goals Comment \"improve communication\"  -BB         Fall Risk Assessment    Any falls in the past year: Yes  -BB      Number of falls reported in the last 12 months 2  -BB      Factors that contributed to the fall: Lost balance;Fatigue;Uneven surface;Tripped  -BB      Does patient have a fear of falling No  -BB         Services    Prior Rehab/Home Health Experiences Yes  -BB      When was the prior experience with Rehab/Home Health currently  -BB      Where was the prior experience with Rehab/Home Health at [facility]  -BB         Daily Activities    Primary Language English  -BB      Are you able to read Yes  -BB      Are you able to write Yes  -BB      How does patient learn best? Demonstration  -BB      Teaching needs identified Home Exercise Program  -BB      Pt Participated in POC and Goals Yes  -BB         Safety    Are you being hurt, hit, or frightened by anyone at home or in your life? No  -BB      Are you being neglected by a caregiver No  -BB                User Key  (r) = Recorded By, (t) = Taken By, (c) = Cosigned By      Initials Name Provider Type    Branden Verma SLP Speech and Language Pathologist                     OP SLP Assessment/Plan - 07/08/24 1646          SLP Assessment    Functional Problems Speech Language- Adult/Cognition  -BB    Impact on Function: Adult Speech Language/Cognition Difficulty communicating wants, needs, and ideas;Difficulty communicating in a medical emergency;Restrictions in personal and social life;Unable to understand " written/spoken language;Difficulty sequencing thoughts to express complex messages;Difficulty following directions;Decreased recall of personal information and medical history;Trouble learning or remembering new information  -BB    Clinical Impression: Speech Language-Adult/Congnition Moderate:;Cognitive Communication Impairment  -BB    Please refer to paper survey for additional self-reported information Yes  -BB    Please refer to items scanned into chart for additional diagnostic informaiton and handouts as provided by clinician Yes  -BB    Prognosis Fair (comment)  -BB    Patient/caregiver participated in establishment of treatment plan and goals Yes  -BB    Patient would benefit from skilled therapy intervention Yes  -BB       SLP Plan    Frequency 1-2x/week  -BB    Duration 4-8 weeks  -BB    Planned CPT's? SLP INDIVIDUAL SPEECH THERAPY: 14815;SLP DEV COG SKILLS INITIAL (15 MIN) : 59108;SLP DEV COG SKILLS ADD (15 MIN) : 22198  -BB    Expected Duration of Therapy Session (SLP Eval) 45  -BB              User Key  (r) = Recorded By, (t) = Taken By, (c) = Cosigned By      Initials Name Provider Type    BB Branden Holcomb, SLP Speech and Language Pathologist                     SLP SLC Evaluation - 07/08/24 1300          Communication Assessment/Intervention    Document Type evaluation  -BB    Subjective Information no complaints  -BB    Patient Observations alert;cooperative;agree to therapy  -BB    Patient Effort good  -BB    Symptoms Noted During/After Treatment none  -BB       General Information    Patient Profile Reviewed yes  -BB    Pertinent History Of Current Problem 72 yo who arrives with her  for OP SLP ashley. Pt completed neuropsych eval 2 years ago, which showed MCI. Onset was several years ago; it got worse about 2 years ago. She recently saw neuro who completed cognitive screen which shows an increase of difficulties. She is having more trouble with finding words and getting words out. She  continues with forgetfulness. She has an upcoming appt with neuropsych in mid-September. She had a repeat MRI which was similar to the one done a couple of years ago. They would like to work on word-finding and getting out words. She is currently doing PT.  -BB    Precautions/Limitations, Vision WFL with corrective lenses;for purposes of eval  -BB    Precautions/Limitations, Hearing WFL;for purposes of eval  -BB    Patient Level of Education high school  -BB    Plans/Goals Discussed with patient and family;agreed upon  -BB    Barriers to Rehab none identified  -BB    Patient's Goals for Discharge functional communication  -BB    Family Goals for Discharge functional communication  -BB    Standardized Assessment Used CLQT  -BB       Pain    Additional Documentation Pain Scale: Numbers Pre/Post-Treatment (Group)  -BB       Pain Scale: Numbers Pre/Post-Treatment    Pretreatment Pain Rating 0/10 - no pain  -BB    Posttreatment Pain Rating 0/10 - no pain  -BB       Standardized Tests    Cognitive/Memory Tests CLQT: Cognitive Linguistic Quick Test  -BB       CLQT (The Cognitive Linguistic Quick Test)    Attention Domain Score 134  -BB    Attention Severity Rating 3: Mild  -BB    Memory Domain Score 97  -BB    Memory Severity Rating 2: Moderate  -BB    Executive Function Domain Score 9  -BB    Executive Function Severity Rating 2: Moderate  -BB    Language Domain Score 22  -BB    Language Severity Rating 2: Moderate  -BB    Visuospatial Domain Score 49  -BB    Visuospatial Severity Rating 3: Mild  -BB    Clock Drawing Total Score 6  -BB    Clock Drawing Severity Rating Severe  -BB    Composite Severity Rating 2.4  -BB    Composite Severity Rating Range 2.4 - 1.5: Moderate  -BB    CLQT Comments Cognitive-linguistic evaluation completed this date. The Cognitive Linguistic Quick Test (CLQT) was used as a standardized assessment to evaluate patient's current cognitive skills (i.e., problem solving, reasoning, mental  flexibility, memory, judgement, and speed of processing). Patient received an overall composite score of 2.4/4.0 indicating moderate cognitive impairment. The results of the assessment were as followed: Attention - Mild, Memory - Moderate, Executive Functions - Moderate, Language - Moderate, Visuospatial skills - Mild, and Clock Drawing - Severe. Demonstrated moderate difficulty with processing, mental flexibility, auditory and reading comprehension, and attention during clock drawing task, mazes, symbol trails and design generation tasks. Demonstrated strengths in symbol cancellation, confrontation naming tasks. Although pt demonstrated strength with confrontation naming and mild-moderate difficulties with generative naming task, the patient demonstrated moderate difficulties with expressing complex messages in unstructured conversation. Difficulties noted with memory during story retelling and design memory subtests. Patient's difficulties may have significant impact on social interactions, learning new information, and recall of pertinent information. Recommend speech therapy to target cognitive skills with strategies in order to increase patient’s independence and reduce caregiver burden.  -BB       SLP Evaluation Clinical Impressions    SLP Diagnosis moderate;cognitive-linguistic disorder  -BB    Rehab Potential/Prognosis fair  -BB    SLC Criteria for Skilled Therapy Interventions Met yes  -BB    Functional Impact functional impact in social situations;functional impact in ADLs;difficulty communicating wants, needs;difficulty communicating in an emergency;difficulty in expressing complex messages;restrictions in personal and social life  -BB       Recommendations    Therapy Frequency (SLP SLC) 2 days per week  -BB    Predicted Duration Therapy Intervention (Days) other (see comments)   4-8 weeks -BB              User Key  (r) = Recorded By, (t) = Taken By, (c) = Cosigned By      Initials Name Provider Type     Branden Verma, SLP Speech and Language Pathologist                                    OP SLP Education       Row Name 07/08/24 1647       Education    Barriers to Learning No barriers identified  -BB    Education Provided Described results of evaluation;Patient participated in establishing goals and treatment plan;Patient expressed understanding of evaluation;Family/caregivers participated in establishing goals and treatment plan;Family/caregivers expressed understanding of evaluation  -BB    Assessed Learning needs;Learning motivation;Learning preferences;Learning readiness  -BB    Learning Motivation Strong  -BB    Learning Method Explanation  -BB    Teaching Response Verbalized understanding  -BB              User Key  (r) = Recorded By, (t) = Taken By, (c) = Cosigned By      Initials Name Effective Dates    BB Branden Holcomb SLP 02/19/23 -                    SLP OP Goals       Row Name 07/08/24 1600          Goal Type Needed    Goal Type Needed Verbal Expression;Auditory Comprehension  -BB        Verbal Expression Goals    Verbal Expression LTG's Patient will be able to verbally express basic wants and needs in home environment  -BB     Patient will be able to verbally express basic wants and needs in home environment 80%:;without cues  -BB     Status: Patient will be able to verbally express basic wants and needs in home environment New  -BB     Verbal Expression STG's Patient will improve verbal expressive language skills by naming objects/pictures;Patient will improve verbal expressive language skills by generating a sentence when given a key word;Patient will improve verbal expressive language skills by providing basic definition when given a word;Patient will improve verbal expressive language skills by describing attributes, function, action and/or uses of an object/item  -BB     Patient will improve verbal expressive language skills by naming objects/pictures 80%:;without cues  -BB     Status: Patient  will improve verbal expressive language skills by naming objects/pictures New  -BB     Patient will improve verbal expressive language skills by generating a sentence when given a key word 80%:;without cues  -BB     Status: Patient will improve verbal expressive language skills by generating a sentence when given a key word New  -BB     Patient will improve verbal expressive language skills by providing basic definition when given a word 80%:;without cues  -BB     Status: Patient will improve verbal expressive language skills by providing basic definition when given a word New  -BB     Patient will improve verbal expressive language skills by describing attributes, function, action and/or uses of an object/item 80%:;without cues  -BB     Status: Patient will improve verbal expressive language skills by describing attributes, function, action and/or uses of an object/item New  -BB        Auditory Comprehension Goals    Auditory Comprehension LTG's Patient will comprehend basic spoken information presented in home environment  -BB     Patient will comprehend basic spoken information presented in home environment 70%:;without cues  -BB     Status: Patient will comprehend basic spoken information presented in home environment New  -BB     Auditory Comprehension STG's Patient will demonstrate comprehension of spoken language by answering simple Yes/No questions  -BB     Patient will demonstrate comprehension of spoken language by answering simple Yes/No questions 80%:;without cues  -BB     Status: Patient will demonstrate comprehension of spoken language by answering simple Yes/No questions New  -BB               User Key  (r) = Recorded By, (t) = Taken By, (c) = Cosigned By      Initials Name Provider Type    Branden Verma, SLP Speech and Language Pathologist                    [id number], [phone]       Time Calculation:   SLP Start Time: 1330  SLP Stop Time: 1415  SLP Time Calculation (min): 45 min  Timed  Charges  96125-Standardized cognitive performance testin  Total Minutes  Timed Charges Total Minutes: 75   Total Minutes: 75    Therapy Charges for Today       Code Description Service Date Service Provider Modifiers Qty    22912072511  ST STD COG PERF TEST PER HOUR 2024 Branden Holcomb, SLP GN 1                     Branden Holcomb, SLP  2024

## 2024-07-10 ENCOUNTER — TREATMENT (OUTPATIENT)
Dept: PHYSICAL THERAPY | Facility: CLINIC | Age: 74
End: 2024-07-10
Payer: MEDICARE

## 2024-07-10 DIAGNOSIS — H81.8X2 UNILATERAL VESTIBULAR WEAKNESS, LEFT: ICD-10-CM

## 2024-07-10 DIAGNOSIS — H81.90 BALANCE PROBLEM DUE TO VESTIBULAR DYSFUNCTION, UNSPECIFIED LATERALITY: Primary | ICD-10-CM

## 2024-07-10 DIAGNOSIS — R29.898 WEAKNESS OF BOTH HIPS: ICD-10-CM

## 2024-07-10 NOTE — PROGRESS NOTES
Re-Assessment / Progress Note  [facility] [place]   [address], [address]  [place] [address]  P: [phone]  F: [phone]  Patient: Carol A Osgood   : 1950  Diagnosis/ICD-10 Code:  Balance problem due to vestibular dysfunction, unspecified laterality [H81.90]  Referring practitioner: NAOMI Reddy  Date of Initial Visit: Episode Type: THERAPY  Noted: 5/3/2024    Today's Date: 7/10/2024  Patient seen for 7 sessions.      Subjective:   Carol Osgood reports: no new c/o, no falls recently     Subjective Questionnaire: ABC: deferred  Clinical Progress: improved  Home Program Compliance: Yes  Treatment has included: therapeutic exercise, neuromuscular re-education, and therapeutic activity    Subjective   Objective   Left Hip   Planes of Motion   Flexion: 4  Abduction: 4  External rotation: 4  Internal rotation: 4     Right Hip   Planes of Motion   Flexion: 4  Abduction: 4  External rotation: 4  Internal rotation: 4+     Left Knee   Flexion: 5  Extension: 5     Right Knee   Flexion: 5  Extension: 5     Left Ankle/Foot   Dorsiflexion: 4+     Right Ankle/Foot   Dorsiflexion: 5      Assessment/Plan  Progress toward previous goals: Partially Met    Goals    SHORT TERM GOALS: 5 weeks  1. Pt will improve standing balance with eyes closed to require only supervision > 15 sec on foam.     2. Pt. will improve (B) LE strength to >4/5 (in sitting) as needed to perform sit to stand txf x 2 without UE. met  3. Pt. Will demonstrate the ability to ambulate with SPC with proper adjustments and forward gaze for 4 min. met  4. Pt. Will traverse 10 stairs (up/down) with 1 railing and supervision for entering/exiting home safely.       LONG TERM GOALS: 10 weeks  1. Pt will improve standing balance with narrow TOYA, eyes closed to require only supervision > 1 min.   2. Pt. will improve gluteal and gastrocs strength to 4+/5 (in sitting) as needed to improve 30 sec sit to stand to 10x  with UE assist on thighs.    3. Pt. Will demonstrate the ability to ambulate with SPC with proper adjustments and forward gaze for >7 min. met  4. Pt. Will decrease TUG score to < 10  Sec; Increase Waite Balance > 43/56 demonstrating improved mobility safety.       Recommendations: Continue as planned  Timeframe: 1 month  Prognosis to achieve goals: good    PT Signature: Petty Ho, PT  [place] Lic. # [id number]      Based upon review of the patient's progress and continued therapy plan, it is my medical opinion that Carol Osgood should continue physical therapy treatment at [facility]  [address] [address] [phone] ; Fax Number [phone]    Signature: __________________________________  Virginia Lucas APRN    Manual Therapy:          mins  15692;  Therapeutic Exercise:    20      mins  00616;     Neuromuscular Frank:     10     mins  01760;    Therapeutic Activity:       10    mins  77035;     Gait Training:            mins  53605;     Ultrasound:           mins  99762;    Electrical Stimulation:          mins  09809 ( );  Dry Needling           mins self-pay  Traction           mins 69540  Canalith Repositioning         mins 87669      Timed Treatment:   40   mins   Total Treatment:     40   mins

## 2024-07-15 ENCOUNTER — HOSPITAL ENCOUNTER (OUTPATIENT)
Dept: SPEECH THERAPY | Facility: HOSPITAL | Age: 74
Setting detail: THERAPIES SERIES
Discharge: HOME | End: 2024-07-15
Payer: MEDICARE

## 2024-07-15 DIAGNOSIS — R47.1 DYSARTHRIA: Primary | ICD-10-CM

## 2024-07-15 PROCEDURE — 92507 TX SP LANG VOICE COMM INDIV: CPT

## 2024-07-15 PROCEDURE — 97130 THER IVNTJ EA ADDL 15 MIN: CPT

## 2024-07-15 PROCEDURE — 97129 THER IVNTJ 1ST 15 MIN: CPT

## 2024-07-15 NOTE — THERAPY TREATMENT NOTE
Outpatient Speech Language Pathology   Adult Speech Language Cognitive Treatment Note  [facility]     Patient Name: Carol A Osgood  : 1950  MRN: [id number]  Today's Date: 7/15/2024         Visit Date: 07/15/2024   Patient Active Problem List   Diagnosis    Low back pain    Low hemoglobin    Menopause present    Osteoporosis    Thrombocytopenia    Renal insufficiency    Pain and swelling of left knee    Anxiety    Vitamin D deficiency    Constipation    Memory loss    Incontinence of feces    Colon cancer screening    Hx of adenomatous colonic polyps    Glaucomatous atrophy of optic disc    Nuclear senile cataract    Vitreous degeneration    Renal calculus          Visit Dx:    ICD-10-CM ICD-9-CM   1. Dysarthria  R47.1 784.51                              SLP OP Goals       Row Name 07/15/24 1400          Subjective Comments    Subjective Comments Pt/spouse interested in discussing HEP and cog assessment results.  -BB        Verbal Expression Goals    Patient will improve verbal expressive language skills by naming objects/pictures 80%:;without cues  -BB     Status: Patient will improve verbal expressive language skills by naming objects/pictures Progressing as expected  -BB     Comments:Patient will improve verbal expressive language skills by naming objects/pictures Extensive education provided regarding HEP strategies. Discussed suggestion for purchasing memory journal and calendar for home and personal use. Suggestion to draw simple picture during or immediately after event to assist with sequencing, categorization, expression, planning, recall. Discussed visual schedule on home calendar w assistance from spouse with possible daily schedule. Discussed importance of routine and procedural memory for improved sense of stability. Pt and pt's  verbalized understanding and demonstrated understanding by asking appropriate questions.  -BB               User Key  (r) = Recorded By, (t) = Taken By, (c) =  Cosigned By      Initials Name Provider Type    BB Branden Holcomb, SLP Speech and Language Pathologist                           Time Calculation:   SLP Start Time: 1330  SLP Stop Time: 1415  SLP Time Calculation (min): 45 min  Timed Charges  33101-FV Dev of Cogn Skills Initial Minutes: 15  89165-WU Dev of Cogn Skills Add Minutes: 30  Total Minutes  Timed Charges Total Minutes: 45   Total Minutes: 45    Therapy Charges for Today       Code Description Service Date Service Provider Modifiers Qty    06022085222 HC ST DEV OF COGN SKILLS INITIAL 15 MIN 7/15/2024 Branden Holcomb, SLP  1    83506627815 HC ST DEV OF COGN SKILLS EACH ADDT'L 15 MIN 7/15/2024 Branden Holcomb, SLP  2                     Branden Holcomb, SLP  7/15/2024

## 2024-07-16 DIAGNOSIS — N18.9 CHRONIC RENAL IMPAIRMENT, UNSPECIFIED CKD STAGE: ICD-10-CM

## 2024-07-16 DIAGNOSIS — E03.9 HYPOTHYROIDISM, UNSPECIFIED TYPE: Primary | ICD-10-CM

## 2024-07-16 DIAGNOSIS — E78.5 HYPERLIPIDEMIA, UNSPECIFIED HYPERLIPIDEMIA TYPE: ICD-10-CM

## 2024-07-17 ENCOUNTER — LAB (OUTPATIENT)
Dept: LAB | Facility: HOSPITAL | Age: 74
End: 2024-07-17
Payer: MEDICARE

## 2024-07-17 LAB
ALBUMIN SERPL-MCNC: 4.2 G/DL (ref 3.5–5.2)
ALBUMIN/GLOB SERPL: 1.4 G/DL
ALP SERPL-CCNC: 68 U/L (ref 39–117)
ALT SERPL W P-5'-P-CCNC: 15 U/L (ref 1–33)
ANION GAP SERPL CALCULATED.3IONS-SCNC: 7.9 MMOL/L (ref 5–15)
AST SERPL-CCNC: 23 U/L (ref 1–32)
BASOPHILS # BLD AUTO: 0.03 10*3/MM3 (ref 0–0.2)
BASOPHILS NFR BLD AUTO: 0.7 % (ref 0–1.5)
BILIRUB SERPL-MCNC: 0.5 MG/DL (ref 0–1.2)
BUN SERPL-MCNC: 44 MG/DL (ref 8–23)
BUN/CREAT SERPL: 28.4 (ref 7–25)
CALCIUM SPEC-SCNC: 10.5 MG/DL (ref 8.6–10.5)
CHLORIDE SERPL-SCNC: 105 MMOL/L (ref 98–107)
CO2 SERPL-SCNC: 30.1 MMOL/L (ref 22–29)
CREAT SERPL-MCNC: 1.55 MG/DL (ref 0.57–1)
DEPRECATED RDW RBC AUTO: 44.2 FL (ref 37–54)
EGFRCR SERPLBLD CKD-EPI 2021: 34.8 ML/MIN/1.73
EOSINOPHIL # BLD AUTO: 0.06 10*3/MM3 (ref 0–0.4)
EOSINOPHIL NFR BLD AUTO: 1.3 % (ref 0.3–6.2)
ERYTHROCYTE [DISTWIDTH] IN BLOOD BY AUTOMATED COUNT: 12.6 % (ref 12.3–15.4)
GLOBULIN UR ELPH-MCNC: 3.1 GM/DL
GLUCOSE SERPL-MCNC: 90 MG/DL (ref 65–99)
HCT VFR BLD AUTO: 35.9 % (ref 34–46.6)
HGB BLD-MCNC: 11.9 G/DL (ref 12–15.9)
IMM GRANULOCYTES # BLD AUTO: 0.01 10*3/MM3 (ref 0–0.05)
IMM GRANULOCYTES NFR BLD AUTO: 0.2 % (ref 0–0.5)
LYMPHOCYTES # BLD AUTO: 0.88 10*3/MM3 (ref 0.7–3.1)
LYMPHOCYTES NFR BLD AUTO: 19.2 % (ref 19.6–45.3)
MCH RBC QN AUTO: 31.9 PG (ref 26.6–33)
MCHC RBC AUTO-ENTMCNC: 33.1 G/DL (ref 31.5–35.7)
MCV RBC AUTO: 96.2 FL (ref 79–97)
MONOCYTES # BLD AUTO: 0.45 10*3/MM3 (ref 0.1–0.9)
MONOCYTES NFR BLD AUTO: 9.8 % (ref 5–12)
NEUTROPHILS NFR BLD AUTO: 3.15 10*3/MM3 (ref 1.7–7)
NEUTROPHILS NFR BLD AUTO: 68.8 % (ref 42.7–76)
NRBC BLD AUTO-RTO: 0 /100 WBC (ref 0–0.2)
PLATELET # BLD AUTO: 133 10*3/MM3 (ref 140–450)
PMV BLD AUTO: 11.2 FL (ref 6–12)
POTASSIUM SERPL-SCNC: 4.8 MMOL/L (ref 3.5–5.2)
PROT SERPL-MCNC: 7.3 G/DL (ref 6–8.5)
RBC # BLD AUTO: 3.73 10*6/MM3 (ref 3.77–5.28)
SODIUM SERPL-SCNC: 143 MMOL/L (ref 136–145)
TSH SERPL DL<=0.05 MIU/L-ACNC: 2.39 UIU/ML (ref 0.27–4.2)
WBC NRBC COR # BLD AUTO: 4.58 10*3/MM3 (ref 3.4–10.8)

## 2024-07-17 PROCEDURE — 84443 ASSAY THYROID STIM HORMONE: CPT | Performed by: INTERNAL MEDICINE

## 2024-07-17 PROCEDURE — 36415 COLL VENOUS BLD VENIPUNCTURE: CPT | Performed by: INTERNAL MEDICINE

## 2024-07-17 PROCEDURE — 85025 COMPLETE CBC W/AUTO DIFF WBC: CPT | Performed by: INTERNAL MEDICINE

## 2024-07-17 PROCEDURE — 80053 COMPREHEN METABOLIC PANEL: CPT | Performed by: INTERNAL MEDICINE

## 2024-07-17 PROCEDURE — 80061 LIPID PANEL: CPT | Performed by: INTERNAL MEDICINE

## 2024-07-18 LAB
CHOLEST SERPL-MCNC: 195 MG/DL (ref 100–199)
HDLC SERPL-MCNC: 80 MG/DL
LDLC SERPL CALC-MCNC: 101 MG/DL (ref 0–99)
LDLC/HDLC SERPL: 1.3 RATIO (ref 0–3.2)
TRIGL SERPL-MCNC: 79 MG/DL (ref 0–149)
VLDLC SERPL CALC-MCNC: 14 MG/DL (ref 5–40)

## 2024-07-22 ENCOUNTER — HOSPITAL ENCOUNTER (OUTPATIENT)
Dept: SPEECH THERAPY | Facility: HOSPITAL | Age: 74
Setting detail: THERAPIES SERIES
Discharge: HOME | End: 2024-07-22
Payer: MEDICARE

## 2024-07-22 DIAGNOSIS — R41.841 COGNITIVE COMMUNICATION DEFICIT: Primary | ICD-10-CM

## 2024-07-22 PROCEDURE — 97129 THER IVNTJ 1ST 15 MIN: CPT

## 2024-07-22 PROCEDURE — 97130 THER IVNTJ EA ADDL 15 MIN: CPT

## 2024-07-24 ENCOUNTER — OFFICE VISIT (OUTPATIENT)
Dept: INTERNAL MEDICINE | Facility: CLINIC | Age: 74
End: 2024-07-24
Payer: MEDICARE

## 2024-07-24 VITALS
HEART RATE: 73 BPM | WEIGHT: 130.1 LBS | HEIGHT: 67 IN | TEMPERATURE: 97.8 F | BODY MASS INDEX: 20.42 KG/M2 | OXYGEN SATURATION: 99 % | DIASTOLIC BLOOD PRESSURE: 60 MMHG | SYSTOLIC BLOOD PRESSURE: 100 MMHG

## 2024-07-24 DIAGNOSIS — N18.30 CRI (CHRONIC RENAL INSUFFICIENCY), STAGE 3 (MODERATE): ICD-10-CM

## 2024-07-24 DIAGNOSIS — E03.9 HYPOTHYROIDISM, UNSPECIFIED TYPE: ICD-10-CM

## 2024-07-24 DIAGNOSIS — M81.0 AGE-RELATED OSTEOPOROSIS WITHOUT CURRENT PATHOLOGICAL FRACTURE: ICD-10-CM

## 2024-07-24 DIAGNOSIS — K59.00 CONSTIPATION, UNSPECIFIED CONSTIPATION TYPE: ICD-10-CM

## 2024-07-24 DIAGNOSIS — Z12.11 COLON CANCER SCREENING: Primary | ICD-10-CM

## 2024-07-24 DIAGNOSIS — R41.3 MEMORY LOSS: ICD-10-CM

## 2024-07-24 PROCEDURE — 1125F AMNT PAIN NOTED PAIN PRSNT: CPT | Performed by: INTERNAL MEDICINE

## 2024-07-24 PROCEDURE — 99214 OFFICE O/P EST MOD 30 MIN: CPT | Performed by: INTERNAL MEDICINE

## 2024-07-24 NOTE — PROGRESS NOTES
Subjective   Carol A Osgood is a 73 y.o. female.   Fu with fl  Hypothyroidism     Working on pelvic floor exercises to help with bowel incontinence she is seeing pt and she is doing better   Cont to struggle with memory    Pt has been doing well with thyroid meds.  Taking as perscribed without any complications    The following portions of the patient's history were reviewed and updated as appropriate: allergies, current medications, past family history, past medical history, past social history, past surgical history, and problem list.  No tob no etoh    Review of Systems    Objective   Physical Exam  Vitals reviewed.   Constitutional:       Appearance: She is well-developed.   HENT:      Head: Normocephalic and atraumatic.      Right Ear: External ear normal.      Left Ear: External ear normal.   Eyes:      Conjunctiva/sclera: Conjunctivae normal.      Pupils: Pupils are equal, round, and reactive to light.   Neck:      Thyroid: No thyromegaly.      Trachea: No tracheal deviation.   Cardiovascular:      Rate and Rhythm: Normal rate and regular rhythm.      Heart sounds: Normal heart sounds.   Pulmonary:      Effort: Pulmonary effort is normal.      Breath sounds: Normal breath sounds.   Abdominal:      General: Bowel sounds are normal. There is no distension.      Palpations: Abdomen is soft.      Tenderness: There is no abdominal tenderness.   Musculoskeletal:         General: No deformity. Normal range of motion.      Cervical back: Normal range of motion.   Skin:     General: Skin is warm and dry.   Neurological:      Mental Status: She is alert and oriented to person, place, and time.   Psychiatric:         Behavior: Behavior normal.         Thought Content: Thought content normal.         Judgment: Judgment normal.       Vitals:    07/24/24 1302   BP: 100/60   Pulse: 73   Temp: 97.8 °F (36.6 °C)   SpO2: 99%     Body mass index is 20.37 kg/m².         Assessment & Plan   Diagnoses and all orders for this  visit:    1. Colon cancer screening (Primary)  -     Ambulatory Referral to Gastroenterology    2. Memory loss    3. Constipation, unspecified constipation type    4. Hypothyroidism, unspecified type    5. Age-related osteoporosis without current pathological fracture       Memory issues  seeing neuro nd getting speech  Constipation-  sheis doing better with prunes  due colon   send referral  Osteoporosis-  on the prolia  cont vit d  Hypothyrodism- ok with current meds  CRI-  she has seen renal in the past nothing further to do  will cont to follow  if changes refer back to renal

## 2024-07-29 ENCOUNTER — APPOINTMENT (OUTPATIENT)
Dept: SPEECH THERAPY | Facility: HOSPITAL | Age: 74
End: 2024-07-29
Payer: MEDICARE

## 2024-07-31 ENCOUNTER — TREATMENT (OUTPATIENT)
Dept: PHYSICAL THERAPY | Facility: CLINIC | Age: 74
End: 2024-07-31
Payer: MEDICARE

## 2024-07-31 DIAGNOSIS — R29.898 WEAKNESS OF BOTH HIPS: ICD-10-CM

## 2024-07-31 DIAGNOSIS — H81.8X2 UNILATERAL VESTIBULAR WEAKNESS, LEFT: ICD-10-CM

## 2024-07-31 DIAGNOSIS — H81.90 BALANCE PROBLEM DUE TO VESTIBULAR DYSFUNCTION, UNSPECIFIED LATERALITY: Primary | ICD-10-CM

## 2024-07-31 NOTE — PROGRESS NOTES
Physical Therapy Daily Treatment Note  [facility] [place]   [address], [address]  [place] [address]  P: [phone]  F: [phone]    Patient: Carol A Osgood   : 1950  Referring practitioner: NAOMI Dumas  Date of Initial Visit: Type: THERAPY  Noted: 5/3/2024  Today's Date: 2024  Patient seen for 8 sessions       Visit Diagnoses:    ICD-10-CM ICD-9-CM   1. Balance problem due to vestibular dysfunction, unspecified laterality  H81.90 386.50   2. Weakness of both hips  R29.898 729.89   3. Unilateral vestibular weakness, left  H81.8X2 386.8         Carol Osgood reports: no c/o     Subjective     Objective   See Exercise, Manual, and Modality Logs for complete treatment.       Assessment/Plan Compliant/cooperative with current rehab efforts.  Benefits from verbal/tactile cues to ensure correct exercise performance/technique, hold time and position. Plan details: Progress ROM / strengthening / stabilization / functional activity as tolerated       Timed:         Manual Therapy:         mins  55851;     Therapeutic Exercise:   15      mins  58005;     Neuromuscular Frank:   15     mins  35000;    Therapeutic Activity:          mins  21266;     Gait Training:           mins  06192;     Ultrasound:          mins  06574;    Ionto                                   mins  23804  Self Care                            mins  07791  Traction          mins 71607      Un-Timed:  Canalith Repos         mins 10150  Electrical Stimulation:         mins  39646 ( );  Dry Needling          mins self-pay  Traction          mins 48017        Timed Treatment:  30    mins   Total Treatment:     30   mins    Petty Ho PT  [place] License #: [id number]    Physical Therapist

## 2024-08-05 ENCOUNTER — HOSPITAL ENCOUNTER (OUTPATIENT)
Dept: SPEECH THERAPY | Facility: HOSPITAL | Age: 74
Setting detail: THERAPIES SERIES
Discharge: HOME | End: 2024-08-05
Payer: MEDICARE

## 2024-08-05 DIAGNOSIS — R41.841 COGNITIVE COMMUNICATION DEFICIT: Primary | ICD-10-CM

## 2024-08-05 PROCEDURE — 97129 THER IVNTJ 1ST 15 MIN: CPT

## 2024-08-05 PROCEDURE — 97130 THER IVNTJ EA ADDL 15 MIN: CPT

## 2024-08-05 NOTE — THERAPY TREATMENT NOTE
Outpatient Speech Language Pathology   Adult Speech Language Cognitive Treatment Note  [facility]     Patient Name: Carol A Osgood  : 1950  MRN: [id number]  Today's Date: 2024         Visit Date: 2024   Patient Active Problem List   Diagnosis    Low back pain    Low hemoglobin    Osteoporosis    Thrombocytopenia    Renal insufficiency    Pain and swelling of left knee    Anxiety    Vitamin D deficiency    Constipation    Memory loss    Incontinence of feces    Colon cancer screening    Hx of adenomatous colonic polyps    Glaucomatous atrophy of optic disc    Nuclear senile cataract    Vitreous degeneration    Renal calculus    Hypothyroidism    CRI (chronic renal insufficiency), stage 3 (moderate)          Visit Dx:    ICD-10-CM ICD-9-CM   1. Cognitive communication deficit  R41.841 799.52                              SLP OP Goals       Row Name 24 1300          Subjective Comments    Subjective Comments Patient is pleasant and cooperative.  present during session; exhibited great understanding and demonstration of all education and communication strategies discussed during session.  -CR        Subjective Pain    Able to rate subjective pain? yes  -CR     Pre-Treatment Pain Level 0  -CR     Post-Treatment Pain Level 0  -CR        Verbal Expression Goals    Patient will improve verbal expressive language skills by generating a sentence when given a key word 80%:;without cues  -CR     Status: Patient will improve verbal expressive language skills by generating a sentence when given a key word Progressing as expected  -CR     Comments: Patient will improve verbal expressive language skills by generating a sentence when given a key word When given initial 2 words, patient provided appropriate word to complete a sentence with 50% accuracy given NO cues, increased to 90% accuracy given MIN-MOD verbal cues.  -CR     Patient will improve verbal expressive language skills by describing  "attributes, function, action and/or uses of an object/item 80%:;without cues  -CR     Status: Patient will improve verbal expressive language skills by describing attributes, function, action and/or uses of an object/item Progressing as expected  -CR     Comments: Patient will improve verbal expressive language skills by describing attributes, function, action and/or uses of an object/item Patient identified key word when given describing attributes with 80% accuracy given NO cues, unable to increase accuracy despite MAX phonemic cues. Difficulties comprehending instructions to complete describing portion of \"Catch phrase\" task. Semantic analysis education provided. Patient utilized semantic analysis during structured white board description task with 80% accuracy given set-up cues only, increased when given MOD verbal cues. Extensive education provided to patient and  regarding functional communication strategies in natural environment. Pt and  verbalized understanding.  -CR               User Key  (r) = Recorded By, (t) = Taken By, (c) = Cosigned By      Initials Name Provider Type    CR Beatriz Wright SLP Speech and Language Pathologist                           Time Calculation:   SLP Start Time: 1325  SLP Stop Time: 1412  SLP Time Calculation (min): 47 min    Therapy Charges for Today       Code Description Service Date Service Provider Modifiers Qty    53501019111 HC ST DEV OF COGN SKILLS INITIAL 15 MIN 8/5/2024 Beatriz Wright SLP  1    46112129029 HC ST DEV OF COGN SKILLS EACH ADDT'L 15 MIN 8/5/2024 Beatriz Wright SLP  2                     KETAN Mendoza  8/5/2024  "

## 2024-08-07 ENCOUNTER — TREATMENT (OUTPATIENT)
Dept: PHYSICAL THERAPY | Facility: CLINIC | Age: 74
End: 2024-08-07
Payer: MEDICARE

## 2024-08-07 DIAGNOSIS — R29.898 WEAKNESS OF BOTH HIPS: ICD-10-CM

## 2024-08-07 DIAGNOSIS — H81.8X2 UNILATERAL VESTIBULAR WEAKNESS, LEFT: ICD-10-CM

## 2024-08-07 DIAGNOSIS — H81.90 BALANCE PROBLEM DUE TO VESTIBULAR DYSFUNCTION, UNSPECIFIED LATERALITY: Primary | ICD-10-CM

## 2024-08-07 NOTE — PROGRESS NOTES
Physical Therapy Daily Treatment Note  [facility] [place]   [address], [address]  [place] [address]  P: [phone]  F: [phone]    Patient: Carol A Osgood   : 1950  Referring practitioner: NAOMI Dumas  Date of Initial Visit: Type: THERAPY  Noted: 5/3/2024  Today's Date: 2024  Patient seen for 9 sessions       Visit Diagnoses:    ICD-10-CM ICD-9-CM   1. Balance problem due to vestibular dysfunction, unspecified laterality  H81.90 386.50   2. Weakness of both hips  R29.898 729.89   3. Unilateral vestibular weakness, left  H81.8X2 386.8         Carol Osgood reports: no new c/o     Subjective     Objective   See Exercise, Manual, and Modality Logs for complete treatment.       Assessment/Plan Compliant/cooperative with current rehab efforts.  Benefits from verbal/tactile cues to ensure correct exercise performance/technique, hold time and position. Plan details: Progress ROM / strengthening / stabilization / functional activity as tolerated       Timed:         Manual Therapy:         mins  34916;     Therapeutic Exercise: 15        mins  82275;     Neuromuscular Frank:  15      mins  80753;    Therapeutic Activity:     8     mins  02541;     Gait Training:           mins  58137;     Ultrasound:          mins  94440;    Ionto                                   mins  48276  Self Care                            mins  30457  Traction          mins 99054      Un-Timed:  Canalith Repos         mins 51294  Electrical Stimulation:         mins  32638 ( );  Dry Needling          mins self-pay  Traction          mins 56790        Timed Treatment:  38    mins   Total Treatment:    38    mins    Petty Ho PT  [place] License #: [id number]    Physical Therapist

## 2024-08-12 ENCOUNTER — HOSPITAL ENCOUNTER (OUTPATIENT)
Dept: SPEECH THERAPY | Facility: HOSPITAL | Age: 74
Setting detail: THERAPIES SERIES
Discharge: HOME | End: 2024-08-12
Payer: MEDICARE

## 2024-08-12 DIAGNOSIS — R47.1 DYSARTHRIA: ICD-10-CM

## 2024-08-12 DIAGNOSIS — R41.841 COGNITIVE COMMUNICATION DEFICIT: Primary | ICD-10-CM

## 2024-08-12 PROCEDURE — 92507 TX SP LANG VOICE COMM INDIV: CPT

## 2024-08-12 NOTE — THERAPY TREATMENT NOTE
Outpatient Speech Language Pathology   Adult Speech Language Cognitive Treatment Note  [facility]     Patient Name: Carol A Osgood  : 1950  MRN: [id number]  Today's Date: 2024         Visit Date: 2024   Patient Active Problem List   Diagnosis    Low back pain    Low hemoglobin    Osteoporosis    Thrombocytopenia    Renal insufficiency    Pain and swelling of left knee    Anxiety    Vitamin D deficiency    Constipation    Memory loss    Incontinence of feces    Colon cancer screening    Hx of adenomatous colonic polyps    Glaucomatous atrophy of optic disc    Nuclear senile cataract    Vitreous degeneration    Renal calculus    Hypothyroidism    CRI (chronic renal insufficiency), stage 3 (moderate)          Visit Dx:    ICD-10-CM ICD-9-CM   1. Cognitive communication deficit  R41.841 799.52   2. Dysarthria  R47.1 784.51                              SLP OP Goals       Row Name 24 1400          Subjective Comments    Subjective Comments Pt very pleasant and cooperative.  -BB        Verbal Expression Goals    Status: Patient will improve verbal expressive language skills by generating a sentence when given a key word Progressing as expected  -BB     Comments: Patient will improve verbal expressive language skills by generating a sentence when given a key word Responsive namin% acc wo cues and 100% acc w semantic cues. Training/education regarding strategies (visualization, intentionality): mod-max A  -BB               User Key  (r) = Recorded By, (t) = Taken By, (c) = Cosigned By      Initials Name Provider Type    Branden Verma, SLP Speech and Language Pathologist                           Time Calculation:   SLP Start Time: 1330  SLP Stop Time: 1415  SLP Time Calculation (min): 45 min  Timed Charges  89519-FL Dev of Cogn Skills Initial Minutes: 15  07702-MT Dev of Cogn Skills Add Minutes: 30  Total Minutes  Timed Charges Total Minutes: 45   Total Minutes: 45    Therapy Charges for  Today       Code Description Service Date Service Provider Modifiers Qty    03460559522  ST TREATMENT SPEECH 3 8/12/2024 Branden Holcomb, SLP GN 1                     KETAN Reyes  8/12/2024

## 2024-08-14 ENCOUNTER — TREATMENT (OUTPATIENT)
Dept: PHYSICAL THERAPY | Facility: CLINIC | Age: 74
End: 2024-08-14
Payer: MEDICARE

## 2024-08-14 DIAGNOSIS — R29.898 WEAKNESS OF BOTH HIPS: ICD-10-CM

## 2024-08-14 DIAGNOSIS — H81.8X2 UNILATERAL VESTIBULAR WEAKNESS, LEFT: ICD-10-CM

## 2024-08-14 DIAGNOSIS — H81.90 BALANCE PROBLEM DUE TO VESTIBULAR DYSFUNCTION, UNSPECIFIED LATERALITY: Primary | ICD-10-CM

## 2024-08-14 NOTE — PROGRESS NOTES
Re-Assessment / Progress Note  [facility] [place]   [address], [address]  [place] [address]  P: [phone]  F: [phone]  Patient: Carol A Osgood   : 1950  Diagnosis/ICD-10 Code:  Balance problem due to vestibular dysfunction, unspecified laterality [H81.90]  Referring practitioner: NAOMI Dumas  Date of Initial Visit: Episode Type: THERAPY  Noted: 5/3/2024    Today's Date: 2024  Patient seen for 10 sessions.      Subjective:   Carol Osgood reports: no recent falls or dizziness     Subjective Questionnaire: ABC: deferred  Clinical Progress: improved  Home Program Compliance: Yes  Treatment has included: therapeutic exercise, neuromuscular re-education, and therapeutic activity    Subjective   Objective   Left Hip   Planes of Motion   Flexion: 4+  Abduction: 4  External rotation: 4  Internal rotation: 4     Right Hip   Planes of Motion   Flexion: 4+  Abduction: 4  External rotation: 4  Internal rotation: 4+     Left Knee   Flexion: 5  Extension: 5     Right Knee   Flexion: 5  Extension: 5     Left Ankle/Foot   Dorsiflexion: 4+     Right Ankle/Foot   Dorsiflexion: 5   Assessment/Plan  Progress toward previous goals: Partially Met    Goals     SHORT TERM GOALS: 5 weeks  1. Pt will improve standing balance with eyes closed to require only supervision > 15 sec on foam.   met  2. Pt. will improve (B) LE strength to >4/5 (in sitting) as needed to perform sit to stand txf x 2 without UE. met  3. Pt. Will demonstrate the ability to ambulate with SPC with proper adjustments and forward gaze for 4 min. met  4. Pt. Will traverse 10 stairs (up/down) with 1 railing and supervision for entering/exiting home safely.  met     LONG TERM GOALS: 10 weeks  1. Pt will improve standing balance with narrow TOYA, eyes closed to require only supervision > 1 min.   2. Pt. will improve gluteal and gastrocs strength to 4+/5 (in sitting) as needed to improve 30 sec sit to stand to  10x with UE assist on thighs.    3. Pt. Will demonstrate the ability to ambulate with SPC with proper adjustments and forward gaze for >7 min. met  4. Pt. Will decrease TUG score to < 10  Sec; Increase Waite Balance > 43/56 demonstrating improved mobility safety.       Recommendations: Continue as planned  Timeframe: 1 month  Prognosis to achieve goals: good    PT Signature: Petty Ho, PT  [place] Lic. # [id number]      Based upon review of the patient's progress and continued therapy plan, it is my medical opinion that Carol Osgood should continue physical therapy treatment at [facility]  [address] [place] [address] [phone] ; Fax Number [phone]    Signature: __________________________________  Tiffany Gibbons APRN    Manual Therapy:          mins  33080;  Therapeutic Exercise:     20     mins  39471;     Neuromuscular Frank:   10       mins  20351;    Therapeutic Activity:      8     mins  68668;     Gait Training:            mins  73099;     Ultrasound:           mins  93205;    Electrical Stimulation:          mins  44849 ( );  Dry Needling           mins self-pay  Traction           mins 30166  Canalith Repositioning         mins 39398      Timed Treatment:   38   mins   Total Treatment:  38      mins

## 2024-08-15 ENCOUNTER — TELEPHONE (OUTPATIENT)
Dept: GASTROENTEROLOGY | Facility: CLINIC | Age: 74
End: 2024-08-15
Payer: MEDICARE

## 2024-08-15 NOTE — TELEPHONE ENCOUNTER
Hub staff attempted to follow warm transfer process and was unsuccessful     Caller: Osgood,Michael    Relationship to patient: Emergency Contact    Best call back number: [phone]    Patient is needing: PTS EC RETURNING MISSED CALL TO SCHEDULE SCOPE. PLEASE CONTACT TO SCHEDULE. BEST TIME TO REACH PT IS BETWEEN 10A AND 4P DAILY

## 2024-08-16 ENCOUNTER — PREP FOR SURGERY (OUTPATIENT)
Dept: SURGERY | Facility: SURGERY CENTER | Age: 74
End: 2024-08-16
Payer: MEDICARE

## 2024-08-16 DIAGNOSIS — Z12.11 ENCOUNTER FOR SCREENING FOR MALIGNANT NEOPLASM OF COLON: Primary | ICD-10-CM

## 2024-08-16 RX ORDER — SODIUM CHLORIDE 0.9 % (FLUSH) 0.9 %
10 SYRINGE (ML) INJECTION AS NEEDED
OUTPATIENT
Start: 2024-08-16

## 2024-08-16 RX ORDER — SODIUM CHLORIDE 0.9 % (FLUSH) 0.9 %
3 SYRINGE (ML) INJECTION EVERY 12 HOURS SCHEDULED
OUTPATIENT
Start: 2024-08-16

## 2024-08-16 RX ORDER — SODIUM CHLORIDE, SODIUM LACTATE, POTASSIUM CHLORIDE, CALCIUM CHLORIDE 600; 310; 30; 20 MG/100ML; MG/100ML; MG/100ML; MG/100ML
30 INJECTION, SOLUTION INTRAVENOUS CONTINUOUS PRN
OUTPATIENT
Start: 2024-08-16

## 2024-08-19 ENCOUNTER — HOSPITAL ENCOUNTER (OUTPATIENT)
Dept: SPEECH THERAPY | Facility: HOSPITAL | Age: 74
Setting detail: THERAPIES SERIES
Discharge: HOME | End: 2024-08-19
Payer: MEDICARE

## 2024-08-19 DIAGNOSIS — R41.841 COGNITIVE COMMUNICATION DEFICIT: Primary | ICD-10-CM

## 2024-08-19 DIAGNOSIS — R47.1 DYSARTHRIA: ICD-10-CM

## 2024-08-19 PROCEDURE — 92507 TX SP LANG VOICE COMM INDIV: CPT

## 2024-08-19 NOTE — THERAPY TREATMENT NOTE
Outpatient Speech Language Pathology   Adult Speech Language Cognitive Treatment Note  [facility]     Patient Name: Carol A Osgood  : 1950  MRN: [id number]  Today's Date: 2024         Visit Date: 2024   Patient Active Problem List   Diagnosis    Low back pain    Low hemoglobin    Osteoporosis    Thrombocytopenia    Renal insufficiency    Pain and swelling of left knee    Anxiety    Vitamin D deficiency    Constipation    Memory loss    Incontinence of feces    Colon cancer screening    Hx of adenomatous colonic polyps    Glaucomatous atrophy of optic disc    Nuclear senile cataract    Vitreous degeneration    Renal calculus    Hypothyroidism    CRI (chronic renal insufficiency), stage 3 (moderate)    Encounter for screening for malignant neoplasm of colon          Visit Dx:    ICD-10-CM ICD-9-CM   1. Cognitive communication deficit  R41.841 799.52   2. Dysarthria  R47.1 784.51                              SLP OP Goals       Row Name 24 1700          Subjective Comments    Subjective Comments Pt pleasant and cooperative.  -BB        Verbal Expression Goals    Patient will improve verbal expressive language skills by naming objects/pictures 80%:;without cues  -BB     Status: Patient will improve verbal expressive language skills by naming objects/pictures Progressing as expected  -BB     Comments:Patient will improve verbal expressive language skills by naming objects/pictures Confrontation namin% acc wo cues and 100% acc w SFA. Training w SFA: DEP  -BB     Patient will improve verbal expressive language skills by generating a sentence when given a key word 80%:;without cues  -BB     Status: Patient will improve verbal expressive language skills by generating a sentence when given a key word Progressing as expected  -BB     Comments: Patient will improve verbal expressive language skills by generating a sentence when given a key word Responsive namin% acc wo cues and 100% acc w  semantic cues. Training/education regarding strategies (visualization, intentionality): mod-max A  -BB     Patient will improve verbal expressive language skills by providing basic definition when given a word 80%:;without cues  -BB     Status: Patient will improve verbal expressive language skills by providing basic definition when given a word Progressing as expected  -BB     Comments: Patient will improve verbal expressive language skills by generating a sentence when given a key word Descriptions of words: mod A  -BB               User Key  (r) = Recorded By, (t) = Taken By, (c) = Cosigned By      Initials Name Provider Type    Branden Verma, SLP Speech and Language Pathologist                           Time Calculation:   SLP Start Time: 1330  SLP Stop Time: 1415  SLP Time Calculation (min): 45 min  Untimed Charges  28200-QO Treatment/ST Modification Prosth Aug Alter : 45  Total Minutes  Untimed Charges Total Minutes: 45   Total Minutes: 45    Therapy Charges for Today       Code Description Service Date Service Provider Modifiers Qty    95589168916  ST TREATMENT SPEECH 3 8/19/2024 Branden Holcomb, SLP GN 1                     KETAN Reyes  8/19/2024

## 2024-08-21 ENCOUNTER — TREATMENT (OUTPATIENT)
Dept: PHYSICAL THERAPY | Facility: CLINIC | Age: 74
End: 2024-08-21
Payer: MEDICARE

## 2024-08-21 DIAGNOSIS — R29.898 WEAKNESS OF BOTH HIPS: ICD-10-CM

## 2024-08-21 DIAGNOSIS — H81.90 BALANCE PROBLEM DUE TO VESTIBULAR DYSFUNCTION, UNSPECIFIED LATERALITY: Primary | ICD-10-CM

## 2024-08-21 NOTE — PROGRESS NOTES
Physical Therapy Daily Treatment Note  [facility] [place]   [address], [address]  [place] [address]  P: [phone]  F: [phone]    Patient: Carol A Osgood   : 1950  Referring practitioner: NAOMI Dumas  Date of Initial Visit: Type: THERAPY  Noted: 5/3/2024  Today's Date: 2024  Patient seen for 11 sessions       Visit Diagnoses:    ICD-10-CM ICD-9-CM   1. Balance problem due to vestibular dysfunction, unspecified laterality  H81.90 386.50   2. Weakness of both hips  R29.898 729.89         Carol Osgood reports: feels a little stiff today in legs    Subjective     Objective   See Exercise, Manual, and Modality Logs for complete treatment.       Assessment/Plan Compliant/cooperative with current rehab efforts.  Benefits from verbal/tactile cues to ensure correct exercise performance/technique, hold time and position. Plan details: Progress ROM / strengthening / stabilization / functional activity as tolerated       Timed:         Manual Therapy:         mins  05012;     Therapeutic Exercise:  15       mins  32585;     Neuromuscular Frank:   15     mins  94494;    Therapeutic Activity:          mins  29316;     Gait Training:           mins  47741;     Ultrasound:          mins  75798;    Ionto                                   mins  11874  Self Care                            mins  30276  Traction          mins 12617      Un-Timed:  Canalith Repos         mins 67865  Electrical Stimulation:         mins  43639 ( );  Dry Needling          mins self-pay  Traction          mins 18948        Timed Treatment:   30   mins   Total Treatment:    30    mins    Petty Ho PT  [place] License #: [id number]    Physical Therapist

## 2024-08-26 ENCOUNTER — HOSPITAL ENCOUNTER (OUTPATIENT)
Dept: SPEECH THERAPY | Facility: HOSPITAL | Age: 74
Setting detail: THERAPIES SERIES
Discharge: HOME | End: 2024-08-26
Payer: MEDICARE

## 2024-08-26 DIAGNOSIS — R41.841 COGNITIVE COMMUNICATION DEFICIT: Primary | ICD-10-CM

## 2024-08-26 DIAGNOSIS — R47.1 DYSARTHRIA: ICD-10-CM

## 2024-08-26 PROCEDURE — 92507 TX SP LANG VOICE COMM INDIV: CPT

## 2024-08-26 NOTE — THERAPY TREATMENT NOTE
Outpatient Speech Language Pathology   Adult Speech Language Cognitive Treatment Note  [facility]     Patient Name: Carol A Osgood  : 1950  MRN: [id number]  Today's Date: 2024         Visit Date: 2024   Patient Active Problem List   Diagnosis    Low back pain    Low hemoglobin    Osteoporosis    Thrombocytopenia    Renal insufficiency    Pain and swelling of left knee    Anxiety    Vitamin D deficiency    Constipation    Memory loss    Incontinence of feces    Colon cancer screening    Hx of adenomatous colonic polyps    Glaucomatous atrophy of optic disc    Nuclear senile cataract    Vitreous degeneration    Renal calculus    Hypothyroidism    CRI (chronic renal insufficiency), stage 3 (moderate)    Encounter for screening for malignant neoplasm of colon          Visit Dx:    ICD-10-CM ICD-9-CM   1. Cognitive communication deficit  R41.841 799.52   2. Dysarthria  R47.1 784.51                              SLP OP Goals       Row Name 24 1400          Subjective Comments    Subjective Comments Pt pleasant and cooperative.  -BB        Verbal Expression Goals    Patient will improve verbal expressive language skills by naming objects/pictures 80%:;without cues  -BB     Status: Patient will improve verbal expressive language skills by naming objects/pictures Progressing as expected  -BB     Comments:Patient will improve verbal expressive language skills by naming objects/pictures VNeST-like therapy with action pictures w prompt to supply subject, verb, direct object: 90% acc w min-mod A  -BB               User Key  (r) = Recorded By, (t) = Taken By, (c) = Cosigned By      Initials Name Provider Type    Branden Verma, SLP Speech and Language Pathologist                           Time Calculation:   SLP Start Time: 1330  SLP Stop Time: 1415  SLP Time Calculation (min): 45 min  Untimed Charges  16632-OC Treatment/ST Modification Prosth Aug Alter : 45  Total Minutes  Untimed Charges Total  Minutes: 45   Total Minutes: 45    Therapy Charges for Today       Code Description Service Date Service Provider Modifiers Qty    67614640620  ST TREATMENT SPEECH 3 8/26/2024 Branden Holcomb, SLP GN 1                     KETAN Reyes  8/26/2024

## 2024-08-28 ENCOUNTER — TREATMENT (OUTPATIENT)
Dept: PHYSICAL THERAPY | Facility: CLINIC | Age: 74
End: 2024-08-28
Payer: MEDICARE

## 2024-08-28 DIAGNOSIS — H81.90 BALANCE PROBLEM DUE TO VESTIBULAR DYSFUNCTION, UNSPECIFIED LATERALITY: Primary | ICD-10-CM

## 2024-08-28 DIAGNOSIS — R29.898 WEAKNESS OF BOTH HIPS: ICD-10-CM

## 2024-08-28 DIAGNOSIS — H81.8X2 UNILATERAL VESTIBULAR WEAKNESS, LEFT: ICD-10-CM

## 2024-08-28 NOTE — PROGRESS NOTES
Physical Therapy Daily Treatment Note/Discharge   [facility] [place]   [address], [address]  [place] [address]  P: [phone]  F: [phone]    Patient: Carol A Osgood   : 1950  Referring practitioner: NAOMI Dumas  Date of Initial Visit: Type: THERAPY  Noted: 5/3/2024  Today's Date: 2024  Patient seen for 12 sessions       Visit Diagnoses:    ICD-10-CM ICD-9-CM   1. Balance problem due to vestibular dysfunction, unspecified laterality  H81.90 386.50   2. Weakness of both hips  R29.898 729.89   3. Unilateral vestibular weakness, left  H81.8X2 386.8         Carol Osgood reports: no recent falls     Subjective     Objective   See Exercise, Manual, and Modality Logs for complete treatment.       Assessment/Plan Pt has made excellent progress. No LOB with balance challenges or falls. Ready to DC to continue with home exercises.       Timed:         Manual Therapy:         mins  32130;     Therapeutic Exercise:      20   mins  56749;     Neuromuscular Frank:    10    mins  15460;    Therapeutic Activity:       8  mins  05153;     Gait Training:           mins  09795;     Ultrasound:          mins  59495;    Ionto                                   mins  87302  Self Care                           mins  70792  Traction          mins 79367      Un-Timed:  Canalith Repos         mins 50246  Electrical Stimulation:         mins  68514 ( );  Dry Needling          mins self-pay  Traction          mins 69381        Timed Treatment:  38    mins   Total Treatment:    38    mins    Petty Ho PT  [place] License #: [id number]    Physical Therapist

## 2024-09-16 ENCOUNTER — HOSPITAL ENCOUNTER (OUTPATIENT)
Dept: SPEECH THERAPY | Facility: HOSPITAL | Age: 74
Discharge: HOME | End: 2024-09-16
Admitting: NURSE PRACTITIONER
Payer: MEDICARE

## 2024-09-16 DIAGNOSIS — R41.841 COGNITIVE COMMUNICATION DEFICIT: Primary | ICD-10-CM

## 2024-09-16 DIAGNOSIS — R47.1 DYSARTHRIA: ICD-10-CM

## 2024-09-16 PROCEDURE — 92507 TX SP LANG VOICE COMM INDIV: CPT

## 2024-09-19 ENCOUNTER — TELEPHONE (OUTPATIENT)
Dept: NEUROLOGY | Facility: CLINIC | Age: 74
End: 2024-09-19
Payer: MEDICARE

## 2024-09-23 DIAGNOSIS — M81.0 AGE-RELATED OSTEOPOROSIS WITHOUT CURRENT PATHOLOGICAL FRACTURE: Primary | ICD-10-CM

## 2024-09-26 ENCOUNTER — INFUSION (OUTPATIENT)
Dept: ONCOLOGY | Facility: HOSPITAL | Age: 74
End: 2024-09-26
Payer: MEDICARE

## 2024-09-26 ENCOUNTER — LAB (OUTPATIENT)
Dept: OTHER | Facility: HOSPITAL | Age: 74
End: 2024-09-26
Payer: MEDICARE

## 2024-09-26 DIAGNOSIS — M81.0 AGE-RELATED OSTEOPOROSIS WITHOUT CURRENT PATHOLOGICAL FRACTURE: Primary | ICD-10-CM

## 2024-09-26 DIAGNOSIS — M81.0 AGE-RELATED OSTEOPOROSIS WITHOUT CURRENT PATHOLOGICAL FRACTURE: ICD-10-CM

## 2024-09-26 LAB
25(OH)D3 SERPL-MCNC: 65.4 NG/ML (ref 30–100)
ALBUMIN SERPL-MCNC: 4 G/DL (ref 3.5–5.2)
ALBUMIN/GLOB SERPL: 1.3 G/DL
ALP SERPL-CCNC: 84 U/L (ref 39–117)
ALT SERPL W P-5'-P-CCNC: 13 U/L (ref 1–33)
ANION GAP SERPL CALCULATED.3IONS-SCNC: 7 MMOL/L (ref 5–15)
AST SERPL-CCNC: 20 U/L (ref 1–32)
BILIRUB SERPL-MCNC: 0.4 MG/DL (ref 0–1.2)
BUN SERPL-MCNC: 38 MG/DL (ref 8–23)
BUN/CREAT SERPL: 25.7 (ref 7–25)
CALCIUM SPEC-SCNC: 9.4 MG/DL (ref 8.6–10.5)
CHLORIDE SERPL-SCNC: 102 MMOL/L (ref 98–107)
CO2 SERPL-SCNC: 29 MMOL/L (ref 22–29)
CREAT SERPL-MCNC: 1.48 MG/DL (ref 0.57–1)
EGFRCR SERPLBLD CKD-EPI 2021: 36.8 ML/MIN/1.73
GLOBULIN UR ELPH-MCNC: 3.1 GM/DL
GLUCOSE SERPL-MCNC: 89 MG/DL (ref 65–99)
MAGNESIUM SERPL-MCNC: 2.3 MG/DL (ref 1.6–2.4)
PHOSPHATE SERPL-MCNC: 2.5 MG/DL (ref 2.5–4.5)
POTASSIUM SERPL-SCNC: 4.5 MMOL/L (ref 3.5–5.2)
PROT SERPL-MCNC: 7.1 G/DL (ref 6–8.5)
SODIUM SERPL-SCNC: 138 MMOL/L (ref 136–145)

## 2024-09-26 PROCEDURE — 84100 ASSAY OF PHOSPHORUS: CPT | Performed by: INTERNAL MEDICINE

## 2024-09-26 PROCEDURE — 80053 COMPREHEN METABOLIC PANEL: CPT | Performed by: INTERNAL MEDICINE

## 2024-09-26 PROCEDURE — 82306 VITAMIN D 25 HYDROXY: CPT | Performed by: INTERNAL MEDICINE

## 2024-09-26 PROCEDURE — 96372 THER/PROPH/DIAG INJ SC/IM: CPT

## 2024-09-26 PROCEDURE — 25010000002 DENOSUMAB 60 MG/ML SOLUTION PREFILLED SYRINGE: Performed by: INTERNAL MEDICINE

## 2024-09-26 PROCEDURE — 83735 ASSAY OF MAGNESIUM: CPT | Performed by: INTERNAL MEDICINE

## 2024-09-26 RX ADMIN — DENOSUMAB 60 MG: 60 INJECTION SUBCUTANEOUS at 10:13

## 2024-09-30 ENCOUNTER — HOSPITAL ENCOUNTER (OUTPATIENT)
Dept: SPEECH THERAPY | Facility: HOSPITAL | Age: 74
Discharge: HOME | End: 2024-09-30
Admitting: NURSE PRACTITIONER
Payer: MEDICARE

## 2024-09-30 DIAGNOSIS — R41.841 COGNITIVE COMMUNICATION DEFICIT: Primary | ICD-10-CM

## 2024-09-30 PROCEDURE — 92507 TX SP LANG VOICE COMM INDIV: CPT

## 2024-09-30 NOTE — THERAPY TREATMENT NOTE
Outpatient Speech Language Pathology   Adult Speech Language Cognitive Treatment Note  [facility]     Patient Name: Carol A Osgood  : 1950  MRN: [id number]  Today's Date: 2024         Visit Date: 2024   Patient Active Problem List   Diagnosis    Low back pain    Low hemoglobin    Osteoporosis    Thrombocytopenia    Renal insufficiency    Pain and swelling of left knee    Anxiety    Vitamin D deficiency    Constipation    Memory loss    Incontinence of feces    Colon cancer screening    Hx of adenomatous colonic polyps    Glaucomatous atrophy of optic disc    Nuclear senile cataract    Vitreous degeneration    Renal calculus    Hypothyroidism    CRI (chronic renal insufficiency), stage 3 (moderate)    Encounter for screening for malignant neoplasm of colon          Visit Dx:    ICD-10-CM ICD-9-CM   1. Cognitive communication deficit  R41.841 799.52                              SLP OP Goals       Row Name 24 1300          Subjective Comments    Subjective Comments Patient is pleasant and cooperative.  present during session. States they had a neuropsych follow-up which revealed moderate severity impacting primarily speech and memory.  unable to recall type of dementia but states he will bring report to next visit.  -CR        Subjective Pain    Able to rate subjective pain? yes  -CR     Pre-Treatment Pain Level 0  -CR     Post-Treatment Pain Level 0  -CR        Verbal Expression Goals    Patient will improve verbal expressive language skills by naming objects/pictures 80%:;without cues  -CR     Status: Patient will improve verbal expressive language skills by naming objects/pictures Progressing as expected  -CR     Comments:Patient will improve verbal expressive language skills by naming objects/pictures Divergent concrete naming completed; pt named 10 pieces of furniture given MIN-MOD verbal cues and education for visualization.  -CR     Patient will improve verbal  expressive language skills by generating a sentence when given a key word 80%:;without cues  -CR     Status: Patient will improve verbal expressive language skills by generating a sentence when given a key word Progressing as expected  -CR     Comments: Patient will improve verbal expressive language skills by generating a sentence when given a key word Given a picture scene, patient created multiple sentences to create a story with 80% accuracy with MOD verbal cues in times of word finding difficulties throughout task. Given beginning of common phrase expression, pt completed expression with given word(s) with 50% acc without cues, increased to 100% acc given MOD-MAX verbal/visual cues.  -CR     Patient will improve verbal expressive language skills by describing attributes, function, action and/or uses of an object/item 80%:;without cues  -CR     Status: Patient will improve verbal expressive language skills by describing attributes, function, action and/or uses of an object/item Progressing as expected  -CR     Comments: Patient will improve verbal expressive language skills by describing attributes, function, action and/or uses of an object/item IIND use/initiation of semantic feature analysis in times of anomia, however, pt required additional cueing from SLP or  to state target word.  -CR               User Key  (r) = Recorded By, (t) = Taken By, (c) = Cosigned By      Initials Name Provider Type    CR Beatriz Wright SLP Speech and Language Pathologist                           Time Calculation:   SLP Start Time: 1325  SLP Stop Time: 1410  SLP Time Calculation (min): 45 min  Untimed Charges  06690-VT Treatment/ST Modification Prosth Aug Alter : 45  Total Minutes  Untimed Charges Total Minutes: 45   Total Minutes: 45    Therapy Charges for Today       Code Description Service Date Service Provider Modifiers Qty    [id number]  ST TREATMENT SPEECH 3 9/30/2024 Beatriz Wright SLP KX, GN 1                      Beatriz Wright, SLP  9/30/2024

## 2024-10-07 ENCOUNTER — HOSPITAL ENCOUNTER (OUTPATIENT)
Dept: SPEECH THERAPY | Facility: HOSPITAL | Age: 74
Discharge: HOME | End: 2024-10-07
Admitting: NURSE PRACTITIONER
Payer: MEDICARE

## 2024-10-07 DIAGNOSIS — R41.841 COGNITIVE COMMUNICATION DEFICIT: Primary | ICD-10-CM

## 2024-10-07 PROCEDURE — 92507 TX SP LANG VOICE COMM INDIV: CPT

## 2024-10-07 NOTE — THERAPY PROGRESS REPORT/RE-CERT
Outpatient Speech Language Pathology   Adult Speech Language Cognitive Progress Note  [facility]     Patient Name: Carol A Osgood  : 1950  MRN: [id number]  Today's Date: 10/7/2024         Visit Date: 10/07/2024   Patient Active Problem List   Diagnosis    Low back pain    Low hemoglobin    Osteoporosis    Thrombocytopenia    Renal insufficiency    Pain and swelling of left knee    Anxiety    Vitamin D deficiency    Constipation    Memory loss    Incontinence of feces    Colon cancer screening    Hx of adenomatous colonic polyps    Glaucomatous atrophy of optic disc    Nuclear senile cataract    Vitreous degeneration    Renal calculus    Hypothyroidism    CRI (chronic renal insufficiency), stage 3 (moderate)    Encounter for screening for malignant neoplasm of colon          Visit Dx:    ICD-10-CM ICD-9-CM   1. Cognitive communication deficit  R41.841 799.52                              SLP OP Goals       Row Name 10/07/24 1300          Subjective Comments    Subjective Comments Patient is pleasant and cooperative.  present during session and brought neuropsych results indicating frontotemporal dementia.  -CR        Subjective Pain    Able to rate subjective pain? yes  -CR     Pre-Treatment Pain Level 0  -CR     Post-Treatment Pain Level 0  -CR        Verbal Expression Goals    Patient will be able to verbally express basic wants and needs in home environment 80%:;without cues  -CR     Status: Patient will be able to verbally express basic wants and needs in home environment Progressing as expected  -CR     Comments: Patient will be able to verbally express basic wants and needs in home environment Pt verbalizes basic wants and needs in home environment with frequent word finding difficulties requiring cues from .  -CR     Patient will improve verbal expressive language skills by naming objects/pictures 80%:;without cues  -CR     Status: Patient will improve verbal expressive language skills  by naming objects/pictures Progressing as expected  -CR     Comments:Patient will improve verbal expressive language skills by naming objects/pictures Divergent concrete naming completed; pt named 5 sports in two minutes given MIN-MOD verbal cues.  -CR     Patient will improve verbal expressive language skills by generating a sentence when given a key word 80%:;without cues  -CR     Status: Patient will improve verbal expressive language skills by generating a sentence when given a key word Progressing as expected  -CR     Comments: Patient will improve verbal expressive language skills by generating a sentence when given a key word Given a picture scene, patient created multiple sentences to create a story with 80% accuracy with MOD-MAX verbal cues for grammatically correct sentence structure and good reasoning skills.  -CR     Patient will improve verbal expressive language skills by providing basic definition when given a word 80%:;without cues  -CR     Status: Patient will improve verbal expressive language skills by providing basic definition when given a word Progressing as expected  -CR     Comments: Patient will improve verbal expressive language skills by generating a sentence when given a key word Providing antonyms to target word completed with 60% accuracy without cues, increased to 100% accuracy given MOD cues.  -CR     Patient will improve verbal expressive language skills by describing attributes, function, action and/or uses of an object/item 80%:;without cues  -CR     Status: Patient will improve verbal expressive language skills by describing attributes, function, action and/or uses of an object/item Progressing as expected  -CR     Comments: Patient will improve verbal expressive language skills by describing attributes, function, action and/or uses of an object/item MIN-MOD cues from SLP and/or  to enhance use of semantic feature analysis.  -CR        Auditory Comprehension Goals     Auditory Comprehension LTG's Patient will comprehend basic spoken information presented in home environment  -CR     Patient will comprehend basic spoken information presented in home environment 70%:;without cues  -CR     Status: Patient will comprehend basic spoken information presented in home environment Achieved  -CR     Comments: Patient will comprehend basic spoken information presented in home environment Pt demonstrates good comprehension throughout POC thus far. Goal met.  -CR     Patient will demonstrate comprehension of spoken language by answering simple Yes/No questions 80%:;without cues  -CR     Status: Patient will demonstrate comprehension of spoken language by answering simple Yes/No questions Achieved  -CR     Comments: Patient will demonstrate comprehension of spoken language by answering simple Yes/No questions Pt answers Y/N questions appropriately without cues.  -CR               User Key  (r) = Recorded By, (t) = Taken By, (c) = Cosigned By      Initials Name Provider Type    Beatriz Hardwick, SLP Speech and Language Pathologist                    Patient has made steady progress toward speech therapy goals thus far. Requiring an average of MOD verbal cues to improve functional verbal expression. Education and training completed with  to incorporate semantic feature analysis;  IND in cueing strategies which enhances pt's functional communication. Pt would benefit further from continued speech therapy services to improve independent use of communicative strategies and decrease caregiver burden. Without speech therapy services, pt is at risk for further decline and social isolation.        Time Calculation:   SLP Start Time: 1330  SLP Stop Time: 1415  SLP Time Calculation (min): 45 min  Untimed Charges  35865-SH Treatment/ST Modification Prosth Aug Alter : 45  Total Minutes  Untimed Charges Total Minutes: 45   Total Minutes: 45    Therapy Charges for Today       Code  Description Service Date Service Provider Modifiers Qty    99475193306  ST TREATMENT SPEECH 3 10/7/2024 Beatriz Wright, SLP KX, GN 1                     KETAN Mendoza  10/7/2024

## 2024-10-21 ENCOUNTER — TELEPHONE (OUTPATIENT)
Dept: GASTROENTEROLOGY | Facility: CLINIC | Age: 74
End: 2024-10-21
Payer: MEDICARE

## 2024-10-21 ENCOUNTER — HOSPITAL ENCOUNTER (OUTPATIENT)
Dept: SPEECH THERAPY | Facility: HOSPITAL | Age: 74
Discharge: HOME | End: 2024-10-21
Admitting: NURSE PRACTITIONER
Payer: MEDICARE

## 2024-10-21 DIAGNOSIS — R41.841 COGNITIVE COMMUNICATION DEFICIT: Primary | ICD-10-CM

## 2024-10-21 PROCEDURE — 92507 TX SP LANG VOICE COMM INDIV: CPT

## 2024-10-21 NOTE — THERAPY TREATMENT NOTE
Outpatient Speech Language Pathology   Adult Speech Language Cognitive Treatment Note  [facility]     Patient Name: Carol A Osgood  : 1950  MRN: [id number]  Today's Date: 10/21/2024         Visit Date: 10/21/2024   Patient Active Problem List   Diagnosis    Low back pain    Low hemoglobin    Osteoporosis    Thrombocytopenia    Renal insufficiency    Pain and swelling of left knee    Anxiety    Vitamin D deficiency    Constipation    Memory loss    Incontinence of feces    Colon cancer screening    Hx of adenomatous colonic polyps    Glaucomatous atrophy of optic disc    Nuclear senile cataract    Vitreous degeneration    Renal calculus    Hypothyroidism    CRI (chronic renal insufficiency), stage 3 (moderate)    Encounter for screening for malignant neoplasm of colon          Visit Dx:    ICD-10-CM ICD-9-CM   1. Cognitive communication deficit  R41.841 799.52                              SLP OP Goals       Row Name 10/21/24 1300          Subjective Comments    Subjective Comments Patient is pleasant and cooperative.  present during session. Neuro appt on Wednesday to discuss neuropsych results and further POC. Will plan for at least one more speech therapy appt. Pt and  in agreement.  -CR        Subjective Pain    Able to rate subjective pain? yes  -CR     Pre-Treatment Pain Level 0  -CR     Post-Treatment Pain Level 0  -CR        Verbal Expression Goals    Patient will improve verbal expressive language skills by naming objects/pictures 80%:;without cues  -CR     Status: Patient will improve verbal expressive language skills by naming objects/pictures Progressing as expected  -CR     Comments:Patient will improve verbal expressive language skills by naming objects/pictures Naming simple objects on picture cards completed with 80% accuracy without cues, increased to 100% accuracy with MOD written/verbal cues. Naming items in a picture scene completed with 80% accuracy without cues.  -CR      Patient will improve verbal expressive language skills by generating a sentence when given a key word 80%:;without cues  -CR     Status: Patient will improve verbal expressive language skills by generating a sentence when given a key word Progressing as expected  -CR     Comments: Patient will improve verbal expressive language skills by generating a sentence when given a key word Generating a sentence with each key word from picture scene completed with 50% accuracy without cues, increased to 100% accuracy given MIN-MOD cues from SLP or .  -CR               User Key  (r) = Recorded By, (t) = Taken By, (c) = Cosigned By      Initials Name Provider Type    CR Beatriz Wright SLP Speech and Language Pathologist                           Time Calculation:   SLP Start Time: 1330  SLP Stop Time: 1415  SLP Time Calculation (min): 45 min  Untimed Charges  02709-TO Treatment/ST Modification Prosth Aug Alter : 45  Total Minutes  Untimed Charges Total Minutes: 45   Total Minutes: 45    Therapy Charges for Today       Code Description Service Date Service Provider Modifiers Qty    [id number]  ST TREATMENT SPEECH 3 10/21/2024 Beatriz Wright SLP KX, GN 1                     KETAN Mendoza  10/21/2024

## 2024-10-23 ENCOUNTER — OFFICE VISIT (OUTPATIENT)
Dept: NEUROLOGY | Facility: CLINIC | Age: 74
End: 2024-10-23
Payer: MEDICARE

## 2024-10-23 VITALS
BODY MASS INDEX: 19.78 KG/M2 | WEIGHT: 126 LBS | HEART RATE: 66 BPM | DIASTOLIC BLOOD PRESSURE: 74 MMHG | HEIGHT: 67 IN | SYSTOLIC BLOOD PRESSURE: 112 MMHG

## 2024-10-23 DIAGNOSIS — G31.01 PRIMARY PROGRESSIVE APHASIA: Primary | ICD-10-CM

## 2024-10-23 DIAGNOSIS — F02.80 PRIMARY PROGRESSIVE APHASIA: Primary | ICD-10-CM

## 2024-10-23 NOTE — PROGRESS NOTES
[facility] NEUROLOGY         Date of Visit: 10/23/2024    Name: Carol A Osgood    :  1950    PCP: Ciera Newman MD    Visit Type: follow-up         Subjective     Patient ID: Mattie is a 73 y.o. female.         History of Present Illness  I had the pleasure of seeing your patient  today.  As you may know she is a 73-year-old female here today for follow-up for treatment of mild cognitive impairment.  She was referred by her primary care physician.  They are also here to discuss possible additional options for management of memory disorder.    History:    Patient does have history of osteoporosis, hypothyroid, vitamin B12 and vitamin D deficiency, mild cognitive impairment.    Patient and  present today for the appointment.  Patient's  states that memory symptoms have been going on for several years now.  They did end up undergoing workup for this in .  At that time an MRI of the brain was performed which showed some microvascular changes with no other significant abnormalities.  They did end up completing neuropsychological testing at [facility] and [facility] also in .  Neuropsychological testing that time revealed evidence for a mild cognitive impairment.  They felt that the pattern did not fit an Alzheimer's type presentation however more likely a vascular dementia presentation however symptoms were too early to further delineate.    Patient did follow back up with primary care physician after this.  They did possibly discuss starting acetylcholinesterase inhibitors however decided to forego medication at that time.  Since then patient has had progressive worsening of some of the cognitive symptoms and is here today for additional recommendations and treatment options.    Current:    At last appointment we did end up ordering a repeat MRI of the brain which did show evidence for cerebral amyloid angiopathy as well as mild to moderate microvascular changes and atrophy.   It did appear to be fairly stable in comparison to scan from 2022 with no significant progression of disease process.    Patient also underwent repeat neuropsychological testing at [facility].  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  It did not fit criteria for Alzheimer's diagnosis.    Patient also has been completing speech therapy to help with some of the aphasia symptoms that she has been experiencing.  Her  states that it has been helpful for both of them and learning how to compensate for her word deficits.  He states that overall she he feels that she has been fairly stable with no significant progression or worsening of symptoms.  They deny any new or worsening neurological complaints at today's visit.      The following portions of the patient's history were reviewed and updated as appropriate: allergies, current medications, past family history, past medical history, past social history, past surgical history, and problem list.                 Review of Systems   Constitutional:  Negative for activity change, appetite change and unexpected weight change.   HENT:  Negative for hearing loss and trouble swallowing.    Eyes:  Negative for visual disturbance.   Respiratory:  Negative for chest tightness and shortness of breath.    Cardiovascular:  Negative for palpitations.   Gastrointestinal:  Negative for constipation and diarrhea.   Genitourinary:  Negative for decreased urine volume, difficulty urinating and frequency.   Musculoskeletal:  Negative for back pain and gait problem.   Neurological:  Positive for speech difficulty. Negative for tremors, syncope, facial asymmetry and light-headedness.   Psychiatric/Behavioral:  Positive  "for confusion. Negative for agitation, behavioral problems, decreased concentration, dysphoric mood, hallucinations and sleep disturbance. The patient is not nervous/anxious.             Current Medications:    Current Outpatient Medications   Medication Instructions    acetaminophen (TYLENOL) 650 mg, Every 6 Hours PRN    Ascorbic Acid (VITAMIN C PO) 1 tablet, Daily    Cholecalciferol (VITAMIN D3) 50 MCG (2000 UT) tablet 1 tablet, Daily    coenzyme Q10 100 mg, Daily    denosumab (PROLIA) 60 mg, Every 6 Months    levothyroxine (SYNTHROID, LEVOTHROID) 25 mcg, Oral, Daily    Methylfol-Algae-I95-Rcxrxwbzdo (Cerefolin NAC) 6-90.314-2-600 MG tablet 1 tablet, Oral, Daily    NON FORMULARY 2 Times Daily    Polyethyl Glycol-Propyl Glycol (SYSTANE ULTRA OP) 1 drop, Nightly    vitamin B-12 (CYANOCOBALAMIN) 1,000 mcg, Daily    Zinc 50 MG tablet 1 tablet, Daily          /74   Pulse 66   Ht 170.2 cm (67.01\")   Wt 57.2 kg (126 lb)   BMI 19.73 kg/m²                Objective     Neurological Exam  Mental Status  Awake and alert. Oriented only to person and situation. Patient was able to correctly name place including country city and state however had to be given choices of the terms in order to come up with the correct answer.  She was having difficulty naming specifically during evaluation.. Recalls 3 of 3 objects immediately. At 15 minutes recalls 0 of 3 elements. Recalls 0 of 3 objects with prompting. Able to copy figure. Speech is normal. Able to repeat and write. Follows complex commands. Attention and concentration are normal. 0/5. MMSE score: 16.    Cranial Nerves  CN II: Visual fields full to confrontation.  CN III, IV, VI: Extraocular movements intact bilaterally. Normal lids and orbits bilaterally. Pupils equal round and reactive to light bilaterally.  CN V: Facial sensation is normal.  CN VII: Full and symmetric facial movement.  CN IX, X: Palate elevates symmetrically  CN XI: Shoulder shrug strength is " normal.  CN XII: Tongue midline without atrophy or fasciculations.    Motor  Normal muscle bulk throughout. Normal muscle tone. No abnormal involuntary movements. Strength is 5/5 throughout all four extremities.    Sensory  Sensation is intact to light touch, pinprick, vibration and proprioception in all four extremities.    Reflexes  Deep tendon reflexes are 2+ and symmetric in all four extremities.    Coordination    Finger-to-nose, rapid alternating movements and heel-to-shin normal bilaterally without dysmetria.    Gait  Normal casual, toe, heel and tandem gait.      Physical Exam  Constitutional:       General: She is awake.      Appearance: Normal appearance. She is normal weight.   Eyes:      General: Lids are normal.      Extraocular Movements: Extraocular movements intact.      Pupils: Pupils are equal, round, and reactive to light.   Pulmonary:      Effort: Pulmonary effort is normal.   Skin:     General: Skin is warm.   Neurological:      Mental Status: She is alert.      Motor: Motor strength is normal.     Coordination: Coordination is intact.      Deep Tendon Reflexes: Reflexes are normal and symmetric.   Psychiatric:         Mood and Affect: Mood normal.         Speech: Speech normal.         Behavior: Behavior normal.                     Assessment & Plan     Diagnoses and all orders for this visit:    1. Primary progressive aphasia (Primary)  -     Ambulatory Referral to Neurology      At this time I did discuss in detail results of MRI brain and neuropsychological testing with the patient and her .  We did discuss the difference between primary progressive aphasia and other types of dementia syndromes.    In addition I did discuss with them possibly starting on acetylcholinesterase inhibitor such as donepezil and potentially memantine for management of symptoms of the dementia.  I did give the papers about both medications and they would like to go home and think about this a little bit  more.  They have previously decided not to pursue any additional medical management.    In addition I did discuss with them considering referral to a dementia clinic as this is a more rare form of dementia.  They will be able to discuss any additional treatment options they would recommend.  They are open to this.    In addition we will continue with speech therapy for aphasia recommendations and management.    Plan to follow-up in 6 months or if they decide to go forward with medication 2 months.  Total of 45 minutes was spent with the patient and  discussing diagnosis, prognosis, plan of care, reviewing previous testing, recommendations for medication management.              Virginia SALGADO    Neurology    [facility] [place]    Phone: [phone]    10/23/2024 , 15:59 EDT

## 2024-10-28 ENCOUNTER — HOSPITAL ENCOUNTER (OUTPATIENT)
Dept: SPEECH THERAPY | Facility: HOSPITAL | Age: 74
Discharge: HOME | End: 2024-10-28
Admitting: NURSE PRACTITIONER
Payer: MEDICARE

## 2024-10-28 DIAGNOSIS — R41.841 COGNITIVE COMMUNICATION DEFICIT: Primary | ICD-10-CM

## 2024-10-28 PROCEDURE — 92507 TX SP LANG VOICE COMM INDIV: CPT

## 2024-10-28 NOTE — THERAPY DISCHARGE NOTE
Speech Language Pathology Discharge Summary  [facility]       Patient Name: Carol A Osgood  : 1950  MRN: [id number]    Today's Date: 10/28/2024       SLP OP Goals       Row Name 10/28/24 1300          Subjective Comments    Subjective Comments Patient is pleasant and cooperative.  present during session. Planned discharge this date discussed with patient and . All in agreement of plan and recs.  -CR        Subjective Pain    Able to rate subjective pain? yes  -CR     Pre-Treatment Pain Level 0  -CR     Post-Treatment Pain Level 0  -CR        Verbal Expression Goals    Patient will be able to verbally express basic wants and needs in home environment 80%:;without cues  -CR     Status: Patient will be able to verbally express basic wants and needs in home environment Discontinued  -CR     Comments: Patient will be able to verbally express basic wants and needs in home environment Pt verbalizes basic wants and needs in home environment with frequent word finding difficulties requiring cues from .  -CR     Patient will improve verbal expressive language skills by naming objects/pictures 80%:;without cues  -CR     Status: Patient will improve verbal expressive language skills by naming objects/pictures Achieved  -CR     Comments:Patient will improve verbal expressive language skills by naming objects/pictures Naming simple objects with 80% accuracy without cues. Goal met.  -CR     Patient will improve verbal expressive language skills by generating a sentence when given a key word 80%:;without cues  -CR     Status: Patient will improve verbal expressive language skills by generating a sentence when given a key word Discontinued  -CR     Comments: Patient will improve verbal expressive language skills by generating a sentence when given a key word Generating a sentence given target word with 80% accuracy given MIN-MOD cues provided from SLP or .  -CR     Patient will improve verbal  expressive language skills by providing basic definition when given a word 80%:;without cues  -CR     Status: Patient will improve verbal expressive language skills by providing basic definition when given a word Discontinued  -CR     Comments: Patient will improve verbal expressive language skills by generating a sentence when given a key word Providing antonyms to target word completed with 60% accuracy without cues, increased to 100% accuracy given MOD cues from SLP or .  -CR     Patient will improve verbal expressive language skills by describing attributes, function, action and/or uses of an object/item 80%:;without cues  -CR     Status: Patient will improve verbal expressive language skills by describing attributes, function, action and/or uses of an object/item Discontinued  -CR     Comments: Patient will improve verbal expressive language skills by describing attributes, function, action and/or uses of an object/item MIN-MOD cues from SLP and/or  to enhance use of semantic feature analysis.  is IND with cueing hierarchy to assist in times of word finding difficulties.  -CR        Auditory Comprehension Goals    Patient will comprehend basic spoken information presented in home environment 70%:;without cues  -CR     Status: Patient will comprehend basic spoken information presented in home environment Achieved  -CR     Patient will demonstrate comprehension of spoken language by answering simple Yes/No questions 80%:;without cues  -CR     Status: Patient will demonstrate comprehension of spoken language by answering simple Yes/No questions Achieved  -CR               User Key  (r) = Recorded By, (t) = Taken By, (c) = Cosigned By      Initials Name Provider Type    Beatriz Hardwick, SLP Speech and Language Pathologist                    OP SLP Discharge Summary  Date of Discharge: 10/28/24  Reason for Discharge: identified goals partially met  Progress Toward Achieving Short/long Term  Goals: goals partially met within established timelines  Discharge Destination: home w/ assist  Discharge Instructions: Complete structured home exercise program provided during session this date. Seek out communicative opportunities Notify MD if cognitive-communication skills worsen.    Good progress made toward speech therapy goals throughout POC. Neuropysch testing completed during POC which indicated moderate frontotemporal dementia.  IND with use of cueing hierarchy to assist pt during times of word finding difficulties. POC focused heavily on caregiver education and use of communicative strategies given progressive diagnosis. No further functional progress expected at this time.      Time Calculation:   Untimed Charges  59496-RQ Treatment/ST Modification Prosth Aug Alter : 45  Total Minutes  Untimed Charges Total Minutes: 45   Total Minutes: 45    Therapy Charges for Today       Code Description Service Date Service Provider Modifiers Qty    [id number]  ST TREATMENT SPEECH 3 10/28/2024 Beatriz Wright, SLP KX, GN 1                    KETAN Mendoza  10/28/2024

## 2024-11-04 ENCOUNTER — OFFICE VISIT (OUTPATIENT)
Dept: INTERNAL MEDICINE | Facility: CLINIC | Age: 74
End: 2024-11-04
Payer: MEDICARE

## 2024-11-04 ENCOUNTER — LAB (OUTPATIENT)
Dept: LAB | Facility: HOSPITAL | Age: 74
End: 2024-11-04
Payer: MEDICARE

## 2024-11-04 VITALS
SYSTOLIC BLOOD PRESSURE: 114 MMHG | WEIGHT: 129.6 LBS | HEART RATE: 69 BPM | BODY MASS INDEX: 20.34 KG/M2 | DIASTOLIC BLOOD PRESSURE: 72 MMHG | TEMPERATURE: 97.4 F | HEIGHT: 67 IN | OXYGEN SATURATION: 98 %

## 2024-11-04 DIAGNOSIS — R41.3 MEMORY LOSS: ICD-10-CM

## 2024-11-04 DIAGNOSIS — N18.30 CRI (CHRONIC RENAL INSUFFICIENCY), STAGE 3 (MODERATE): ICD-10-CM

## 2024-11-04 DIAGNOSIS — E03.9 HYPOTHYROIDISM, UNSPECIFIED TYPE: Primary | ICD-10-CM

## 2024-11-04 DIAGNOSIS — E78.5 HYPERLIPIDEMIA, UNSPECIFIED HYPERLIPIDEMIA TYPE: ICD-10-CM

## 2024-11-04 DIAGNOSIS — Z12.11 COLON CANCER SCREENING: ICD-10-CM

## 2024-11-04 LAB
ALBUMIN SERPL-MCNC: 4.1 G/DL (ref 3.5–5.2)
ALBUMIN/GLOB SERPL: 1.4 G/DL
ALP SERPL-CCNC: 80 U/L (ref 39–117)
ALT SERPL W P-5'-P-CCNC: 12 U/L (ref 1–33)
ANION GAP SERPL CALCULATED.3IONS-SCNC: 10.1 MMOL/L (ref 5–15)
AST SERPL-CCNC: 20 U/L (ref 1–32)
BASOPHILS # BLD AUTO: 0.03 10*3/MM3 (ref 0–0.2)
BASOPHILS NFR BLD AUTO: 0.6 % (ref 0–1.5)
BILIRUB SERPL-MCNC: 0.4 MG/DL (ref 0–1.2)
BUN SERPL-MCNC: 37 MG/DL (ref 8–23)
BUN/CREAT SERPL: 26.1 (ref 7–25)
CALCIUM SPEC-SCNC: 8.9 MG/DL (ref 8.6–10.5)
CHLORIDE SERPL-SCNC: 103 MMOL/L (ref 98–107)
CO2 SERPL-SCNC: 26.9 MMOL/L (ref 22–29)
CREAT SERPL-MCNC: 1.42 MG/DL (ref 0.57–1)
DEPRECATED RDW RBC AUTO: 44 FL (ref 37–54)
EGFRCR SERPLBLD CKD-EPI 2021: 38.7 ML/MIN/1.73
EOSINOPHIL # BLD AUTO: 0.12 10*3/MM3 (ref 0–0.4)
EOSINOPHIL NFR BLD AUTO: 2.4 % (ref 0.3–6.2)
ERYTHROCYTE [DISTWIDTH] IN BLOOD BY AUTOMATED COUNT: 12.6 % (ref 12.3–15.4)
GLOBULIN UR ELPH-MCNC: 3 GM/DL
GLUCOSE SERPL-MCNC: 84 MG/DL (ref 65–99)
HCT VFR BLD AUTO: 36.8 % (ref 34–46.6)
HGB BLD-MCNC: 12 G/DL (ref 12–15.9)
IMM GRANULOCYTES # BLD AUTO: 0.01 10*3/MM3 (ref 0–0.05)
IMM GRANULOCYTES NFR BLD AUTO: 0.2 % (ref 0–0.5)
LYMPHOCYTES # BLD AUTO: 0.92 10*3/MM3 (ref 0.7–3.1)
LYMPHOCYTES NFR BLD AUTO: 18.4 % (ref 19.6–45.3)
MCH RBC QN AUTO: 31.1 PG (ref 26.6–33)
MCHC RBC AUTO-ENTMCNC: 32.6 G/DL (ref 31.5–35.7)
MCV RBC AUTO: 95.3 FL (ref 79–97)
MONOCYTES # BLD AUTO: 0.46 10*3/MM3 (ref 0.1–0.9)
MONOCYTES NFR BLD AUTO: 9.2 % (ref 5–12)
NEUTROPHILS NFR BLD AUTO: 3.47 10*3/MM3 (ref 1.7–7)
NEUTROPHILS NFR BLD AUTO: 69.2 % (ref 42.7–76)
NRBC BLD AUTO-RTO: 0 /100 WBC (ref 0–0.2)
PLATELET # BLD AUTO: 155 10*3/MM3 (ref 140–450)
PMV BLD AUTO: 11.3 FL (ref 6–12)
POTASSIUM SERPL-SCNC: 4.3 MMOL/L (ref 3.5–5.2)
PROT SERPL-MCNC: 7.1 G/DL (ref 6–8.5)
RBC # BLD AUTO: 3.86 10*6/MM3 (ref 3.77–5.28)
SODIUM SERPL-SCNC: 140 MMOL/L (ref 136–145)
WBC NRBC COR # BLD AUTO: 5.01 10*3/MM3 (ref 3.4–10.8)

## 2024-11-04 PROCEDURE — 85025 COMPLETE CBC W/AUTO DIFF WBC: CPT | Performed by: INTERNAL MEDICINE

## 2024-11-04 PROCEDURE — 99214 OFFICE O/P EST MOD 30 MIN: CPT | Performed by: INTERNAL MEDICINE

## 2024-11-04 PROCEDURE — 90677 PCV20 VACCINE IM: CPT | Performed by: INTERNAL MEDICINE

## 2024-11-04 PROCEDURE — G0009 ADMIN PNEUMOCOCCAL VACCINE: HCPCS | Performed by: INTERNAL MEDICINE

## 2024-11-04 PROCEDURE — 1125F AMNT PAIN NOTED PAIN PRSNT: CPT | Performed by: INTERNAL MEDICINE

## 2024-11-04 PROCEDURE — 80053 COMPREHEN METABOLIC PANEL: CPT | Performed by: INTERNAL MEDICINE

## 2024-11-04 PROCEDURE — 36415 COLL VENOUS BLD VENIPUNCTURE: CPT | Performed by: INTERNAL MEDICINE

## 2024-11-04 NOTE — PROGRESS NOTES
Subjective   Carol A Osgood is a 73 y.o. female.   Fu  labs today  Chronic Kidney Disease     She ha been drinking more water and going to check labs today  She is cont the prolia for osteoporosis and think she is doing well  She has been getting PT for OAB and that is doing well    The following portions of the patient's history were reviewed and updated as appropriate: allergies, current medications, past family history, past medical history, past social history, past surgical history, and problem list.  She does not drink much water but is trying to do better  she is walkign reg and ests a low fat/sugar diet    Review of Systems    Objective   Physical Exam  Vitals reviewed.   Constitutional:       Appearance: She is well-developed.   HENT:      Head: Normocephalic and atraumatic.      Right Ear: External ear normal.      Left Ear: External ear normal.   Eyes:      Conjunctiva/sclera: Conjunctivae normal.      Pupils: Pupils are equal, round, and reactive to light.   Neck:      Thyroid: No thyromegaly.      Trachea: No tracheal deviation.   Cardiovascular:      Rate and Rhythm: Normal rate and regular rhythm.      Heart sounds: Normal heart sounds.   Pulmonary:      Effort: Pulmonary effort is normal.      Breath sounds: Normal breath sounds.   Abdominal:      General: Bowel sounds are normal. There is no distension.      Palpations: Abdomen is soft.      Tenderness: There is no abdominal tenderness.   Musculoskeletal:         General: No deformity. Normal range of motion.      Cervical back: Normal range of motion.   Skin:     General: Skin is warm and dry.   Neurological:      Mental Status: She is alert and oriented to person, place, and time.   Psychiatric:         Behavior: Behavior normal.         Thought Content: Thought content normal.         Judgment: Judgment normal.       Vitals:    11/04/24 1028   BP: 114/72   Pulse: 69   Temp: 97.4 °F (36.3 °C)   SpO2: 98%     Body mass index is 20.29 kg/m².          Assessment & Plan   Diagnoses and all orders for this visit:    1. Hypothyroidism, unspecified type (Primary)    2. Memory loss    3. CRI (chronic renal insufficiency), stage 3 (moderate)     Memory issues-  sees neuro-  taking cerofolin and getting reg exercise and healthy diet.  She has seen neurology and discussed with them using some of the medications but she is not sure she wants to take those.  CRI-  stable-  will cont to follow  check bmp today  Hypothyrodism-  ok with current meds  4.  Osteoporosis: She is taking the Prolia making sure she is getting enough calcium and vitamin D.  She has also doing weightbearing exercise with walking and light weights.

## 2024-11-08 ENCOUNTER — ANESTHESIA EVENT (OUTPATIENT)
Dept: SURGERY | Facility: SURGERY CENTER | Age: 74
End: 2024-11-08
Payer: MEDICARE

## 2024-11-08 ENCOUNTER — HOSPITAL ENCOUNTER (OUTPATIENT)
Facility: SURGERY CENTER | Age: 74
Setting detail: HOSPITAL OUTPATIENT SURGERY
Discharge: HOME | End: 2024-11-08
Attending: INTERNAL MEDICINE | Admitting: INTERNAL MEDICINE
Payer: MEDICARE

## 2024-11-08 ENCOUNTER — ANESTHESIA (OUTPATIENT)
Dept: SURGERY | Facility: SURGERY CENTER | Age: 74
End: 2024-11-08
Payer: MEDICARE

## 2024-11-08 VITALS
SYSTOLIC BLOOD PRESSURE: 113 MMHG | HEIGHT: 66 IN | TEMPERATURE: 97.8 F | BODY MASS INDEX: 20.22 KG/M2 | OXYGEN SATURATION: 99 % | RESPIRATION RATE: 16 BRPM | WEIGHT: 125.8 LBS | HEART RATE: 82 BPM | DIASTOLIC BLOOD PRESSURE: 60 MMHG

## 2024-11-08 DIAGNOSIS — Z12.11 ENCOUNTER FOR SCREENING FOR MALIGNANT NEOPLASM OF COLON: ICD-10-CM

## 2024-11-08 PROCEDURE — 25010000002 PROPOFOL 1000 MG/100ML EMULSION: Performed by: ANESTHESIOLOGY

## 2024-11-08 PROCEDURE — 25010000002 LIDOCAINE 2% SOLUTION: Performed by: ANESTHESIOLOGY

## 2024-11-08 PROCEDURE — 25010000002 PROPOFOL 10 MG/ML EMULSION: Performed by: ANESTHESIOLOGY

## 2024-11-08 PROCEDURE — G0105 COLORECTAL SCRN; HI RISK IND: HCPCS | Performed by: INTERNAL MEDICINE

## 2024-11-08 PROCEDURE — 25810000003 LACTATED RINGERS PER 1000 ML: Performed by: ANESTHESIOLOGY

## 2024-11-08 RX ORDER — PROPOFOL 10 MG/ML
INJECTION, EMULSION INTRAVENOUS AS NEEDED
Status: DISCONTINUED | OUTPATIENT
Start: 2024-11-08 | End: 2024-11-08 | Stop reason: SURG

## 2024-11-08 RX ORDER — EPHEDRINE SULFATE 50 MG/ML
INJECTION INTRAVENOUS AS NEEDED
Status: DISCONTINUED | OUTPATIENT
Start: 2024-11-08 | End: 2024-11-08 | Stop reason: SURG

## 2024-11-08 RX ORDER — SODIUM CHLORIDE, SODIUM LACTATE, POTASSIUM CHLORIDE, CALCIUM CHLORIDE 600; 310; 30; 20 MG/100ML; MG/100ML; MG/100ML; MG/100ML
30 INJECTION, SOLUTION INTRAVENOUS CONTINUOUS PRN
Status: DISCONTINUED | OUTPATIENT
Start: 2024-11-08 | End: 2024-11-08 | Stop reason: HOSPADM

## 2024-11-08 RX ORDER — SODIUM CHLORIDE 0.9 % (FLUSH) 0.9 %
10 SYRINGE (ML) INJECTION AS NEEDED
Status: DISCONTINUED | OUTPATIENT
Start: 2024-11-08 | End: 2024-11-08 | Stop reason: HOSPADM

## 2024-11-08 RX ORDER — LIDOCAINE HYDROCHLORIDE 20 MG/ML
INJECTION, SOLUTION INFILTRATION; PERINEURAL AS NEEDED
Status: DISCONTINUED | OUTPATIENT
Start: 2024-11-08 | End: 2024-11-08 | Stop reason: SURG

## 2024-11-08 RX ORDER — SODIUM CHLORIDE 0.9 % (FLUSH) 0.9 %
3 SYRINGE (ML) INJECTION EVERY 12 HOURS SCHEDULED
Status: DISCONTINUED | OUTPATIENT
Start: 2024-11-08 | End: 2024-11-08 | Stop reason: HOSPADM

## 2024-11-08 RX ORDER — SODIUM CHLORIDE, SODIUM LACTATE, POTASSIUM CHLORIDE, CALCIUM CHLORIDE 600; 310; 30; 20 MG/100ML; MG/100ML; MG/100ML; MG/100ML
INJECTION, SOLUTION INTRAVENOUS CONTINUOUS PRN
Status: DISCONTINUED | OUTPATIENT
Start: 2024-11-08 | End: 2024-11-08 | Stop reason: SURG

## 2024-11-08 RX ADMIN — PROPOFOL 120 MG: 10 INJECTION, EMULSION INTRAVENOUS at 11:00

## 2024-11-08 RX ADMIN — SODIUM CHLORIDE, POTASSIUM CHLORIDE, SODIUM LACTATE AND CALCIUM CHLORIDE: 600; 310; 30; 20 INJECTION, SOLUTION INTRAVENOUS at 10:56

## 2024-11-08 RX ADMIN — EPHEDRINE SULFATE 5 MG: 50 INJECTION, SOLUTION INTRAVENOUS at 11:25

## 2024-11-08 RX ADMIN — EPHEDRINE SULFATE 5 MG: 50 INJECTION, SOLUTION INTRAVENOUS at 11:19

## 2024-11-08 RX ADMIN — LIDOCAINE HYDROCHLORIDE 30 MG: 20 INJECTION, SOLUTION INFILTRATION; PERINEURAL at 11:00

## 2024-11-08 RX ADMIN — PROPOFOL 200 MCG/KG/MIN: 10 INJECTION, EMULSION INTRAVENOUS at 11:00

## 2024-11-08 NOTE — ANESTHESIA POSTPROCEDURE EVALUATION
"Patient: Carol A Osgood    Procedure Summary       Date: 11/08/24 Room / Location: [facility]    Anesthesia Start: 1056 Anesthesia Stop: 1134    Procedure: COLONOSCOPY FOR SCREENING Diagnosis:       Encounter for screening for malignant neoplasm of colon      (Encounter for screening for malignant neoplasm of colon [Z12.11])    Surgeons: Vineet Tran MD Provider: Javid Terry DO    Anesthesia Type: MAC ASA Status: 2            Anesthesia Type: MAC    Vitals  Vitals Value Taken Time   /60 11/08/24 1146   Temp 36.6 °C (97.8 °F) 11/08/24 1131   Pulse 82 11/08/24 1146   Resp 16 11/08/24 1146   SpO2 99 % 11/08/24 1146           Post Anesthesia Care and Evaluation    Patient location during evaluation: bedside  Patient participation: complete - patient participated  Level of consciousness: awake and alert  Pain management: adequate    Airway patency: patent  Anesthetic complications: No anesthetic complications  PONV Status: controlled  Cardiovascular status: acceptable and hemodynamically stable  Respiratory status: acceptable, spontaneous ventilation and nonlabored ventilation  Hydration status: acceptable    Comments: /60   Pulse 82   Temp 36.6 °C (97.8 °F) (Infrared)   Resp 16   Ht 167.6 cm (66\")   Wt 57.1 kg (125 lb 12.8 oz)   LMP  (LMP Unknown)   SpO2 99%   BMI 20.30 kg/m²       "

## 2024-11-08 NOTE — ANESTHESIA PREPROCEDURE EVALUATION
Anesthesia Evaluation     Patient summary reviewed   no history of anesthetic complications:   NPO Solid Status: > 8 hours  NPO Liquid Status: > 2 hours           Airway   Mallampati: II  TM distance: >3 FB  Neck ROM: full  No difficulty expected  Dental      Comment: Bridges, permanent    Pulmonary     breath sounds clear to auscultation  (-) shortness of breath, recent URI, not a smokerSleep apnea: STOP BANG 2.  Cardiovascular   Exercise tolerance: good (4-7 METS)    ECG reviewed  Rhythm: regular  Rate: normal    (+) DVT resolved  (-) past MI, dysrhythmias, angina      Neuro/Psych  (+) dizziness/light headedness (vertigo), psychiatric history Anxiety and ADHD, dementia  (-) seizures, CVA  GI/Hepatic/Renal/Endo    (+) renal disease (Stg3 Cr 1.42, h/o)- CRI and stones, thyroid problem hypothyroidism  (-)  obesity    Musculoskeletal     (-) neck stiffness  Abdominal    Substance History      OB/GYN          Other   arthritis,                       Anesthesia Plan    ASA 2     MAC     (MAC anesthesia discussed with patient and/or patient representative. Risks (including but not limited to intra-op awareness), benefits, and alternatives were discussed. Understanding was voiced with an agreement to proceed with a MAC technique and General as a backup option. )  intravenous induction     Anesthetic plan, risks, benefits, and alternatives have been provided, discussed and informed consent has been obtained with: patient and spouse/significant other.        CODE STATUS:

## 2024-11-08 NOTE — H&P
"No chief complaint on file.      HPI  screening         Problem List:    Patient Active Problem List   Diagnosis    Low hemoglobin    Osteoporosis    Thrombocytopenia    Anxiety    Vitamin D deficiency    Constipation    Memory loss    Incontinence of feces    Colon cancer screening    Hx of adenomatous colonic polyps    Glaucomatous atrophy of optic disc    Nuclear senile cataract    Vitreous degeneration    Renal calculus    Hypothyroidism    CRI (chronic renal insufficiency), stage 3 (moderate)    Encounter for screening for malignant neoplasm of colon       Medical History:    Past Medical History:   Diagnosis Date    ADHD (attention deficit hyperactivity disorder)     have had systems for many years    Arthritis     Bruises easily     Colon cancer screening 06/03/2021    Colon polyp     Constipation     Constipation     Dementia 2024    Difficulty swallowing pills     \"LARGE PILLS, NEED TO TAKE WITH YOGURT\"    DVT, lower extremity 2022    RESOLVED    History of 2019 novel coronavirus disease (COVID-19) 08/2023    History of recent fall 09/2023    Hypothyroidism     Irritable bowel syndrome     Kidney stone     Kidney stone     Low back pain     Memory loss     Memory loss, short term     Osteopenia     Osteoporosis     Renal calculus 10/18/2023    Shingles     Urinary tract infection     Vertigo         Social History:    Social History     Socioeconomic History    Marital status:      Spouse name: Michael J Osgood    Number of children: 1   Tobacco Use    Smoking status: Never    Smokeless tobacco: Never    Tobacco comments:     no caffeine   Vaping Use    Vaping status: Never Used   Substance and Sexual Activity    Alcohol use: No    Drug use: No    Sexual activity: Not Currently     Partners: Male     Comment: NA       Family History:   Family History   Problem Relation Age of Onset    COPD Mother     Heart attack Mother     Stroke Mother     Heart disease Father         PARALYSIS, WAIST DOWN    " Thrombocytopenia Sister     Hepatitis Brother     Prostate cancer Brother     Breast cancer Paternal Aunt     Colon cancer Paternal Aunt     Colon polyps Paternal Aunt     Diabetes Maternal Grandmother     Cancer Paternal Grandmother         BRAIN    COPD Other     Heart attack Other     Stroke Other     Thrombocytopenia Other     Diabetes Other     Brain cancer Other     Colon cancer Other         NEPHEW    Colon polyps Other     Irritable bowel syndrome Neg Hx     Ulcerative colitis Neg Hx     Crohn's disease Neg Hx     Inflammatory bowel disease Neg Hx     Malig Hyperthermia Neg Hx        Surgical History:   Past Surgical History:   Procedure Laterality Date    APPENDECTOMY      COLONOSCOPY N/A 09/14/2021    Procedure: COLONOSCOPY;  Surgeon: Vineet Tran MD;  Location: [facility];  Service: Gastroenterology;  Laterality: N/A;    EXTRACORPOREAL SHOCK WAVE LITHOTRIPSY (ESWL) Right 10/18/2023    Procedure: EXTRACORPOREAL SHOCKWAVE LITHOTRIPSY- RIGHT;  Surgeon: Jeffery Reynolds MD;  Location: [facility] OR;  Service: Urology;  Laterality: Right;    EXTRACORPOREAL SHOCK WAVE LITHOTRIPSY (ESWL) Right 12/06/2023    Procedure: RIGHT KIDNEY SHOCK WAVE LITHOTRIPSY;  Surgeon: Jeffery Reynolds MD;  Location: [facility] OR;  Service: Urology;  Laterality: Right;    URETEROSCOPY LASER LITHOTRIPSY WITH STENT INSERTION Right 01/10/2024    Procedure: RIGHT URETEROSCOPY LASER LITHOTRIPSY WITH STENT PLACMENT;  Surgeon: Jeffery Reynolds MD;  Location: [facility] MAIN OR;  Service: Urology;  Laterality: Right;    VASCULAR SURGERY      VEIN SURGERY         No current facility-administered medications for this encounter.    Current Outpatient Medications:     acetaminophen (TYLENOL) 325 MG tablet, Take 2 tablets by mouth Every 6 (Six) Hours As Needed for Mild Pain., Disp: , Rfl:     Ascorbic Acid (VITAMIN C PO), Take 1 tablet by mouth Daily., Disp: , Rfl:     Cholecalciferol (VITAMIN D3) 50 MCG (2000 UT) tablet,  "Take 1 tablet by mouth Daily., Disp: , Rfl:     coenzyme Q10 100 MG capsule, Take 1 capsule by mouth Daily., Disp: , Rfl:     denosumab (PROLIA) 60 MG/ML solution prefilled syringe syringe, Inject 1 mL under the skin into the appropriate area as directed Every 6 (Six) Months. FIRST DOSE 9/2023, Disp: , Rfl:     levothyroxine (SYNTHROID, LEVOTHROID) 25 MCG tablet, TAKE 1 TABLET BY MOUTH EVERY DAY, Disp: 90 tablet, Rfl: 3    Methylfol-Algae-E18-Kfzaktngov (Cerefolin NAC) 6-90.314-2-600 MG tablet, TAKE 1 TABLET BY MOUTH DAILY, Disp: 90 tablet, Rfl: 1    NON FORMULARY, Take  by mouth 2 (Two) Times a Day. Name Colon Ease, Disp: , Rfl:     Polyethyl Glycol-Propyl Glycol (SYSTANE ULTRA OP), Administer 1 drop to both eyes Every Night., Disp: , Rfl:     vitamin B-12 (CYANOCOBALAMIN) 1000 MCG tablet, Take 1 tablet by mouth Daily., Disp: , Rfl:     Zinc 50 MG tablet, Take 1 tablet by mouth Daily., Disp: , Rfl:     Allergies:   Allergies   Allergen Reactions    Adhesive Tape Swelling     \"REDNESS\"    Celecoxib Other (See Comments)     Made her feel bad        The following portions of the patient's history were reviewed by me and updated as appropriate: review of systems, allergies, current medications, past family history, past medical history, past social history, past surgical history and problem list.    There were no vitals filed for this visit.    PHYSICAL EXAM:    CONSTITUTIONAL:  today's vital signs reviewed by me  GASTROINTESTINAL: abdomen is soft nontender nondistended with normal active bowel sounds, no masses are appreciated    Assessment/ Plan  Screening    colonoscopy    Risks and benefits as well as alternatives to endoscopic evaluation were explained to the patient and they voiced understanding and wish to proceed.  These risks include but are not limited to the risk of bleeding, perforation, adverse reaction to sedation, and missed lesions.  The patient was given the opportunity to ask questions prior to the " endoscopic procedure.

## 2024-12-20 ENCOUNTER — TELEPHONE (OUTPATIENT)
Dept: NEUROLOGY | Facility: CLINIC | Age: 74
End: 2024-12-20
Payer: MEDICARE

## 2024-12-20 NOTE — TELEPHONE ENCOUNTER
Patient got to ED, please review  Recd visit note from where pt saw [facility] neuro. Called pt to see if they would like to cancel appt with [facility] in April, as they have established care with [facility] with a follow up with them in March. Recd vm    HUB may cancel if pt wishes to cancel.

## 2025-01-20 RX ORDER — L-MEFOL/A-CYST/MEB12/ALGAL OIL 6-600-2 MG
1 TABLET ORAL DAILY
Qty: 12 TABLET | Refills: 0 | COMMUNITY
Start: 2025-01-20

## 2025-01-20 RX ORDER — L-MEFOL/A-CYST/MEB12/ALGAL OIL 6-600-2 MG
1 TABLET ORAL DAILY
Qty: 90 TABLET | Refills: 3 | Status: SHIPPED | OUTPATIENT
Start: 2025-01-20 | End: 2025-01-20 | Stop reason: SDUPTHER

## 2025-01-26 ENCOUNTER — HOSPITAL ENCOUNTER (EMERGENCY)
Facility: HOSPITAL | Age: 75
Discharge: HOME | End: 2025-01-26
Attending: STUDENT IN AN ORGANIZED HEALTH CARE EDUCATION/TRAINING PROGRAM | Admitting: STUDENT IN AN ORGANIZED HEALTH CARE EDUCATION/TRAINING PROGRAM
Payer: MEDICARE

## 2025-01-26 ENCOUNTER — APPOINTMENT (OUTPATIENT)
Dept: CT IMAGING | Facility: HOSPITAL | Age: 75
End: 2025-01-26
Payer: MEDICARE

## 2025-01-26 VITALS
HEART RATE: 75 BPM | TEMPERATURE: 97.2 F | SYSTOLIC BLOOD PRESSURE: 144 MMHG | DIASTOLIC BLOOD PRESSURE: 77 MMHG | OXYGEN SATURATION: 99 % | RESPIRATION RATE: 16 BRPM

## 2025-01-26 DIAGNOSIS — S09.90XA CHI (CLOSED HEAD INJURY), INITIAL ENCOUNTER: Primary | ICD-10-CM

## 2025-01-26 DIAGNOSIS — R26.81 GAIT INSTABILITY: ICD-10-CM

## 2025-01-26 LAB
ALBUMIN SERPL-MCNC: 4 G/DL (ref 3.5–5.2)
ALBUMIN/GLOB SERPL: 1.4 G/DL
ALP SERPL-CCNC: 71 U/L (ref 39–117)
ALT SERPL W P-5'-P-CCNC: 10 U/L (ref 1–33)
ANION GAP SERPL CALCULATED.3IONS-SCNC: 10.3 MMOL/L (ref 5–15)
AST SERPL-CCNC: 19 U/L (ref 1–32)
BASOPHILS # BLD AUTO: 0.04 10*3/MM3 (ref 0–0.2)
BASOPHILS NFR BLD AUTO: 0.5 % (ref 0–1.5)
BILIRUB SERPL-MCNC: 0.4 MG/DL (ref 0–1.2)
BILIRUB UR QL STRIP: NEGATIVE
BUN SERPL-MCNC: 37 MG/DL (ref 8–23)
BUN/CREAT SERPL: 29.1 (ref 7–25)
CALCIUM SPEC-SCNC: 9.4 MG/DL (ref 8.6–10.5)
CHLORIDE SERPL-SCNC: 105 MMOL/L (ref 98–107)
CLARITY UR: ABNORMAL
CO2 SERPL-SCNC: 23.7 MMOL/L (ref 22–29)
COLOR UR: YELLOW
CREAT SERPL-MCNC: 1.27 MG/DL (ref 0.57–1)
DEPRECATED RDW RBC AUTO: 43.6 FL (ref 37–54)
EGFRCR SERPLBLD CKD-EPI 2021: 43.9 ML/MIN/1.73
EOSINOPHIL # BLD AUTO: 0.02 10*3/MM3 (ref 0–0.4)
EOSINOPHIL NFR BLD AUTO: 0.3 % (ref 0.3–6.2)
ERYTHROCYTE [DISTWIDTH] IN BLOOD BY AUTOMATED COUNT: 12.8 % (ref 12.3–15.4)
GLOBULIN UR ELPH-MCNC: 2.8 GM/DL
GLUCOSE SERPL-MCNC: 84 MG/DL (ref 65–99)
GLUCOSE UR STRIP-MCNC: NEGATIVE MG/DL
HCT VFR BLD AUTO: 36.1 % (ref 34–46.6)
HGB BLD-MCNC: 12.5 G/DL (ref 12–15.9)
HGB UR QL STRIP.AUTO: NEGATIVE
IMM GRANULOCYTES # BLD AUTO: 0.02 10*3/MM3 (ref 0–0.05)
IMM GRANULOCYTES NFR BLD AUTO: 0.3 % (ref 0–0.5)
KETONES UR QL STRIP: NEGATIVE
LEUKOCYTE ESTERASE UR QL STRIP.AUTO: NEGATIVE
LYMPHOCYTES # BLD AUTO: 0.51 10*3/MM3 (ref 0.7–3.1)
LYMPHOCYTES NFR BLD AUTO: 6.9 % (ref 19.6–45.3)
MCH RBC QN AUTO: 32.4 PG (ref 26.6–33)
MCHC RBC AUTO-ENTMCNC: 34.6 G/DL (ref 31.5–35.7)
MCV RBC AUTO: 93.5 FL (ref 79–97)
MONOCYTES # BLD AUTO: 0.39 10*3/MM3 (ref 0.1–0.9)
MONOCYTES NFR BLD AUTO: 5.3 % (ref 5–12)
NEUTROPHILS NFR BLD AUTO: 6.42 10*3/MM3 (ref 1.7–7)
NEUTROPHILS NFR BLD AUTO: 86.7 % (ref 42.7–76)
NITRITE UR QL STRIP: NEGATIVE
NRBC BLD AUTO-RTO: 0 /100 WBC (ref 0–0.2)
PH UR STRIP.AUTO: 5.5 [PH] (ref 5–8)
PLATELET # BLD AUTO: 158 10*3/MM3 (ref 140–450)
PMV BLD AUTO: 11.1 FL (ref 6–12)
POTASSIUM SERPL-SCNC: 4.2 MMOL/L (ref 3.5–5.2)
PROT SERPL-MCNC: 6.8 G/DL (ref 6–8.5)
PROT UR QL STRIP: NEGATIVE
RBC # BLD AUTO: 3.86 10*6/MM3 (ref 3.77–5.28)
SODIUM SERPL-SCNC: 139 MMOL/L (ref 136–145)
SP GR UR STRIP: 1.01 (ref 1–1.03)
UROBILINOGEN UR QL STRIP: ABNORMAL
WBC NRBC COR # BLD AUTO: 7.4 10*3/MM3 (ref 3.4–10.8)

## 2025-01-26 PROCEDURE — P9612 CATHETERIZE FOR URINE SPEC: HCPCS

## 2025-01-26 PROCEDURE — 81003 URINALYSIS AUTO W/O SCOPE: CPT | Performed by: PHYSICIAN ASSISTANT

## 2025-01-26 PROCEDURE — 85025 COMPLETE CBC W/AUTO DIFF WBC: CPT | Performed by: PHYSICIAN ASSISTANT

## 2025-01-26 PROCEDURE — 99284 EMERGENCY DEPT VISIT MOD MDM: CPT

## 2025-01-26 PROCEDURE — 70450 CT HEAD/BRAIN W/O DYE: CPT

## 2025-01-26 PROCEDURE — 72125 CT NECK SPINE W/O DYE: CPT

## 2025-01-26 PROCEDURE — 80053 COMPREHEN METABOLIC PANEL: CPT | Performed by: PHYSICIAN ASSISTANT

## 2025-01-26 RX ORDER — SODIUM CHLORIDE 0.9 % (FLUSH) 0.9 %
10 SYRINGE (ML) INJECTION AS NEEDED
Status: DISCONTINUED | OUTPATIENT
Start: 2025-01-26 | End: 2025-01-26 | Stop reason: HOSPADM

## 2025-01-26 NOTE — ED PROVIDER NOTES
EMERGENCY DEPARTMENT ENCOUNTER      Room Number:  05/05  PCP: Ciera Newman MD  Independent Historians: Patient/  Patient Care Team:  Ciera Newman MD as PCP - Fredrick Manning MD as Consulting Physician (Nephrology)  Dania Sanchez APRN as Nurse Practitioner (Gastroenterology)  Vineet Tran MD as Consulting Physician (Gastroenterology)  Raad Lawson MD as Consulting Physician (Vascular Surgery)  Ciera Newman MD as Referring Physician (Internal Medicine)       HPI:  Chief Complaint: CHI    A complete HPI/ROS/PMH/PSH/SH/FH are unobtainable due to: Poor historian and Dementia    Chronic or social conditions impacting patient care (Social Determinants of Health): None      Context: Carol A Osgood is a 74 y.o. female with a PMH significant for osteoporosis who presents to the ED c/o acute closed head injury.  Medicine reports that the patient was in the shower today when she became unsteady and fell, striking the back of her head on the wall on her way down.  She has a couple superficial abrasions to the shins bilaterally from the fall which appear very minor.  There was no loss of consciousness.  The  does report that the patient has had numerous episodes over the years of becoming unsteady and lightheaded while in the shower.  This episode today did not seem terribly unusual but the patient continues with some mild weakness at this time.  She is able to stand and ambulate unassisted.  No neurologic deficits.  She is at her mental and neurologic baseline.  No external signs of trauma.      Upon review of prior external notes (non-ED) -and- Review of prior external test results outside of this encounter it appears the patient was evaluated in the office with neurology for mild dementia on December 19, 2024.  The patient had a normal phosphorus and magnesium on 9/26/2024.      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    Nuclear senile cataract 03/11/2016    Vitreous degeneration 03/11/2016    Renal calculus 10/18/2023    Hypothyroidism 07/24/2024    CRI (chronic renal insufficiency), stage 3 (moderate) 07/24/2024    Encounter for screening for malignant neoplasm of colon 08/16/2024     Resolved Ambulatory Problems     Diagnosis Date Noted    Elevated pancreatic enzyme 02/24/2016    Low back pain 02/24/2016    Menopause present 02/24/2016    Renal insufficiency 02/24/2016    Pain and swelling of left knee 02/24/2016    Generalized abdominal pain 07/17/2017     Past Medical History:   Diagnosis Date    ADHD (attention deficit hyperactivity disorder)     Arthritis     Bruises easily     Colon polyp     Dementia 2024    Difficulty swallowing pills     DVT, lower extremity 2022    History of 2019 novel coronavirus disease (COVID-19) 08/2023    History of recent fall 09/2023    Irritable bowel syndrome     Kidney stone     Kidney stone     Memory loss, short term     Osteopenia     Shingles     Urinary tract infection     Vertigo          PAST SURGICAL HISTORY  Past Surgical History:   Procedure Laterality Date    APPENDECTOMY      COLONOSCOPY N/A 09/14/2021    Procedure: COLONOSCOPY;  Surgeon: Vineet Tran MD;  Location: [facility] [place], [place] MAIN OR;  Service: Gastroenterology;  Laterality: N/A;    COLONOSCOPY N/A 11/8/2024    Procedure: COLONOSCOPY FOR SCREENING;  Surgeon: Vineet Tran MD;  Location: [facility] [place], [place] MAIN OR;  Service: Gastroenterology;  Laterality: N/A;  Fair Prep, Hemorrhoids, Tortuous Colon    EXTRACORPOREAL SHOCK WAVE LITHOTRIPSY (ESWL) Right 10/18/2023    Procedure: EXTRACORPOREAL SHOCKWAVE  LITHOTRIPSY- RIGHT;  Surgeon: Jeffery Reynolds MD;  Location: [facility] MAIN OR;  Service: Urology;  Laterality: Right;    EXTRACORPOREAL SHOCK WAVE LITHOTRIPSY (ESWL) Right 12/06/2023    Procedure: RIGHT KIDNEY SHOCK WAVE LITHOTRIPSY;  Surgeon: Jeffery Reynolds MD;  Location: [facility] MAIN OR;  Service: Urology;  Laterality: Right;    URETEROSCOPY LASER LITHOTRIPSY WITH STENT INSERTION Right 01/10/2024    Procedure: RIGHT URETEROSCOPY LASER LITHOTRIPSY WITH STENT PLACMENT;  Surgeon: Jeffery Reynolds MD;  Location: [facility] MAIN OR;  Service: Urology;  Laterality: Right;    VASCULAR SURGERY      VEIN SURGERY           FAMILY HISTORY  Family History   Problem Relation Age of Onset    COPD Mother     Heart attack Mother     Stroke Mother     Heart disease Father         PARALYSIS, WAIST DOWN    Thrombocytopenia Sister     Hepatitis Brother     Prostate cancer Brother     Breast cancer Paternal Aunt     Colon cancer Paternal Aunt     Colon polyps Paternal Aunt     Diabetes Maternal Grandmother     Cancer Paternal Grandmother         BRAIN    COPD Other     Heart attack Other     Stroke Other     Thrombocytopenia Other     Diabetes Other     Brain cancer Other     Colon cancer Other         NEPHEW    Colon polyps Other     Irritable bowel syndrome Neg Hx     Ulcerative colitis Neg Hx     Crohn's disease Neg Hx     Inflammatory bowel disease Neg Hx     Malig Hyperthermia Neg Hx          SOCIAL HISTORY  Social History     Socioeconomic History    Marital status:      Spouse name: Bert JENSEN Osgood    Number of children: 1   Tobacco Use    Smoking status: Never    Smokeless tobacco: Never    Tobacco comments:     no caffeine   Vaping Use    Vaping status: Never Used   Substance and Sexual Activity    Alcohol use: No    Drug use: No    Sexual activity: Not Currently     Partners: Male     Comment: NA         ALLERGIES  Adhesive tape and Celecoxib      REVIEW OF SYSTEMS  Included in HPI  All systems reviewed and  negative except for those discussed in HPI.      PHYSICAL EXAM    I have reviewed the triage vital signs and nursing notes.    ED Triage Vitals   Temp Heart Rate Resp BP SpO2   01/26/25 1437 01/26/25 1437 01/26/25 1437 01/26/25 1441 01/26/25 1437   97.2 °F (36.2 °C) 88 16 156/85 97 %      Temp src Heart Rate Source Patient Position BP Location FiO2 (%)   -- -- -- -- --              Physical Exam  Constitutional:       General: She is not in acute distress.     Appearance: She is well-developed.   HENT:      Head: Normocephalic and atraumatic.   Eyes:      General: No scleral icterus.     Conjunctiva/sclera: Conjunctivae normal.   Neck:      Trachea: No tracheal deviation.   Cardiovascular:      Rate and Rhythm: Normal rate and regular rhythm.   Pulmonary:      Effort: Pulmonary effort is normal.      Breath sounds: Normal breath sounds.   Abdominal:      Palpations: Abdomen is soft.      Tenderness: There is no abdominal tenderness.   Musculoskeletal:         General: No deformity.      Cervical back: Normal range of motion. No spinous process tenderness or muscular tenderness.   Lymphadenopathy:      Cervical: No cervical adenopathy.   Skin:     General: Skin is warm and dry.   Neurological:      Mental Status: She is alert and oriented to person, place, and time.      Sensory: Sensation is intact.      Motor: Motor function is intact.   Psychiatric:         Behavior: Behavior normal.         Vital signs and nursing notes reviewed.      PPE: I wore a surgical mask throughout my encounters with the pt. I performed hand hygiene on entry into the pt room and upon exit.     LAB RESULTS  Recent Results (from the past 24 hours)   Comprehensive Metabolic Panel    Collection Time: 01/26/25  3:56 PM    Specimen: Arm, Right; Blood   Result Value Ref Range    Glucose 84 65 - 99 mg/dL    BUN 37 (H) 8 - 23 mg/dL    Creatinine 1.27 (H) 0.57 - 1.00 mg/dL    Sodium 139 136 - 145 mmol/L    Potassium 4.2 3.5 - 5.2 mmol/L    Chloride  105 98 - 107 mmol/L    CO2 23.7 22.0 - 29.0 mmol/L    Calcium 9.4 8.6 - 10.5 mg/dL    Total Protein 6.8 6.0 - 8.5 g/dL    Albumin 4.0 3.5 - 5.2 g/dL    ALT (SGPT) 10 1 - 33 U/L    AST (SGOT) 19 1 - 32 U/L    Alkaline Phosphatase 71 39 - 117 U/L    Total Bilirubin 0.4 0.0 - 1.2 mg/dL    Globulin 2.8 gm/dL    A/G Ratio 1.4 g/dL    BUN/Creatinine Ratio 29.1 (H) 7.0 - 25.0    Anion Gap 10.3 5.0 - 15.0 mmol/L    eGFR 43.9 (L) >60.0 mL/min/1.73   CBC Auto Differential    Collection Time: 01/26/25  3:56 PM    Specimen: Arm, Right; Blood   Result Value Ref Range    WBC 7.40 3.40 - 10.80 10*3/mm3    RBC 3.86 3.77 - 5.28 10*6/mm3    Hemoglobin 12.5 12.0 - 15.9 g/dL    Hematocrit 36.1 34.0 - 46.6 %    MCV 93.5 79.0 - 97.0 fL    MCH 32.4 26.6 - 33.0 pg    MCHC 34.6 31.5 - 35.7 g/dL    RDW 12.8 12.3 - 15.4 %    RDW-SD 43.6 37.0 - 54.0 fl    MPV 11.1 6.0 - 12.0 fL    Platelets 158 140 - 450 10*3/mm3    Neutrophil % 86.7 (H) 42.7 - 76.0 %    Lymphocyte % 6.9 (L) 19.6 - 45.3 %    Monocyte % 5.3 5.0 - 12.0 %    Eosinophil % 0.3 0.3 - 6.2 %    Basophil % 0.5 0.0 - 1.5 %    Immature Grans % 0.3 0.0 - 0.5 %    Neutrophils, Absolute 6.42 1.70 - 7.00 10*3/mm3    Lymphocytes, Absolute 0.51 (L) 0.70 - 3.10 10*3/mm3    Monocytes, Absolute 0.39 0.10 - 0.90 10*3/mm3    Eosinophils, Absolute 0.02 0.00 - 0.40 10*3/mm3    Basophils, Absolute 0.04 0.00 - 0.20 10*3/mm3    Immature Grans, Absolute 0.02 0.00 - 0.05 10*3/mm3    nRBC 0.0 0.0 - 0.2 /100 WBC   Urinalysis With Microscopic If Indicated (No Culture) - Straight Cath    Collection Time: 01/26/25  4:23 PM    Specimen: Straight Cath; Urine   Result Value Ref Range    Color, UA Yellow Yellow, Straw    Appearance, UA Cloudy (A) Clear    pH, UA 5.5 5.0 - 8.0    Specific Gravity, UA 1.012 1.005 - 1.030    Glucose, UA Negative Negative    Ketones, UA Negative Negative    Bilirubin, UA Negative Negative    Blood, UA Negative Negative    Protein, UA Negative Negative    Leuk Esterase, UA Negative  Negative    Nitrite, UA Negative Negative    Urobilinogen, UA 0.2 E.U./dL 0.2 - 1.0 E.U./dL         RADIOLOGY  CT Head Without Contrast, CT Cervical Spine Without Contrast    Result Date: 1/26/2025  CT HEAD WO CONTRAST-, CT CERVICAL SPINE WO CONTRAST-  INDICATIONS: Trauma  TECHNIQUE: Radiation dose reduction techniques were utilized, including automated exposure control and exposure modulation based on body size. Noncontrast head CT, cervical spine CT  COMPARISON: None available  FINDINGS:    No acute intracranial hemorrhage, midline shift or mass effect. No acute territorial infarct is identified.  Mild to moderate periventricular hypodensities suggest chronic small vessel ischemic change in a patient this age.  Arterial calcifications are seen at the base of the brain.  Ventricles, cisterns, cerebral sulci are unremarkable for patient age.  The visualized paranasal sinuses, orbits, mastoid air cells are unremarkable.   Cervical spine CT:  No acute fracture is identified.  Mild anterolisthesis of C3 on C4. Mild retrolisthesis of C4 on C5, C5 on C6.  Facet and uncovertebral joint hypertrophy contribute to neuroforaminal narrowing, more prominent on the left at C5/6.  Assessment of intracanalicular contents is limited without the benefit of intrathecal contrast material, with appearance of mild central stenosis related to disc osteophyte complex at C3/4, C5/6, C6/7.  Small calcifications are seen in the left submandibular gland.  Fibronodular changes are apparent at the lung apices.        No acute intracranial hemorrhage or hydrocephalus; chronic appearing changes of the brain. No acute cervical fracture identified; degenerative changes of the cervical spine. If there is further clinical concern, MRI could be considered for further evaluation.  This report was finalized on 1/26/2025 3:40 PM by Dr. Adam Xavier M.D on Workstation: [id number]         MEDICATIONS GIVEN IN ER  Medications   sodium chloride 0.9 %  flush 10 mL (has no administration in time range)         ORDERS PLACED DURING THIS VISIT:  Orders Placed This Encounter   Procedures    CT Head Without Contrast    CT Cervical Spine Without Contrast    Comprehensive Metabolic Panel    Urinalysis With Microscopic If Indicated (No Culture) - Urine, Clean Catch    CBC Auto Differential    Orthostatic Vitals (Blood Pressure & Heart Rate)    Insert Peripheral IV    CBC & Differential         OUTPATIENT MEDICATION MANAGEMENT:  Current Facility-Administered Medications Ordered in Epic   Medication Dose Route Frequency Provider Last Rate Last Admin    sodium chloride 0.9 % flush 10 mL  10 mL Intravenous PRN Uri Carpenter PA         Current Outpatient Medications Ordered in Epic   Medication Sig Dispense Refill    acetaminophen (TYLENOL) 325 MG tablet Take 2 tablets by mouth Every 6 (Six) Hours As Needed for Mild Pain.      Ascorbic Acid (VITAMIN C PO) Take 1 tablet by mouth Daily.      Cholecalciferol (VITAMIN D3) 50 MCG (2000 UT) tablet Take 1 tablet by mouth Daily.      coenzyme Q10 100 MG capsule Take 1 capsule by mouth Daily.      denosumab (PROLIA) 60 MG/ML solution prefilled syringe syringe Inject 1 mL under the skin into the appropriate area as directed Every 6 (Six) Months. FIRST DOSE 9/2023      levothyroxine (SYNTHROID, LEVOTHROID) 25 MCG tablet TAKE 1 TABLET BY MOUTH EVERY DAY 90 tablet 3    Methylfol-Algae-H72-Rjffjnwekh (Cerefolin NAC) 6-90.314-2-600 MG tablet Take 1 tablet by mouth Daily. 12 tablet 0    Polyethyl Glycol-Propyl Glycol (SYSTANE ULTRA OP) Administer 1 drop to both eyes Every Night.      vitamin B-12 (CYANOCOBALAMIN) 1000 MCG tablet Take 1 tablet by mouth Daily.      Zinc 50 MG tablet Take 1 tablet by mouth Daily.               PROGRESS, DATA ANALYSIS, CONSULTS, AND MEDICAL DECISION MAKING  All labs have been independently interpreted by me.  All radiology studies have been reviewed by me. All EKG's have been independently viewed and  interpreted by me.  Discussion below represents my analysis of pertinent findings related to patient's condition, differential diagnosis, treatment plan and final disposition.      DIFFERENTIAL DIAGNOSIS INCLUDE BUT NOT LIMITED TO:     Concussion, scalp contusion, intracranial bleeding    Clinical Scores: N/A      ED Course as of 01/26/25 1819   Sun Jan 26, 2025 1816 Patient presentation and evaluation consistent with mechanical fall and closed head injury.  She has a reassuring ED evaluation and is appropriate for outpatient management and supportive care at this time.  Ambulates unassisted with a steady gait and is at her neurologic baseline.  Family agreeable. [DC]      ED Course User Index  [DC] Uri Carpenter PA         1819 I rechecked the patient.  I discussed the patient's labs, radiology findings (including all incidental findings), diagnosis, and plan for discharge.  A repeat exam reveals no new worrisome changes from my initial exam findings.  The patient understands that the fact that they are being discharged does not denote that nothing is abnormal, it indicates that no clinical emergency is present and that they must follow-up as directed in order to properly maintain their health.  Follow-up instructions (specifically listed below) and return to ER precautions were given at this time.  I specifically instructed the patient to follow-up with their PCP.  The patient understands and agrees with the plan, and is ready for discharge.  All questions answered.         AS OF 18:19 EST VITALS:    BP - 144/77  HR - 75  TEMP - 97.2 °F (36.2 °C)  O2 SATS - 99%    COMPLEXITY OF CARE  Admission was considered but after careful review of the patient's presentation, physical examination, diagnostic results, and response to treatment the patient may be safely discharged with outpatient follow-up.      DIAGNOSIS  Final diagnoses:   CHI (closed head injury), initial encounter   Gait instability          DISPOSITION  ED Disposition       ED Disposition   Discharge    Condition   Stable    Comment   --                Please note that portions of this document were completed with a voice recognition program.    Note Disclaimer: At [facility], we believe that sharing information builds trust and better relationships. You are receiving this note because you recently visited [facility]. It is possible you will see health information before a provider has talked with you about it. This kind of information can be easy to misunderstand. To help you fully understand what it means for your health, we urge you to discuss this note with your provider.         Uri Carpenter PA  01/28/25 0605

## 2025-01-26 NOTE — ED TRIAGE NOTES
Patient to ER via car from home for fall in the shower today with head injury and neck pain no loc no blood thinners    Baseline  dementia

## 2025-01-26 NOTE — ED PROVIDER NOTES
EMERGENCY DEPARTMENT MD ATTESTATION NOTE    Room Number:  05/05  PCP: Ciera Newman MD  Independent Historians: Patient and Family    HPI:    Context: Carol A Osgood is a 74 y.o. female with a medical history of dementia, DVT who presents to the ED c/o acute head injury.  Patient fell while in the shower.  Patient hit her head.  No loss of consciousness.  Patient has had episodes of unsteadiness and lightheadedness previously.      Prescription drug monitoring program review:           PHYSICAL EXAM    I have reviewed the triage vital signs and nursing notes.    ED Triage Vitals   Temp Heart Rate Resp BP SpO2   01/26/25 1437 01/26/25 1437 01/26/25 1437 01/26/25 1441 01/26/25 1437   97.2 °F (36.2 °C) 88 16 156/85 97 %      Temp src Heart Rate Source Patient Position BP Location FiO2 (%)   -- -- 01/26/25 1600 -- --     Lying         Physical Exam  GENERAL: alert, no acute distress  SKIN: Warm, dry  HENT: Normocephalic, atraumatic  EYES: no scleral icterus  CV: regular rhythm, regular rate  RESPIRATORY: normal effort, lungs clear  ABDOMEN: soft, nontender, nondistended  MUSCULOSKELETAL: no deformity  NEURO: alert, moves all extremities, follows commands            MEDICATIONS GIVEN IN ER  Medications   sodium chloride 0.9 % flush 10 mL (has no administration in time range)         ORDERS PLACED DURING THIS VISIT:  Orders Placed This Encounter   Procedures   • CT Head Without Contrast   • CT Cervical Spine Without Contrast   • Comprehensive Metabolic Panel   • Urinalysis With Microscopic If Indicated (No Culture) - Urine, Clean Catch   • CBC Auto Differential   • Orthostatic Vitals (Blood Pressure & Heart Rate)   • Insert Peripheral IV   • CBC & Differential         PROCEDURES  Procedures            PROGRESS, DATA ANALYSIS, CONSULTS, AND MEDICAL DECISION MAKING  All labs have been independently interpreted by me.  All radiology studies have been reviewed by me. All EKG's have been independently viewed and interpreted by  me.  Discussion below represents my analysis of pertinent findings related to patient's condition, differential diagnosis, treatment plan and final disposition.    Differential diagnosis includes but is not limited to head injury, intracranial hemorrhage, C-spine injury, near syncope.    Clinical Scores:                                     ED Course as of 01/29/25 1119   Sun Jan 26, 2025   1816 Patient presentation and evaluation consistent with mechanical fall and closed head injury.  She has a reassuring ED evaluation and is appropriate for outpatient management and supportive care at this time.  Ambulates unassisted with a steady gait and is at her neurologic baseline.  Family agreeable. [DC]      ED Course User Index  [DC] Uri Carpenter PA       MDM: 74-year-old female presenting for evaluation of injury sustained in a fall.  Mechanical versus near syncopal.   states patient never actually lost consciousness.  Patient has had episodes of lightheadedness in the past.  Workup in the emergency department is notable for improved creatinine versus most recent lab values.  CT head/C-spine are unremarkable.  No repairable lacerations.  Abrasions to bilateral shins bandaged appropriately.  Supportive care instructions discussed with patient and .  Discharged follow-up on outpatient basis.   and patient agreeable with plan.      COMPLEXITY OF CARE  Admission was considered but after careful review of the patient's presentation, physical examination, diagnostic results, and response to treatment the patient may be safely discharged with outpatient follow-up.    Please note that portions of this document were completed with a voice recognition program.    Note Disclaimer: At [facility], we believe that sharing information builds trust and better relationships. You are receiving this note because you recently visited [facility]. It is possible you will see health information before a  provider has talked with you about it. This kind of information can be easy to misunderstand. To help you fully understand what it means for your health, we urge you to discuss this note with your provider.         Raad Hinds MD  01/26/25 [id number]       Raad Hinds MD  01/29/25 [id number]

## 2025-01-29 RX ORDER — LEVOTHYROXINE SODIUM 25 UG/1
25 TABLET ORAL DAILY
Qty: 90 TABLET | Refills: 3 | Status: SHIPPED | OUTPATIENT
Start: 2025-01-29

## 2025-01-30 ENCOUNTER — OFFICE VISIT (OUTPATIENT)
Dept: INTERNAL MEDICINE | Facility: CLINIC | Age: 75
End: 2025-01-30
Payer: MEDICARE

## 2025-01-30 VITALS
HEIGHT: 66 IN | OXYGEN SATURATION: 99 % | WEIGHT: 132 LBS | HEART RATE: 66 BPM | DIASTOLIC BLOOD PRESSURE: 74 MMHG | SYSTOLIC BLOOD PRESSURE: 126 MMHG | TEMPERATURE: 97.4 F | BODY MASS INDEX: 21.21 KG/M2

## 2025-01-30 DIAGNOSIS — R55 VASOVAGAL NEAR SYNCOPE: Primary | ICD-10-CM

## 2025-01-30 PROCEDURE — 1125F AMNT PAIN NOTED PAIN PRSNT: CPT | Performed by: INTERNAL MEDICINE

## 2025-01-30 PROCEDURE — 99213 OFFICE O/P EST LOW 20 MIN: CPT | Performed by: INTERNAL MEDICINE

## 2025-01-30 RX ORDER — UBIDECARENONE 30 MG
CAPSULE ORAL
COMMUNITY

## 2025-01-30 NOTE — PROGRESS NOTES
Subjective   Carol A Osgood is a 74 y.o. female.   Fu after recent falls  She was seen in the ER  Fall       Pt collapsed in the shower    no LOC  she did hit her head on the tile so she had a CT scan in the ER  she has a slight HA but otherwise ok  She has not had any further episodes and  says she has been walking reg anf no issues  She denies any focal weakness      The following portions of the patient's history were reviewed and updated as appropriate: allergies, current medications, past family history, past medical history, past social history, past surgical history, and problem list.  No tob no etoh  Review of Systems    Objective   Physical Exam  Vitals reviewed.   Constitutional:       Appearance: She is well-developed.   HENT:      Head: Normocephalic and atraumatic.      Right Ear: External ear normal.      Left Ear: External ear normal.   Eyes:      Conjunctiva/sclera: Conjunctivae normal.      Pupils: Pupils are equal, round, and reactive to light.   Neck:      Thyroid: No thyromegaly.      Trachea: No tracheal deviation.   Cardiovascular:      Rate and Rhythm: Normal rate and regular rhythm.      Heart sounds: Normal heart sounds.   Pulmonary:      Effort: Pulmonary effort is normal.      Breath sounds: Normal breath sounds.   Abdominal:      General: Bowel sounds are normal. There is no distension.      Palpations: Abdomen is soft.      Tenderness: There is no abdominal tenderness.   Musculoskeletal:         General: No deformity. Normal range of motion.      Cervical back: Normal range of motion.   Skin:     General: Skin is warm and dry.   Neurological:      Mental Status: She is alert and oriented to person, place, and time.   Psychiatric:         Behavior: Behavior normal.         Thought Content: Thought content normal.         Judgment: Judgment normal.         Vitals:    01/30/25 1426   BP: 126/74   Pulse: 66   Temp: 97.4 °F (36.3 °C)   SpO2: 99%     Body mass index is 21.31 kg/m².      CT scan neg  cbc reviewed and nl    Assessment & Plan   Diagnoses and all orders for this visit:    1. Vasovagal near syncope (Primary)       Vagavagal- near syncope with fall-  she is over all doing well  no recurrent episodes  she needs to be cautious when taking a hot shower in the future  if she has any further episodes they will let me know

## 2025-03-31 ENCOUNTER — INFUSION (OUTPATIENT)
Dept: ONCOLOGY | Facility: HOSPITAL | Age: 75
End: 2025-03-31
Payer: MEDICARE

## 2025-03-31 ENCOUNTER — LAB (OUTPATIENT)
Dept: OTHER | Facility: HOSPITAL | Age: 75
End: 2025-03-31
Payer: MEDICARE

## 2025-03-31 DIAGNOSIS — M81.0 AGE-RELATED OSTEOPOROSIS WITHOUT CURRENT PATHOLOGICAL FRACTURE: ICD-10-CM

## 2025-03-31 DIAGNOSIS — M81.0 AGE-RELATED OSTEOPOROSIS WITHOUT CURRENT PATHOLOGICAL FRACTURE: Primary | ICD-10-CM

## 2025-03-31 LAB
25(OH)D3 SERPL-MCNC: 56.7 NG/ML (ref 30–100)
ALBUMIN SERPL-MCNC: 4.2 G/DL (ref 3.5–5.2)
ALBUMIN/GLOB SERPL: 1.6 G/DL
ALP SERPL-CCNC: 80 U/L (ref 39–117)
ALT SERPL W P-5'-P-CCNC: 12 U/L (ref 1–33)
ANION GAP SERPL CALCULATED.3IONS-SCNC: 10.4 MMOL/L (ref 5–15)
AST SERPL-CCNC: 18 U/L (ref 1–32)
BILIRUB SERPL-MCNC: 0.3 MG/DL (ref 0–1.2)
BUN SERPL-MCNC: 35 MG/DL (ref 8–23)
BUN/CREAT SERPL: 22.7 (ref 7–25)
CALCIUM SPEC-SCNC: 9.8 MG/DL (ref 8.6–10.5)
CHLORIDE SERPL-SCNC: 106 MMOL/L (ref 98–107)
CO2 SERPL-SCNC: 26.6 MMOL/L (ref 22–29)
CREAT SERPL-MCNC: 1.54 MG/DL (ref 0.57–1)
EGFRCR SERPLBLD CKD-EPI 2021: 34.8 ML/MIN/1.73
GLOBULIN UR ELPH-MCNC: 2.6 GM/DL
GLUCOSE SERPL-MCNC: 62 MG/DL (ref 65–99)
MAGNESIUM SERPL-MCNC: 2.1 MG/DL (ref 1.6–2.4)
PHOSPHATE SERPL-MCNC: 3.2 MG/DL (ref 2.5–4.5)
POTASSIUM SERPL-SCNC: 4.2 MMOL/L (ref 3.5–5.2)
PROT SERPL-MCNC: 6.8 G/DL (ref 6–8.5)
SODIUM SERPL-SCNC: 143 MMOL/L (ref 136–145)

## 2025-03-31 PROCEDURE — 25010000002 DENOSUMAB 60 MG/ML SOLUTION PREFILLED SYRINGE: Performed by: INTERNAL MEDICINE

## 2025-03-31 PROCEDURE — 83735 ASSAY OF MAGNESIUM: CPT | Performed by: INTERNAL MEDICINE

## 2025-03-31 PROCEDURE — 80053 COMPREHEN METABOLIC PANEL: CPT | Performed by: INTERNAL MEDICINE

## 2025-03-31 PROCEDURE — 84100 ASSAY OF PHOSPHORUS: CPT | Performed by: INTERNAL MEDICINE

## 2025-03-31 PROCEDURE — 96372 THER/PROPH/DIAG INJ SC/IM: CPT

## 2025-03-31 PROCEDURE — 82306 VITAMIN D 25 HYDROXY: CPT | Performed by: INTERNAL MEDICINE

## 2025-03-31 RX ADMIN — DENOSUMAB 60 MG: 60 INJECTION SUBCUTANEOUS at 09:47

## 2025-03-31 NOTE — NURSING NOTE
Arrived  for prolia injection. Indication and side effects reviewed. Denies recent dental work. Labs and medications verified. Prolia administered in right arm without incidence. Instructed to call prescribing MD for any concerns or questions and instructed on how to schedule future appts.  Pt vu and discharged in stable condition.    Noted glucose 62; asymptomatic. Gave fruit juice and pnutbutter and crackers. Discharged with spouse attending.

## 2025-04-23 DIAGNOSIS — Z00.00 MEDICARE ANNUAL WELLNESS VISIT, SUBSEQUENT: Primary | ICD-10-CM

## 2025-04-23 DIAGNOSIS — E78.5 HYPERLIPIDEMIA, UNSPECIFIED HYPERLIPIDEMIA TYPE: ICD-10-CM

## 2025-04-23 DIAGNOSIS — N18.30 CRI (CHRONIC RENAL INSUFFICIENCY), STAGE 3 (MODERATE): ICD-10-CM

## 2025-04-23 DIAGNOSIS — E03.9 HYPOTHYROIDISM, UNSPECIFIED TYPE: ICD-10-CM

## 2025-05-01 ENCOUNTER — LAB (OUTPATIENT)
Dept: LAB | Facility: HOSPITAL | Age: 75
End: 2025-05-01
Payer: MEDICARE

## 2025-05-01 LAB
ALBUMIN SERPL-MCNC: 4.1 G/DL (ref 3.5–5.2)
ALBUMIN UR-MCNC: 1.9 MG/DL
ALBUMIN/GLOB SERPL: 1.5 G/DL
ALP SERPL-CCNC: 78 U/L (ref 39–117)
ALT SERPL W P-5'-P-CCNC: 10 U/L (ref 1–33)
ANION GAP SERPL CALCULATED.3IONS-SCNC: 7.4 MMOL/L (ref 5–15)
AST SERPL-CCNC: 16 U/L (ref 1–32)
BASOPHILS # BLD AUTO: 0.05 10*3/MM3 (ref 0–0.2)
BASOPHILS NFR BLD AUTO: 0.9 % (ref 0–1.5)
BILIRUB SERPL-MCNC: 0.3 MG/DL (ref 0–1.2)
BUN SERPL-MCNC: 36 MG/DL (ref 8–23)
BUN/CREAT SERPL: 27.5 (ref 7–25)
CALCIUM SPEC-SCNC: 9.4 MG/DL (ref 8.6–10.5)
CHLORIDE SERPL-SCNC: 104 MMOL/L (ref 98–107)
CHOLEST SERPL-MCNC: 186 MG/DL (ref 0–200)
CO2 SERPL-SCNC: 27.6 MMOL/L (ref 22–29)
CREAT SERPL-MCNC: 1.31 MG/DL (ref 0.57–1)
CREAT UR-MCNC: 61 MG/DL
DEPRECATED RDW RBC AUTO: 45.4 FL (ref 37–54)
EGFRCR SERPLBLD CKD-EPI 2021: 42.3 ML/MIN/1.73
EOSINOPHIL # BLD AUTO: 0.15 10*3/MM3 (ref 0–0.4)
EOSINOPHIL NFR BLD AUTO: 2.6 % (ref 0.3–6.2)
ERYTHROCYTE [DISTWIDTH] IN BLOOD BY AUTOMATED COUNT: 12.8 % (ref 12.3–15.4)
GLOBULIN UR ELPH-MCNC: 2.8 GM/DL
GLUCOSE SERPL-MCNC: 81 MG/DL (ref 65–99)
HCT VFR BLD AUTO: 34.8 % (ref 34–46.6)
HDLC SERPL-MCNC: 71 MG/DL (ref 40–60)
HGB BLD-MCNC: 11.4 G/DL (ref 12–15.9)
IMM GRANULOCYTES # BLD AUTO: 0.01 10*3/MM3 (ref 0–0.05)
IMM GRANULOCYTES NFR BLD AUTO: 0.2 % (ref 0–0.5)
LDLC SERPL CALC-MCNC: 102 MG/DL (ref 0–100)
LDLC/HDLC SERPL: 1.42 {RATIO}
LYMPHOCYTES # BLD AUTO: 0.94 10*3/MM3 (ref 0.7–3.1)
LYMPHOCYTES NFR BLD AUTO: 16.4 % (ref 19.6–45.3)
MCH RBC QN AUTO: 31.6 PG (ref 26.6–33)
MCHC RBC AUTO-ENTMCNC: 32.8 G/DL (ref 31.5–35.7)
MCV RBC AUTO: 96.4 FL (ref 79–97)
MICROALBUMIN/CREAT UR: 31.1 MG/G (ref 0–29)
MONOCYTES # BLD AUTO: 0.47 10*3/MM3 (ref 0.1–0.9)
MONOCYTES NFR BLD AUTO: 8.2 % (ref 5–12)
NEUTROPHILS NFR BLD AUTO: 4.1 10*3/MM3 (ref 1.7–7)
NEUTROPHILS NFR BLD AUTO: 71.7 % (ref 42.7–76)
NRBC BLD AUTO-RTO: 0 /100 WBC (ref 0–0.2)
PLATELET # BLD AUTO: 133 10*3/MM3 (ref 140–450)
PMV BLD AUTO: 10.9 FL (ref 6–12)
POTASSIUM SERPL-SCNC: 4.6 MMOL/L (ref 3.5–5.2)
PROT SERPL-MCNC: 6.9 G/DL (ref 6–8.5)
RBC # BLD AUTO: 3.61 10*6/MM3 (ref 3.77–5.28)
SODIUM SERPL-SCNC: 139 MMOL/L (ref 136–145)
TRIGL SERPL-MCNC: 71 MG/DL (ref 0–150)
TSH SERPL DL<=0.05 MIU/L-ACNC: 2.05 UIU/ML (ref 0.27–4.2)
VLDLC SERPL-MCNC: 13 MG/DL (ref 5–40)
WBC NRBC COR # BLD AUTO: 5.72 10*3/MM3 (ref 3.4–10.8)

## 2025-05-01 PROCEDURE — 82570 ASSAY OF URINE CREATININE: CPT | Performed by: INTERNAL MEDICINE

## 2025-05-01 PROCEDURE — 82043 UR ALBUMIN QUANTITATIVE: CPT | Performed by: INTERNAL MEDICINE

## 2025-05-01 PROCEDURE — 84443 ASSAY THYROID STIM HORMONE: CPT | Performed by: INTERNAL MEDICINE

## 2025-05-01 PROCEDURE — 80053 COMPREHEN METABOLIC PANEL: CPT | Performed by: INTERNAL MEDICINE

## 2025-05-01 PROCEDURE — 85025 COMPLETE CBC W/AUTO DIFF WBC: CPT | Performed by: INTERNAL MEDICINE

## 2025-05-01 PROCEDURE — 80061 LIPID PANEL: CPT | Performed by: INTERNAL MEDICINE

## 2025-05-07 ENCOUNTER — OFFICE VISIT (OUTPATIENT)
Dept: INTERNAL MEDICINE | Facility: CLINIC | Age: 75
End: 2025-05-07
Payer: MEDICARE

## 2025-05-07 VITALS
HEIGHT: 66 IN | SYSTOLIC BLOOD PRESSURE: 122 MMHG | DIASTOLIC BLOOD PRESSURE: 76 MMHG | WEIGHT: 134.6 LBS | BODY MASS INDEX: 21.63 KG/M2 | OXYGEN SATURATION: 99 % | HEART RATE: 72 BPM

## 2025-05-07 DIAGNOSIS — E03.9 HYPOTHYROIDISM, UNSPECIFIED TYPE: ICD-10-CM

## 2025-05-07 DIAGNOSIS — Z12.11 COLON CANCER SCREENING: ICD-10-CM

## 2025-05-07 DIAGNOSIS — M81.0 OSTEOPOROSIS, UNSPECIFIED OSTEOPOROSIS TYPE, UNSPECIFIED PATHOLOGICAL FRACTURE PRESENCE: ICD-10-CM

## 2025-05-07 DIAGNOSIS — M81.0 AGE-RELATED OSTEOPOROSIS WITHOUT CURRENT PATHOLOGICAL FRACTURE: ICD-10-CM

## 2025-05-07 DIAGNOSIS — Z00.00 MEDICARE ANNUAL WELLNESS VISIT, SUBSEQUENT: Primary | ICD-10-CM

## 2025-05-07 DIAGNOSIS — N18.30 CRI (CHRONIC RENAL INSUFFICIENCY), STAGE 3 (MODERATE): ICD-10-CM

## 2025-05-07 DIAGNOSIS — E78.5 HYPERLIPIDEMIA, UNSPECIFIED HYPERLIPIDEMIA TYPE: ICD-10-CM

## 2025-05-07 DIAGNOSIS — R41.3 MEMORY LOSS: ICD-10-CM

## 2025-05-07 NOTE — PATIENT INSTRUCTIONS
Medicare Wellness  Personal Prevention Plan of Service     Date of Office Visit:    Encounter Provider:  Ciera Newman MD  Place of Service:  [facility] INTERNAL MEDICINE  Patient Name: Carol A Osgood  :  1950    As part of the Medicare Wellness portion of your visit today, we are providing you with this personalized preventive plan of services (PPPS). This plan is based upon recommendations of the [place] Preventive Services Task Force (USPSTF) and the Advisory Committee on Immunization Practices (ACIP).    This lists the preventive care services that should be considered, and provides dates of when you are due. Items listed as completed are up-to-date and do not require any further intervention.    Health Maintenance   Topic Date Due    RSV Vaccine - Adults (1 - 1-dose 75+ series) Never done    DXA SCAN  2025    TDAP/TD VACCINES (2 - Tdap) 2025 (Originally 2019)    INFLUENZA VACCINE  2025    LIPID PANEL  2026    ANNUAL WELLNESS VISIT  2026    COLORECTAL CANCER SCREENING  2027    HEPATITIS C SCREENING  Completed    Pneumococcal Vaccine 50+  Completed    COVID-19 Vaccine  Discontinued    MAMMOGRAM  Discontinued    ZOSTER VACCINE  Discontinued       Orders Placed This Encounter   Procedures    DEXA Bone Density Axial     Standing Status:   Future     Expected Date:   2025     Expiration Date:   2026     Is patient taking or have taken long term Glucocorticoid (steroids)?:   No     Does the patient have rheumatoid arthritis?:   No     Does the patient have secondary osteoporosis?:   Yes     Reason for Exam::   osteoporosis, postmenopausal     Release to patient:   Routine Release [[id number]]       No follow-ups on file.

## 2025-05-07 NOTE — PROGRESS NOTES
Subjective   Carol A Osgood is a 74 y.o. female.   Fu with FL  Hypothyroidism       She has been having cont abd bloating.  She has seen GI   she has had constipation and bloating for years but currently  Pt has been doing well with thyroid meds.  Taking as perscribed without any complications  Patient's  does think her memory is relatively stable she does still forget things but is trying very hard to focus on 1 thing at a time she is seeing neurology  The following portions of the patient's history were reviewed and updated as appropriate: allergies, current medications, past family history, past medical history, past social history, past surgical history, and problem list.  She does get some exercise but nothing every single day  Review of Systems  No chest pain no shortness of breath no orthopnea PND or lower extremity edema  Objective   Physical Exam  Vitals reviewed.   Constitutional:       Appearance: She is well-developed.   HENT:      Head: Normocephalic and atraumatic.      Right Ear: External ear normal.      Left Ear: External ear normal.   Eyes:      Conjunctiva/sclera: Conjunctivae normal.      Pupils: Pupils are equal, round, and reactive to light.   Neck:      Thyroid: No thyromegaly.      Trachea: No tracheal deviation.   Cardiovascular:      Rate and Rhythm: Normal rate and regular rhythm.      Heart sounds: Normal heart sounds.   Pulmonary:      Effort: Pulmonary effort is normal.      Breath sounds: Normal breath sounds.   Abdominal:      General: Bowel sounds are normal. There is no distension.      Palpations: Abdomen is soft.      Tenderness: There is no abdominal tenderness.   Musculoskeletal:         General: No deformity. Normal range of motion.      Cervical back: Normal range of motion.   Skin:     General: Skin is warm and dry.   Neurological:      Mental Status: She is alert and oriented to person, place, and time.   Psychiatric:         Behavior: Behavior normal.         Thought  Content: Thought content normal.         Judgment: Judgment normal.         Vitals:    05/07/25 1109   BP: 122/76   Pulse: 72   SpO2: 99%     Body mass index is 21.73 kg/m².         Assessment & Plan   Diagnoses and all orders for this visit:    1. Medicare annual wellness visit, subsequent (Primary)    2. Hypothyroidism, unspecified type    3. Age-related osteoporosis without current pathological fracture    4. Memory loss    5. CRI (chronic renal insufficiency), stage 3 (moderate)      Memory loss-  cont to work on diet and exercise  she does have an rx for aricept and she may try it  CRI- she is stable and actually a little better  Constipation-  she will try magnesium citrate and see if that helps  cont prune  Osteoporosis- she will cot the prolia

## 2025-05-07 NOTE — PROGRESS NOTES
Subjective   The ABCs of the Annual Wellness Visit  Medicare Wellness Visit      Carol A Osgood is a 74 y.o. patient who presents for a Medicare Wellness Visit.    The following portions of the patient's history were reviewed and   updated as appropriate: allergies, current medications, past family history, past medical history, past social history, past surgical history, and problem list.    Compared to one year ago, the patient's physical   health is the same.  Compared to one year ago, the patient's mental   health is the same.    Recent Hospitalizations:  She was not admitted to the hospital during the last year.     Current Medical Providers:  Patient Care Team:  Ciera Newman MD as PCP - General  Fredrick Norris MD as Consulting Physician (Nephrology)  Dania Sanchez APRN as Nurse Practitioner (Gastroenterology)  Vineet Tran MD as Consulting Physician (Gastroenterology)  Raad Lawson MD as Consulting Physician (Vascular Surgery)  Ciera Newman MD as Referring Physician (Internal Medicine)    Outpatient Medications Prior to Visit   Medication Sig Dispense Refill    acetaminophen (TYLENOL) 325 MG tablet Take 2 tablets by mouth Every 6 (Six) Hours As Needed for Mild Pain.      Ascorbic Acid (VITAMIN C PO) Take 1 tablet by mouth Daily.      Cholecalciferol (VITAMIN D3) 50 MCG (2000 UT) tablet Take 1 tablet by mouth Daily.      coenzyme Q10 100 MG capsule Take 1 capsule by mouth Daily.      DANDELION ROOT PO Take  by mouth.      denosumab (PROLIA) 60 MG/ML solution prefilled syringe syringe Inject 1 mL under the skin into the appropriate area as directed Every 6 (Six) Months. FIRST DOSE 9/2023      levothyroxine (SYNTHROID, LEVOTHROID) 25 MCG tablet TAKE 1 TABLET BY MOUTH EVERY DAY 90 tablet 3    Methylfol-Algae-L79-Ntxzszlrmx (Cerefolin NAC) 6-90.314-2-600 MG tablet Take 1 tablet by mouth Daily. 12 tablet 0    Misc Natural Products (CORNSILK PO) Take  by mouth.      Multiple Vitamins-Minerals (Super  "D-Zinc-Selenium-Copper) tablet Take  by mouth.      Polyethyl Glycol-Propyl Glycol (SYSTANE ULTRA OP) Administer 1 drop to both eyes Every Night.      vitamin B-12 (CYANOCOBALAMIN) 1000 MCG tablet Take 1 tablet by mouth Daily.      Zinc 50 MG tablet Take 1 tablet by mouth Daily.       No facility-administered medications prior to visit.     No opioid medication identified on active medication list. I have reviewed chart for other potential  high risk medication/s and harmful drug interactions in the elderly.      Aspirin is not on active medication list.  Aspirin use is not indicated based on review of current medical condition/s. Risk of harm outweighs potential benefits.  .    Patient Active Problem List   Diagnosis    Low hemoglobin    Osteoporosis    Thrombocytopenia    Anxiety    Vitamin D deficiency    Constipation    Memory loss    Incontinence of feces    Hx of adenomatous colonic polyps    Glaucomatous atrophy of optic disc    Nuclear senile cataract    Vitreous degeneration    Renal calculus    Hypothyroidism    CRI (chronic renal insufficiency), stage 3 (moderate)    Encounter for screening for malignant neoplasm of colon     Advance Care Planning Advance Directive is not on file.  ACP discussion was held with the patient during this visit. Patient has an advance directive (not in EMR), copy requested.            Objective   Vitals:    05/07/25 1109   BP: 122/76   BP Location: Left arm   Patient Position: Sitting   Pulse: 72   SpO2: 99%   Weight: 61.1 kg (134 lb 9.6 oz)   Height: 167.6 cm (66\")   PainSc: 4        Estimated body mass index is 21.73 kg/m² as calculated from the following:    Height as of this encounter: 167.6 cm (66\").    Weight as of this encounter: 61.1 kg (134 lb 9.6 oz).    BMI is within normal parameters. No other follow-up for BMI required.           Does the patient have evidence of cognitive impairment? Yes  Lab Results   Component Value Date    TRIG 71 05/01/2025    HDL 71 (H) " 2025     (H) 2025    VLDL 13 2025                                                                                                Health  Risk Assessment    Smoking Status:  Social History     Tobacco Use   Smoking Status Never   Smokeless Tobacco Never   Tobacco Comments    no caffeine     Alcohol Consumption:  Social History     Substance and Sexual Activity   Alcohol Use No       Fall Risk Screen  LUPEADI Fall Risk Assessment was completed, and patient is at HIGH risk for falls. Assessment completed on:2025    Depression Screening   Little interest or pleasure in doing things? Not at all   Feeling down, depressed, or hopeless? Not at all   PHQ-2 Total Score 0      Health Habits and Functional and Cognitive Screenin/30/2025     9:35 AM   Functional & Cognitive Status   Do you have difficulty preparing food and eating? Yes   Do you have difficulty bathing yourself, getting dressed or grooming yourself? No   Do you have difficulty using the toilet? No   Do you have difficulty moving around from place to place? No   Do you have trouble with steps or getting out of a bed or a chair? No   Current Diet Well Balanced Diet   Dental Exam Up to date   Eye Exam Up to date   Exercise (times per week) 3 times per week   Current Exercises Include Aerobics;Other;Walking   Do you need help using the phone?  No   Are you deaf or do you have serious difficulty hearing?  No   Do you need help to go to places out of walking distance? Yes   Do you need help shopping? Yes   Do you need help preparing meals?  Yes   Do you need help with housework?  Yes   Do you need help with laundry? Yes   Do you need help taking your medications? Yes   Do you need help managing money? Yes   Do you ever drive or ride in a car without wearing a seat belt? No   Have you felt unusual stress, anger or loneliness in the last month? No   Who do you live with? Spouse   If you need help, do you have trouble finding someone  available to you? No   Have you been bothered in the last four weeks by sexual problems? No   Do you have difficulty concentrating, remembering or making decisions? Yes           Age-appropriate Screening Schedule:  Refer to the list below for future screening recommendations based on patient's age, sex and/or medical conditions. Orders for these recommended tests are listed in the plan section. The patient has been provided with a written plan.    Health Maintenance List  Health Maintenance   Topic Date Due    RSV Vaccine - Adults (1 - 1-dose 75+ series) Never done    ANNUAL WELLNESS VISIT  01/23/2025    DXA SCAN  07/31/2025    TDAP/TD VACCINES (2 - Tdap) 11/04/2025 (Originally 9/1/2019)    INFLUENZA VACCINE  07/01/2025    LIPID PANEL  05/01/2026    COLORECTAL CANCER SCREENING  11/08/2027    HEPATITIS C SCREENING  Completed    Pneumococcal Vaccine 50+  Completed    COVID-19 Vaccine  Discontinued    MAMMOGRAM  Discontinued    ZOSTER VACCINE  Discontinued                                                                                                                                                CMS Preventative Services Quick Reference  Risk Factors Identified During Encounter  Inactivity/Sedentary: Patient was advised to exercise at least 150 minutes a week per CDC recommendations.    The above risks/problems have been discussed with the patient.  Pertinent information has been shared with the patient in the After Visit Summary.  An After Visit Summary and PPPS were made available to the patient.    Follow Up:   Next Medicare Wellness visit to be scheduled in 1 year.         Additional E&M Note during same encounter follows:  Patient has additional, significant, and separately identifiable condition(s)/problem(s) that require work above and beyond the Medicare Wellness Visit     Chief Complaint  Medicare Wellness-subsequent and Hypothyroidism    Subjective   HPI                  Objective   Vital Signs:  /76  "(BP Location: Left arm, Patient Position: Sitting)   Pulse 72   Ht 167.6 cm (66\")   Wt 61.1 kg (134 lb 9.6 oz)   SpO2 99%   BMI 21.73 kg/m²   Physical Exam               Assessment and Plan      Hypothyroidism, unspecified type         Age-related osteoporosis without current pathological fracture         Medicare annual wellness visit, subsequent         Memory loss         CRI (chronic renal insufficiency), stage 3 (moderate)           Osteoporosis, unspecified osteoporosis type, unspecified pathological fracture presence    Orders:    DEXA Bone Density Axial; Future            Follow Up   No follow-ups on file.  Patient was given instructions and counseling regarding her condition or for health maintenance advice. Please see specific information pulled into the AVS if appropriate.    "

## 2025-05-21 ENCOUNTER — TELEPHONE (OUTPATIENT)
Dept: GASTROENTEROLOGY | Facility: CLINIC | Age: 75
End: 2025-05-21

## 2025-05-21 NOTE — TELEPHONE ENCOUNTER
Hub staff attempted to follow warm transfer process and was unsuccessful     Caller: Osgood,Michael    Relationship to patient: Emergency Contact    Best call back number: [phone]      Patient is needing: SPOUSE CALLING TO SAY THEY WILL BE THERE AT 9:30 FRIDAY 5-23-25 AS REQUESTED. PLEASE ADVISE.

## 2025-05-21 NOTE — PROGRESS NOTES
Chief Complaint   Patient presents with    Constipation    Abdominal Pain        History of Present Illness  Patient is a 74-year-old female, new to me, an established patient of our practice.  She was last seen by NAOMI Faustin on 5/10/2024 for history of fecal incontinence and constipation.  Progress Notes by Dania Sanchez APRN (05/10/2024 15:00)  (Copied text in this note has been reviewed, modified, and accurate as of 5/21/2025)    Fecal Incontinence and Constipation  She has been managing her constipation with natural prunes, which have been beneficial. However, she does not experience complete bowel movements. Increasing her prune intake results in slimy stools, although not diarrhea. She has a history of slow processing. A few weeks ago, she experienced significant abdominal bloating, which was not a daily occurrence. This prompted her current visit. She also reports occasional discomfort and intermittent pain. Her last bowel movement was characterized by the passage of small, short stools. She does not use MiraLAX. She reports no presence of blood in her stool. She experiences lower abdominal pain and cramping associated with constipation. She has previously used Fleet enema in preparation for a test. She has not yet started taking magnesium citrate as recommended by Dr. Newman.  - Onset: A few weeks ago, significant abdominal bloating prompted her current visit.  - Location: Lower abdominal pain and cramping.  - Duration: Occasional discomfort and intermittent pain.  - Character: Constipation managed with prunes, slimy stools with increased prune intake, small, short stools, no blood in stool.  - Alleviating/Aggravating Factors: Natural prunes beneficial, does not use MiraLAX, Fleet enema used previously, has not started magnesium citrate.  - Severity: Significant abdominal bloating, occasional discomfort and intermittent pain.    Hypothyroidism  She has hypothyroidism and is on a low dose of  "thyroid medication.    PAST SURGICAL HISTORY:  Colonoscopy on 09/14/2021 and 11/08/2024.  Anorectal manometry on 06/11/2024.    SOCIAL HISTORY  Marital Status:     FAMILY HISTORY  - Negative for colon cancer    Result Review :    5/1/2025  - Lipid (triglyceride 71, HDL 71, )  - CBC (hemoglobin 11.4)  - TSH normal  - CMP (normal LFT, ALK)    Colonoscopy (11/08/2024 10:42)   - Preparation of the colon was fair.   - There was significant looping of the colon.   - The entire examined colon is normal.   - Non-bleeding internal hemorrhoids.   - The examination was otherwise normal.   - No specimens collected.    Anorectal Manometry (06/11/2024)   - anorectal manometry shows weak anal sphincter with paradoxical contraction  - Defecating protogram shows weakness of the puborectalis muscle and small anterior rectocele  - Pelvic floor therapy was recommended based on this result     COLONOSCOPY (09/14021 09:33)   - Tortuous colon.  - Melanosis in the colon. Biopsied.  - Non-bleeding internal hemorrhoids.  - The examination was otherwise normal    Tissue Pathology Exam (09/14/2021 09:57)   - Melanosis coli.     Vital Signs:   /82   Pulse 63   Temp 97.3 °F (36.3 °C)   Ht 167.6 cm (66\")   Wt 61.6 kg (135 lb 12.8 oz)   SpO2 99%   BMI 21.92 kg/m²     Body mass index is 21.92 kg/m².     Physical Exam  Vitals reviewed.   Constitutional:       Appearance: Normal appearance.   Pulmonary:      Effort: Pulmonary effort is normal. No respiratory distress.   Abdominal:      General: Abdomen is flat. Bowel sounds are normal. There is distension.      Palpations: Abdomen is soft. There is no mass.      Tenderness: There is no abdominal tenderness. There is no guarding.      Comments: bloating   Musculoskeletal:         General: Normal range of motion.   Skin:     General: Skin is warm and dry.   Neurological:      General: No focal deficit present.      Mental Status: She is alert and oriented to person, place, and " time.   Psychiatric:         Mood and Affect: Mood normal.         Behavior: Behavior normal.         Thought Content: Thought content normal.         Judgment: Judgment normal.       Assessment and Plan    Diagnoses and all orders for this visit:    1. Incontinence of feces, unspecified fecal incontinence type (Primary)  -     XR Abdomen KUB    2. Constipation, unspecified constipation type  -     XR Abdomen KUB    3. Tortuous colon  -     XR Abdomen KUB    4. Hypothyroidism, unspecified type      Assessment & Plan  1. Constipation:  - Looping of the colon and weakened anal sphincter are anatomical, structural problem and her hypothyroidism condition, all of which are contributory factors for her constipation issues. Advised patient to continue prune juice and increase water intake to 64 ounces daily.  - Take a daily magnesium citrate pill (400 mg).  - Use MiraLAX consistently, starting with one scoop daily mixed with prune juice and water.  - If bowel movements remain irregularly hard/firm stool, increase to two scoops daily.  - If stools become loose, reduce to one scoop every other day.  - Take a daily fiber supplement.  - Order a KUB x-ray to assess stool accumulation in the colon.  - Consider escalation of treatment to prescriptive laxative medications as needed if the above failed.     2. Hypothyroidism:  - Currently on thyroid supplement, follow up with PCP  - Thyroid levels were normal as of 05/01/2025.    Follow-up: The patient will follow up in 10 to 12 weeks or sooner as needed.    Patient Instructions   - Proceed with KUB abdomen xray  - Continue on Miralax daily, increase to 2 times day as needed. If stool becomes too soft, either withhold it or take it every other day  - Add one fiber supplement daily    We recommend that you start a daily fiber supplement such as Metamucil, Benefiber, Citrucel  As well as consuming at least 20 to 30 g of dietary fiber per day.  This helps to keep stools soft, formed,  and easy transit throughout the colon to produce regular incomplete bowel movements.  When starting fiber regimen, I recommend a low dose and gradually increase to 1 tablespoon every 7 days as tolerated.  This will help your body acclimate to the higher fiber diet and reduce risk of unwanted side effects, which may include bloating, gas,, abdominal fullness, and cramping.  For example: Begin taking 1 tablespoon daily for at least 7 days, if this is not successful in producing regular bowel movements, you can begin taking 1 tablespoon twice daily, and finally if this is not successful after 2 tablespoons for 7 days, you can further increase to maximum recommended dosing of 1 tablespoon 3 times per day.  It is important to consume increased amounts of fluid throughout the day to help improve the effectiveness of fiber supplement  Avoid gamete formulations of fiber as this can further increase your risk of the above side effects due to artificial sweeteners in the Gummies.  Fiber supplements can take anywhere from 12 to 72 hours to work.  Make sure to increase free water intake between 6 to 12 ounces of water or noncarbonated beverage with fiber supplement.      EMR Dragon/Transcription Disclaimer:  This document has been Dictated utilizing Dragon dictation.     Patient or patient representative verbalized consent for the use of Ambient Listening during the visit with  NAOMI Jaramillo for chart documentation. 5/23/2025  09:47 EDT

## 2025-05-23 ENCOUNTER — OFFICE VISIT (OUTPATIENT)
Dept: GASTROENTEROLOGY | Facility: CLINIC | Age: 75
End: 2025-05-23
Payer: MEDICARE

## 2025-05-23 ENCOUNTER — HOSPITAL ENCOUNTER (OUTPATIENT)
Dept: GENERAL RADIOLOGY | Facility: HOSPITAL | Age: 75
Discharge: HOME | End: 2025-05-23
Payer: MEDICARE

## 2025-05-23 VITALS
TEMPERATURE: 97.3 F | SYSTOLIC BLOOD PRESSURE: 110 MMHG | OXYGEN SATURATION: 99 % | HEART RATE: 63 BPM | WEIGHT: 135.8 LBS | HEIGHT: 66 IN | BODY MASS INDEX: 21.83 KG/M2 | DIASTOLIC BLOOD PRESSURE: 82 MMHG

## 2025-05-23 DIAGNOSIS — K59.00 CONSTIPATION, UNSPECIFIED CONSTIPATION TYPE: ICD-10-CM

## 2025-05-23 DIAGNOSIS — Q43.8 TORTUOUS COLON: ICD-10-CM

## 2025-05-23 DIAGNOSIS — R15.9 INCONTINENCE OF FECES, UNSPECIFIED FECAL INCONTINENCE TYPE: Primary | ICD-10-CM

## 2025-05-23 DIAGNOSIS — E03.9 HYPOTHYROIDISM, UNSPECIFIED TYPE: ICD-10-CM

## 2025-05-23 PROCEDURE — 1160F RVW MEDS BY RX/DR IN RCRD: CPT | Performed by: NURSE PRACTITIONER

## 2025-05-23 PROCEDURE — 74018 RADEX ABDOMEN 1 VIEW: CPT

## 2025-05-23 PROCEDURE — 99214 OFFICE O/P EST MOD 30 MIN: CPT | Performed by: NURSE PRACTITIONER

## 2025-05-23 PROCEDURE — 1159F MED LIST DOCD IN RCRD: CPT | Performed by: NURSE PRACTITIONER

## 2025-05-23 NOTE — PATIENT INSTRUCTIONS
- Proceed with KUB abdomen xray  - Continue on Miralax daily, increase to 2 times day as needed. If stool becomes too soft, either withhold it or take it every other day  - Add one fiber supplement daily    We recommend that you start a daily fiber supplement such as Metamucil, Benefiber, Citrucel  As well as consuming at least 20 to 30 g of dietary fiber per day.  This helps to keep stools soft, formed, and easy transit throughout the colon to produce regular incomplete bowel movements.  When starting fiber regimen, I recommend a low dose and gradually increase to 1 tablespoon every 7 days as tolerated.  This will help your body acclimate to the higher fiber diet and reduce risk of unwanted side effects, which may include bloating, gas,, abdominal fullness, and cramping.  For example: Begin taking 1 tablespoon daily for at least 7 days, if this is not successful in producing regular bowel movements, you can begin taking 1 tablespoon twice daily, and finally if this is not successful after 2 tablespoons for 7 days, you can further increase to maximum recommended dosing of 1 tablespoon 3 times per day.  It is important to consume increased amounts of fluid throughout the day to help improve the effectiveness of fiber supplement  Avoid gamete formulations of fiber as this can further increase your risk of the above side effects due to artificial sweeteners in the Gummies.  Fiber supplements can take anywhere from 12 to 72 hours to work.  Make sure to increase free water intake between 6 to 12 ounces of water or noncarbonated beverage with fiber supplement.

## 2025-08-06 ENCOUNTER — HOSPITAL ENCOUNTER (OUTPATIENT)
Dept: BONE DENSITY | Facility: HOSPITAL | Age: 75
Discharge: HOME | End: 2025-08-06
Admitting: INTERNAL MEDICINE
Payer: MEDICARE

## 2025-08-06 DIAGNOSIS — M81.0 OSTEOPOROSIS, UNSPECIFIED OSTEOPOROSIS TYPE, UNSPECIFIED PATHOLOGICAL FRACTURE PRESENCE: ICD-10-CM

## 2025-08-06 PROCEDURE — 77080 DXA BONE DENSITY AXIAL: CPT

## 2025-08-27 ENCOUNTER — OFFICE VISIT (OUTPATIENT)
Dept: INTERNAL MEDICINE | Facility: CLINIC | Age: 75
End: 2025-08-27
Payer: MEDICARE

## 2025-08-27 VITALS
DIASTOLIC BLOOD PRESSURE: 78 MMHG | TEMPERATURE: 97.5 F | OXYGEN SATURATION: 99 % | BODY MASS INDEX: 21.28 KG/M2 | HEIGHT: 66 IN | SYSTOLIC BLOOD PRESSURE: 142 MMHG | WEIGHT: 132.4 LBS | HEART RATE: 69 BPM

## 2025-08-27 DIAGNOSIS — L23.9 ALLERGIC CONTACT DERMATITIS, UNSPECIFIED TRIGGER: Primary | ICD-10-CM

## 2025-08-27 PROCEDURE — 1125F AMNT PAIN NOTED PAIN PRSNT: CPT | Performed by: INTERNAL MEDICINE

## 2025-08-27 PROCEDURE — 99213 OFFICE O/P EST LOW 20 MIN: CPT | Performed by: INTERNAL MEDICINE

## 2025-08-27 RX ORDER — METHYLPREDNISOLONE 4 MG/1
TABLET ORAL
Qty: 21 TABLET | Refills: 0 | Status: SHIPPED | OUTPATIENT
Start: 2025-08-27

## (undated) DEVICE — FLEX ADVANTAGE 1500CC: Brand: FLEX ADVANTAGE

## (undated) DEVICE — Device

## (undated) DEVICE — CANN O2 ETCO2 FITS ALL CONN CO2 SMPL A/ 7IN DISP LF

## (undated) DEVICE — GOWN PROC ENDOARMOR GI LVL3 HY/SHLD UNIV

## (undated) DEVICE — KT ORCA ORCAPOD DISP STRL

## (undated) DEVICE — ADAPT CLN SCPE ENDO PORPOISE BX/50 DISP

## (undated) DEVICE — CANN NASL CO2 TRULINK W/O2 A/